# Patient Record
Sex: MALE | Race: BLACK OR AFRICAN AMERICAN | NOT HISPANIC OR LATINO
[De-identification: names, ages, dates, MRNs, and addresses within clinical notes are randomized per-mention and may not be internally consistent; named-entity substitution may affect disease eponyms.]

---

## 2017-10-31 ENCOUNTER — APPOINTMENT (OUTPATIENT)
Dept: INTERNAL MEDICINE | Facility: CLINIC | Age: 42
End: 2017-10-31
Payer: COMMERCIAL

## 2017-10-31 PROCEDURE — 99214 OFFICE O/P EST MOD 30 MIN: CPT

## 2018-01-04 ENCOUNTER — APPOINTMENT (OUTPATIENT)
Dept: INTERNAL MEDICINE | Facility: CLINIC | Age: 43
End: 2018-01-04

## 2018-01-26 ENCOUNTER — OUTPATIENT (OUTPATIENT)
Dept: OUTPATIENT SERVICES | Facility: HOSPITAL | Age: 43
LOS: 1 days | Discharge: HOME | End: 2018-01-26
Payer: COMMERCIAL

## 2018-01-26 DIAGNOSIS — C85.88 OTHER SPECIFIED TYPES OF NON-HODGKIN LYMPHOMA, LYMPH NODES OF MULTIPLE SITES: ICD-10-CM

## 2018-01-30 ENCOUNTER — RESULT REVIEW (OUTPATIENT)
Age: 43
End: 2018-01-30

## 2018-01-30 ENCOUNTER — APPOINTMENT (OUTPATIENT)
Dept: HEMATOLOGY ONCOLOGY | Facility: CLINIC | Age: 43
End: 2018-01-30
Payer: COMMERCIAL

## 2018-01-30 ENCOUNTER — LABORATORY RESULT (OUTPATIENT)
Age: 43
End: 2018-01-30

## 2018-01-30 VITALS
DIASTOLIC BLOOD PRESSURE: 100 MMHG | BODY MASS INDEX: 26.12 KG/M2 | HEIGHT: 68.98 IN | TEMPERATURE: 98.8 F | RESPIRATION RATE: 16 BRPM | OXYGEN SATURATION: 98 % | WEIGHT: 176.37 LBS | HEART RATE: 100 BPM | SYSTOLIC BLOOD PRESSURE: 150 MMHG

## 2018-01-30 LAB
BASOPHILS # BLD AUTO: 0.1 K/UL — SIGNIFICANT CHANGE UP (ref 0–0.2)
BASOPHILS NFR BLD AUTO: 0.7 % — SIGNIFICANT CHANGE UP (ref 0–2)
EOSINOPHIL # BLD AUTO: 0.2 K/UL — SIGNIFICANT CHANGE UP (ref 0–0.5)
EOSINOPHIL NFR BLD AUTO: 1.8 % — SIGNIFICANT CHANGE UP (ref 0–6)
HCT VFR BLD CALC: 43.7 % — SIGNIFICANT CHANGE UP (ref 39–50)
HGB BLD-MCNC: 15.1 G/DL — SIGNIFICANT CHANGE UP (ref 13–17)
LYMPHOCYTES # BLD AUTO: 4.1 K/UL — HIGH (ref 1–3.3)
LYMPHOCYTES # BLD AUTO: 49.8 % — HIGH (ref 13–44)
MCHC RBC-ENTMCNC: 30.1 PG — SIGNIFICANT CHANGE UP (ref 27–34)
MCHC RBC-ENTMCNC: 34.5 G/DL — SIGNIFICANT CHANGE UP (ref 32–36)
MCV RBC AUTO: 87.2 FL — SIGNIFICANT CHANGE UP (ref 80–100)
MONOCYTES # BLD AUTO: 0.6 K/UL — SIGNIFICANT CHANGE UP (ref 0–0.9)
MONOCYTES NFR BLD AUTO: 7.8 % — SIGNIFICANT CHANGE UP (ref 2–14)
NEUTROPHILS # BLD AUTO: 3.3 K/UL — SIGNIFICANT CHANGE UP (ref 1.8–7.4)
NEUTROPHILS NFR BLD AUTO: 39.9 % — LOW (ref 43–77)
PLATELET # BLD AUTO: 196 K/UL — SIGNIFICANT CHANGE UP (ref 150–400)
RBC # BLD: 5.01 M/UL — SIGNIFICANT CHANGE UP (ref 4.2–5.8)
RBC # FLD: 14.5 % — SIGNIFICANT CHANGE UP (ref 10.3–14.5)
WBC # BLD: 8.3 K/UL — SIGNIFICANT CHANGE UP (ref 3.8–10.5)
WBC # FLD AUTO: 8.3 K/UL — SIGNIFICANT CHANGE UP (ref 3.8–10.5)

## 2018-01-30 PROCEDURE — 99245 OFF/OP CONSLTJ NEW/EST HI 55: CPT | Mod: 25

## 2018-01-30 PROCEDURE — 38222 DX BONE MARROW BX & ASPIR: CPT | Mod: LT

## 2018-01-31 ENCOUNTER — APPOINTMENT (OUTPATIENT)
Dept: NUCLEAR MEDICINE | Facility: CLINIC | Age: 43
End: 2018-01-31
Payer: COMMERCIAL

## 2018-01-31 ENCOUNTER — RESULT REVIEW (OUTPATIENT)
Age: 43
End: 2018-01-31

## 2018-01-31 ENCOUNTER — OUTPATIENT (OUTPATIENT)
Dept: OUTPATIENT SERVICES | Facility: HOSPITAL | Age: 43
LOS: 1 days | End: 2018-01-31

## 2018-01-31 ENCOUNTER — APPOINTMENT (OUTPATIENT)
Dept: HEMATOLOGY ONCOLOGY | Facility: CLINIC | Age: 43
End: 2018-01-31
Payer: COMMERCIAL

## 2018-01-31 ENCOUNTER — OUTPATIENT (OUTPATIENT)
Dept: OUTPATIENT SERVICES | Facility: HOSPITAL | Age: 43
LOS: 1 days | End: 2018-01-31
Payer: COMMERCIAL

## 2018-01-31 VITALS
RESPIRATION RATE: 16 BRPM | TEMPERATURE: 97.9 F | OXYGEN SATURATION: 100 % | BODY MASS INDEX: 26.17 KG/M2 | HEART RATE: 90 BPM | SYSTOLIC BLOOD PRESSURE: 143 MMHG | DIASTOLIC BLOOD PRESSURE: 89 MMHG | WEIGHT: 177.11 LBS

## 2018-01-31 DIAGNOSIS — C85.88 OTHER SPECIFIED TYPES OF NON-HODGKIN LYMPHOMA, LYMPH NODES OF MULTIPLE SITES: ICD-10-CM

## 2018-01-31 DIAGNOSIS — C83.74: ICD-10-CM

## 2018-01-31 LAB
ALBUMIN SERPL ELPH-MCNC: 4.7 G/DL
ALP BLD-CCNC: 76 U/L
ALT SERPL-CCNC: 27 U/L
ANION GAP SERPL CALC-SCNC: 11 MMOL/L
APTT BLD: 28.2 SEC
AST SERPL-CCNC: 22 U/L
B2 MICROGLOB SERPL-MCNC: 2.5 MG/L
BILIRUB SERPL-MCNC: 0.2 MG/DL
BUN SERPL-MCNC: 13 MG/DL
CALCIUM SERPL-MCNC: 10.3 MG/DL
CHLORIDE SERPL-SCNC: 99 MMOL/L
CO2 SERPL-SCNC: 29 MMOL/L
CREAT SERPL-MCNC: 1.29 MG/DL
FIBRINOGEN PPP COAG.DERIVED-MCNC: 290 MG/DL
GLUCOSE SERPL-MCNC: 97 MG/DL
IGA SER QL IEP: 72 MG/DL
IGG SER QL IEP: 2170 MG/DL
IGM SER QL IEP: 717 MG/DL
INR PPP: 0.97 RATIO
LDH SERPL-CCNC: 212 U/L
PHOSPHATE SERPL-MCNC: 3.5 MG/DL
POTASSIUM SERPL-SCNC: 4.5 MMOL/L
PROT SERPL-MCNC: 8.9 G/DL
PT BLD: 11 SEC
SODIUM SERPL-SCNC: 139 MMOL/L
URATE SERPL-MCNC: 4 MG/DL

## 2018-01-31 PROCEDURE — 85097 BONE MARROW INTERPRETATION: CPT

## 2018-01-31 PROCEDURE — 78815 PET IMAGE W/CT SKULL-THIGH: CPT | Mod: 26,PI

## 2018-01-31 PROCEDURE — 38222 DX BONE MARROW BX & ASPIR: CPT | Mod: LT

## 2018-01-31 PROCEDURE — 88305 TISSUE EXAM BY PATHOLOGIST: CPT

## 2018-01-31 PROCEDURE — 88305 TISSUE EXAM BY PATHOLOGIST: CPT | Mod: 26

## 2018-01-31 PROCEDURE — 88185 FLOWCYTOMETRY/TC ADD-ON: CPT

## 2018-01-31 PROCEDURE — 88184 FLOWCYTOMETRY/ TC 1 MARKER: CPT

## 2018-01-31 PROCEDURE — 78472 GATED HEART PLANAR SINGLE: CPT | Mod: 26

## 2018-01-31 PROCEDURE — 88313 SPECIAL STAINS GROUP 2: CPT

## 2018-01-31 PROCEDURE — 88189 FLOWCYTOMETRY/READ 16 & >: CPT

## 2018-01-31 PROCEDURE — 88313 SPECIAL STAINS GROUP 2: CPT | Mod: 26

## 2018-02-05 LAB
HEMATOPATHOLOGY REPORT: SIGNIFICANT CHANGE UP
TM INTERPRETATION: SIGNIFICANT CHANGE UP

## 2018-02-07 ENCOUNTER — RESULT REVIEW (OUTPATIENT)
Age: 43
End: 2018-02-07

## 2018-02-07 PROCEDURE — 88321 CONSLTJ&REPRT SLD PREP ELSWR: CPT

## 2018-02-07 PROCEDURE — 88367 INSITU HYBRIDIZATION AUTO: CPT | Mod: 26,59

## 2018-02-07 PROCEDURE — 88360 TUMOR IMMUNOHISTOCHEM/MANUAL: CPT | Mod: 26,59

## 2018-02-07 PROCEDURE — 88365 INSITU HYBRIDIZATION (FISH): CPT | Mod: 26,59

## 2018-02-07 PROCEDURE — 88342 IMHCHEM/IMCYTCHM 1ST ANTB: CPT | Mod: 26,59

## 2018-02-07 PROCEDURE — 88341 IMHCHEM/IMCYTCHM EA ADD ANTB: CPT | Mod: 26,59

## 2018-02-11 ENCOUNTER — FORM ENCOUNTER (OUTPATIENT)
Age: 43
End: 2018-02-11

## 2018-02-12 ENCOUNTER — OUTPATIENT (OUTPATIENT)
Dept: OUTPATIENT SERVICES | Facility: HOSPITAL | Age: 43
LOS: 1 days | End: 2018-02-12
Payer: COMMERCIAL

## 2018-02-12 DIAGNOSIS — C85.10 UNSPECIFIED B-CELL LYMPHOMA, UNSPECIFIED SITE: ICD-10-CM

## 2018-02-12 PROCEDURE — 36561 INSERT TUNNELED CV CATH: CPT

## 2018-02-12 PROCEDURE — 76937 US GUIDE VASCULAR ACCESS: CPT

## 2018-02-12 PROCEDURE — 77001 FLUOROGUIDE FOR VEIN DEVICE: CPT | Mod: 26

## 2018-02-12 PROCEDURE — C1788: CPT

## 2018-02-12 PROCEDURE — 76937 US GUIDE VASCULAR ACCESS: CPT | Mod: 26

## 2018-02-12 PROCEDURE — C1894: CPT

## 2018-02-12 PROCEDURE — 77001 FLUOROGUIDE FOR VEIN DEVICE: CPT

## 2018-02-12 PROCEDURE — C1769: CPT

## 2018-02-14 DIAGNOSIS — Z45.2 ENCOUNTER FOR ADJUSTMENT AND MANAGEMENT OF VASCULAR ACCESS DEVICE: ICD-10-CM

## 2018-02-14 DIAGNOSIS — C85.10 UNSPECIFIED B-CELL LYMPHOMA, UNSPECIFIED SITE: ICD-10-CM

## 2018-02-22 ENCOUNTER — INPATIENT (INPATIENT)
Facility: HOSPITAL | Age: 43
LOS: 11 days | Discharge: HOME | DRG: 846 | End: 2018-03-06
Attending: INTERNAL MEDICINE | Admitting: INTERNAL MEDICINE
Payer: COMMERCIAL

## 2018-02-22 VITALS
RESPIRATION RATE: 18 BRPM | HEIGHT: 68 IN | DIASTOLIC BLOOD PRESSURE: 86 MMHG | SYSTOLIC BLOOD PRESSURE: 130 MMHG | WEIGHT: 176.15 LBS | TEMPERATURE: 98 F | HEART RATE: 105 BPM

## 2018-02-22 DIAGNOSIS — B20 HUMAN IMMUNODEFICIENCY VIRUS [HIV] DISEASE: ICD-10-CM

## 2018-02-22 DIAGNOSIS — C85.88 OTHER SPECIFIED TYPES OF NON-HODGKIN LYMPHOMA, LYMPH NODES OF MULTIPLE SITES: ICD-10-CM

## 2018-02-22 DIAGNOSIS — B99.9 UNSPECIFIED INFECTIOUS DISEASE: ICD-10-CM

## 2018-02-22 DIAGNOSIS — Z29.9 ENCOUNTER FOR PROPHYLACTIC MEASURES, UNSPECIFIED: ICD-10-CM

## 2018-02-22 DIAGNOSIS — C85.10 UNSPECIFIED B-CELL LYMPHOMA, UNSPECIFIED SITE: ICD-10-CM

## 2018-02-22 LAB
ALBUMIN SERPL ELPH-MCNC: 4.4 G/DL — SIGNIFICANT CHANGE UP (ref 3.3–5)
ALP SERPL-CCNC: 72 U/L — SIGNIFICANT CHANGE UP (ref 40–120)
ALT FLD-CCNC: 38 U/L RC — SIGNIFICANT CHANGE UP (ref 10–45)
ANION GAP SERPL CALC-SCNC: 11 MMOL/L — SIGNIFICANT CHANGE UP (ref 5–17)
APTT BLD: 32 SEC — SIGNIFICANT CHANGE UP (ref 27.5–37.4)
AST SERPL-CCNC: 22 U/L — SIGNIFICANT CHANGE UP (ref 10–40)
BASOPHILS # BLD AUTO: 0.1 K/UL — SIGNIFICANT CHANGE UP (ref 0–0.2)
BASOPHILS NFR BLD AUTO: 1 % — SIGNIFICANT CHANGE UP (ref 0–2)
BILIRUB SERPL-MCNC: 0.4 MG/DL — SIGNIFICANT CHANGE UP (ref 0.2–1.2)
BLD GP AB SCN SERPL QL: NEGATIVE — SIGNIFICANT CHANGE UP
BUN SERPL-MCNC: 11 MG/DL — SIGNIFICANT CHANGE UP (ref 7–23)
CALCIUM SERPL-MCNC: 9.5 MG/DL — SIGNIFICANT CHANGE UP (ref 8.4–10.5)
CHLORIDE SERPL-SCNC: 99 MMOL/L — SIGNIFICANT CHANGE UP (ref 96–108)
CO2 SERPL-SCNC: 26 MMOL/L — SIGNIFICANT CHANGE UP (ref 22–31)
CREAT SERPL-MCNC: 0.98 MG/DL — SIGNIFICANT CHANGE UP (ref 0.5–1.3)
D DIMER BLD IA.RAPID-MCNC: <150 NG/ML DDU — SIGNIFICANT CHANGE UP
EOSINOPHIL # BLD AUTO: 0.1 K/UL — SIGNIFICANT CHANGE UP (ref 0–0.5)
EOSINOPHIL NFR BLD AUTO: 0.9 % — SIGNIFICANT CHANGE UP (ref 0–6)
FIBRINOGEN PPP-MCNC: 288 MG/DL — LOW (ref 310–510)
GLUCOSE SERPL-MCNC: 95 MG/DL — SIGNIFICANT CHANGE UP (ref 70–99)
HCT VFR BLD CALC: 46 % — SIGNIFICANT CHANGE UP (ref 39–50)
HGB BLD-MCNC: 14.6 G/DL — SIGNIFICANT CHANGE UP (ref 13–17)
INR BLD: 1 RATIO — SIGNIFICANT CHANGE UP (ref 0.88–1.16)
LDH SERPL L TO P-CCNC: 193 U/L — SIGNIFICANT CHANGE UP (ref 50–242)
LYMPHOCYTES # BLD AUTO: 2.7 K/UL — SIGNIFICANT CHANGE UP (ref 1–3.3)
LYMPHOCYTES # BLD AUTO: 44.7 % — HIGH (ref 13–44)
MAGNESIUM SERPL-MCNC: 1.9 MG/DL — SIGNIFICANT CHANGE UP (ref 1.6–2.6)
MCHC RBC-ENTMCNC: 28.4 PG — SIGNIFICANT CHANGE UP (ref 27–34)
MCHC RBC-ENTMCNC: 31.7 GM/DL — LOW (ref 32–36)
MCV RBC AUTO: 89.5 FL — SIGNIFICANT CHANGE UP (ref 80–100)
MONOCYTES # BLD AUTO: 0.5 K/UL — SIGNIFICANT CHANGE UP (ref 0–0.9)
MONOCYTES NFR BLD AUTO: 8.6 % — SIGNIFICANT CHANGE UP (ref 2–14)
NEUTROPHILS # BLD AUTO: 2.7 K/UL — SIGNIFICANT CHANGE UP (ref 1.8–7.4)
NEUTROPHILS NFR BLD AUTO: 44.7 % — SIGNIFICANT CHANGE UP (ref 43–77)
PHOSPHATE SERPL-MCNC: 3.1 MG/DL — SIGNIFICANT CHANGE UP (ref 2.5–4.5)
PLATELET # BLD AUTO: 215 K/UL — SIGNIFICANT CHANGE UP (ref 150–400)
POTASSIUM SERPL-MCNC: 3.9 MMOL/L — SIGNIFICANT CHANGE UP (ref 3.5–5.3)
POTASSIUM SERPL-SCNC: 3.9 MMOL/L — SIGNIFICANT CHANGE UP (ref 3.5–5.3)
PROT SERPL-MCNC: 9.2 G/DL — HIGH (ref 6–8.3)
PROTHROM AB SERPL-ACNC: 10.9 SEC — SIGNIFICANT CHANGE UP (ref 9.8–12.7)
RBC # BLD: 5.14 M/UL — SIGNIFICANT CHANGE UP (ref 4.2–5.8)
RBC # FLD: 14 % — SIGNIFICANT CHANGE UP (ref 10.3–14.5)
RH IG SCN BLD-IMP: POSITIVE — SIGNIFICANT CHANGE UP
SODIUM SERPL-SCNC: 136 MMOL/L — SIGNIFICANT CHANGE UP (ref 135–145)
URATE SERPL-MCNC: 5.4 MG/DL — SIGNIFICANT CHANGE UP (ref 3.4–8.8)
WBC # BLD: 6.1 K/UL — SIGNIFICANT CHANGE UP (ref 3.8–10.5)
WBC # FLD AUTO: 6.1 K/UL — SIGNIFICANT CHANGE UP (ref 3.8–10.5)

## 2018-02-22 RX ORDER — ALLOPURINOL 300 MG
300 TABLET ORAL DAILY
Qty: 0 | Refills: 0 | Status: DISCONTINUED | OUTPATIENT
Start: 2018-02-22 | End: 2018-03-06

## 2018-02-22 RX ORDER — INFLUENZA VIRUS VACCINE 15; 15; 15; 15 UG/.5ML; UG/.5ML; UG/.5ML; UG/.5ML
0.5 SUSPENSION INTRAMUSCULAR ONCE
Qty: 0 | Refills: 0 | Status: COMPLETED | OUTPATIENT
Start: 2018-02-22 | End: 2018-02-22

## 2018-02-22 RX ORDER — ABACAVIR 20 MG/ML
600 SOLUTION ORAL DAILY
Qty: 0 | Refills: 0 | Status: DISCONTINUED | OUTPATIENT
Start: 2018-02-22 | End: 2018-02-22

## 2018-02-22 RX ORDER — ACETAMINOPHEN 500 MG
650 TABLET ORAL ONCE
Qty: 0 | Refills: 0 | Status: COMPLETED | OUTPATIENT
Start: 2018-02-22 | End: 2018-02-22

## 2018-02-22 RX ORDER — HYDROCORTISONE 20 MG
100 TABLET ORAL ONCE
Qty: 0 | Refills: 0 | Status: DISCONTINUED | OUTPATIENT
Start: 2018-02-22 | End: 2018-03-06

## 2018-02-22 RX ORDER — ENOXAPARIN SODIUM 100 MG/ML
40 INJECTION SUBCUTANEOUS DAILY
Qty: 0 | Refills: 0 | Status: DISCONTINUED | OUTPATIENT
Start: 2018-02-22 | End: 2018-02-23

## 2018-02-22 RX ORDER — ACYCLOVIR SODIUM 500 MG
400 VIAL (EA) INTRAVENOUS THREE TIMES A DAY
Qty: 0 | Refills: 0 | Status: DISCONTINUED | OUTPATIENT
Start: 2018-02-22 | End: 2018-03-06

## 2018-02-22 RX ORDER — RITUXIMAB 10 MG/ML
728 INJECTION, SOLUTION INTRAVENOUS ONCE
Qty: 0 | Refills: 0 | Status: COMPLETED | OUTPATIENT
Start: 2018-02-22 | End: 2018-02-26

## 2018-02-22 RX ORDER — DIPHENHYDRAMINE HCL 50 MG
50 CAPSULE ORAL ONCE
Qty: 0 | Refills: 0 | Status: COMPLETED | OUTPATIENT
Start: 2018-02-22 | End: 2018-02-22

## 2018-02-22 RX ORDER — LAMIVUDINE 150 MG
300 TABLET ORAL DAILY
Qty: 0 | Refills: 0 | Status: DISCONTINUED | OUTPATIENT
Start: 2018-02-22 | End: 2018-02-22

## 2018-02-22 RX ORDER — DIPHENHYDRAMINE HCL 50 MG
50 CAPSULE ORAL ONCE
Qty: 0 | Refills: 0 | Status: COMPLETED | OUTPATIENT
Start: 2018-02-22 | End: 2018-02-23

## 2018-02-22 RX ADMIN — Medication 50 MILLIGRAM(S): at 15:30

## 2018-02-22 RX ADMIN — Medication 400 MILLIGRAM(S): at 21:17

## 2018-02-22 RX ADMIN — Medication 650 MILLIGRAM(S): at 15:30

## 2018-02-22 RX ADMIN — Medication 300 MILLIGRAM(S): at 18:11

## 2018-02-22 NOTE — H&P ADULT - ASSESSMENT
Patient is a 41-year-old male who is HIV-positive and has been diagnosed with a high-grade B-cell, CD10+ lymphoma admitted for cycle 1 DA-R-EPOCH.

## 2018-02-22 NOTE — PATIENT PROFILE ADULT. - LANGUAGE ASSISTANCE NEEDED
No-Patient/Caregiver offered and refused free interpretation services./Analy at bedside to translate

## 2018-02-22 NOTE — ADVANCED PRACTICE NURSE CONSULT - ASSESSMENT
Pt admitted for chemotherapy oriented to unit routine alert and oriented times four. Pt ambulate to bathroom with steady gait no distress noted. Mediport to right chest wall access by primary nurse, remain  with + blood return and flush easily. Chemotherapy teaching done with help of  pt verbalized understanding, also given drug card which outline side effect. Pt lab result was reviewed by Dr Cortes. Pt was pre-medicated with Tylenol 650 mg PO, and Benadryl 50 mg ivss. Drug verification done with primary nurse. At 1615 start Rituxan 375 mg/m2= 728 mg iv to run over 4-6 hours. Pt was monitor during the initial hour vital sign taken and charted no reaction noted. At 1930 pt complain of itching generalized  stp Rituxan and notified NP, order Benadryl repeated dose done. At 2015 will resume infusion pt stated felt better. Report given to primary nurse. Left pt in bed sleeping. Pt admitted for chemotherapy oriented to unit routine alert and oriented times four. Pt ambulate to bathroom with steady gait no distress noted. Mediport to right chest wall access by primary nurse, remain  with + blood return and flush easily. Chemotherapy teaching done with help of  pt verbalized understanding, also given drug card which outline side effect. Pt lab result was reviewed by Dr Cortes. Pt was pre-medicated with Tylenol 650 mg PO, and Benadryl 50 mg ivss. Drug verification done with primary nurse. At 1615 start Rituxan 375 mg/m2= 728 mg iv to run over 4-6 hours. Pt was monitor during the initial hour vital sign taken and charted no reaction noted. At 1930 pt complain of itching generalized  stp Rituxan and notified NP, order Benadryl repeated dose done. At 2015 resume infusion pt stated felt better. Report given to primary nurse. Left pt in bed with visitors at bedside.

## 2018-02-22 NOTE — H&P ADULT - PROBLEM SELECTOR PLAN 1
Admit to [facility]  Start R-EPOCH  LP with IT MTX 2/22, follow up flow results  CBC w diff daily  CMP, TLS labs, coags daily  Strict I&O's, daily weight, mouth care Admit to [facility]  Start R-EPOCH  LP with IT MTX 2/23, follow up flow results  CBC w diff daily, transfuse prn  CMP, replenish prn  TLS labs, coags daily  continue Allopurinol  Strict I&O's, daily weight, mouth care  Right Chest Mediport may access

## 2018-02-23 ENCOUNTER — OUTPATIENT (OUTPATIENT)
Dept: OUTPATIENT SERVICES | Facility: HOSPITAL | Age: 43
LOS: 1 days | Discharge: HOME | End: 2018-02-23

## 2018-02-23 ENCOUNTER — TRANSCRIPTION ENCOUNTER (OUTPATIENT)
Age: 43
End: 2018-02-23

## 2018-02-23 DIAGNOSIS — C85.88 OTHER SPECIFIED TYPES OF NON-HODGKIN LYMPHOMA, LYMPH NODES OF MULTIPLE SITES: ICD-10-CM

## 2018-02-23 LAB
ALBUMIN SERPL ELPH-MCNC: 3.8 G/DL — SIGNIFICANT CHANGE UP (ref 3.3–5)
ALP SERPL-CCNC: 66 U/L — SIGNIFICANT CHANGE UP (ref 40–120)
ALT FLD-CCNC: 39 U/L RC — SIGNIFICANT CHANGE UP (ref 10–45)
ANION GAP SERPL CALC-SCNC: 11 MMOL/L — SIGNIFICANT CHANGE UP (ref 5–17)
APPEARANCE CSF: CLEAR — SIGNIFICANT CHANGE UP
APPEARANCE SPUN FLD: COLORLESS — SIGNIFICANT CHANGE UP
AST SERPL-CCNC: 27 U/L — SIGNIFICANT CHANGE UP (ref 10–40)
BASOPHILS # BLD AUTO: 0 K/UL — SIGNIFICANT CHANGE UP (ref 0–0.2)
BASOPHILS NFR BLD AUTO: 0.4 % — SIGNIFICANT CHANGE UP (ref 0–2)
BILIRUB SERPL-MCNC: 0.3 MG/DL — SIGNIFICANT CHANGE UP (ref 0.2–1.2)
BUN SERPL-MCNC: 9 MG/DL — SIGNIFICANT CHANGE UP (ref 7–23)
CALCIUM SERPL-MCNC: 8.9 MG/DL — SIGNIFICANT CHANGE UP (ref 8.4–10.5)
CHLORIDE SERPL-SCNC: 100 MMOL/L — SIGNIFICANT CHANGE UP (ref 96–108)
CO2 SERPL-SCNC: 24 MMOL/L — SIGNIFICANT CHANGE UP (ref 22–31)
COLOR CSF: SIGNIFICANT CHANGE UP
CREAT SERPL-MCNC: 1.02 MG/DL — SIGNIFICANT CHANGE UP (ref 0.5–1.3)
EOSINOPHIL # BLD AUTO: 0.1 K/UL — SIGNIFICANT CHANGE UP (ref 0–0.5)
EOSINOPHIL NFR BLD AUTO: 2.4 % — SIGNIFICANT CHANGE UP (ref 0–6)
GLUCOSE CSF-MCNC: 65 MG/DL — SIGNIFICANT CHANGE UP (ref 40–70)
GLUCOSE SERPL-MCNC: 110 MG/DL — HIGH (ref 70–99)
HCT VFR BLD CALC: 44.8 % — SIGNIFICANT CHANGE UP (ref 39–50)
HGB BLD-MCNC: 14.2 G/DL — SIGNIFICANT CHANGE UP (ref 13–17)
LDH CSF L TO P-CCNC: 19 U/L — SIGNIFICANT CHANGE UP
LDH FLD-CCNC: 19 U/L — SIGNIFICANT CHANGE UP
LDH SERPL L TO P-CCNC: 288 U/L — HIGH (ref 50–242)
LYMPHOCYTES # BLD AUTO: 0.6 K/UL — LOW (ref 1–3.3)
LYMPHOCYTES # BLD AUTO: 11 % — LOW (ref 13–44)
LYMPHOCYTES # CSF: 99 % — HIGH (ref 40–80)
MAGNESIUM SERPL-MCNC: 1.7 MG/DL — SIGNIFICANT CHANGE UP (ref 1.6–2.6)
MCHC RBC-ENTMCNC: 28.2 PG — SIGNIFICANT CHANGE UP (ref 27–34)
MCHC RBC-ENTMCNC: 31.6 GM/DL — LOW (ref 32–36)
MCV RBC AUTO: 89.1 FL — SIGNIFICANT CHANGE UP (ref 80–100)
MONOCYTES # BLD AUTO: 0.1 K/UL — SIGNIFICANT CHANGE UP (ref 0–0.9)
MONOCYTES NFR BLD AUTO: 2.5 % — SIGNIFICANT CHANGE UP (ref 2–14)
MONOS+MACROS NFR CSF: 1 % — LOW (ref 15–45)
NEUTROPHILS # BLD AUTO: 4.4 K/UL — SIGNIFICANT CHANGE UP (ref 1.8–7.4)
NEUTROPHILS # CSF: 0 % — SIGNIFICANT CHANGE UP (ref 0–6)
NEUTROPHILS NFR BLD AUTO: 83.7 % — HIGH (ref 43–77)
NRBC NFR CSF: 4 /UL — SIGNIFICANT CHANGE UP (ref 0–5)
PHOSPHATE SERPL-MCNC: 2.5 MG/DL — SIGNIFICANT CHANGE UP (ref 2.5–4.5)
PLATELET # BLD AUTO: 205 K/UL — SIGNIFICANT CHANGE UP (ref 150–400)
POTASSIUM SERPL-MCNC: 4.1 MMOL/L — SIGNIFICANT CHANGE UP (ref 3.5–5.3)
POTASSIUM SERPL-SCNC: 4.1 MMOL/L — SIGNIFICANT CHANGE UP (ref 3.5–5.3)
PROT CSF-MCNC: 42 MG/DL — SIGNIFICANT CHANGE UP (ref 15–45)
PROT SERPL-MCNC: 8.1 G/DL — SIGNIFICANT CHANGE UP (ref 6–8.3)
RBC # BLD: 5.03 M/UL — SIGNIFICANT CHANGE UP (ref 4.2–5.8)
RBC # CSF: 0 /UL — SIGNIFICANT CHANGE UP (ref 0–0)
RBC # FLD: 14 % — SIGNIFICANT CHANGE UP (ref 10.3–14.5)
SODIUM SERPL-SCNC: 135 MMOL/L — SIGNIFICANT CHANGE UP (ref 135–145)
TUBE TYPE: SIGNIFICANT CHANGE UP
URATE SERPL-MCNC: 4.1 MG/DL — SIGNIFICANT CHANGE UP (ref 3.4–8.8)
WBC # BLD: 5.3 K/UL — SIGNIFICANT CHANGE UP (ref 3.8–10.5)
WBC # FLD AUTO: 5.3 K/UL — SIGNIFICANT CHANGE UP (ref 3.8–10.5)

## 2018-02-23 PROCEDURE — 88188 FLOWCYTOMETRY/READ 9-15: CPT | Mod: 59

## 2018-02-23 PROCEDURE — 99232 SBSQ HOSP IP/OBS MODERATE 35: CPT | Mod: GC

## 2018-02-23 PROCEDURE — 71045 X-RAY EXAM CHEST 1 VIEW: CPT | Mod: 26

## 2018-02-23 PROCEDURE — 88108 CYTOPATH CONCENTRATE TECH: CPT | Mod: 26,59

## 2018-02-23 PROCEDURE — 77003 FLUOROGUIDE FOR SPINE INJECT: CPT | Mod: 26

## 2018-02-23 PROCEDURE — 93010 ELECTROCARDIOGRAM REPORT: CPT

## 2018-02-23 PROCEDURE — 62272 THER SPI PNXR DRG CSF: CPT

## 2018-02-23 RX ORDER — SODIUM CHLORIDE 9 MG/ML
500 INJECTION INTRAMUSCULAR; INTRAVENOUS; SUBCUTANEOUS ONCE
Qty: 0 | Refills: 0 | Status: COMPLETED | OUTPATIENT
Start: 2018-02-23 | End: 2018-02-23

## 2018-02-23 RX ORDER — METHOTREXATE 2.5 MG/1
12 TABLET ORAL ONCE
Qty: 0 | Refills: 0 | Status: COMPLETED | OUTPATIENT
Start: 2018-02-23 | End: 2018-02-26

## 2018-02-23 RX ORDER — METOCLOPRAMIDE HCL 10 MG
10 TABLET ORAL EVERY 6 HOURS
Qty: 0 | Refills: 0 | Status: DISCONTINUED | OUTPATIENT
Start: 2018-02-23 | End: 2018-02-23

## 2018-02-23 RX ORDER — ONDANSETRON 8 MG/1
8 TABLET, FILM COATED ORAL EVERY 8 HOURS
Qty: 0 | Refills: 0 | Status: COMPLETED | OUTPATIENT
Start: 2018-02-23 | End: 2018-02-28

## 2018-02-23 RX ORDER — DOXORUBICIN HYDROCHLORIDE 2 MG/ML
19 INJECTION, SOLUTION INTRAVENOUS DAILY
Qty: 0 | Refills: 0 | Status: COMPLETED | OUTPATIENT
Start: 2018-02-23 | End: 2018-02-27

## 2018-02-23 RX ORDER — ACYCLOVIR SODIUM 500 MG
1 VIAL (EA) INTRAVENOUS
Qty: 0 | Refills: 0 | COMMUNITY
Start: 2018-02-23

## 2018-02-23 RX ORDER — ONDANSETRON 8 MG/1
8 TABLET, FILM COATED ORAL EVERY 8 HOURS
Qty: 0 | Refills: 0 | Status: DISCONTINUED | OUTPATIENT
Start: 2018-02-23 | End: 2018-02-23

## 2018-02-23 RX ORDER — FOSAPREPITANT DIMEGLUMINE 150 MG/5ML
150 INJECTION, POWDER, LYOPHILIZED, FOR SOLUTION INTRAVENOUS ONCE
Qty: 0 | Refills: 0 | Status: COMPLETED | OUTPATIENT
Start: 2018-02-23 | End: 2018-02-23

## 2018-02-23 RX ORDER — METOCLOPRAMIDE HCL 10 MG
10 TABLET ORAL EVERY 6 HOURS
Qty: 0 | Refills: 0 | Status: DISCONTINUED | OUTPATIENT
Start: 2018-02-23 | End: 2018-03-06

## 2018-02-23 RX ORDER — ETOPOSIDE 20 MG/ML
97 VIAL (ML) INTRAVENOUS DAILY
Qty: 0 | Refills: 0 | Status: COMPLETED | OUTPATIENT
Start: 2018-02-23 | End: 2018-02-27

## 2018-02-23 RX ORDER — ACETAMINOPHEN 500 MG
650 TABLET ORAL EVERY 6 HOURS
Qty: 0 | Refills: 0 | Status: DISCONTINUED | OUTPATIENT
Start: 2018-02-23 | End: 2018-03-06

## 2018-02-23 RX ADMIN — Medication 50 MILLIGRAM(S): at 00:10

## 2018-02-23 RX ADMIN — Medication 1 TABLET(S): at 11:26

## 2018-02-23 RX ADMIN — ONDANSETRON 8 MILLIGRAM(S): 8 TABLET, FILM COATED ORAL at 21:21

## 2018-02-23 RX ADMIN — SODIUM CHLORIDE 500 MILLILITER(S): 9 INJECTION INTRAMUSCULAR; INTRAVENOUS; SUBCUTANEOUS at 23:34

## 2018-02-23 RX ADMIN — FOSAPREPITANT DIMEGLUMINE 300 MILLIGRAM(S): 150 INJECTION, POWDER, LYOPHILIZED, FOR SOLUTION INTRAVENOUS at 17:30

## 2018-02-23 RX ADMIN — Medication 400 MILLIGRAM(S): at 05:28

## 2018-02-23 RX ADMIN — Medication 115 MILLIGRAM(S): at 18:44

## 2018-02-23 RX ADMIN — Medication 650 MILLIGRAM(S): at 22:15

## 2018-02-23 RX ADMIN — Medication 400 MILLIGRAM(S): at 21:21

## 2018-02-23 RX ADMIN — Medication 300 MILLIGRAM(S): at 11:26

## 2018-02-23 NOTE — DISCHARGE NOTE ADULT - PATIENT PORTAL LINK FT
You can access the [facility] Portal, offered by [facility], by registering with the following website: [email or web address]

## 2018-02-23 NOTE — PROGRESS NOTE ADULT - ATTENDING COMMENTS
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 On Bactrim/Acyclovir prophylaxis. ID consult called

## 2018-02-23 NOTE — PROGRESS NOTE ADULT - SUBJECTIVE AND OBJECTIVE BOX
CLINICAL INDICATION:     Patient presents for fluoroscopically guided lumbar puncture and intrathecal chemotherapy administration.      Risks and benefits were discussed with the patient and/or patient health care proxy.  Risks discussed included bleeding, infection, nerve damage, and headache.       Status post lumbar puncture at the L2-L3 level utilizing a 22G spinal needle.      6cc of clear cerebral spinal fluid was obtained.    12mg of methotrexate was intrathecally injected.      No immediate complications.

## 2018-02-23 NOTE — DISCHARGE NOTE ADULT - PROVIDER TOKENS
TOKEN:'[id number]' TOKEN:'[id number]',TOKEN:'[id number]' TOKEN:'[id number]',TOKEN:'[id number]',TOKEN:'[id number]'

## 2018-02-23 NOTE — DISCHARGE NOTE ADULT - CARE PROVIDERS DIRECT ADDRESSES
,[email or web address],DirectAddress_Unknown

## 2018-02-23 NOTE — ADVANCED PRACTICE NURSE CONSULT - ASSESSMENT
Labs today WBC 5.3 HGB 14.2 HCT 44.8 PLTS 25 CR. 1.02 TBILI. 0.3 Pt found in bed alert and oriented x4, v/s stable afebrile, n/c offered.  Right chest wall mediport in place. Site without redness or swelling. Lumen previously accessed with + blood return flushes well with NS. Pt premedicated with  Zofran 8 mg and emend 150mg  IVSS prior to chemotherapy. Chemotherapy verified by 2 RNs prior to administration. at 1800 treated with doxorubicin 10mg/m2=19mg,etoposide 50mg/m2= 97mg and vincristine 0.4mg/m2= 0.8mg civi. Pt remains in bed with n/c offered. Primary RN aware of treatment plan.

## 2018-02-23 NOTE — DISCHARGE NOTE ADULT - MEDICATION SUMMARY - MEDICATIONS TO TAKE
I will START or STAY ON the medications listed below when I get home from the hospital:    predniSONE 5 mg oral tablet  -- 23 tab(s) by mouth 2 times a day, one dose tonite 2/27 and one dose on 2/28 am dose and then stop.  -- Indication: For anti-inflammatory    Reglan 10 mg oral tablet  -- 1 tab(s) by mouth every 6 hours, As Needed  -- Indication: For anti-nausea    allopurinol 300 mg oral tablet  -- 1 tab(s) by mouth once a day x2 days and then stop.  -- Indication: For tumor lysis    Triumeq oral tablet  -- 1 tab(s) by mouth once a day  -- Indication: For anti-retroviral    acyclovir 400 mg oral tablet  -- 1 tab(s) by mouth 3 times a day  -- Indication: For anti-viral    sulfamethoxazole-trimethoprim 800 mg-160 mg oral tablet  -- 1 tab(s) by mouth Monday, Wednesday, and Friday  -- Indication: For Prophylactic measure I will START or STAY ON the medications listed below when I get home from the hospital:    Reglan 10 mg oral tablet  -- 1 tab(s) by mouth every 6 hours, As Needed  -- Indication: For anti-nausea    allopurinol 300 mg oral tablet  -- 1 tab(s) by mouth once a day x2 days and then stop.  -- Indication: For tumor lysis    Triumeq oral tablet  -- 1 tab(s) by mouth once a day  -- Indication: For HIV (human immunodeficiency virus infection)    acyclovir 400 mg oral tablet  -- 1 tab(s) by mouth 3 times a day  -- Indication: For Prophylaxis    sulfamethoxazole-trimethoprim 800 mg-160 mg oral tablet  -- 1 tab(s) by mouth Monday, Wednesday, and Friday  -- Indication: For Prophylaxis I will START or STAY ON the medications listed below when I get home from the hospital:    Reglan 10 mg oral tablet  -- 1 tab(s) by mouth every 6 hours, As Needed  -- Indication: For anti-nausea    acyclovir 400 mg oral tablet  -- 1 tab(s) by mouth 3 times a day  -- Indication: For Prophylaxis    Triumeq oral tablet  -- 1 tab(s) by mouth once a day  -- Indication: For HIV (human immunodeficiency virus infection)    sulfamethoxazole-trimethoprim 800 mg-160 mg oral tablet  -- 1 tab(s) by mouth Monday, Wednesday, and Friday  -- Indication: For Prophylaxis I will START or STAY ON the medications listed below when I get home from the hospital:    Reglan 10 mg oral tablet  -- 1 tab(s) by mouth every 6 hours, As Needed  -- Indication: For anti-nausea    acyclovir 400 mg oral tablet  -- 1 tab(s) by mouth 3 times a day  -- Indication: For Prophylaxis    Triumeq oral tablet  -- 1 tab(s) by mouth once a day  -- Indication: For HIV (human immunodeficiency virus infection)    Mepron 750 mg/5 mL oral suspension  -- 5 milliliter(s) by mouth 2 times a day   -- Finish all this medication unless otherwise directed by prescriber.  Shake well before use.  Take with food or milk.    -- Indication: For Prophylactic measure    sulfamethoxazole-trimethoprim 800 mg-160 mg oral tablet  -- 1 tab(s) by mouth Monday, Wednesday, and Friday  -- Indication: For Prophylactic measure I will START or STAY ON the medications listed below when I get home from the hospital:    Reglan 10 mg oral tablet  -- 1 tab(s) by mouth every 6 hours, As Needed  -- Indication: For anti-nausea    acyclovir 400 mg oral tablet  -- 1 tab(s) by mouth 3 times a day  -- Indication: For Prophylaxis    Triumeq oral tablet  -- 1 tab(s) by mouth once a day  -- Indication: For HIV (human immunodeficiency virus infection)    Mepron 750 mg/5 mL oral suspension  -- 5 milliliter(s) by mouth 2 times a day   -- Finish all this medication unless otherwise directed by prescriber.  Shake well before use.  Take with food or milk.    -- Indication: For Prophylactic measure

## 2018-02-23 NOTE — PROGRESS NOTE ADULT - PROBLEM SELECTOR PLAN 1
Start DA- R-EPOCH  LP with IT MTX 2/23, follow up flow results  CBC w diff daily, transfuse prn  CMP, replenish prn  TLS labs, coags daily  continue Allopurinol  Strict I&O's, daily weight, mouth care  Right Chest Mediport may access

## 2018-02-23 NOTE — DISCHARGE NOTE ADULT - PLAN OF CARE
maintain counts, remain free from infection Notify MD and report to ER for any temperature greater than or equal to 100.4 degrees, intractable nausea, vomiting, diarrhea, or uncontrolled bleeding. maintain CD4 counts, keep viral load down continue Triumeq as prescribed, may need to call specialty pharmacy to arrange delivery continue Triumeq as prescribed, follow up with Infectious disease clininc and/or Dr. Craft continue Triumeq as prescribed, follow up with Infectious disease clinic and/or Dr. Craft continue Triumeq as prescribed, follow up with Infectious disease as directed in care provider section below continue Triumeq as prescribed, follow up with Infectious disease as directed in care provider section below. Follow up in ID clinic (Dr. Berry) by calling [phone] Pickup medication at [facility] and continue Triumeq as prescribed, follow up with Infectious disease as directed in care provider section below. Follow up in ID clinic (Dr. Berry) by calling [phone]

## 2018-02-23 NOTE — DISCHARGE NOTE ADULT - CARE PLAN
Principal Discharge DX:	High grade B-cell lymphoma  Goal:	maintain counts, remain free from infection  Assessment and plan of treatment:	Notify MD and report to ER for any temperature greater than or equal to 100.4 degrees, intractable nausea, vomiting, diarrhea, or uncontrolled bleeding.  Secondary Diagnosis:	HIV (human immunodeficiency virus infection)  Goal:	maintain CD4 counts, keep viral load down  Assessment and plan of treatment:	continue Triumeq as prescribed, may need to call specialty pharmacy to arrange delivery Principal Discharge DX:	High grade B-cell lymphoma  Goal:	maintain counts, remain free from infection  Assessment and plan of treatment:	Notify MD and report to ER for any temperature greater than or equal to 100.4 degrees, intractable nausea, vomiting, diarrhea, or uncontrolled bleeding.  Secondary Diagnosis:	HIV (human immunodeficiency virus infection)  Goal:	maintain CD4 counts, keep viral load down  Assessment and plan of treatment:	continue Triumeq as prescribed, follow up with Infectious disease clininc and/or Dr. Craft Principal Discharge DX:	High grade B-cell lymphoma  Goal:	maintain counts, remain free from infection  Assessment and plan of treatment:	Notify MD and report to ER for any temperature greater than or equal to 100.4 degrees, intractable nausea, vomiting, diarrhea, or uncontrolled bleeding.  Secondary Diagnosis:	HIV (human immunodeficiency virus infection)  Goal:	maintain CD4 counts, keep viral load down  Assessment and plan of treatment:	continue Triumeq as prescribed, follow up with Infectious disease clinic and/or Dr. Craft Principal Discharge DX:	High grade B-cell lymphoma  Goal:	maintain counts, remain free from infection  Assessment and plan of treatment:	Notify MD and report to ER for any temperature greater than or equal to 100.4 degrees, intractable nausea, vomiting, diarrhea, or uncontrolled bleeding.  Secondary Diagnosis:	HIV (human immunodeficiency virus infection)  Goal:	maintain CD4 counts, keep viral load down  Assessment and plan of treatment:	continue Triumeq as prescribed, follow up with Infectious disease as directed in care provider section below Principal Discharge DX:	High grade B-cell lymphoma  Goal:	maintain counts, remain free from infection  Assessment and plan of treatment:	Notify MD and report to ER for any temperature greater than or equal to 100.4 degrees, intractable nausea, vomiting, diarrhea, or uncontrolled bleeding.  Secondary Diagnosis:	HIV (human immunodeficiency virus infection)  Goal:	maintain CD4 counts, keep viral load down  Assessment and plan of treatment:	continue Triumeq as prescribed, follow up with Infectious disease as directed in care provider section below. Follow up in ID clinic (Dr. Berry) by calling [phone] Principal Discharge DX:	High grade B-cell lymphoma  Goal:	maintain counts, remain free from infection  Assessment and plan of treatment:	Notify MD and report to ER for any temperature greater than or equal to 100.4 degrees, intractable nausea, vomiting, diarrhea, or uncontrolled bleeding.  Secondary Diagnosis:	HIV (human immunodeficiency virus infection)  Goal:	maintain CD4 counts, keep viral load down  Assessment and plan of treatment:	Pickup medication at [facility] and continue Triumeq as prescribed, follow up with Infectious disease as directed in care provider section below. Follow up in ID clinic (Dr. Berry) by calling [phone]

## 2018-02-23 NOTE — DISCHARGE NOTE ADULT - ADDITIONAL INSTRUCTIONS
To [facility] on  Thursday 3/1/18 at 12:00 noon for Neulasta injection  To [facility] on Monday 3/5/18 at 9:20 am with LUIZ Thomas. To [facility] on  Thursday 3/1/18 at 12:00 noon for Neulasta injection  To [facility] on Monday 3/5/18 at 9:20 am with LUIZ Thomas and blood work. You are scheduled to have blood work performed on thursday 3/8/18. To [facility] on  Thursday 3/1/18 at 12:00 noon for Neulasta injection  To [facility] on Monday 3/5/18 at 9:20 am with LUIZ Thomas and blood work. You are scheduled to have blood work performed on Thursday 3/8/18. To [facility] on  Friday 3/2/18 at 1:20 pm for Neulasta injection  To [facility] on Monday 3/5/18 at 9:20 am with LUIZ Thomas and blood work. You are scheduled to have blood work performed on Thursday 3/8/18. To [email or web address] on Monday 3/5/18 at 9:20 am with LUIZ Thomas and blood work. You are scheduled to have blood work performed on Thursday 3/8/18. To [facility] on Saturday 3/3/18 at 1240 pm for Neluasta injection.  To [facility] on Monday 3/5/18 at 9:20 am with LUIZ Thomas and blood work. You are scheduled to have blood work performed on Thursday 3/8/18. To [facility] on Saturday 3/3/18 at 1240 pm for Neluasta injection.  To [facility] on Monday 3/5/18 at 9:20 am with LUIZ Thomas and blood work.  You are scheduled for possible platelets at      . You are scheduled for a lumbar puncture as an outpatient at the hospital at 2:30pm on Monday 3/5 as well.  You are scheduled to have blood work performed on Thursday 3/8/18. You will be requiring an LP as an outpatient as well. To [facility] on Saturday 3/3/18 at 1240 pm for Neluasta injection.  To [facility] on Monday 3/5/18 at 9:20 am with LUIZ Thomas and blood work.  You are scheduled for possible platelets at 9am.    . You are scheduled for a lumbar puncture as an outpatient at the hospital at 2:30pm on Monday 3/5 as well.  You are scheduled to have blood work performed on Thursday 3/8/18. You will be requiring an LP as an outpatient as well. To [facility] on ________ with NP Amira Thomas and blood work.   You are scheduled to have blood work performed on Thursday 3/8/18. You will be requiring an LP as an outpatient as well.  Please call ID clinic by calling [phone] to follow up To [facility] on Friday 3/9/16 at _________with NP Amira Thomas and blood work.     Please call Infectious Disease clinic by calling [phone] to make appointment To [facility] on Friday 3/9/16 at 11:00 am with LUIZ Crawford and blood work.     Please call Infectious Disease clinic by calling [phone] to make appointment with Dr. Berry.

## 2018-02-23 NOTE — PROGRESS NOTE ADULT - PROBLEM SELECTOR PLAN 2
continue Triumeq 1 tab daily  last CD4 in 800's December 2017 continue Triumeq 1 tab daily  last CD4 in 800's December 2017  follow up Infectious Disease

## 2018-02-23 NOTE — DISCHARGE NOTE ADULT - CARE PROVIDER_API CALL
Niall Cortes), Hematology; Internal Medicine; Medical Oncology  [address] [place] [address]  Phone: [phone]  Fax: [phone] Niall Cortes), Hematology; Internal Medicine; Medical Oncology  [address] [place] [address]  Phone: [phone]  Fax: [phone]    Esther Berry), Infectious Disease; Internal Medicine  [address] [place] [address]  Phone: [phone]  Fax: [phone] Niall Cortes), Hematology; Internal Medicine; Medical Oncology  [address] [place] [address]  Phone: [phone]  Fax: [phone]    Esther Berry), Infectious Disease; Internal Medicine  [address] [place] [address]  Phone: [phone]  Fax: [phone]    Amira Crawford (ANP-C), NP in Adult Health  [address] [place] [address]  Phone: [phone]  Fax: [phone]

## 2018-02-23 NOTE — DISCHARGE NOTE ADULT - HOSPITAL COURSE
Patient is a 41-year-old male who is HIV-positive and has been diagnosed with a high-grade B-cell, CD10+ lymphoma admitted for cycle 1 DA-R-EPOCH.    Upon admission 2/22, pt had chest Mediport accessed, started on IV Hydration. Patient received Rituxan 375 mg/m2 and IT MTX on Day 1. Patient then received Doxorubicin 10mg/m2 on Days 2-5, Etoposide 50mg/m2 on days 2-5, Vincristine 0.4mg/m2 on days 2-5, Prednisone 60mg/m2 on days 2-6, and Cyclophosphamide x 1 on day 6. Patient was pre-medicated prior to Rituxan with tylenol, benadryl, and hydrocortisone, and received anti-emetics Emend 150mg x 1 on day 2, Zofran 8mg IV q8 on days 2.6    Patient tolerated chemotherapy well, stable for discharge on Tuesday 2/26. Post discharge Neulasta injection scheduled for 3/1 and follow-up appointment at [facility] with NP on 3/5. Patient is a 41-year-old male who is HIV-positive and has been diagnosed with a high-grade B-cell, CD10+ lymphoma admitted for cycle 1 DA-R-EPOCH.    Upon admission 2/22, pt had chest Mediport accessed, started on IV Hydration. Patient received Rituxan 375 mg/m2 and IT MTX on Day 1. Patient then received Doxorubicin 10mg/m2 on Days 2-5, Etoposide 50mg/m2 on days 2-5, Vincristine 0.4mg/m2 on days 2-5, Prednisone 60mg/m2 on days 2-6, and Cyclophosphamide x 1 on day 6. Patient was pre-medicated prior to Rituxan with tylenol, benadryl, and hydrocortisone, and received anti-emetics Emend 150mg x 1 on day 2, Zofran 8mg IV q8 on days 2-6. Flow cytometry from CSF on 2/23 was positive for malignant cells, patient underwent LP with intrathecal chemotherapy on 2/28.    Patient tolerated chemotherapy well, stable for discharge on Tuesday 2/26. Post discharge Neulasta injection scheduled for 3/1 and follow-up appointment at [facility] with NP on 3/5. Patient is a 41-year-old male who is HIV-positive and has been diagnosed with a high-grade B-cell, CD10+ lymphoma admitted for cycle 1 DA-R-EPOCH.    Upon admission 2/22, pt had chest Mediport accessed, started on IV Hydration. Patient received Rituxan 375 mg/m2 and IT MTX on Day 1. Patient then received Doxorubicin 10mg/m2 on Days 2-5, Etoposide 50mg/m2 on days 2-5, Vincristine 0.4mg/m2 on days 2-5, Prednisone 60mg/m2 on days 2-6, and Cyclophosphamide x 1 on day 6. Patient was pre-medicated prior to Rituxan with tylenol, benadryl, and hydrocortisone, and received anti-emetics Emend 150mg x 1 on day 2, Zofran 8mg IV q8 on days 2-6. Flow cytometry from CSF on 2/23 was positive for malignant cells, patient underwent LP with intrathecal chemotherapy on 2/28.    Patient tolerated chemotherapy well, discharge was held for neutropenic fever with all infectious work up negative. The patient completed LP on Wednesday 2/28 and then was stable for discharge. Post discharge Neulasta injection scheduled for 3/1 and follow-up appointment at [facility] with NP on 3/5. Patient is a 41-year-old male who is HIV-positive and has been diagnosed with a high-grade B-cell, CD10+ lymphoma admitted for cycle 1 DA-R-EPOCH.    Upon admission 2/22, pt had chest Mediport accessed, started on IV Hydration. Patient received Rituxan 375 mg/m2 and IT MTX on Day 1. Patient then received Doxorubicin 10mg/m2 on Days 2-5, Etoposide 50mg/m2 on days 2-5, Vincristine 0.4mg/m2 on days 2-5, Prednisone 60mg/m2 on days 2-6, and Cyclophosphamide x 1 on day 6. Patient was pre-medicated prior to Rituxan with tylenol, benadryl, and hydrocortisone, and received anti-emetics Emend 150mg x 1 on day 2, Zofran 8mg IV q8 on days 2-6. Flow cytometry from CSF on 2/23 was positive for malignant cells, patient underwent LP with intrathecal chemotherapy on 2/28. Flow cytometry results from LP on 2/28 absent B-cells. Patient underwent one further LP with IT MTX on 3/5.    Patient tolerated chemotherapy well, discharge was held for neutropenic fever with all infectious work up negative. Patient is a 41-year-old male who is HIV-positive and has been diagnosed with a high-grade B-cell, CD10+ lymphoma admitted for cycle 1 DA-R-EPOCH.    Upon admission 2/22, pt had chest Mediport accessed, started on IV Hydration. Patient received Rituxan 375 mg/m2 and IT MTX on Day 1. Patient then received Doxorubicin 10mg/m2 on Days 2-5, Etoposide 50mg/m2 on days 2-5, Vincristine 0.4mg/m2 on days 2-5, Prednisone 60mg/m2 on days 2-6, and Cyclophosphamide x 1 on day 6. Patient was pre-medicated prior to Rituxan with tylenol, benadryl, and hydrocortisone, and received anti-emetics Emend 150mg x 1 on day 2, Zofran 8mg IV q8 on days 2-6. Flow cytometry from CSF on 2/23 was positive for malignant cells, patient underwent LP with intrathecal chemotherapy on 2/28. Flow cytometry results from LP on 2/28 absent B-cells. Patient underwent one further LP with IT MTX on 3/5, results pending, will be followed as outpatient.    Patient continued on Triumeq for HIV treatment, Infectious disease followed the patient, offered him to be seen in clinic as outpatient for more support measures vs going back to private ID Dr. Craft. Patient will decide course of action. LFT's were elevated (AST 1.7x ULN/ ALT >5x ULN). Bactrim was discontinued as likely cause for transaminitis. Mepron ordered for further PCP prophylaxis, likely will need prior authorization. To be checked on follow up appointment at [facility]. Patient is a 41-year-old male who is HIV-positive and has been diagnosed with a high-grade B-cell, CD10+ lymphoma admitted for cycle 1 DA-R-EPOCH.    Upon admission 2/22, pt had chest Mediport accessed, started on IV Hydration. Patient received Rituxan 375 mg/m2 and IT MTX on Day 1. Patient then received Doxorubicin 10mg/m2 on Days 2-5, Etoposide 50mg/m2 on days 2-5, Vincristine 0.4mg/m2 on days 2-5, Prednisone 60mg/m2 on days 2-6, and Cyclophosphamide x 1 on day 6. Patient was pre-medicated prior to Rituxan with tylenol, benadryl, and hydrocortisone, and received anti-emetics Emend 150mg x 1 on day 2, Zofran 8mg IV q8 on days 2-6. Flow cytometry from CSF on 2/23 was positive for malignant cells, patient underwent LP with intrathecal chemotherapy on 2/28. Flow cytometry results from LP on 2/28 absent B-cells. Patient underwent one further LP with IT MTX on 3/5, results pending, will be followed as outpatient.    Patient continued on Triumeq for HIV treatment, Infectious disease followed the patient, offered him to be seen in clinic as outpatient for more support measures vs going back to private ID Dr. Craft. Patient will decide course of action. LFT's were elevated (AST 1.7x ULN/ ALT >5x ULN). Bactrim was discontinued as likely cause for transaminitis. Mepron ordered for further PCP prophylaxis, likely will need prior authorization. To be checked on follow up appointment at [facility]. Triumeq was arranged for delivery from specialty [facility], sent to friend's house  Clarence Simmons.

## 2018-02-23 NOTE — DISCHARGE NOTE ADULT - MEDICATION SUMMARY - MEDICATIONS TO STOP TAKING
I will STOP taking the medications listed below when I get home from the hospital:  None I will STOP taking the medications listed below when I get home from the hospital:    allopurinol 300 mg oral tablet  -- 1 tab(s) by mouth once a day

## 2018-02-23 NOTE — CHART NOTE - NSCHARTNOTEFT_GEN_A_CORE
Medicine PA Episodic Note     Called by RN to evaluate pt. with temp of 101.7F. Pt. seen and examined at bedside, alert and in NAD.  Denies c/o CP, SOB, palpitations, HA, cough, diaphoresis, chills, N/V/D, abd pain or dysuria.    VITAL SIGNS:  T(C): 38.7 (02-23-18 @ 21:25), Max: 38.7 (02-23-18 @ 21:25)  HR: 128 (02-23-18 @ 21:25) (91 - 128)  BP: 102/56 (02-23-18 @ 21:25) (99/53 - 123/76)  RR: 18 (02-23-18 @ 21:25) (18 - 18)  SpO2: 98% (02-23-18 @ 13:45) (98% - 100%)  Wt(kg): --      LABORATORY:                          14.2   5.3   )-----------( 205      ( 23 Feb 2018 07:03 )             44.8       02-23    135  |  100  |  9   ----------------------------<  110<H>  4.1   |  24  |  1.02    Ca    8.9      23 Feb 2018 07:03  Phos  2.5     02-23  Mg     1.7     02-23    TPro  8.1  /  Alb  3.8  /  TBili  0.3  /  DBili  x   /  AST  27  /  ALT  39  /  AlkPhos  66  02-23          MICROBIOLOGY:       RADIOLOGY:      MEDICATIONS:   abacavir 600 mG/dolutegravir 50 mG/lamiVUDine 300 mG 1 Tablet(s) Oral daily  acyclovir   Tablet 400 milliGRAM(s) Oral three times a day  allopurinol 300 milliGRAM(s) Oral daily  DOXOrubicin IVPB w/vinCRIStine 19 milliGRAM(s) IV Intermittent daily  etoposide IVPB 97 milliGRAM(s) IV Intermittent daily  influenza   Vaccine 0.5 milliLiter(s) IntraMuscular once  methotrexate PF IntraThecal 12 milliGRAM(s) IntraThecal once  ondansetron Injectable 8 milliGRAM(s) IV Push every 8 hours  predniSONE   Tablet 115 milliGRAM(s) Oral two times a day  riTUXimab IVPB 728 milliGRAM(s) IV Intermittent once  trimethoprim  160 mG/sulfamethoxazole 800 mG 1 Tablet(s) Oral <User Schedule>      PHYSICAL EXAM:    Constitutional: AOx3, in NAD  Resp: CTA bilat, resp. even and non-labored   CV: S1 S2. No murmurs.  GI: BS X4 active. soft. nontender.  Ext: No BLE edema.      ASSESSMENT/PLAN:   HPI:  Patient is a 41-year-old male who is HIV-positive and has been diagnosed with a high-grade B-cell, CD10+ lymphoma admitted for cycle 1 DA-R-EPOCH.  Patient was initially seen at  [facility] with fever. He was found to have left axillary lymphadenopathy. He had not been fully compliant with his antiretroviral therapy until December of 2017 when he restarted his medication. His CD4 count in December 2017 was in 800 range.  CT of chest showed left axillary lymphadenopathy with a node measuring up to 5 cm. Minimal bibasilar and right middle lobe atelectasis was seen with a tiny left pleural effusion. Mild cardiomegaly was noted. CT of abdomen and pelvis showed no hepatosplenomegaly or mass and no significant adenopathy.. Gastric wall thickening was seen of unclear etiology.  Left axillary lymph node biopsy showed a high-grade CD10 positive B cell lymphoma expressing CD20 and BCL 6. 65% of nuclei showed a myc rearrangement. BCL-2 and BCL 6 were not rearranged. Ki-67 was about 90%.. LDH was 618, but I think it was tested using a higher range. CBC, creat were normal. Total protein and globulins were increased. He defervesced on antibiotics and was discharged home. (22 Feb 2018 10:29)      # Fever    - Tylenol and cooling measures prn for pyrexia  - BC x2, UA/UC  - Chest x-ray  - Continue with   - Continue IV hydration  - Monitor vital signs q4hr.    Will endorse to primary team in am; attending to follow.    Joya Shultz PA-C  # Medicine PA Episodic Note     Called by RN to evaluate pt. with temp of 101.7F. Pt. seen and examined at bedside, alert and in NAD.  Denies c/o CP, SOB, palpitations, HA, cough, diaphoresis, chills, N/V/D, abd pain or dysuria.    VITAL SIGNS:  T(C): 38.7 (02-23-18 @ 21:25), Max: 38.7 (02-23-18 @ 21:25)  HR: 128 (02-23-18 @ 21:25) (91 - 128)  BP: 102/56 (02-23-18 @ 21:25) (99/53 - 123/76)  RR: 18 (02-23-18 @ 21:25) (18 - 18)  SpO2: 98% (02-23-18 @ 13:45) (98% - 100%)  Wt(kg): --      LABORATORY:                          14.2   5.3   )-----------( 205      ( 23 Feb 2018 07:03 )             44.8       02-23    135  |  100  |  9   ----------------------------<  110<H>  4.1   |  24  |  1.02    Ca    8.9      23 Feb 2018 07:03  Phos  2.5     02-23  Mg     1.7     02-23    TPro  8.1  /  Alb  3.8  /  TBili  0.3  /  DBili  x   /  AST  27  /  ALT  39  /  AlkPhos  66  02-23          MICROBIOLOGY:       RADIOLOGY:      MEDICATIONS:   abacavir 600 mG/dolutegravir 50 mG/lamiVUDine 300 mG 1 Tablet(s) Oral daily  acyclovir   Tablet 400 milliGRAM(s) Oral three times a day  allopurinol 300 milliGRAM(s) Oral daily  DOXOrubicin IVPB w/vinCRIStine 19 milliGRAM(s) IV Intermittent daily  etoposide IVPB 97 milliGRAM(s) IV Intermittent daily  influenza   Vaccine 0.5 milliLiter(s) IntraMuscular once  methotrexate PF IntraThecal 12 milliGRAM(s) IntraThecal once  ondansetron Injectable 8 milliGRAM(s) IV Push every 8 hours  predniSONE   Tablet 115 milliGRAM(s) Oral two times a day  riTUXimab IVPB 728 milliGRAM(s) IV Intermittent once  trimethoprim  160 mG/sulfamethoxazole 800 mG 1 Tablet(s) Oral <User Schedule>      PHYSICAL EXAM:    Constitutional: AOx3, in NAD  Resp: CTA bilat, resp. even and non-labored   CV: S1 S2. No murmurs.  GI: BS X4 active. soft. nontender.  Ext: No BLE edema.      ASSESSMENT/PLAN:   HPI:  Patient is a 41-year-old male who is HIV-positive and has been diagnosed with a high-grade B-cell, CD10+ lymphoma admitted for cycle 1 DA-R-EPOCH.  Patient was initially seen at  [facility] with fever. He was found to have left axillary lymphadenopathy. He had not been fully compliant with his antiretroviral therapy until December of 2017 when he restarted his medication. His CD4 count in December 2017 was in 800 range.  CT of chest showed left axillary lymphadenopathy with a node measuring up to 5 cm. Minimal bibasilar and right middle lobe atelectasis was seen with a tiny left pleural effusion. Mild cardiomegaly was noted. CT of abdomen and pelvis showed no hepatosplenomegaly or mass and no significant adenopathy.. Gastric wall thickening was seen of unclear etiology.  Left axillary lymph node biopsy showed a high-grade CD10 positive B cell lymphoma expressing CD20 and BCL 6. 65% of nuclei showed a myc rearrangement. BCL-2 and BCL 6 were not rearranged. Ki-67 was about 90%.. LDH was 618, but I think it was tested using a higher range. CBC, creat were normal. Total protein and globulins were increased. He defervesced on antibiotics and was discharged home. (22 Feb 2018 10:29)      # Fever, non-neutropenic    - Tylenol and cooling measures prn for pyrexia  - BC x2, UA/UC ordered  - Chest x-ray ordered  - Continue with bactrim, acyclovir, triumeq  - Monitor vital signs q4hr.    Will endorse to primary team in am; attending to follow.    Joya Shultz PA-C  #[id number] Medicine PA Episodic Note     Called by RN to evaluate pt. with temp of 101.7F. Pt. seen and examined at bedside, alert and in NAD.  Denies c/o CP, SOB, palpitations, HA, cough, diaphoresis, chills, N/V/D, abd pain or dysuria.    VITAL SIGNS:  T(C): 38.7 (02-23-18 @ 21:25), Max: 38.7 (02-23-18 @ 21:25)  HR: 128 (02-23-18 @ 21:25) (91 - 128)  BP: 102/56 (02-23-18 @ 21:25) (99/53 - 123/76)  RR: 18 (02-23-18 @ 21:25) (18 - 18)  SpO2: 98% (02-23-18 @ 13:45) (98% - 100%)  Wt(kg): --      LABORATORY:                          14.2   5.3   )-----------( 205      ( 23 Feb 2018 07:03 )             44.8       02-23    135  |  100  |  9   ----------------------------<  110<H>  4.1   |  24  |  1.02    Ca    8.9      23 Feb 2018 07:03  Phos  2.5     02-23  Mg     1.7     02-23    TPro  8.1  /  Alb  3.8  /  TBili  0.3  /  DBili  x   /  AST  27  /  ALT  39  /  AlkPhos  66  02-23          MICROBIOLOGY:       RADIOLOGY:      MEDICATIONS:   abacavir 600 mG/dolutegravir 50 mG/lamiVUDine 300 mG 1 Tablet(s) Oral daily  acyclovir   Tablet 400 milliGRAM(s) Oral three times a day  allopurinol 300 milliGRAM(s) Oral daily  DOXOrubicin IVPB w/vinCRIStine 19 milliGRAM(s) IV Intermittent daily  etoposide IVPB 97 milliGRAM(s) IV Intermittent daily  influenza   Vaccine 0.5 milliLiter(s) IntraMuscular once  methotrexate PF IntraThecal 12 milliGRAM(s) IntraThecal once  ondansetron Injectable 8 milliGRAM(s) IV Push every 8 hours  predniSONE   Tablet 115 milliGRAM(s) Oral two times a day  riTUXimab IVPB 728 milliGRAM(s) IV Intermittent once  trimethoprim  160 mG/sulfamethoxazole 800 mG 1 Tablet(s) Oral <User Schedule>      PHYSICAL EXAM:    Constitutional: AOx3, in NAD  Resp: CTA bilat, resp. even and non-labored   CV: S1 S2. No murmurs.  GI: BS X4 active. soft. nontender.  Ext: No BLE edema.      ASSESSMENT/PLAN:   HPI:  Patient is a 41-year-old male who is HIV-positive and has been diagnosed with a high-grade B-cell, CD10+ lymphoma admitted for cycle 1 DA-R-EPOCH.  Patient was initially seen at  [facility] with fever. He was found to have left axillary lymphadenopathy. He had not been fully compliant with his antiretroviral therapy until December of 2017 when he restarted his medication. His CD4 count in December 2017 was in 800 range.  CT of chest showed left axillary lymphadenopathy with a node measuring up to 5 cm. Minimal bibasilar and right middle lobe atelectasis was seen with a tiny left pleural effusion. Mild cardiomegaly was noted. CT of abdomen and pelvis showed no hepatosplenomegaly or mass and no significant adenopathy.. Gastric wall thickening was seen of unclear etiology.  Left axillary lymph node biopsy showed a high-grade CD10 positive B cell lymphoma expressing CD20 and BCL 6. 65% of nuclei showed a myc rearrangement. BCL-2 and BCL 6 were not rearranged. Ki-67 was about 90%.. LDH was 618, but I think it was tested using a higher range. CBC, creat were normal. Total protein and globulins were increased. He defervesced on antibiotics and was discharged home. (22 Feb 2018 10:29)      # Fever, non-neutropenic    - Tylenol and cooling measures prn for pyrexia  - BC x2, UA/UC ordered  - Chest x-ray ordered  - Continue with bactrim, acyclovir, triumeq  - Monitor vital signs q4hr.    Will endorse to primary team in am; attending to follow.    Joya Shultz PA-C  #[id number]    Addendum: Prelim CXR results without any acute findings. F/u with final report.

## 2018-02-23 NOTE — PROGRESS NOTE ADULT - SUBJECTIVE AND OBJECTIVE BOX
Diagnosis: High grade B-Cell Lymphoma    Protocol/Chemo Regimen: DA-R-EPOCH  Day: 1     Pt endorsed:    Review of Systems:      Pain scale:                                        Location:    Diet: regular    Allergies: No Known Allergies      ANTIMICROBIALS  abacavir 600 mG/dolutegravir 50 mG/lamiVUDine 300 mG 1 Tablet(s) Oral daily  acyclovir   Tablet 400 milliGRAM(s) Oral three times a day  trimethoprim  160 mG/sulfamethoxazole 800 mG 1 Tablet(s) Oral <User Schedule>      HEME/ONC MEDICATIONS  enoxaparin Injectable 40 milliGRAM(s) SubCutaneous daily  riTUXimab IVPB 728 milliGRAM(s) IV Intermittent once      STANDING MEDICATIONS  allopurinol 300 milliGRAM(s) Oral daily  influenza   Vaccine 0.5 milliLiter(s) IntraMuscular once      PRN MEDICATIONS  hydrocortisone sodium succinate Injectable 100 milliGRAM(s) IV Push once PRN        Vital Signs Last 24 Hrs  T(C): 37.5 (23 Feb 2018 05:38), Max: 37.5 (23 Feb 2018 05:38)  T(F): 99.5 (23 Feb 2018 05:38), Max: 99.5 (23 Feb 2018 05:38)  HR: 114 (23 Feb 2018 05:38) (82 - 114)  BP: 123/73 (23 Feb 2018 05:38) (121/72 - 153/84)  RR: 18 (23 Feb 2018 05:38) (18 - 18)  SpO2: 100% (23 Feb 2018 05:38) (99% - 100%)    PHYSICAL EXAM  General: adult in NAD  HEENT: clear oropharynx, anicteric sclera, pink conjunctiva  Neck: supple  CV: normal S1/S2 RRR  Lungs: positive air movement b/l ant lungs,clear to auscultation, no wheezes, no rales  Abdomen: soft non-tender non-distended, no hepatosplenomegaly  Ext: no clubbing cyanosis or edema  Skin: no rashes and no petechiae  Neuro: alert and oriented X 4, no focal deficits  Central Line: right chest Mediport    LABS:    Blood Cultures:                           14.2   5.3   )-----------( 205      ( 23 Feb 2018 07:03 )             44.8         Mean Cell Volume : 89.1 fl  Mean Cell Hemoglobin : 28.2 pg  Mean Cell Hemoglobin Concentration : 31.6 gm/dL  Auto Neutrophil # : 4.4 K/uL  Auto Lymphocyte # : 0.6 K/uL  Auto Monocyte # : 0.1 K/uL  Auto Eosinophil # : 0.1 K/uL  Auto Basophil # : 0.0 K/uL  Auto Neutrophil % : 83.7 %  Auto Lymphocyte % : 11.0 %  Auto Monocyte % : 2.5 %  Auto Eosinophil % : 2.4 %  Auto Basophil % : 0.4 %      02-23    135  |  100  |  9   ----------------------------<  110<H>  4.1   |  24  |  1.02    Ca    8.9      23 Feb 2018 07:03  Phos  2.5     02-23  Mg     1.7     02-23    TPro  8.1  /  Alb  3.8  /  TBili  0.3  /  DBili  x   /  AST  27  /  ALT  39  /  AlkPhos  66  02-23      PT/INR - ( 22 Feb 2018 12:15 )   PT: 10.9 sec;   INR: 1.00 ratio    PTT - ( 22 Feb 2018 12:15 )  PTT:32.0 sec      Uric Acid 4.1      RADIOLOGY & ADDITIONAL STUDIES: Diagnosis: High grade B-Cell Lymphoma    Protocol/Chemo Regimen: DA-R-EPOCH  Day: 1     Wes Sloan  ID: # [id number]  Pt endorsed: no compaints    Review of Systems: Patient denied nausea, vomiting, odynophagia, chest pain, cough, dyspnea, abdominal pain, constipation, diarrhea, sunday-rectal pain, rash, fatigue, headache, depression    Pain scale:  0                                       Diet: regular    Allergies: No Known Allergies      ANTIMICROBIALS  abacavir 600 mG/dolutegravir 50 mG/lamiVUDine 300 mG 1 Tablet(s) Oral daily  acyclovir   Tablet 400 milliGRAM(s) Oral three times a day  trimethoprim  160 mG/sulfamethoxazole 800 mG 1 Tablet(s) Oral <User Schedule>      HEME/ONC MEDICATIONS  enoxaparin Injectable 40 milliGRAM(s) SubCutaneous daily  riTUXimab IVPB 728 milliGRAM(s) IV Intermittent once      STANDING MEDICATIONS  allopurinol 300 milliGRAM(s) Oral daily  influenza   Vaccine 0.5 milliLiter(s) IntraMuscular once      PRN MEDICATIONS  hydrocortisone sodium succinate Injectable 100 milliGRAM(s) IV Push once PRN        Vital Signs Last 24 Hrs  T(C): 37.5 (23 Feb 2018 05:38), Max: 37.5 (23 Feb 2018 05:38)  T(F): 99.5 (23 Feb 2018 05:38), Max: 99.5 (23 Feb 2018 05:38)  HR: 114 (23 Feb 2018 05:38) (82 - 114)  BP: 123/73 (23 Feb 2018 05:38) (121/72 - 153/84)  RR: 18 (23 Feb 2018 05:38) (18 - 18)  SpO2: 100% (23 Feb 2018 05:38) (99% - 100%)    PHYSICAL EXAM  General: adult in NAD  HEENT: clear oropharynx, anicteric sclera, pink conjunctiva  Neck: supple  CV: normal S1/S2 RRR  Lungs: positive air movement b/l ant lungs,clear to auscultation, no wheezes, no rales  Abdomen: soft non-tender non-distended, no hepatosplenomegaly  Ext: no clubbing cyanosis or edema  Skin: no rashes and no petechiae  Neuro: alert and oriented X 4, no focal deficits  Central Line: right chest Mediport    LABS:                          14.2   5.3   )-----------( 205      ( 23 Feb 2018 07:03 )             44.8         Mean Cell Volume : 89.1 fl  Mean Cell Hemoglobin : 28.2 pg  Mean Cell Hemoglobin Concentration : 31.6 gm/dL  Auto Neutrophil # : 4.4 K/uL  Auto Lymphocyte # : 0.6 K/uL  Auto Monocyte # : 0.1 K/uL  Auto Eosinophil # : 0.1 K/uL  Auto Basophil # : 0.0 K/uL  Auto Neutrophil % : 83.7 %  Auto Lymphocyte % : 11.0 %  Auto Monocyte % : 2.5 %  Auto Eosinophil % : 2.4 %  Auto Basophil % : 0.4 %      02-23    135  |  100  |  9   ----------------------------<  110<H>  4.1   |  24  |  1.02    Ca    8.9      23 Feb 2018 07:03  Phos  2.5     02-23  Mg     1.7     02-23    TPro  8.1  /  Alb  3.8  /  TBili  0.3  /  DBili  x   /  AST  27  /  ALT  39  /  AlkPhos  66  02-23      PT/INR - ( 22 Feb 2018 12:15 )   PT: 10.9 sec;   INR: 1.00 ratio    PTT - ( 22 Feb 2018 12:15 )  PTT:32.0 sec      Uric Acid 4.1

## 2018-02-24 LAB
ALBUMIN SERPL ELPH-MCNC: 3.5 G/DL — SIGNIFICANT CHANGE UP (ref 3.3–5)
ALP SERPL-CCNC: 63 U/L — SIGNIFICANT CHANGE UP (ref 40–120)
ALT FLD-CCNC: 48 U/L RC — HIGH (ref 10–45)
ANION GAP SERPL CALC-SCNC: 15 MMOL/L — SIGNIFICANT CHANGE UP (ref 5–17)
APPEARANCE UR: CLEAR — SIGNIFICANT CHANGE UP
AST SERPL-CCNC: 33 U/L — SIGNIFICANT CHANGE UP (ref 10–40)
BASOPHILS # BLD AUTO: 0 K/UL — SIGNIFICANT CHANGE UP (ref 0–0.2)
BASOPHILS NFR BLD AUTO: 0 % — SIGNIFICANT CHANGE UP (ref 0–2)
BILIRUB SERPL-MCNC: 0.2 MG/DL — SIGNIFICANT CHANGE UP (ref 0.2–1.2)
BILIRUB UR-MCNC: NEGATIVE — SIGNIFICANT CHANGE UP
BUN SERPL-MCNC: 14 MG/DL — SIGNIFICANT CHANGE UP (ref 7–23)
CALCIUM SERPL-MCNC: 8.5 MG/DL — SIGNIFICANT CHANGE UP (ref 8.4–10.5)
CHLORIDE SERPL-SCNC: 100 MMOL/L — SIGNIFICANT CHANGE UP (ref 96–108)
CO2 SERPL-SCNC: 22 MMOL/L — SIGNIFICANT CHANGE UP (ref 22–31)
COLOR SPEC: YELLOW — SIGNIFICANT CHANGE UP
CREAT SERPL-MCNC: 1.19 MG/DL — SIGNIFICANT CHANGE UP (ref 0.5–1.3)
DIFF PNL FLD: ABNORMAL
EOSINOPHIL # BLD AUTO: 0 K/UL — SIGNIFICANT CHANGE UP (ref 0–0.5)
EOSINOPHIL NFR BLD AUTO: 0.4 % — SIGNIFICANT CHANGE UP (ref 0–6)
GLUCOSE SERPL-MCNC: 162 MG/DL — HIGH (ref 70–99)
GLUCOSE UR QL: NEGATIVE — SIGNIFICANT CHANGE UP
HCT VFR BLD CALC: 41.1 % — SIGNIFICANT CHANGE UP (ref 39–50)
HGB BLD-MCNC: 13.2 G/DL — SIGNIFICANT CHANGE UP (ref 13–17)
KETONES UR-MCNC: NEGATIVE — SIGNIFICANT CHANGE UP
LDH SERPL L TO P-CCNC: 202 U/L — SIGNIFICANT CHANGE UP (ref 50–242)
LEUKOCYTE ESTERASE UR-ACNC: NEGATIVE — SIGNIFICANT CHANGE UP
LYMPHOCYTES # BLD AUTO: 1.2 K/UL — SIGNIFICANT CHANGE UP (ref 1–3.3)
LYMPHOCYTES # BLD AUTO: 19.3 % — SIGNIFICANT CHANGE UP (ref 13–44)
MAGNESIUM SERPL-MCNC: 1.9 MG/DL — SIGNIFICANT CHANGE UP (ref 1.6–2.6)
MCHC RBC-ENTMCNC: 28.9 PG — SIGNIFICANT CHANGE UP (ref 27–34)
MCHC RBC-ENTMCNC: 32.1 GM/DL — SIGNIFICANT CHANGE UP (ref 32–36)
MCV RBC AUTO: 90.1 FL — SIGNIFICANT CHANGE UP (ref 80–100)
MONOCYTES # BLD AUTO: 0.1 K/UL — SIGNIFICANT CHANGE UP (ref 0–0.9)
MONOCYTES NFR BLD AUTO: 1.2 % — LOW (ref 2–14)
NEUTROPHILS # BLD AUTO: 4.9 K/UL — SIGNIFICANT CHANGE UP (ref 1.8–7.4)
NEUTROPHILS NFR BLD AUTO: 79.1 % — HIGH (ref 43–77)
NITRITE UR-MCNC: NEGATIVE — SIGNIFICANT CHANGE UP
PH UR: 6 — SIGNIFICANT CHANGE UP (ref 5–8)
PHOSPHATE SERPL-MCNC: 3.1 MG/DL — SIGNIFICANT CHANGE UP (ref 2.5–4.5)
PLATELET # BLD AUTO: 169 K/UL — SIGNIFICANT CHANGE UP (ref 150–400)
POTASSIUM SERPL-MCNC: 4.2 MMOL/L — SIGNIFICANT CHANGE UP (ref 3.5–5.3)
POTASSIUM SERPL-SCNC: 4.2 MMOL/L — SIGNIFICANT CHANGE UP (ref 3.5–5.3)
PROT SERPL-MCNC: 7.4 G/DL — SIGNIFICANT CHANGE UP (ref 6–8.3)
PROT UR-MCNC: SIGNIFICANT CHANGE UP
RBC # BLD: 4.56 M/UL — SIGNIFICANT CHANGE UP (ref 4.2–5.8)
RBC # FLD: 14 % — SIGNIFICANT CHANGE UP (ref 10.3–14.5)
SODIUM SERPL-SCNC: 137 MMOL/L — SIGNIFICANT CHANGE UP (ref 135–145)
SP GR SPEC: >1.03 — HIGH (ref 1.01–1.02)
URATE SERPL-MCNC: 3.4 MG/DL — SIGNIFICANT CHANGE UP (ref 3.4–8.8)
UROBILINOGEN FLD QL: NEGATIVE — SIGNIFICANT CHANGE UP
WBC # BLD: 6.2 K/UL — SIGNIFICANT CHANGE UP (ref 3.8–10.5)
WBC # FLD AUTO: 6.2 K/UL — SIGNIFICANT CHANGE UP (ref 3.8–10.5)

## 2018-02-24 RX ORDER — ENOXAPARIN SODIUM 100 MG/ML
40 INJECTION SUBCUTANEOUS EVERY 24 HOURS
Qty: 0 | Refills: 0 | Status: DISCONTINUED | OUTPATIENT
Start: 2018-02-24 | End: 2018-02-28

## 2018-02-24 RX ADMIN — ONDANSETRON 8 MILLIGRAM(S): 8 TABLET, FILM COATED ORAL at 13:24

## 2018-02-24 RX ADMIN — ENOXAPARIN SODIUM 40 MILLIGRAM(S): 100 INJECTION SUBCUTANEOUS at 17:30

## 2018-02-24 RX ADMIN — Medication 400 MILLIGRAM(S): at 21:26

## 2018-02-24 RX ADMIN — Medication 115 MILLIGRAM(S): at 05:33

## 2018-02-24 RX ADMIN — Medication 300 MILLIGRAM(S): at 13:24

## 2018-02-24 RX ADMIN — ONDANSETRON 8 MILLIGRAM(S): 8 TABLET, FILM COATED ORAL at 05:33

## 2018-02-24 RX ADMIN — Medication 400 MILLIGRAM(S): at 13:24

## 2018-02-24 RX ADMIN — ONDANSETRON 8 MILLIGRAM(S): 8 TABLET, FILM COATED ORAL at 21:26

## 2018-02-24 RX ADMIN — Medication 115 MILLIGRAM(S): at 17:31

## 2018-02-24 RX ADMIN — Medication 400 MILLIGRAM(S): at 05:33

## 2018-02-24 NOTE — PROGRESS NOTE ADULT - PROBLEM SELECTOR PLAN 3
continue Triumeq 1 tab daily  last CD4 in 800's December 2017  follow up Infectious Disease Continue Triumeq 1 tab daily  last CD4 in 800's December 2017  Infectious Disease following Continue Triumeq 1 tab daily

## 2018-02-24 NOTE — ADVANCED PRACTICE NURSE CONSULT - ASSESSMENT
Patient's alert & oriented x 4,resting in bed,denies any discomfort,verbalized understanding re- chemo TX.,labs today checked & reviewed by Dr. Samayoa,w/ SL R CW mediport,dressing dry & intact,w/ + blood return,flushes easily,Zofran 8 mg IVP given by Primary RN,Chemo TX. checked & verified w/ other Chemo RN.Etoposide 50 mg/m2=97 mg CIVI,Doxorubicin 10 mg/m2=19 mg CIVI w/ Vincristine 0.4 mg/m2=0.8 mg CIVI mixed in 1 bag started @ 1800 pm.Primary RN aware re- chemo TX., will follow.

## 2018-02-24 NOTE — PROGRESS NOTE ADULT - ASSESSMENT
This is a 41-year-old male who is HIV-positive and has been diagnosed with a high-grade B-cell, CD10+ lymphoma admitted for cycle 1 DA-R-EPOCH. 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 LP completed with IT MTX on 2/23 results currently pending.

## 2018-02-24 NOTE — PROGRESS NOTE ADULT - PROBLEM SELECTOR PLAN 2
afebrile, not neutropenic  Bactrim, Acyclovir prophylaxis Patient is not neutropenic  If febrile Pan Cx and CXR  Continue Bactrim and Acyclovir ppx

## 2018-02-24 NOTE — PROGRESS NOTE ADULT - SUBJECTIVE AND OBJECTIVE BOX
Diagnosis: High grade B-Cell Lymphoma    Protocol/Chemo Regimen: DA-R-EPOCH    Day: 2     Wes Sloan  ID: # [id number]    Pt endorsed: no complaints    Review of Systems: Patient denied nausea, vomiting, odynophagia, chest pain, cough, dyspnea, abdominal pain, constipation, diarrhea, sunday-rectal pain, rash, fatigue, headache, depression    Pain scale:  0                                       Diet: regular    Allergies: No Known Allergies      ANTIMICROBIALS  abacavir 600 mG/dolutegravir 50 mG/lamiVUDine 300 mG 1 Tablet(s) Oral daily  acyclovir   Tablet 400 milliGRAM(s) Oral three times a day  trimethoprim  160 mG/sulfamethoxazole 800 mG 1 Tablet(s) Oral <User Schedule>      HEME/ONC MEDICATIONS  DOXOrubicin IVPB w/vinCRIStine 19 milliGRAM(s) IV Intermittent daily  etoposide IVPB 97 milliGRAM(s) IV Intermittent daily  methotrexate PF IntraThecal 12 milliGRAM(s) IntraThecal once  riTUXimab IVPB 728 milliGRAM(s) IV Intermittent once      STANDING MEDICATIONS  allopurinol 300 milliGRAM(s) Oral daily  influenza   Vaccine 0.5 milliLiter(s) IntraMuscular once  ondansetron Injectable 8 milliGRAM(s) IV Push every 8 hours  predniSONE   Tablet 115 milliGRAM(s) Oral two times a day      PRN MEDICATIONS  acetaminophen   Tablet 650 milliGRAM(s) Oral every 6 hours PRN  hydrocortisone sodium succinate Injectable 100 milliGRAM(s) IV Push once PRN  metoclopramide Injectable 10 milliGRAM(s) IV Push every 6 hours PRN      Vital Signs Last 24 Hrs  T(C): 35.9 (24 Feb 2018 05:40), Max: 38.7 (23 Feb 2018 21:25)  T(F): 96.7 (24 Feb 2018 05:40), Max: 101.7 (23 Feb 2018 21:25)  HR: 94 (24 Feb 2018 05:40) (94 - 128)  BP: 119/69 (24 Feb 2018 05:40) (99/53 - 119/69)  BP(mean): --  RR: 18 (24 Feb 2018 05:40) (18 - 18)  SpO2: 96% (24 Feb 2018 05:40) (96% - 98%)      PHYSICAL EXAM  General: NAD  HEENT: PERRLA, EOMI, clear oropharynx, anicteric sclera, pink conjunctiva  Neck: supple  CV: (+) S1/S2 RRR  Lungs: clear to auscultation, no wheezes or rales  Abdomen: soft, non-tender, non-distended (+) BS  Ext: no clubbing, cyanosis or edema  Skin: no rashes and no petechiae  Neuro: alert and oriented X 3, no focal deficits  Central Line: R ACW Mediport C/D/I      LABS:                        13.2   6.2   )-----------( 169      ( 24 Feb 2018 07:10 )             41.1         Mean Cell Volume : 90.1 fl  Mean Cell Hemoglobin : 28.9 pg  Mean Cell Hemoglobin Concentration : 32.1 gm/dL  Auto Neutrophil # : 4.9 K/uL  Auto Lymphocyte # : 1.2 K/uL  Auto Monocyte # : 0.1 K/uL  Auto Eosinophil # : 0.0 K/uL  Auto Basophil # : 0.0 K/uL  Auto Neutrophil % : 79.1 %  Auto Lymphocyte % : 19.3 %  Auto Monocyte % : 1.2 %  Auto Eosinophil % : 0.4 %  Auto Basophil % : 0.0 %      02-24    137  |  100  |  14  ----------------------------<  162<H>  4.2   |  22  |  1.19    Ca    8.5      24 Feb 2018 07:10  Phos  3.1     02-24  Mg     1.9     02-24    TPro  7.4  /  Alb  3.5  /  TBili  0.2  /  DBili  x   /  AST  33  /  ALT  48<H>  /  AlkPhos  63  02-24      Mg 1.9  Phos 3.1      PT/INR - ( 22 Feb 2018 12:15 )   PT: 10.9 sec;   INR: 1.00 ratio         PTT - ( 22 Feb 2018 12:15 )  PTT:32.0 sec      Uric Acid 3.4 Diagnosis: High grade B-Cell Lymphoma    Protocol/Chemo Regimen: DA-R-EPOCH    Day: 2     Wes Creisaias  ID:     Pt endorsed: no complaints    Review of Systems: Patient denied nausea, vomiting, odynophagia, chest pain, cough, dyspnea, abdominal pain, constipation, diarrhea, sunday-rectal pain, rash, fatigue, headache, depression    Pain scale:  0                                       Diet: regular    Allergies: No Known Allergies      ANTIMICROBIALS  abacavir 600 mG/dolutegravir 50 mG/lamiVUDine 300 mG 1 Tablet(s) Oral daily  acyclovir   Tablet 400 milliGRAM(s) Oral three times a day  trimethoprim  160 mG/sulfamethoxazole 800 mG 1 Tablet(s) Oral <User Schedule>      HEME/ONC MEDICATIONS  DOXOrubicin IVPB w/vinCRIStine 19 milliGRAM(s) IV Intermittent daily  etoposide IVPB 97 milliGRAM(s) IV Intermittent daily  methotrexate PF IntraThecal 12 milliGRAM(s) IntraThecal once  riTUXimab IVPB 728 milliGRAM(s) IV Intermittent once      STANDING MEDICATIONS  allopurinol 300 milliGRAM(s) Oral daily  influenza   Vaccine 0.5 milliLiter(s) IntraMuscular once  ondansetron Injectable 8 milliGRAM(s) IV Push every 8 hours  predniSONE   Tablet 115 milliGRAM(s) Oral two times a day      PRN MEDICATIONS  acetaminophen   Tablet 650 milliGRAM(s) Oral every 6 hours PRN  hydrocortisone sodium succinate Injectable 100 milliGRAM(s) IV Push once PRN  metoclopramide Injectable 10 milliGRAM(s) IV Push every 6 hours PRN      Vital Signs Last 24 Hrs  T(C): 35.9 (24 Feb 2018 05:40), Max: 38.7 (23 Feb 2018 21:25)  T(F): 96.7 (24 Feb 2018 05:40), Max: 101.7 (23 Feb 2018 21:25)  HR: 94 (24 Feb 2018 05:40) (94 - 128)  BP: 119/69 (24 Feb 2018 05:40) (99/53 - 119/69)  BP(mean): --  RR: 18 (24 Feb 2018 05:40) (18 - 18)  SpO2: 96% (24 Feb 2018 05:40) (96% - 98%)      PHYSICAL EXAM  General: NAD  HEENT: PERRLA, EOMI, clear oropharynx, anicteric sclera, pink conjunctiva  Neck: supple  CV: (+) S1/S2 RRR  Lungs: clear to auscultation, no wheezes or rales  Abdomen: soft, non-tender, non-distended (+) BS  Ext: no clubbing, cyanosis or edema  Skin: no rashes and no petechiae  Neuro: alert and oriented X 3, no focal deficits  Central Line: R ACW Mediport C/D/I      LABS:                        13.2   6.2   )-----------( 169      ( 24 Feb 2018 07:10 )             41.1         Mean Cell Volume : 90.1 fl  Mean Cell Hemoglobin : 28.9 pg  Mean Cell Hemoglobin Concentration : 32.1 gm/dL  Auto Neutrophil # : 4.9 K/uL  Auto Lymphocyte # : 1.2 K/uL  Auto Monocyte # : 0.1 K/uL  Auto Eosinophil # : 0.0 K/uL  Auto Basophil # : 0.0 K/uL  Auto Neutrophil % : 79.1 %  Auto Lymphocyte % : 19.3 %  Auto Monocyte % : 1.2 %  Auto Eosinophil % : 0.4 %  Auto Basophil % : 0.0 %      02-24    137  |  100  |  14  ----------------------------<  162<H>  4.2   |  22  |  1.19    Ca    8.5      24 Feb 2018 07:10  Phos  3.1     02-24  Mg     1.9     02-24    TPro  7.4  /  Alb  3.5  /  TBili  0.2  /  DBili  x   /  AST  33  /  ALT  48<H>  /  AlkPhos  63  02-24      Mg 1.9  Phos 3.1      PT/INR - ( 22 Feb 2018 12:15 )   PT: 10.9 sec;   INR: 1.00 ratio         PTT - ( 22 Feb 2018 12:15 )  PTT:32.0 sec      Uric Acid 3.4 Diagnosis: High grade B-Cell Lymphoma    Protocol/Chemo Regimen: DA-R-EPOCH    Day: 2     Wes Sloan  ID: [id number]    Pt endorsed: no complaints    Review of Systems: Patient denied nausea, vomiting, odynophagia, chest pain, cough, dyspnea, abdominal pain, constipation, diarrhea, sunday-rectal pain, rash, fatigue, headache, depression    Pain scale:  0                                       Diet: regular    Allergies: No Known Allergies      ANTIMICROBIALS  abacavir 600 mG/dolutegravir 50 mG/lamiVUDine 300 mG 1 Tablet(s) Oral daily  acyclovir   Tablet 400 milliGRAM(s) Oral three times a day  trimethoprim  160 mG/sulfamethoxazole 800 mG 1 Tablet(s) Oral <User Schedule>      HEME/ONC MEDICATIONS  DOXOrubicin IVPB w/vinCRIStine 19 milliGRAM(s) IV Intermittent daily  etoposide IVPB 97 milliGRAM(s) IV Intermittent daily  methotrexate PF IntraThecal 12 milliGRAM(s) IntraThecal once  riTUXimab IVPB 728 milliGRAM(s) IV Intermittent once      STANDING MEDICATIONS  allopurinol 300 milliGRAM(s) Oral daily  influenza   Vaccine 0.5 milliLiter(s) IntraMuscular once  ondansetron Injectable 8 milliGRAM(s) IV Push every 8 hours  predniSONE   Tablet 115 milliGRAM(s) Oral two times a day      PRN MEDICATIONS  acetaminophen   Tablet 650 milliGRAM(s) Oral every 6 hours PRN  hydrocortisone sodium succinate Injectable 100 milliGRAM(s) IV Push once PRN  metoclopramide Injectable 10 milliGRAM(s) IV Push every 6 hours PRN      Vital Signs Last 24 Hrs  T(C): 35.9 (24 Feb 2018 05:40), Max: 38.7 (23 Feb 2018 21:25)  T(F): 96.7 (24 Feb 2018 05:40), Max: 101.7 (23 Feb 2018 21:25)  HR: 94 (24 Feb 2018 05:40) (94 - 128)  BP: 119/69 (24 Feb 2018 05:40) (99/53 - 119/69)  BP(mean): --  RR: 18 (24 Feb 2018 05:40) (18 - 18)  SpO2: 96% (24 Feb 2018 05:40) (96% - 98%)      PHYSICAL EXAM  General: NAD  HEENT: PERRLA, EOMI, clear oropharynx, anicteric sclera, pink conjunctiva  Neck: supple  CV: (+) S1/S2 RRR  Lungs: clear to auscultation, no wheezes or rales  Abdomen: soft, non-tender, non-distended (+) BS  Ext: no clubbing, cyanosis or edema  Skin: no rashes and no petechiae  Neuro: alert and oriented X 3, no focal deficits  Central Line: R ACW Mediport C/D/I      LABS:                        13.2   6.2   )-----------( 169      ( 24 Feb 2018 07:10 )             41.1         Mean Cell Volume : 90.1 fl  Mean Cell Hemoglobin : 28.9 pg  Mean Cell Hemoglobin Concentration : 32.1 gm/dL  Auto Neutrophil # : 4.9 K/uL  Auto Lymphocyte # : 1.2 K/uL  Auto Monocyte # : 0.1 K/uL  Auto Eosinophil # : 0.0 K/uL  Auto Basophil # : 0.0 K/uL  Auto Neutrophil % : 79.1 %  Auto Lymphocyte % : 19.3 %  Auto Monocyte % : 1.2 %  Auto Eosinophil % : 0.4 %  Auto Basophil % : 0.0 %      02-24    137  |  100  |  14  ----------------------------<  162<H>  4.2   |  22  |  1.19    Ca    8.5      24 Feb 2018 07:10  Phos  3.1     02-24  Mg     1.9     02-24    TPro  7.4  /  Alb  3.5  /  TBili  0.2  /  DBili  x   /  AST  33  /  ALT  48<H>  /  AlkPhos  63  02-24      Mg 1.9  Phos 3.1      PT/INR - ( 22 Feb 2018 12:15 )   PT: 10.9 sec;   INR: 1.00 ratio         PTT - ( 22 Feb 2018 12:15 )  PTT:32.0 sec      Uric Acid 3.4

## 2018-02-24 NOTE — PROGRESS NOTE ADULT - PROBLEM SELECTOR PLAN 4
Enoxaparin Sq daily  discontinue if PLT's less than 50k Lovenox 40mg SQ daily to start 24 hours after LP  D/C if PLT's < 50k

## 2018-02-24 NOTE — PROGRESS NOTE ADULT - ATTENDING COMMENTS
41 yo M with HIV on HAART (but hx noncompliance), newly dx'ed high grade B-cell lymphoma myc+ stage I admitted for C1 dose-adjusted R-EPOCH day 2  Rituxan given on 2/22  -IT MTx LP yesterday -will f/u cell count CSF, flow cytometry  -monitor counts, transfuse PRN  -monitor for fevers -not neutropenic. On Bactrim/Acyclovir prophylaxis. ID consult called 39 yo M with HIV on HAART (but hx noncompliance), newly dx'ed high grade B-cell lymphoma myc+ stage I admitted for C1 dose-adjusted R-EPOCH day 2  Rituxan given on 2/22  -IT MTx LP on 2/23 -will f/u cell count CSF, flow cytometry  -monitor counts, transfuse PRN  -monitor for fevers -not neutropenic. On Bactrim/Acyclovir prophylaxis.

## 2018-02-24 NOTE — PROGRESS NOTE ADULT - PROBLEM SELECTOR PLAN 1
Start DA- R-EPOCH  LP with IT MTX 2/23, follow up flow results  CBC w diff daily, transfuse prn  CMP, replenish prn  TLS labs, coags daily  continue Allopurinol  Strict I&O's, daily weight, mouth care  Right Chest Mediport may access Admitted for Cycle 1 of DA- R-EPOCH  LP with IT MTX completed 2/23, follow up flow results  Montior CBC/Lytes and transfuse/repletye PRN  Strict Is and Os/Daily weights/Mouth Care  Continue Allopurinol

## 2018-02-24 NOTE — ADVANCED PRACTICE NURSE CONSULT - REASON FOR CONSULT
Chemotherapy notes:                                                                                                                                                                                                    Day 2/4 Etoposide CIVI,Doxorubicin & Vincristine CIVI

## 2018-02-25 LAB
ALBUMIN SERPL ELPH-MCNC: 3.6 G/DL — SIGNIFICANT CHANGE UP (ref 3.3–5)
ALP SERPL-CCNC: 59 U/L — SIGNIFICANT CHANGE UP (ref 40–120)
ALT FLD-CCNC: 45 U/L RC — SIGNIFICANT CHANGE UP (ref 10–45)
ANION GAP SERPL CALC-SCNC: 13 MMOL/L — SIGNIFICANT CHANGE UP (ref 5–17)
AST SERPL-CCNC: 26 U/L — SIGNIFICANT CHANGE UP (ref 10–40)
BASOPHILS # BLD AUTO: 0 K/UL — SIGNIFICANT CHANGE UP (ref 0–0.2)
BASOPHILS NFR BLD AUTO: 0 % — SIGNIFICANT CHANGE UP (ref 0–2)
BILIRUB SERPL-MCNC: 0.2 MG/DL — SIGNIFICANT CHANGE UP (ref 0.2–1.2)
BUN SERPL-MCNC: 14 MG/DL — SIGNIFICANT CHANGE UP (ref 7–23)
CALCIUM SERPL-MCNC: 8.8 MG/DL — SIGNIFICANT CHANGE UP (ref 8.4–10.5)
CHLORIDE SERPL-SCNC: 100 MMOL/L — SIGNIFICANT CHANGE UP (ref 96–108)
CO2 SERPL-SCNC: 24 MMOL/L — SIGNIFICANT CHANGE UP (ref 22–31)
CREAT SERPL-MCNC: 0.98 MG/DL — SIGNIFICANT CHANGE UP (ref 0.5–1.3)
CULTURE RESULTS: SIGNIFICANT CHANGE UP
EOSINOPHIL # BLD AUTO: 0 K/UL — SIGNIFICANT CHANGE UP (ref 0–0.5)
EOSINOPHIL NFR BLD AUTO: 0.1 % — SIGNIFICANT CHANGE UP (ref 0–6)
GLUCOSE SERPL-MCNC: 130 MG/DL — HIGH (ref 70–99)
HCT VFR BLD CALC: 39.9 % — SIGNIFICANT CHANGE UP (ref 39–50)
HGB BLD-MCNC: 12.8 G/DL — LOW (ref 13–17)
LDH SERPL L TO P-CCNC: 177 U/L — SIGNIFICANT CHANGE UP (ref 50–242)
LYMPHOCYTES # BLD AUTO: 1.5 K/UL — SIGNIFICANT CHANGE UP (ref 1–3.3)
LYMPHOCYTES # BLD AUTO: 12.3 % — LOW (ref 13–44)
MAGNESIUM SERPL-MCNC: 2 MG/DL — SIGNIFICANT CHANGE UP (ref 1.6–2.6)
MCHC RBC-ENTMCNC: 28.6 PG — SIGNIFICANT CHANGE UP (ref 27–34)
MCHC RBC-ENTMCNC: 32 GM/DL — SIGNIFICANT CHANGE UP (ref 32–36)
MCV RBC AUTO: 89.2 FL — SIGNIFICANT CHANGE UP (ref 80–100)
MONOCYTES # BLD AUTO: 0.2 K/UL — SIGNIFICANT CHANGE UP (ref 0–0.9)
MONOCYTES NFR BLD AUTO: 2.1 % — SIGNIFICANT CHANGE UP (ref 2–14)
NEUTROPHILS # BLD AUTO: 10.1 K/UL — HIGH (ref 1.8–7.4)
NEUTROPHILS NFR BLD AUTO: 85.5 % — HIGH (ref 43–77)
PHOSPHATE SERPL-MCNC: 3.1 MG/DL — SIGNIFICANT CHANGE UP (ref 2.5–4.5)
PLATELET # BLD AUTO: 172 K/UL — SIGNIFICANT CHANGE UP (ref 150–400)
POTASSIUM SERPL-MCNC: 3.8 MMOL/L — SIGNIFICANT CHANGE UP (ref 3.5–5.3)
POTASSIUM SERPL-SCNC: 3.8 MMOL/L — SIGNIFICANT CHANGE UP (ref 3.5–5.3)
PROT SERPL-MCNC: 7.5 G/DL — SIGNIFICANT CHANGE UP (ref 6–8.3)
RBC # BLD: 4.47 M/UL — SIGNIFICANT CHANGE UP (ref 4.2–5.8)
RBC # FLD: 13.9 % — SIGNIFICANT CHANGE UP (ref 10.3–14.5)
SODIUM SERPL-SCNC: 137 MMOL/L — SIGNIFICANT CHANGE UP (ref 135–145)
SPECIMEN SOURCE: SIGNIFICANT CHANGE UP
URATE SERPL-MCNC: 2.8 MG/DL — LOW (ref 3.4–8.8)
WBC # BLD: 11.8 K/UL — HIGH (ref 3.8–10.5)
WBC # FLD AUTO: 11.8 K/UL — HIGH (ref 3.8–10.5)

## 2018-02-25 RX ADMIN — ENOXAPARIN SODIUM 40 MILLIGRAM(S): 100 INJECTION SUBCUTANEOUS at 18:26

## 2018-02-25 RX ADMIN — ONDANSETRON 8 MILLIGRAM(S): 8 TABLET, FILM COATED ORAL at 05:23

## 2018-02-25 RX ADMIN — Medication 400 MILLIGRAM(S): at 05:23

## 2018-02-25 RX ADMIN — Medication 300 MILLIGRAM(S): at 11:59

## 2018-02-25 RX ADMIN — Medication 400 MILLIGRAM(S): at 14:06

## 2018-02-25 RX ADMIN — Medication 115 MILLIGRAM(S): at 05:24

## 2018-02-25 RX ADMIN — ONDANSETRON 8 MILLIGRAM(S): 8 TABLET, FILM COATED ORAL at 14:06

## 2018-02-25 RX ADMIN — ONDANSETRON 8 MILLIGRAM(S): 8 TABLET, FILM COATED ORAL at 22:50

## 2018-02-25 RX ADMIN — Medication 400 MILLIGRAM(S): at 22:50

## 2018-02-25 RX ADMIN — Medication 115 MILLIGRAM(S): at 18:26

## 2018-02-25 NOTE — PROGRESS NOTE ADULT - PROBLEM SELECTOR PLAN 1
Admitted for Cycle 1 of DA- R-EPOCH  LP with IT MTX completed 2/23, follow up flow results  Montior CBC/Lytes and transfuse/replete PRN  Strict Is and Os/Daily weights/Mouth Care  Continue Allopurinol

## 2018-02-25 NOTE — PROGRESS NOTE ADULT - ASSESSMENT
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 LP completed with IT MTX on 2/23 results currently pending.

## 2018-02-25 NOTE — PROGRESS NOTE ADULT - ATTENDING COMMENTS
41 yo M with HIV on HAART (but hx noncompliance), newly dx'ed high grade B-cell lymphoma myc+ stage I admitted for C1 dose-adjusted R-EPOCH day 2  Rituxan given on 2/22  -IT MTx LP on 2/23 -will f/u cell count CSF, flow cytometry  -monitor counts, transfuse PRN  -monitor for fevers -not neutropenic. On Bactrim/Acyclovir prophylaxis. 39 yo M with HIV on HAART (but hx noncompliance), newly dx'ed high grade B-cell lymphoma myc+ stage I admitted for C1 dose-adjusted R-EPOCH day 3  Rituxan given on 2/22  -IT MTx LP on 2/23 -will f/u cell count CSF, flow cytometry  -monitor counts, transfuse PRN  -monitor for fevers -not neutropenic. On Bactrim/Acyclovir prophylaxis.

## 2018-02-25 NOTE — ADVANCED PRACTICE NURSE CONSULT - REASON FOR CONSULT
Chemotherapy notes:                                                                                                                                                                                                    Day 3/4 Etoposide CIVI,Doxorubicin & Vincristine CIVI

## 2018-02-25 NOTE — ADVANCED PRACTICE NURSE CONSULT - ASSESSMENT
Patient's alert & oriented x 4,resting in bed,denies any discomfort,verbalized understanding re- chemo TX.,labs today checked & reviewed by Dr. Samayoa,w/ SL R CW mediport,dressing dry & intact,w/ + blood return,flushes easily,Zofran 8 mg IVP given by Primary RN,Chemo TX. checked & verified w/ Primary RN.Etoposide 50 mg/m2=97 mg CIVI,Doxorubicin 10 mg/m2=19 mg CIVI w/ Vincristine 0.4 mg/m2=0.8 mg CIVI mixed in 1 bag started @ 1830 pm.Primary RN aware re- chemo TX., will follow.

## 2018-02-25 NOTE — PROGRESS NOTE ADULT - SUBJECTIVE AND OBJECTIVE BOX
Diagnosis: High grade B-Cell Lymphoma    Protocol/Chemo Regimen: DA-R-EPOCH    Day: 3     Wes Creisaias  ID:     Pt endorsed: no complaints    Review of Systems: Patient denied nausea, vomiting, odynophagia, chest pain, cough, dyspnea, abdominal pain, constipation, diarrhea, usnday-rectal pain, rash, fatigue, headache, depression    Pain scale:  0                                       Diet: regular    Allergies: No Known Allergies    ANTIMICROBIALS  abacavir 600 mG/dolutegravir 50 mG/lamiVUDine 300 mG 1 Tablet(s) Oral daily  acyclovir   Tablet 400 milliGRAM(s) Oral three times a day  trimethoprim  160 mG/sulfamethoxazole 800 mG 1 Tablet(s) Oral <User Schedule>      HEME/ONC MEDICATIONS  DOXOrubicin IVPB w/vinCRIStine 19 milliGRAM(s) IV Intermittent daily  enoxaparin Injectable 40 milliGRAM(s) SubCutaneous every 24 hours  etoposide IVPB 97 milliGRAM(s) IV Intermittent daily  methotrexate PF IntraThecal 12 milliGRAM(s) IntraThecal once  riTUXimab IVPB 728 milliGRAM(s) IV Intermittent once      STANDING MEDICATIONS  allopurinol 300 milliGRAM(s) Oral daily  influenza   Vaccine 0.5 milliLiter(s) IntraMuscular once  ondansetron Injectable 8 milliGRAM(s) IV Push every 8 hours  predniSONE   Tablet 115 milliGRAM(s) Oral two times a day      PRN MEDICATIONS  acetaminophen   Tablet 650 milliGRAM(s) Oral every 6 hours PRN  hydrocortisone sodium succinate Injectable 100 milliGRAM(s) IV Push once PRN  metoclopramide Injectable 10 milliGRAM(s) IV Push every 6 hours PRN        Vital Signs Last 24 Hrs  T(C): 36.4 (25 Feb 2018 10:05), Max: 36.8 (24 Feb 2018 21:30)  T(F): 97.5 (25 Feb 2018 10:05), Max: 98.2 (24 Feb 2018 21:30)  HR: 96 (25 Feb 2018 10:05) (88 - 109)  BP: 135/79 (25 Feb 2018 10:05) (118/67 - 135/79)  BP(mean): --  RR: 18 (25 Feb 2018 10:05) (18 - 18)  SpO2: 97% (25 Feb 2018 10:05) (96% - 99%)      PHYSICAL EXAM  General: NAD  HEENT: PERRLA, EOMI, clear oropharynx, anicteric sclera, pink conjunctiva  Neck: supple  CV: (+) S1/S2 RRR  Lungs: clear to auscultation, no wheezes or rales  Abdomen: soft, non-tender, non-distended (+) BS  Ext: no clubbing, cyanosis or edema  Skin: no rashes and no petechiae  Neuro: alert and oriented X 3, no focal deficits  Central Line: R ACW [facility] C/D/I        LABS:                        12.8   11.8  )-----------( 172      ( 25 Feb 2018 06:48 )             39.9         Mean Cell Volume : 89.2 fl  Mean Cell Hemoglobin : 28.6 pg  Mean Cell Hemoglobin Concentration : 32.0 gm/dL  Auto Neutrophil # : 10.1 K/uL  Auto Lymphocyte # : 1.5 K/uL  Auto Monocyte # : 0.2 K/uL  Auto Eosinophil # : 0.0 K/uL  Auto Basophil # : 0.0 K/uL  Auto Neutrophil % : 85.5 %  Auto Lymphocyte % : 12.3 %  Auto Monocyte % : 2.1 %  Auto Eosinophil % : 0.1 %  Auto Basophil % : 0.0 %      02-25    137  |  100  |  14  ----------------------------<  130<H>  3.8   |  24  |  0.98    Ca    8.8      25 Feb 2018 06:48  Phos  3.1     02-25  Mg     2.0     02-25    TPro  7.5  /  Alb  3.6  /  TBili  0.2  /  DBili  x   /  AST  26  /  ALT  45  /  AlkPhos  59  02-25      Mg 2.0  Phos 3.1    Uric Acid 2.8 Diagnosis: High grade B-Cell Lymphoma    Protocol/Chemo Regimen: DA-R-EPOCH    Day: 3     Wes Sloan  ID: [id number]    Pt endorsed: no complaints    Review of Systems: Patient denied nausea, vomiting, odynophagia, chest pain, cough, dyspnea, abdominal pain, constipation, diarrhea, sunday-rectal pain, rash, fatigue, headache, depression    Pain scale:  0                                       Diet: regular    Allergies: No Known Allergies    ANTIMICROBIALS  abacavir 600 mG/dolutegravir 50 mG/lamiVUDine 300 mG 1 Tablet(s) Oral daily  acyclovir   Tablet 400 milliGRAM(s) Oral three times a day  trimethoprim  160 mG/sulfamethoxazole 800 mG 1 Tablet(s) Oral <User Schedule>      HEME/ONC MEDICATIONS  DOXOrubicin IVPB w/vinCRIStine 19 milliGRAM(s) IV Intermittent daily  enoxaparin Injectable 40 milliGRAM(s) SubCutaneous every 24 hours  etoposide IVPB 97 milliGRAM(s) IV Intermittent daily  methotrexate PF IntraThecal 12 milliGRAM(s) IntraThecal once  riTUXimab IVPB 728 milliGRAM(s) IV Intermittent once      STANDING MEDICATIONS  allopurinol 300 milliGRAM(s) Oral daily  influenza   Vaccine 0.5 milliLiter(s) IntraMuscular once  ondansetron Injectable 8 milliGRAM(s) IV Push every 8 hours  predniSONE   Tablet 115 milliGRAM(s) Oral two times a day      PRN MEDICATIONS  acetaminophen   Tablet 650 milliGRAM(s) Oral every 6 hours PRN  hydrocortisone sodium succinate Injectable 100 milliGRAM(s) IV Push once PRN  metoclopramide Injectable 10 milliGRAM(s) IV Push every 6 hours PRN        Vital Signs Last 24 Hrs  T(C): 36.4 (25 Feb 2018 10:05), Max: 36.8 (24 Feb 2018 21:30)  T(F): 97.5 (25 Feb 2018 10:05), Max: 98.2 (24 Feb 2018 21:30)  HR: 96 (25 Feb 2018 10:05) (88 - 109)  BP: 135/79 (25 Feb 2018 10:05) (118/67 - 135/79)  BP(mean): --  RR: 18 (25 Feb 2018 10:05) (18 - 18)  SpO2: 97% (25 Feb 2018 10:05) (96% - 99%)      PHYSICAL EXAM  General: NAD  HEENT: PERRLA, EOMI, clear oropharynx, anicteric sclera, pink conjunctiva  Neck: supple  CV: (+) S1/S2 RRR  Lungs: clear to auscultation, no wheezes or rales  Abdomen: soft, non-tender, non-distended (+) BS  Ext: no clubbing, cyanosis or edema  Skin: no rashes and no petechiae  Neuro: alert and oriented X 3, no focal deficits  Central Line: R ACW [facility] C/D/I        LABS:                        12.8   11.8  )-----------( 172      ( 25 Feb 2018 06:48 )             39.9         Mean Cell Volume : 89.2 fl  Mean Cell Hemoglobin : 28.6 pg  Mean Cell Hemoglobin Concentration : 32.0 gm/dL  Auto Neutrophil # : 10.1 K/uL  Auto Lymphocyte # : 1.5 K/uL  Auto Monocyte # : 0.2 K/uL  Auto Eosinophil # : 0.0 K/uL  Auto Basophil # : 0.0 K/uL  Auto Neutrophil % : 85.5 %  Auto Lymphocyte % : 12.3 %  Auto Monocyte % : 2.1 %  Auto Eosinophil % : 0.1 %  Auto Basophil % : 0.0 %      02-25    137  |  100  |  14  ----------------------------<  130<H>  3.8   |  24  |  0.98    Ca    8.8      25 Feb 2018 06:48  Phos  3.1     02-25  Mg     2.0     02-25    TPro  7.5  /  Alb  3.6  /  TBili  0.2  /  DBili  x   /  AST  26  /  ALT  45  /  AlkPhos  59  02-25      Mg 2.0  Phos 3.1    Uric Acid 2.8

## 2018-02-26 LAB
ALBUMIN SERPL ELPH-MCNC: 3.5 G/DL — SIGNIFICANT CHANGE UP (ref 3.3–5)
ALP SERPL-CCNC: 57 U/L — SIGNIFICANT CHANGE UP (ref 40–120)
ALT FLD-CCNC: 48 U/L RC — HIGH (ref 10–45)
ANION GAP SERPL CALC-SCNC: 11 MMOL/L — SIGNIFICANT CHANGE UP (ref 5–17)
AST SERPL-CCNC: 23 U/L — SIGNIFICANT CHANGE UP (ref 10–40)
BASOPHILS # BLD AUTO: 0 K/UL — SIGNIFICANT CHANGE UP (ref 0–0.2)
BASOPHILS NFR BLD AUTO: 0.2 % — SIGNIFICANT CHANGE UP (ref 0–2)
BILIRUB SERPL-MCNC: 0.3 MG/DL — SIGNIFICANT CHANGE UP (ref 0.2–1.2)
BUN SERPL-MCNC: 14 MG/DL — SIGNIFICANT CHANGE UP (ref 7–23)
CALCIUM SERPL-MCNC: 8.7 MG/DL — SIGNIFICANT CHANGE UP (ref 8.4–10.5)
CHLORIDE SERPL-SCNC: 98 MMOL/L — SIGNIFICANT CHANGE UP (ref 96–108)
CO2 SERPL-SCNC: 26 MMOL/L — SIGNIFICANT CHANGE UP (ref 22–31)
CREAT SERPL-MCNC: 0.93 MG/DL — SIGNIFICANT CHANGE UP (ref 0.5–1.3)
EOSINOPHIL # BLD AUTO: 0 K/UL — SIGNIFICANT CHANGE UP (ref 0–0.5)
EOSINOPHIL NFR BLD AUTO: 0.1 % — SIGNIFICANT CHANGE UP (ref 0–6)
GLUCOSE SERPL-MCNC: 114 MG/DL — HIGH (ref 70–99)
HCT VFR BLD CALC: 40.6 % — SIGNIFICANT CHANGE UP (ref 39–50)
HGB BLD-MCNC: 12.9 G/DL — LOW (ref 13–17)
LDH SERPL L TO P-CCNC: 163 U/L — SIGNIFICANT CHANGE UP (ref 50–242)
LYMPHOCYTES # BLD AUTO: 1.2 K/UL — SIGNIFICANT CHANGE UP (ref 1–3.3)
LYMPHOCYTES # BLD AUTO: 13.9 % — SIGNIFICANT CHANGE UP (ref 13–44)
MAGNESIUM SERPL-MCNC: 2.1 MG/DL — SIGNIFICANT CHANGE UP (ref 1.6–2.6)
MCHC RBC-ENTMCNC: 28.4 PG — SIGNIFICANT CHANGE UP (ref 27–34)
MCHC RBC-ENTMCNC: 31.7 GM/DL — LOW (ref 32–36)
MCV RBC AUTO: 89.5 FL — SIGNIFICANT CHANGE UP (ref 80–100)
MONOCYTES # BLD AUTO: 0.1 K/UL — SIGNIFICANT CHANGE UP (ref 0–0.9)
MONOCYTES NFR BLD AUTO: 1 % — LOW (ref 2–14)
NEUTROPHILS # BLD AUTO: 7.3 K/UL — SIGNIFICANT CHANGE UP (ref 1.8–7.4)
NEUTROPHILS NFR BLD AUTO: 84.8 % — HIGH (ref 43–77)
PHOSPHATE SERPL-MCNC: 3.3 MG/DL — SIGNIFICANT CHANGE UP (ref 2.5–4.5)
PLATELET # BLD AUTO: 184 K/UL — SIGNIFICANT CHANGE UP (ref 150–400)
POTASSIUM SERPL-MCNC: 3.7 MMOL/L — SIGNIFICANT CHANGE UP (ref 3.5–5.3)
POTASSIUM SERPL-SCNC: 3.7 MMOL/L — SIGNIFICANT CHANGE UP (ref 3.5–5.3)
PROT SERPL-MCNC: 7.2 G/DL — SIGNIFICANT CHANGE UP (ref 6–8.3)
RBC # BLD: 4.53 M/UL — SIGNIFICANT CHANGE UP (ref 4.2–5.8)
RBC # FLD: 13.8 % — SIGNIFICANT CHANGE UP (ref 10.3–14.5)
SODIUM SERPL-SCNC: 135 MMOL/L — SIGNIFICANT CHANGE UP (ref 135–145)
URATE SERPL-MCNC: 2.8 MG/DL — LOW (ref 3.4–8.8)
WBC # BLD: 8.6 K/UL — SIGNIFICANT CHANGE UP (ref 3.8–10.5)
WBC # FLD AUTO: 8.6 K/UL — SIGNIFICANT CHANGE UP (ref 3.8–10.5)

## 2018-02-26 PROCEDURE — 99232 SBSQ HOSP IP/OBS MODERATE 35: CPT | Mod: GC

## 2018-02-26 RX ADMIN — Medication 300 MILLIGRAM(S): at 12:12

## 2018-02-26 RX ADMIN — Medication 400 MILLIGRAM(S): at 13:39

## 2018-02-26 RX ADMIN — ONDANSETRON 8 MILLIGRAM(S): 8 TABLET, FILM COATED ORAL at 21:12

## 2018-02-26 RX ADMIN — Medication 400 MILLIGRAM(S): at 21:12

## 2018-02-26 RX ADMIN — Medication 400 MILLIGRAM(S): at 06:47

## 2018-02-26 RX ADMIN — Medication 115 MILLIGRAM(S): at 17:59

## 2018-02-26 RX ADMIN — ENOXAPARIN SODIUM 40 MILLIGRAM(S): 100 INJECTION SUBCUTANEOUS at 17:58

## 2018-02-26 RX ADMIN — Medication 1 TABLET(S): at 12:12

## 2018-02-26 RX ADMIN — ONDANSETRON 8 MILLIGRAM(S): 8 TABLET, FILM COATED ORAL at 06:46

## 2018-02-26 RX ADMIN — Medication 115 MILLIGRAM(S): at 06:47

## 2018-02-26 RX ADMIN — ONDANSETRON 8 MILLIGRAM(S): 8 TABLET, FILM COATED ORAL at 13:39

## 2018-02-26 NOTE — PROGRESS NOTE ADULT - ATTENDING COMMENTS
39 yo M with HIV on HAART (but hx noncompliance), newly dx'ed high grade B-cell lymphoma myc+ stage I admitted for C1 dose-adjusted R-EPOCH day 3  Rituxan given on 2/22  -IT MTx LP on 2/23 -will f/u cell count CSF, flow cytometry  -monitor counts, transfuse PRN  -monitor for fevers -not neutropenic. On Bactrim/Acyclovir prophylaxis. 39 yo M with HIV on HAART (but hx noncompliance), newly dx'ed high grade B-cell lymphoma myc+ stage I admitted for C1 dose-adjusted R-EPOCH day 4  Rituxan given on 2/22. well tolerated  -IT MTx LP on 2/23 -CSF (-)  -monitor counts, transfuse PRN  -monitor for fevers -not neutropenic. On Bactrim/Acyclovir prophylaxis.  plan d/c tomorrow post CTX  Pt advised of importance of taking HAART. 39 yo M with HIV on HAART (but hx noncompliance), newly dx'ed high grade B-cell lymphoma myc+ stage I admitted for C1 dose-adjusted R-EPOCH day 5  Rituxan given on 2/22. well tolerated  -IT MTx LP on 2/23 -CSF (-)  For cytoxan today  work with pt, home care, drug plan (if any) to ensure delivery of HAART to pt ASAP  Home today if stable, for Neulasta in OPD on 3/1/18.  OPD f/u on 3/5/28.  home on Bactrim/Acyclovir prophylaxis.  Pt again advised of importance of taking HAART. 39 yo M with HIV on HAART (but hx noncompliance), newly dx'ed high grade B-cell lymphoma myc+ stage I admitted for C1 dose-adjusted R-EPOCH day 5  Rituxan given on 2/22. well tolerated  -IT MTx LP on 2/23 -CSF (-)  For cytoxan today  work with pt, home care, drug plan (if any) to ensure delivery of HAART to pt ASAP  Home today if stable, for Neulasta in OPD on 3/1/18.  OPD f/u on 3/5/28.  home on Bactrim/Acyclovir prophylaxis.  Pt again advised of importance of taking HAART.    addendum: CSF+  Will need 2x/wk LPs until clear

## 2018-02-26 NOTE — PROGRESS NOTE ADULT - SUBJECTIVE AND OBJECTIVE BOX
Diagnosis: High grade B-Cell Lymphoma    Protocol/Chemo Regimen: DA-R-EPOCH    Day: 4     Wes Sloan  ID:    Pt endorsed: no complaints    Review of Systems: Patient denied nausea, vomiting, odynophagia, chest pain, cough, dyspnea, abdominal pain, constipation, diarrhea, sunday-rectal pain, rash, fatigue, headache, depression    Pain scale:  0                                       Diet: regular    Allergies: No Known Allergies    ANTIMICROBIALS  abacavir 600 mG/dolutegravir 50 mG/lamiVUDine 300 mG 1 Tablet(s) Oral daily  acyclovir   Tablet 400 milliGRAM(s) Oral three times a day  trimethoprim  160 mG/sulfamethoxazole 800 mG 1 Tablet(s) Oral <User Schedule>      HEME/ONC MEDICATIONS  DOXOrubicin IVPB w/vinCRIStine 19 milliGRAM(s) IV Intermittent daily  enoxaparin Injectable 40 milliGRAM(s) SubCutaneous every 24 hours  etoposide IVPB 97 milliGRAM(s) IV Intermittent daily  methotrexate PF IntraThecal 12 milliGRAM(s) IntraThecal once  riTUXimab IVPB 728 milliGRAM(s) IV Intermittent once      STANDING MEDICATIONS  allopurinol 300 milliGRAM(s) Oral daily  influenza   Vaccine 0.5 milliLiter(s) IntraMuscular once  ondansetron Injectable 8 milliGRAM(s) IV Push every 8 hours  predniSONE   Tablet 115 milliGRAM(s) Oral two times a day      PRN MEDICATIONS  acetaminophen   Tablet 650 milliGRAM(s) Oral every 6 hours PRN  hydrocortisone sodium succinate Injectable 100 milliGRAM(s) IV Push once PRN  metoclopramide Injectable 10 milliGRAM(s) IV Push every 6 hours PRN        Vital Signs Last 24 Hrs      PHYSICAL EXAM  General: NAD  HEENT: PERRLA, EOMI, clear oropharynx, anicteric sclera, pink conjunctiva  Neck: supple  CV: (+) S1/S2 RRR  Lungs: clear to auscultation, no wheezes or rales  Abdomen: soft, non-tender, non-distended (+) BS  Ext: no clubbing, cyanosis or edema  Skin: no rashes and no petechiae  Neuro: alert and oriented X 3, no focal deficits  Central Line: R ACW [facility] C/D/I        LABS: Diagnosis: High grade B-Cell Lymphoma    Protocol/Chemo Regimen: DA-R-EPOCH    Day: 4    Pt endorsed: no complaints    Review of Systems: Patient denied nausea, vomiting, odynophagia, chest pain, cough, dyspnea, abdominal pain, constipation, diarrhea, sunday-rectal pain, rash, fatigue, headache, depression    Pain scale:  0                                       Diet: regular    Allergies: No Known Allergies    ANTIMICROBIALS  abacavir 600 mG/dolutegravir 50 mG/lamiVUDine 300 mG 1 Tablet(s) Oral daily  acyclovir   Tablet 400 milliGRAM(s) Oral three times a day  trimethoprim  160 mG/sulfamethoxazole 800 mG 1 Tablet(s) Oral <User Schedule>      HEME/ONC MEDICATIONS  DOXOrubicin IVPB w/vinCRIStine 19 milliGRAM(s) IV Intermittent daily  enoxaparin Injectable 40 milliGRAM(s) SubCutaneous every 24 hours  etoposide IVPB 97 milliGRAM(s) IV Intermittent daily  methotrexate PF IntraThecal 12 milliGRAM(s) IntraThecal once  riTUXimab IVPB 728 milliGRAM(s) IV Intermittent once      STANDING MEDICATIONS  allopurinol 300 milliGRAM(s) Oral daily  influenza   Vaccine 0.5 milliLiter(s) IntraMuscular once  ondansetron Injectable 8 milliGRAM(s) IV Push every 8 hours  predniSONE   Tablet 115 milliGRAM(s) Oral two times a day      PRN MEDICATIONS  acetaminophen   Tablet 650 milliGRAM(s) Oral every 6 hours PRN  hydrocortisone sodium succinate Injectable 100 milliGRAM(s) IV Push once PRN  metoclopramide Injectable 10 milliGRAM(s) IV Push every 6 hours PRN    Vital Signs Last 24 Hrs  T(C): 36.4 (26 Feb 2018 13:47), Max: 36.9 (25 Feb 2018 17:00)  T(F): 97.5 (26 Feb 2018 13:47), Max: 98.4 (25 Feb 2018 17:00)  HR: 82 (26 Feb 2018 13:47) (82 - 97)  BP: 145/66 (26 Feb 2018 13:47) (127/79 - 150/69)  RR: 18 (26 Feb 2018 13:47) (18 - 20)  SpO2: 96% (26 Feb 2018 13:47) (96% - 98%)      PHYSICAL EXAM  General: NAD  HEENT: PERRLA, EOMI, clear oropharynx, anicteric sclera, pink conjunctiva  Neck: supple  CV: (+) S1/S2 RRR  Lungs: clear to auscultation, no wheezes or rales  Abdomen: soft, non-tender, non-distended (+) BS  Ext: no clubbing, cyanosis or edema  Skin: no rashes and no petechiae  Neuro: alert and oriented X 3, no focal deficits  Central Line: R ACW Mediport C/D/I      LABS:                        12.9   8.6   )-----------( 184      ( 26 Feb 2018 07:12 )             40.6     02-26    135  |  98  |  14  ----------------------------<  114<H>  3.7   |  26  |  0.93    Ca    8.7      26 Feb 2018 07:12  Phos  3.3     02-26  Mg     2.1     02-26    TPro  7.2  /  Alb  3.5  /  TBili  0.3  /  DBili  x   /  AST  23  /  ALT  48<H>  /  AlkPhos  57  02-26      I&O's Summary    25 Feb 2018 07:01  -  26 Feb 2018 07:00  --------------------------------------------------------  IN: 3384 mL / OUT: 3125 mL / NET: 259 mL    26 Feb 2018 07:01  -  26 Feb 2018 15:12  --------------------------------------------------------  IN: 480 mL / OUT: 1200 mL / NET: -720 mL Diagnosis: High grade B-Cell Lymphoma    Protocol/Chemo Regimen: DA-R-EPOCH    Day: 4    Pt endorsed: no complaints    Review of Systems: Patient denied nausea, vomiting, odynophagia, chest pain, cough, dyspnea, abdominal pain, constipation, diarrhea, sunday-rectal pain, rash, fatigue, headache, depression    Pain scale:  0                                       Diet: regular    Allergies: No Known Allergies    ANTIMICROBIALS  abacavir 600 mG/dolutegravir 50 mG/lamiVUDine 300 mG 1 Tablet(s) Oral daily  acyclovir   Tablet 400 milliGRAM(s) Oral three times a day  trimethoprim  160 mG/sulfamethoxazole 800 mG 1 Tablet(s) Oral <User Schedule>      HEME/ONC MEDICATIONS  DOXOrubicin IVPB w/vinCRIStine 19 milliGRAM(s) IV Intermittent daily  enoxaparin Injectable 40 milliGRAM(s) SubCutaneous every 24 hours  etoposide IVPB 97 milliGRAM(s) IV Intermittent daily      STANDING MEDICATIONS  allopurinol 300 milliGRAM(s) Oral daily  influenza   Vaccine 0.5 milliLiter(s) IntraMuscular once  ondansetron Injectable 8 milliGRAM(s) IV Push every 8 hours  predniSONE   Tablet 115 milliGRAM(s) Oral two times a day      PRN MEDICATIONS  acetaminophen   Tablet 650 milliGRAM(s) Oral every 6 hours PRN  hydrocortisone sodium succinate Injectable 100 milliGRAM(s) IV Push once PRN  metoclopramide Injectable 10 milliGRAM(s) IV Push every 6 hours PRN    Vital Signs Last 24 Hrs  T(C): 36.4 (26 Feb 2018 13:47), Max: 36.9 (25 Feb 2018 17:00)  T(F): 97.5 (26 Feb 2018 13:47), Max: 98.4 (25 Feb 2018 17:00)  HR: 82 (26 Feb 2018 13:47) (82 - 97)  BP: 145/66 (26 Feb 2018 13:47) (127/79 - 150/69)  RR: 18 (26 Feb 2018 13:47) (18 - 20)  SpO2: 96% (26 Feb 2018 13:47) (96% - 98%)      PHYSICAL EXAM  General: NAD  HEENT: PERRLA, EOMI, clear oropharynx, anicteric sclera, pink conjunctiva  Neck: supple  CV: (+) S1/S2 RRR  Lungs: clear to auscultation, no wheezes or rales  Abdomen: soft, non-tender, non-distended (+) BS  Ext: no clubbing, cyanosis or edema  Skin: no rashes and no petechiae  Neuro: alert and oriented X 3, no focal deficits  [facility]      LABS:                        12.9   8.6   )-----------( 184      ( 26 Feb 2018 07:12 )             40.6     02-26    135  |  98  |  14  ----------------------------<  114<H>  3.7   |  26  |  0.93    Ca    8.7      26 Feb 2018 07:12  Phos  3.3     02-26  Mg     2.1     02-26    TPro  7.2  /  Alb  3.5  /  TBili  0.3  /  DBili  x   /  AST  23  /  ALT  48<H>  /  AlkPhos  57  02-26      I&O's Summary    25 Feb 2018 07:01 - 26 Feb 2018 07:00  --------------------------------------------------------  IN: 3384 mL / OUT: 3125 mL / NET: 259 mL    26 Feb 2018 07:01  -  26 Feb 2018 15:12  --------------------------------------------------------  IN: 480 mL / OUT: 1200 mL / NET: -720 mL Diagnosis: High grade B-Cell Lymphoma    Protocol/Chemo Regimen: DA-R-EPOCH    Day: 4    : Mikayla ID # [id number]  Pt endorsed: indigestion    Review of Systems: Patient denied nausea, vomiting, odynophagia, chest pain, cough, dyspnea, abdominal pain, constipation, diarrhea, sunday-rectal pain, rash, fatigue, headache, depression    Pain scale:  0                                       Diet: regular    Allergies: No Known Allergies    ANTIMICROBIALS  abacavir 600 mG/dolutegravir 50 mG/lamiVUDine 300 mG 1 Tablet(s) Oral daily  acyclovir   Tablet 400 milliGRAM(s) Oral three times a day  trimethoprim  160 mG/sulfamethoxazole 800 mG 1 Tablet(s) Oral <User Schedule>      HEME/ONC MEDICATIONS  DOXOrubicin IVPB w/vinCRIStine 19 milliGRAM(s) IV Intermittent daily  enoxaparin Injectable 40 milliGRAM(s) SubCutaneous every 24 hours  etoposide IVPB 97 milliGRAM(s) IV Intermittent daily      STANDING MEDICATIONS  allopurinol 300 milliGRAM(s) Oral daily  influenza   Vaccine 0.5 milliLiter(s) IntraMuscular once  ondansetron Injectable 8 milliGRAM(s) IV Push every 8 hours  predniSONE   Tablet 115 milliGRAM(s) Oral two times a day      PRN MEDICATIONS  acetaminophen   Tablet 650 milliGRAM(s) Oral every 6 hours PRN  hydrocortisone sodium succinate Injectable 100 milliGRAM(s) IV Push once PRN  metoclopramide Injectable 10 milliGRAM(s) IV Push every 6 hours PRN    Vital Signs Last 24 Hrs  T(C): 36.4 (26 Feb 2018 13:47), Max: 36.9 (25 Feb 2018 17:00)  T(F): 97.5 (26 Feb 2018 13:47), Max: 98.4 (25 Feb 2018 17:00)  HR: 82 (26 Feb 2018 13:47) (82 - 97)  BP: 145/66 (26 Feb 2018 13:47) (127/79 - 150/69)  RR: 18 (26 Feb 2018 13:47) (18 - 20)  SpO2: 96% (26 Feb 2018 13:47) (96% - 98%)      PHYSICAL EXAM  General: NAD  HEENT: PERRLA, EOMI, clear oropharynx, anicteric sclera, pink conjunctiva  Neck: supple  CV: (+) S1/S2 RRR  Lungs: clear to auscultation, no wheezes or rales  Abdomen: soft, non-tender, non-distended (+) BS  Ext: no clubbing, cyanosis or edema  Skin: no rashes and no petechiae  Neuro: alert and oriented X 3, no focal deficits  Central Line: R ACW [facility] C/D/I      LABS:                        12.9   8.6   )-----------( 184      ( 26 Feb 2018 07:12 )             40.6     02-26    135  |  98  |  14  ----------------------------<  114<H>  3.7   |  26  |  0.93    Ca    8.7      26 Feb 2018 07:12  Phos  3.3     02-26  Mg     2.1     02-26    TPro  7.2  /  Alb  3.5  /  TBili  0.3  /  DBili  x   /  AST  23  /  ALT  48<H>  /  AlkPhos  57  02-26      I&O's Summary    25 Feb 2018 07:01 - 26 Feb 2018 07:00  --------------------------------------------------------  IN: 3384 mL / OUT: 3125 mL / NET: 259 mL    26 Feb 2018 07:01  -  26 Feb 2018 15:12  --------------------------------------------------------  IN: 480 mL / OUT: 1200 mL / NET: -720 mL

## 2018-02-26 NOTE — ADVANCED PRACTICE NURSE CONSULT - ASSESSMENT
Pt admitted for chemotherapy oriented to unit routine alert and oriented times four. Pt ambulate to bathroom with steady gait no distress noted. Mediport to right chest wall access by primary nurse, remain  with + blood return and flush easily. Chemotherapy teaching done with help of  pt verbalized understanding, also given drug card which outline side effect. Pt lab result was reviewed by Dr Cortes. Pt was pre-medicated with zofran by primary nurse. Pt was started on Doxorubicin 10 mg/m2= 19 mg, Vincristine 0.4 mg/m2= 0.8 mg and Etoposide 50 mg/m2= 97 mg iv all to run over 24 hours in separate 500 cc normal saline Report given to primary nurse. Left pt in bed with visitors at bedside. Report given to primary nurse.

## 2018-02-26 NOTE — PROGRESS NOTE ADULT - ASSESSMENT
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 LP completed with IT MTX on 2/23 results currently pending. 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 LP completed with IT MTX on 2/23 cell counts normal.

## 2018-02-26 NOTE — PROGRESS NOTE ADULT - PROBLEM SELECTOR PLAN 1
Admitted for Cycle 1 of DA- R-EPOCH  LP with IT MTX completed 2/23, follow up flow results  Montior CBC/Lytes and transfuse/replete PRN  Strict Is and Os/Daily weights/Mouth Care  Continue Allopurinol Admitted for Cycle 1 of DA- R-EPOCH  LP with IT MTX completed 2/23, follow up flow results  Montior CBC/Lytes and transfuse/replete PRN  Strict Is and Os/Daily weights/Mouth Care  Continue Allopurinol  Discharge planning for 2/27- follow up appointments made Admitted for Cycle 1 of DA- R-EPOCH  LP with IT MTX completed 2/23, cell counts normal  Monitor CBC/Lytes and transfuse/replete PRN  Strict Is and Os/Daily weights/Mouth Care  Continue Allopurinol  Discharge planning for 2/27- follow up appointments made

## 2018-02-27 LAB
ALBUMIN SERPL ELPH-MCNC: 3.4 G/DL — SIGNIFICANT CHANGE UP (ref 3.3–5)
ALP SERPL-CCNC: 57 U/L — SIGNIFICANT CHANGE UP (ref 40–120)
ALT FLD-CCNC: 39 U/L RC — SIGNIFICANT CHANGE UP (ref 10–45)
ANION GAP SERPL CALC-SCNC: 12 MMOL/L — SIGNIFICANT CHANGE UP (ref 5–17)
AST SERPL-CCNC: 16 U/L — SIGNIFICANT CHANGE UP (ref 10–40)
BASOPHILS # BLD AUTO: 0 K/UL — SIGNIFICANT CHANGE UP (ref 0–0.2)
BASOPHILS NFR BLD AUTO: 0.4 % — SIGNIFICANT CHANGE UP (ref 0–2)
BILIRUB SERPL-MCNC: 0.5 MG/DL — SIGNIFICANT CHANGE UP (ref 0.2–1.2)
BUN SERPL-MCNC: 16 MG/DL — SIGNIFICANT CHANGE UP (ref 7–23)
CALCIUM SERPL-MCNC: 8.9 MG/DL — SIGNIFICANT CHANGE UP (ref 8.4–10.5)
CHLORIDE SERPL-SCNC: 97 MMOL/L — SIGNIFICANT CHANGE UP (ref 96–108)
CO2 SERPL-SCNC: 27 MMOL/L — SIGNIFICANT CHANGE UP (ref 22–31)
CREAT SERPL-MCNC: 0.95 MG/DL — SIGNIFICANT CHANGE UP (ref 0.5–1.3)
EOSINOPHIL # BLD AUTO: 0 K/UL — SIGNIFICANT CHANGE UP (ref 0–0.5)
EOSINOPHIL NFR BLD AUTO: 0.2 % — SIGNIFICANT CHANGE UP (ref 0–6)
GLUCOSE SERPL-MCNC: 125 MG/DL — HIGH (ref 70–99)
HCT VFR BLD CALC: 41.9 % — SIGNIFICANT CHANGE UP (ref 39–50)
HGB BLD-MCNC: 13.4 G/DL — SIGNIFICANT CHANGE UP (ref 13–17)
LDH SERPL L TO P-CCNC: 146 U/L — SIGNIFICANT CHANGE UP (ref 50–242)
LYMPHOCYTES # BLD AUTO: 1.2 K/UL — SIGNIFICANT CHANGE UP (ref 1–3.3)
LYMPHOCYTES # BLD AUTO: 21.5 % — SIGNIFICANT CHANGE UP (ref 13–44)
MAGNESIUM SERPL-MCNC: 2.1 MG/DL — SIGNIFICANT CHANGE UP (ref 1.6–2.6)
MCHC RBC-ENTMCNC: 28.4 PG — SIGNIFICANT CHANGE UP (ref 27–34)
MCHC RBC-ENTMCNC: 31.9 GM/DL — LOW (ref 32–36)
MCV RBC AUTO: 89.2 FL — SIGNIFICANT CHANGE UP (ref 80–100)
MONOCYTES # BLD AUTO: 0 K/UL — SIGNIFICANT CHANGE UP (ref 0–0.9)
MONOCYTES NFR BLD AUTO: 0.6 % — LOW (ref 2–14)
NEUTROPHILS # BLD AUTO: 4.2 K/UL — SIGNIFICANT CHANGE UP (ref 1.8–7.4)
NEUTROPHILS NFR BLD AUTO: 77.4 % — HIGH (ref 43–77)
PHOSPHATE SERPL-MCNC: 3.5 MG/DL — SIGNIFICANT CHANGE UP (ref 2.5–4.5)
PLATELET # BLD AUTO: 192 K/UL — SIGNIFICANT CHANGE UP (ref 150–400)
POTASSIUM SERPL-MCNC: 3.8 MMOL/L — SIGNIFICANT CHANGE UP (ref 3.5–5.3)
POTASSIUM SERPL-SCNC: 3.8 MMOL/L — SIGNIFICANT CHANGE UP (ref 3.5–5.3)
PROT SERPL-MCNC: 7.4 G/DL — SIGNIFICANT CHANGE UP (ref 6–8.3)
RBC # BLD: 4.7 M/UL — SIGNIFICANT CHANGE UP (ref 4.2–5.8)
RBC # FLD: 13.6 % — SIGNIFICANT CHANGE UP (ref 10.3–14.5)
SODIUM SERPL-SCNC: 136 MMOL/L — SIGNIFICANT CHANGE UP (ref 135–145)
TM INTERPRETATION: SIGNIFICANT CHANGE UP
URATE SERPL-MCNC: 3.1 MG/DL — LOW (ref 3.4–8.8)
WBC # BLD: 5.5 K/UL — SIGNIFICANT CHANGE UP (ref 3.8–10.5)
WBC # FLD AUTO: 5.5 K/UL — SIGNIFICANT CHANGE UP (ref 3.8–10.5)

## 2018-02-27 PROCEDURE — 99222 1ST HOSP IP/OBS MODERATE 55: CPT

## 2018-02-27 PROCEDURE — 99233 SBSQ HOSP IP/OBS HIGH 50: CPT | Mod: GC

## 2018-02-27 RX ORDER — ALLOPURINOL 300 MG
1 TABLET ORAL
Qty: 0 | Refills: 0 | COMMUNITY

## 2018-02-27 RX ORDER — ABACAVIR SULFATE, DOLUTEGRAVIR SODIUM, LAMIVUDINE 60-5-30 MG
1 KIT ORAL
Qty: 30 | Refills: 0 | OUTPATIENT
Start: 2018-02-27 | End: 2018-03-28

## 2018-02-27 RX ORDER — ABACAVIR SULFATE, DOLUTEGRAVIR SODIUM, LAMIVUDINE 60-5-30 MG
1 KIT ORAL
Qty: 30 | Refills: 0 | OUTPATIENT
Start: 2018-02-27

## 2018-02-27 RX ORDER — METOCLOPRAMIDE HCL 10 MG
1 TABLET ORAL
Qty: 40 | Refills: 0 | OUTPATIENT
Start: 2018-02-27 | End: 2018-03-08

## 2018-02-27 RX ORDER — ABACAVIR SULFATE, DOLUTEGRAVIR SODIUM, LAMIVUDINE 60-5-30 MG
1 KIT ORAL
Qty: 0 | Refills: 0 | COMMUNITY

## 2018-02-27 RX ORDER — CYCLOPHOSPHAMIDE 100 MG
1455 VIAL (EA) INTRAVENOUS ONCE
Qty: 0 | Refills: 0 | Status: COMPLETED | OUTPATIENT
Start: 2018-02-27 | End: 2018-02-28

## 2018-02-27 RX ORDER — ACYCLOVIR SODIUM 500 MG
1 VIAL (EA) INTRAVENOUS
Qty: 90 | Refills: 0 | OUTPATIENT
Start: 2018-02-27 | End: 2018-03-28

## 2018-02-27 RX ORDER — ALLOPURINOL 300 MG
1 TABLET ORAL
Qty: 0 | Refills: 0 | COMMUNITY
Start: 2018-02-27

## 2018-02-27 RX ADMIN — ONDANSETRON 8 MILLIGRAM(S): 8 TABLET, FILM COATED ORAL at 05:00

## 2018-02-27 RX ADMIN — ENOXAPARIN SODIUM 40 MILLIGRAM(S): 100 INJECTION SUBCUTANEOUS at 17:42

## 2018-02-27 RX ADMIN — Medication 400 MILLIGRAM(S): at 21:20

## 2018-02-27 RX ADMIN — Medication 115 MILLIGRAM(S): at 17:42

## 2018-02-27 RX ADMIN — Medication 400 MILLIGRAM(S): at 05:00

## 2018-02-27 RX ADMIN — ONDANSETRON 8 MILLIGRAM(S): 8 TABLET, FILM COATED ORAL at 21:20

## 2018-02-27 RX ADMIN — Medication 300 MILLIGRAM(S): at 12:17

## 2018-02-27 RX ADMIN — Medication 400 MILLIGRAM(S): at 13:27

## 2018-02-27 RX ADMIN — ONDANSETRON 8 MILLIGRAM(S): 8 TABLET, FILM COATED ORAL at 13:27

## 2018-02-27 RX ADMIN — Medication 115 MILLIGRAM(S): at 05:01

## 2018-02-27 NOTE — PROGRESS NOTE ADULT - SUBJECTIVE AND OBJECTIVE BOX
Diagnosis: High grade B-Cell Lymphoma    Protocol/Chemo Regimen: DA-R-EPOCH    Day: 5    Pt endorsed: no complaints    Review of Systems: Patient denied nausea, vomiting, odynophagia, chest pain, cough, dyspnea, abdominal pain, constipation, diarrhea, sunday-rectal pain, rash, fatigue, headache, depression    Pain scale:  0                                       Diet: regular    Allergies: No Known Allergies    ANTIMICROBIALS  abacavir 600 mG/dolutegravir 50 mG/lamiVUDine 300 mG 1 Tablet(s) Oral daily  acyclovir   Tablet 400 milliGRAM(s) Oral three times a day  trimethoprim  160 mG/sulfamethoxazole 800 mG 1 Tablet(s) Oral <User Schedule>      HEME/ONC MEDICATIONS  DOXOrubicin IVPB w/vinCRIStine 19 milliGRAM(s) IV Intermittent daily  enoxaparin Injectable 40 milliGRAM(s) SubCutaneous every 24 hours  etoposide IVPB 97 milliGRAM(s) IV Intermittent daily      STANDING MEDICATIONS  allopurinol 300 milliGRAM(s) Oral daily  influenza   Vaccine 0.5 milliLiter(s) IntraMuscular once  ondansetron Injectable 8 milliGRAM(s) IV Push every 8 hours  predniSONE   Tablet 115 milliGRAM(s) Oral two times a day      PRN MEDICATIONS  acetaminophen   Tablet 650 milliGRAM(s) Oral every 6 hours PRN  hydrocortisone sodium succinate Injectable 100 milliGRAM(s) IV Push once PRN  metoclopramide Injectable 10 milliGRAM(s) IV Push every 6 hours PRN    Vital Signs Last 24 Hrs  T(C): 36.4 (27 Feb 2018 13:42), Max: 36.6 (26 Feb 2018 17:06)  T(F): 97.5 (27 Feb 2018 13:42), Max: 97.9 (26 Feb 2018 17:06)  HR: 77 (27 Feb 2018 13:42) (77 - 92)  BP: 151/81 (27 Feb 2018 13:42) (125/85 - 151/81)  RR: 18 (27 Feb 2018 13:42) (18 - 18)  SpO2: 99% (27 Feb 2018 13:42) (96% - 99%)        PHYSICAL EXAM  General: NAD  HEENT: PERRLA, EOMI, clear oropharynx, anicteric sclera, pink conjunctiva  Neck: supple  CV: (+) S1/S2 RRR  Lungs: clear to auscultation, no wheezes or rales  Abdomen: soft, non-tender, non-distended (+) BS  Ext: no clubbing, cyanosis or edema  Skin: no rashes and no petechiae  Neuro: alert and oriented X 3, no focal deficits  [facility]      LABS:                                   13.4   5.5   )-----------( 192      ( 27 Feb 2018 06:59 )             41.9     02-27    136  |  97  |  16  ----------------------------<  125<H>  3.8   |  27  |  0.95    Ca    8.9      27 Feb 2018 06:59  Phos  3.5     02-27  Mg     2.1     02-27    TPro  7.4  /  Alb  3.4  /  TBili  0.5  /  DBili  x   /  AST  16  /  ALT  39  /  AlkPhos  57  02-27        I&O's Summary    26 Feb 2018 07:01 - 27 Feb 2018 07:00  --------------------------------------------------------  IN: 2283 mL / OUT: 4875 mL / NET: -2592 mL    27 Feb 2018 07:01  -  27 Feb 2018 14:56  --------------------------------------------------------  IN: 953 mL / OUT: 400 mL / NET: 553 mL Diagnosis: High grade B-Cell Lymphoma    Protocol/Chemo Regimen: DA-R-EPOCH    Day: 5    Pt endorsed: no complaints    Review of Systems: Patient denied nausea, vomiting, odynophagia, chest pain, cough, dyspnea, abdominal pain, constipation, diarrhea, sunday-rectal pain, rash, fatigue, headache, depression    Pain scale:  0                                       Diet: regular    Allergies: No Known Allergies    ANTIMICROBIALS  abacavir 600 mG/dolutegravir 50 mG/lamiVUDine 300 mG 1 Tablet(s) Oral daily  acyclovir   Tablet 400 milliGRAM(s) Oral three times a day  trimethoprim  160 mG/sulfamethoxazole 800 mG 1 Tablet(s) Oral <User Schedule>      HEME/ONC MEDICATIONS  DOXOrubicin IVPB w/vinCRIStine 19 milliGRAM(s) IV Intermittent daily  enoxaparin Injectable 40 milliGRAM(s) SubCutaneous every 24 hours  etoposide IVPB 97 milliGRAM(s) IV Intermittent daily      STANDING MEDICATIONS  allopurinol 300 milliGRAM(s) Oral daily  influenza   Vaccine 0.5 milliLiter(s) IntraMuscular once  ondansetron Injectable 8 milliGRAM(s) IV Push every 8 hours  predniSONE   Tablet 115 milliGRAM(s) Oral two times a day      PRN MEDICATIONS  acetaminophen   Tablet 650 milliGRAM(s) Oral every 6 hours PRN  hydrocortisone sodium succinate Injectable 100 milliGRAM(s) IV Push once PRN  metoclopramide Injectable 10 milliGRAM(s) IV Push every 6 hours PRN    Vital Signs Last 24 Hrs  T(C): 36.4 (27 Feb 2018 13:42), Max: 36.6 (26 Feb 2018 17:06)  T(F): 97.5 (27 Feb 2018 13:42), Max: 97.9 (26 Feb 2018 17:06)  HR: 77 (27 Feb 2018 13:42) (77 - 92)  BP: 151/81 (27 Feb 2018 13:42) (125/85 - 151/81)  RR: 18 (27 Feb 2018 13:42) (18 - 18)  SpO2: 99% (27 Feb 2018 13:42) (96% - 99%)        PHYSICAL EXAM  General: NAD  HEENT: PERRLA, EOMI, clear oropharynx, anicteric sclera, pink conjunctiva  Neck: supple  CV: (+) S1/S2 RRR  Lungs: clear to auscultation, no wheezes or rales  Abdomen: soft, non-tender, non-distended (+) BS  Ext: no clubbing, cyanosis or edema  Skin: no rashes and no petechiae  Neuro: alert and oriented X 3, no focal deficits  Central Line: R ACW [facility] C/D/I      LABS:                                   13.4   5.5   )-----------( 192      ( 27 Feb 2018 06:59 )             41.9     02-27    136  |  97  |  16  ----------------------------<  125<H>  3.8   |  27  |  0.95    Ca    8.9      27 Feb 2018 06:59  Phos  3.5     02-27  Mg     2.1     02-27    TPro  7.4  /  Alb  3.4  /  TBili  0.5  /  DBili  x   /  AST  16  /  ALT  39  /  AlkPhos  57  02-27    Flow Cytometry Order. (02.23.18 @ 16:20)    TM Interpretation:   Flow Cytometry Final Report    Cerebrospinal fluid:      - Positive for malignant cells (lymphoma)       - Monotypic B-cells (32% of cells), positive for kappa, CD19, CD20, CD10; negative CD5,  consistent with involvement by CD10 positive B-cell lymphoma.    INTERPRETATION:  MORPHOLOGY:  CYTOSPIN: Slightly increased medium-sized lymphoid cells    IMMUNOPHENOTYPE: _ Monotypic B-cells (32% of cells), positive for kappa, CD19, CD20, CD10; negative  CD5.    The findings are consistent with involvement by CD10 positive B-cell lymphoma.      I&O's Summary    26 Feb 2018 07:01  -  27 Feb 2018 07:00  --------------------------------------------------------  IN: 2283 mL / OUT: 4875 mL / NET: -2592 mL    27 Feb 2018 07:01  -  27 Feb 2018 14:56  --------------------------------------------------------  IN: 953 mL / OUT: 400 mL / NET: 553 mL

## 2018-02-27 NOTE — PROGRESS NOTE ADULT - PROBLEM SELECTOR PLAN 3
Continue Triumeq 1 tab daily  ID called for follow up Continue Triumeq 1 tab daily  ID called for follow up  Patient non-compliant with  medication prior to admission, possibly lost to follow-up  May need assistance with obtaining medication- sent prescription to [facility], awaiting evaluation

## 2018-02-27 NOTE — PROGRESS NOTE ADULT - PROBLEM SELECTOR PLAN 1
Admitted for Cycle 1 of DA- R-EPOCH  LP with IT MTX completed 2/23, FLow cytometry positive for malignant cells  scheduled for LP with IT chemo 2/28 at 2:30 pm  Monitor CBC/Lytes and transfuse/replete PRN  Strict Is and Os/Daily weights/Mouth Care  Continue Allopurinol

## 2018-02-27 NOTE — ADVANCED PRACTICE NURSE CONSULT - ASSESSMENT
Pt found lying in bed. No c/o. Labs today WBC: 5.5, Hgb: 13.4, Hct: 41.9, Plt: 192, serum creatinine: 0.95, total bilirubin: 0.5. Chemotherapy verified with fellow RN. Mediport at right anterior chest wall, previously accessed. Flushes easily with 0.9% NS with positive brisk blood return. Site without any redness, tenderness or swelling. Pt received Cyclophosphamide 750 mg/m2 = 1455 mg IV via pump at 1710. Primary RN aware of plan of care.

## 2018-02-27 NOTE — CONSULT NOTE ADULT - ASSESSMENT
41 yo man with a history of AIDs, recently diagnosed with B-cell lymphoma and current admission for his first cycle of chemotherapy.  Past history of poor medication adherence and possible admissions for an OI of unclear etiology   Will need staging HIV labs    Please send CD4, VL  Toxo ab  quant gold- negative  RPR  strongyloides ab  hep serologies- noted

## 2018-02-27 NOTE — PROGRESS NOTE ADULT - ATTENDING COMMENTS
41 yo M with HIV on HAART (but hx noncompliance), newly dx'ed high grade B-cell lymphoma myc+ stage I admitted for C1 dose-adjusted R-EPOCH day 5  Rituxan given on 2/22. well tolerated  -IT MTx LP on 2/23 -CSF (-)  For cytoxan today  work with pt, home care, drug plan (if any) to ensure delivery of HAART to pt ASAP  Home today if stable, for Neulasta in OPD on 3/1/18.  OPD f/u on 3/5/28.  home on Bactrim/Acyclovir prophylaxis.  Pt again advised of importance of taking HAART.    addendum: CSF+  Will need 2x/wk LPs until clear

## 2018-02-27 NOTE — PROGRESS NOTE ADULT - ASSESSMENT
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 LP completed with IT MTX on 2/23, flow cytometry positive for malignant cells.

## 2018-02-28 LAB
4/8 RATIO: 0.69 RATIO — LOW (ref 0.9–3.6)
ABS CD8: 1110 /UL — HIGH (ref 136–757)
ALBUMIN SERPL ELPH-MCNC: 3.7 G/DL — SIGNIFICANT CHANGE UP (ref 3.3–5)
ALBUMIN SERPL ELPH-MCNC: 3.8 G/DL — SIGNIFICANT CHANGE UP (ref 3.3–5)
ALP SERPL-CCNC: 48 U/L — SIGNIFICANT CHANGE UP (ref 40–120)
ALP SERPL-CCNC: 64 U/L — SIGNIFICANT CHANGE UP (ref 40–120)
ALT FLD-CCNC: 58 U/L RC — HIGH (ref 10–45)
ALT FLD-CCNC: 68 U/L RC — HIGH (ref 10–45)
ANION GAP SERPL CALC-SCNC: 11 MMOL/L — SIGNIFICANT CHANGE UP (ref 5–17)
ANION GAP SERPL CALC-SCNC: 13 MMOL/L — SIGNIFICANT CHANGE UP (ref 5–17)
APPEARANCE CSF: CLEAR — SIGNIFICANT CHANGE UP
APPEARANCE SPUN FLD: COLORLESS — SIGNIFICANT CHANGE UP
APTT BLD: 24.2 SEC — LOW (ref 27.5–37.4)
AST SERPL-CCNC: 34 U/L — SIGNIFICANT CHANGE UP (ref 10–40)
AST SERPL-CCNC: 65 U/L — HIGH (ref 10–40)
BASOPHILS # BLD AUTO: 0 K/UL — SIGNIFICANT CHANGE UP (ref 0–0.2)
BASOPHILS NFR BLD AUTO: 0.7 % — SIGNIFICANT CHANGE UP (ref 0–2)
BILIRUB SERPL-MCNC: 0.5 MG/DL — SIGNIFICANT CHANGE UP (ref 0.2–1.2)
BILIRUB SERPL-MCNC: 0.6 MG/DL — SIGNIFICANT CHANGE UP (ref 0.2–1.2)
BUN SERPL-MCNC: 16 MG/DL — SIGNIFICANT CHANGE UP (ref 7–23)
BUN SERPL-MCNC: 17 MG/DL — SIGNIFICANT CHANGE UP (ref 7–23)
CALCIUM SERPL-MCNC: 9 MG/DL — SIGNIFICANT CHANGE UP (ref 8.4–10.5)
CALCIUM SERPL-MCNC: 9.1 MG/DL — SIGNIFICANT CHANGE UP (ref 8.4–10.5)
CD3 BLASTS SPEC-ACNC: 1873 /UL — SIGNIFICANT CHANGE UP (ref 799–2171)
CD3 BLASTS SPEC-ACNC: 94 % — HIGH (ref 59–85)
CD4 %: 38 % — SIGNIFICANT CHANGE UP (ref 36–65)
CD8 %: 55 % — HIGH (ref 11–36)
CHLORIDE SERPL-SCNC: 95 MMOL/L — LOW (ref 96–108)
CHLORIDE SERPL-SCNC: 96 MMOL/L — SIGNIFICANT CHANGE UP (ref 96–108)
CO2 SERPL-SCNC: 25 MMOL/L — SIGNIFICANT CHANGE UP (ref 22–31)
CO2 SERPL-SCNC: 28 MMOL/L — SIGNIFICANT CHANGE UP (ref 22–31)
COLOR CSF: SIGNIFICANT CHANGE UP
CREAT SERPL-MCNC: 0.86 MG/DL — SIGNIFICANT CHANGE UP (ref 0.5–1.3)
CREAT SERPL-MCNC: 0.95 MG/DL — SIGNIFICANT CHANGE UP (ref 0.5–1.3)
EOSINOPHIL # BLD AUTO: 0 K/UL — SIGNIFICANT CHANGE UP (ref 0–0.5)
EOSINOPHIL NFR BLD AUTO: 0.4 % — SIGNIFICANT CHANGE UP (ref 0–6)
GLUCOSE CSF-MCNC: 94 MG/DL — HIGH (ref 40–70)
GLUCOSE SERPL-MCNC: 117 MG/DL — HIGH (ref 70–99)
GLUCOSE SERPL-MCNC: 168 MG/DL — HIGH (ref 70–99)
HAV IGG+IGM SER QL: REACTIVE
HCT VFR BLD CALC: 43.3 % — SIGNIFICANT CHANGE UP (ref 39–50)
HGB BLD-MCNC: 14.3 G/DL — SIGNIFICANT CHANGE UP (ref 13–17)
HIV-1 VIRAL LOAD RESULT: ABNORMAL
HIV1 RNA # SERPL NAA+PROBE: 65 — SIGNIFICANT CHANGE UP
HIV1 RNA SER-IMP: SIGNIFICANT CHANGE UP
HIV1 RNA SERPL NAA+PROBE-ACNC: ABNORMAL
HIV1 RNA SERPL NAA+PROBE-LOG#: 1.82 — SIGNIFICANT CHANGE UP
INR BLD: 0.96 RATIO — SIGNIFICANT CHANGE UP (ref 0.88–1.16)
LDH CSF L TO P-CCNC: 22 U/L — SIGNIFICANT CHANGE UP
LDH FLD-CCNC: 22 U/L — SIGNIFICANT CHANGE UP
LDH SERPL L TO P-CCNC: 160 U/L — SIGNIFICANT CHANGE UP (ref 50–242)
LDH SERPL L TO P-CCNC: 822 U/L — HIGH (ref 50–242)
LYMPHOCYTES # BLD AUTO: 1.5 K/UL — SIGNIFICANT CHANGE UP (ref 1–3.3)
LYMPHOCYTES # BLD AUTO: 30 % — SIGNIFICANT CHANGE UP (ref 13–44)
LYMPHOCYTES # CSF: 96 % — HIGH (ref 40–80)
MAGNESIUM SERPL-MCNC: 2.2 MG/DL — SIGNIFICANT CHANGE UP (ref 1.6–2.6)
MAGNESIUM SERPL-MCNC: 2.2 MG/DL — SIGNIFICANT CHANGE UP (ref 1.6–2.6)
MCHC RBC-ENTMCNC: 29 PG — SIGNIFICANT CHANGE UP (ref 27–34)
MCHC RBC-ENTMCNC: 33 GM/DL — SIGNIFICANT CHANGE UP (ref 32–36)
MCV RBC AUTO: 87.9 FL — SIGNIFICANT CHANGE UP (ref 80–100)
MONOCYTES # BLD AUTO: 0 K/UL — SIGNIFICANT CHANGE UP (ref 0–0.9)
MONOCYTES NFR BLD AUTO: 0.8 % — LOW (ref 2–14)
MONOS+MACROS NFR CSF: 4 % — LOW (ref 15–45)
NEUTROPHILS # BLD AUTO: 3.4 K/UL — SIGNIFICANT CHANGE UP (ref 1.8–7.4)
NEUTROPHILS # CSF: 0 % — SIGNIFICANT CHANGE UP (ref 0–6)
NEUTROPHILS NFR BLD AUTO: 68.2 % — SIGNIFICANT CHANGE UP (ref 43–77)
NRBC NFR CSF: 4 /UL — SIGNIFICANT CHANGE UP (ref 0–5)
PHOSPHATE SERPL-MCNC: 2.1 MG/DL — LOW (ref 2.5–4.5)
PHOSPHATE SERPL-MCNC: 3.8 MG/DL — SIGNIFICANT CHANGE UP (ref 2.5–4.5)
PLATELET # BLD AUTO: 222 K/UL — SIGNIFICANT CHANGE UP (ref 150–400)
POTASSIUM SERPL-MCNC: 4 MMOL/L — SIGNIFICANT CHANGE UP (ref 3.5–5.3)
POTASSIUM SERPL-MCNC: 5.6 MMOL/L — HIGH (ref 3.5–5.3)
POTASSIUM SERPL-SCNC: 4 MMOL/L — SIGNIFICANT CHANGE UP (ref 3.5–5.3)
POTASSIUM SERPL-SCNC: 5.6 MMOL/L — HIGH (ref 3.5–5.3)
PROT CSF-MCNC: 27 MG/DL — SIGNIFICANT CHANGE UP (ref 15–45)
PROT SERPL-MCNC: 8 G/DL — SIGNIFICANT CHANGE UP (ref 6–8.3)
PROT SERPL-MCNC: 8.7 G/DL — HIGH (ref 6–8.3)
PROTHROM AB SERPL-ACNC: 10.4 SEC — SIGNIFICANT CHANGE UP (ref 9.8–12.7)
RBC # BLD: 4.92 M/UL — SIGNIFICANT CHANGE UP (ref 4.2–5.8)
RBC # CSF: 92 /UL — HIGH (ref 0–0)
RBC # FLD: 13.4 % — SIGNIFICANT CHANGE UP (ref 10.3–14.5)
SODIUM SERPL-SCNC: 133 MMOL/L — LOW (ref 135–145)
SODIUM SERPL-SCNC: 135 MMOL/L — SIGNIFICANT CHANGE UP (ref 135–145)
T GONDII IGG SER QL: <3 IU/ML — SIGNIFICANT CHANGE UP
T GONDII IGG SER QL: NEGATIVE — SIGNIFICANT CHANGE UP
T PALLIDUM AB TITR SER: NEGATIVE — SIGNIFICANT CHANGE UP
T-CELL CD4 SUBSET PNL BLD: 762 /UL — SIGNIFICANT CHANGE UP (ref 489–1457)
TUBE TYPE: SIGNIFICANT CHANGE UP
URATE SERPL-MCNC: 3.4 MG/DL — SIGNIFICANT CHANGE UP (ref 3.4–8.8)
WBC # BLD: 5 K/UL — SIGNIFICANT CHANGE UP (ref 3.8–10.5)
WBC # FLD AUTO: 5 K/UL — SIGNIFICANT CHANGE UP (ref 3.8–10.5)

## 2018-02-28 PROCEDURE — 99232 SBSQ HOSP IP/OBS MODERATE 35: CPT

## 2018-02-28 PROCEDURE — 77003 FLUOROGUIDE FOR SPINE INJECT: CPT | Mod: 26

## 2018-02-28 PROCEDURE — 99231 SBSQ HOSP IP/OBS SF/LOW 25: CPT

## 2018-02-28 PROCEDURE — 62272 THER SPI PNXR DRG CSF: CPT

## 2018-02-28 PROCEDURE — 88188 FLOWCYTOMETRY/READ 9-15: CPT

## 2018-02-28 RX ORDER — FILGRASTIM 480MCG/1.6
480 VIAL (ML) INJECTION DAILY
Qty: 0 | Refills: 0 | Status: DISCONTINUED | OUTPATIENT
Start: 2018-02-28 | End: 2018-03-01

## 2018-02-28 RX ORDER — METHOTREXATE 2.5 MG/1
12 TABLET ORAL ONCE
Qty: 0 | Refills: 0 | Status: COMPLETED | OUTPATIENT
Start: 2018-02-28 | End: 2018-03-04

## 2018-02-28 RX ORDER — POTASSIUM PHOSPHATE, MONOBASIC POTASSIUM PHOSPHATE, DIBASIC 236; 224 MG/ML; MG/ML
15 INJECTION, SOLUTION INTRAVENOUS ONCE
Qty: 0 | Refills: 0 | Status: COMPLETED | OUTPATIENT
Start: 2018-02-28 | End: 2018-02-28

## 2018-02-28 RX ADMIN — Medication 400 MILLIGRAM(S): at 05:14

## 2018-02-28 RX ADMIN — ONDANSETRON 8 MILLIGRAM(S): 8 TABLET, FILM COATED ORAL at 05:14

## 2018-02-28 RX ADMIN — Medication 480 MICROGRAM(S): at 17:14

## 2018-02-28 RX ADMIN — POTASSIUM PHOSPHATE, MONOBASIC POTASSIUM PHOSPHATE, DIBASIC 62.5 MILLIMOLE(S): 236; 224 INJECTION, SOLUTION INTRAVENOUS at 10:09

## 2018-02-28 RX ADMIN — Medication 1 TABLET(S): at 12:41

## 2018-02-28 RX ADMIN — Medication 400 MILLIGRAM(S): at 17:13

## 2018-02-28 RX ADMIN — Medication 115 MILLIGRAM(S): at 05:14

## 2018-02-28 RX ADMIN — Medication 400 MILLIGRAM(S): at 21:25

## 2018-02-28 RX ADMIN — Medication 300 MILLIGRAM(S): at 12:41

## 2018-02-28 NOTE — PROGRESS NOTE ADULT - ATTENDING COMMENTS
41 yo M with HIV on HAART (but hx noncompliance), newly dx'ed high grade B-cell lymphoma myc+ stage I admitted for C1 dose-adjusted R-EPOCH day 5  Rituxan given on 2/22. well tolerated  -IT MTx LP on 2/23 -CSF (-)  For cytoxan today  work with pt, home care, drug plan (if any) to ensure delivery of HAART to pt ASAP  Home today if stable, for Neulasta in OPD on 3/1/18.  OPD f/u on 3/5/28.  home on Bactrim/Acyclovir prophylaxis.  Pt again advised of importance of taking HAART.    addendum: CSF+  Will need 2x/wk LPs until clear 41 yo M with HIV on HAART (but hx noncompliance), newly dx'ed high grade B-cell lymphoma myc+ stage I admitted for C1 dose-adjusted R-EPOCH day 5  Rituxan given on 2/22. well tolerated  -IT MTx LP on 2/23 -CSF (-)  For repeat LP/IT MTX today,.  Pt aware CSF is (+); otherwise w/o c/o.  work with pt, home care, drug plan (if any) to ensure delivery of HAART to pt ASAP  Home today if HAART delivered to home.  home on Bactrim/Acyclovir prophylaxis.  Pt again advised of importance of taking HAART

## 2018-02-28 NOTE — PROGRESS NOTE ADULT - PROBLEM SELECTOR PLAN 1
Admitted for Cycle 1 of DA- R-EPOCH  LP with IT MTX completed 2/23, FLow cytometry positive for malignant cells  scheduled for LP with IT chemo 2/28 at 2:30 pm  Monitor CBC/Lytes and transfuse/replete PRN  Strict Is and Os/Daily weights/Mouth Care  Continue Allopurinol Admitted for Cycle 1 of DA- R-EPOCH  LP with IT MTX completed 2/23, Flow cytometry positive for malignant cells, will need biweekly LPs until cleared, next LP with IT chemo today  Monitor CBC/Lytes and transfuse/replete PRN  Hypophosphatemia: K Phos 15 mmol x 1  Strict Is and Os/Daily weights/Mouth Care  Continue Allopurinol  Antiemetics Admitted for Cycle 1 of DA- R-EPOCH  LP with IT MTX completed 2/23, Flow cytometry positive for malignant cells, will need biweekly LPs until cleared, next LP with IT chemo today  Monitor CBC/Lytes and transfuse/replete PRN  Hypophosphatemia: K Phos 15 mmol x 1  Strict Is and Os/Daily weights/Mouth Care  Zarxio 480 mcg SQ x 1  Continue Allopurinol  Antiemetics

## 2018-02-28 NOTE — PROGRESS NOTE ADULT - SUBJECTIVE AND OBJECTIVE BOX
Clinical Indication:    PREPROCEDURE:    Patient presents for therapeutic lumbar puncture.  Risks and benefits were discussed with patient and/or health care proxy.  Risks include but are not limited to headache, bleeding, infection and nerve damage.    Patient and/or health care proxy understands and consents to procedure.    POSTPROCEDURE:    Lumbar puncture was performed at the L4-5    level.   Intrathecal chemotherapy was administered.     Patient tolerated the procedure well.  CSF delivered to the lab.

## 2018-02-28 NOTE — PROGRESS NOTE ADULT - ASSESSMENT
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 LP completed with IT MTX on 2/23, flow cytometry positive for malignant cells. 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 LP completed with IT MTX on 2/23, flow cytometry positive for malignant cells. Hospital course complicated by neutropenic fevers. 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 LP completed with IT MTX on 2/23, flow cytometry positive for malignant cells. The patients hospital course has been complicated by neutropenic fevers now resolved.

## 2018-02-28 NOTE — PROGRESS NOTE ADULT - ASSESSMENT
41 yo man with a history of AIDs, recently diagnosed with B-cell lymphoma and current admission for his first cycle of chemotherapy.  Past history of poor medication adherence and possible admissions for an OI of unclear etiology   Will need staging HIV labs    Please send CD4, VL  Toxo ab  quant gold- negative  RPR  strongyloides ab  hep serologies- noted    if patient is discharged he can follow up in ID clinic by calling [phone]    Guille Berry MD  [phone]  After 5pm/weekends [phone]

## 2018-02-28 NOTE — PROGRESS NOTE ADULT - SUBJECTIVE AND OBJECTIVE BOX
Diagnosis:    Protocol/Chemo Regimen:    Day:     Pt endorsed:    Review of Systems:     Pain scale:     Diet:     Allergies    No Known Allergies    Intolerances        ANTIMICROBIALS  abacavir 600 mG/dolutegravir 50 mG/lamiVUDine 300 mG 1 Tablet(s) Oral daily  acyclovir   Tablet 400 milliGRAM(s) Oral three times a day  trimethoprim  160 mG/sulfamethoxazole 800 mG 1 Tablet(s) Oral <User Schedule>      HEME/ONC MEDICATIONS      STANDING MEDICATIONS  allopurinol 300 milliGRAM(s) Oral daily      PRN MEDICATIONS  acetaminophen   Tablet 650 milliGRAM(s) Oral every 6 hours PRN  hydrocortisone sodium succinate Injectable 100 milliGRAM(s) IV Push once PRN  metoclopramide Injectable 10 milliGRAM(s) IV Push every 6 hours PRN        Vital Signs Last 24 Hrs  T(C): 35.7 (28 Feb 2018 05:46), Max: 36.9 (27 Feb 2018 21:53)  T(F): 96.3 (28 Feb 2018 05:46), Max: 98.4 (27 Feb 2018 21:53)  HR: 87 (28 Feb 2018 05:46) (74 - 90)  BP: 143/82 (28 Feb 2018 05:46) (119/72 - 151/81)  BP(mean): --  RR: 18 (28 Feb 2018 05:46) (18 - 18)  SpO2: 98% (28 Feb 2018 05:46) (96% - 99%)    PHYSICAL EXAM  General: NAD  HEENT: PERRLA, EOMOI, clear oropharynx, anicteric sclera, pink conjunctiva  Neck: supple  CV: (+) S1/S2 RRR  Lungs: clear to auscultation, no wheezes or rales  Abdomen: soft, non-tender, non-distended (+) BS  Ext: no clubbing, cyanosis or edema  Skin: no rashes and no petechiae  Neuro: alert and oriented X 3, no focal deficits  Central Line:     RECENT CULTURES:  02-24 @ 03:08  .Urine Clean Catch (Midstream)  --  --  --    <10,000 CFU/ml Normal Urogenital [place]ano present  --  02-24 @ 00:18  .Blood Blood-Venous  --  --  --    No growth to date.  --  02-24 @ 00:17  .Blood Blood-Peripheral  --  --  --    No growth to date.  --        LABS:                        14.3   5.0   )-----------( 222      ( 28 Feb 2018 06:49 )             43.3         Mean Cell Volume : 87.9 fl  Mean Cell Hemoglobin : 29.0 pg  Mean Cell Hemoglobin Concentration : 33.0 gm/dL  Auto Neutrophil # : 3.4 K/uL  Auto Lymphocyte # : 1.5 K/uL  Auto Monocyte # : 0.0 K/uL  Auto Eosinophil # : 0.0 K/uL  Auto Basophil # : 0.0 K/uL  Auto Neutrophil % : 68.2 %  Auto Lymphocyte % : 30.0 %  Auto Monocyte % : 0.8 %  Auto Eosinophil % : 0.4 %  Auto Basophil % : 0.7 %      02-28    133<L>  |  95<L>  |  16  ----------------------------<  117<H>  5.6<H>   |  25  |  0.86    Ca    9.0      28 Feb 2018 06:49  Phos  3.8     02-28  Mg     2.2     02-28    TPro  8.7<H>  /  Alb  3.7  /  TBili  0.6  /  DBili  x   /  AST  65<H>  /  ALT  58<H>  /  AlkPhos  48  02-28      Mg 2.2  Phos 3.8            Uric Acid 3.4        RADIOLOGY & ADDITIONAL STUDIES: Diagnosis: High grade B-Cell Lymphoma    Protocol/Chemo Regimen: DA-R-EPOCH    Day: 6    Pt endorsed: no complaints    Review of Systems: Patient denied nausea, vomiting, odynophagia, chest pain, cough, dyspnea, abdominal pain, constipation, diarrhea, sunday-rectal pain, rash, fatigue, headache, depression    Pain scale:  0                                       Diet: regular    Allergies: No Known Allergies      ANTIMICROBIALS  abacavir 600 mG/dolutegravir 50 mG/lamiVUDine 300 mG 1 Tablet(s) Oral daily  acyclovir   Tablet 400 milliGRAM(s) Oral three times a day  trimethoprim  160 mG/sulfamethoxazole 800 mG 1 Tablet(s) Oral <User Schedule>      STANDING MEDICATIONS  allopurinol 300 milliGRAM(s) Oral daily      PRN MEDICATIONS  acetaminophen   Tablet 650 milliGRAM(s) Oral every 6 hours PRN  hydrocortisone sodium succinate Injectable 100 milliGRAM(s) IV Push once PRN  metoclopramide Injectable 10 milliGRAM(s) IV Push every 6 hours PRN      Vital Signs Last 24 Hrs  T(C): 35.7 (28 Feb 2018 05:46), Max: 36.9 (27 Feb 2018 21:53)  T(F): 96.3 (28 Feb 2018 05:46), Max: 98.4 (27 Feb 2018 21:53)  HR: 87 (28 Feb 2018 05:46) (74 - 90)  BP: 143/82 (28 Feb 2018 05:46) (119/72 - 151/81)  BP(mean): --  RR: 18 (28 Feb 2018 05:46) (18 - 18)  SpO2: 98% (28 Feb 2018 05:46) (96% - 99%)      PHYSICAL EXAM  General: NAD  HEENT: PERRLA, EOMI, clear oropharynx, anicteric sclera, pink conjunctiva  Neck: supple  CV: (+) S1/S2 RRR  Lungs: clear to auscultation, no wheezes or rales  Abdomen: soft, non-tender, non-distended (+) BS  Ext: no clubbing, cyanosis or edema  Skin: no rashes and no petechiae  Neuro: alert and oriented X 3, no focal deficits  Central Line: R ACW Mediport C/D/I        LABS:                        14.3   5.0   )-----------( 222      ( 28 Feb 2018 06:49 )             43.3         Mean Cell Volume : 87.9 fl  Mean Cell Hemoglobin : 29.0 pg  Mean Cell Hemoglobin Concentration : 33.0 gm/dL  Auto Neutrophil # : 3.4 K/uL  Auto Lymphocyte # : 1.5 K/uL  Auto Monocyte # : 0.0 K/uL  Auto Eosinophil # : 0.0 K/uL  Auto Basophil # : 0.0 K/uL  Auto Neutrophil % : 68.2 %  Auto Lymphocyte % : 30.0 %  Auto Monocyte % : 0.8 %  Auto Eosinophil % : 0.4 %  Auto Basophil % : 0.7 %      02-28    133<L>  |  95<L>  |  16  ----------------------------<  117<H>  5.6<H>   |  25  |  0.86    Ca    9.0      28 Feb 2018 06:49  Phos  3.8     02-28  Mg     2.2     02-28    TPro  8.7<H>  /  Alb  3.7  /  TBili  0.6  /  DBili  x   /  AST  65<H>  /  ALT  58<H>  /  AlkPhos  48  02-28      Mg 2.2  Phos 3.8    Uric Acid 3.4    RECENT CULTURES:    Culture - Urine (02.24.18 @ 03:08)    Specimen Source: .Urine Clean Catch (Midstream)    Culture Results:   <10,000 CFU/ml Normal Urogenital [place] present    Culture - Blood (02.24.18 @ 00:18)    Specimen Source: .Blood Blood-Venous    Culture Results:   No growth to date.      RADIOLOGY & ADDITIONAL STUDIES:    EXAM:  XR CHEST PORTABLE URGENT 1V                        PROCEDURE DATE:  02/23/2018    IMPRESSION: No focal consolidation. Diagnosis: High grade B-Cell Lymphoma    Protocol/Chemo Regimen: DA-R-EPOCH    Day: 6     ID: [id number]    Pt endorsed: no complaints    Review of Systems: Patient denies headache, dizziness, visual changes, chest pain, palpitations, SOB, abdominal pain, nausea, vomiting, diarrhea or dysuria.    Pain scale:  0                                       Diet: regular    Allergies: No Known Allergies      ANTIMICROBIALS  abacavir 600 mG/dolutegravir 50 mG/lamiVUDine 300 mG 1 Tablet(s) Oral daily  acyclovir   Tablet 400 milliGRAM(s) Oral three times a day  trimethoprim  160 mG/sulfamethoxazole 800 mG 1 Tablet(s) Oral <User Schedule>      STANDING MEDICATIONS  allopurinol 300 milliGRAM(s) Oral daily      PRN MEDICATIONS  acetaminophen   Tablet 650 milliGRAM(s) Oral every 6 hours PRN  hydrocortisone sodium succinate Injectable 100 milliGRAM(s) IV Push once PRN  metoclopramide Injectable 10 milliGRAM(s) IV Push every 6 hours PRN      Vital Signs Last 24 Hrs  T(C): 35.7 (28 Feb 2018 05:46), Max: 36.9 (27 Feb 2018 21:53)  T(F): 96.3 (28 Feb 2018 05:46), Max: 98.4 (27 Feb 2018 21:53)  HR: 87 (28 Feb 2018 05:46) (74 - 90)  BP: 143/82 (28 Feb 2018 05:46) (119/72 - 151/81)  BP(mean): --  RR: 18 (28 Feb 2018 05:46) (18 - 18)  SpO2: 98% (28 Feb 2018 05:46) (96% - 99%)      PHYSICAL EXAM  General: NAD  HEENT: PERRLA, EOMI, clear oropharynx, anicteric sclera, pink conjunctiva  Neck: supple  CV: (+) S1/S2 RRR  Lungs: clear to auscultation, no wheezes or rales  Abdomen: soft, non-tender, non-distended (+) BS  Ext: no clubbing, cyanosis or edema  Skin: no rashes and no petechiae  Neuro: alert and oriented X 3, no focal deficits  Central Line: R ACW Mediport C/D/I        LABS:                        14.3   5.0   )-----------( 222      ( 28 Feb 2018 06:49 )             43.3         Mean Cell Volume : 87.9 fl  Mean Cell Hemoglobin : 29.0 pg  Mean Cell Hemoglobin Concentration : 33.0 gm/dL  Auto Neutrophil # : 3.4 K/uL  Auto Lymphocyte # : 1.5 K/uL  Auto Monocyte # : 0.0 K/uL  Auto Eosinophil # : 0.0 K/uL  Auto Basophil # : 0.0 K/uL  Auto Neutrophil % : 68.2 %  Auto Lymphocyte % : 30.0 %  Auto Monocyte % : 0.8 %  Auto Eosinophil % : 0.4 %  Auto Basophil % : 0.7 %      02-28    133<L>  |  95<L>  |  16  ----------------------------<  117<H>  5.6<H>   |  25  |  0.86    Ca    9.0      28 Feb 2018 06:49  Phos  3.8     02-28  Mg     2.2     02-28    TPro  8.7<H>  /  Alb  3.7  /  TBili  0.6  /  DBili  x   /  AST  65<H>  /  ALT  58<H>  /  AlkPhos  48  02-28      Mg 2.2  Phos 3.8    Uric Acid 3.4    RECENT CULTURES:    Culture - Urine (02.24.18 @ 03:08)    Specimen Source: .Urine Clean Catch (Midstream)    Culture Results:   <10,000 CFU/ml Normal Urogenital [place] present    Culture - Blood (02.24.18 @ 00:18)    Specimen Source: .Blood Blood-Venous    Culture Results:   No growth to date.      RADIOLOGY & ADDITIONAL STUDIES:    EXAM:  XR CHEST PORTABLE URGENT 1V                        PROCEDURE DATE:  02/23/2018    IMPRESSION: No focal consolidation. Diagnosis: High grade B-Cell Lymphoma    Protocol/Chemo Regimen: DA-R-EPOCH    Day: 6     ID: Ana [id number]/ [id number]    Pt endorsed: no complaints    Review of Systems: Patient denies headache, dizziness, visual changes, chest pain, palpitations, SOB, abdominal pain, nausea, vomiting, diarrhea or dysuria.    Pain scale:  0                                       Diet: regular    Allergies: No Known Allergies      ANTIMICROBIALS  abacavir 600 mG/dolutegravir 50 mG/lamiVUDine 300 mG 1 Tablet(s) Oral daily  acyclovir   Tablet 400 milliGRAM(s) Oral three times a day  trimethoprim  160 mG/sulfamethoxazole 800 mG 1 Tablet(s) Oral <User Schedule>      STANDING MEDICATIONS  allopurinol 300 milliGRAM(s) Oral daily      PRN MEDICATIONS  acetaminophen   Tablet 650 milliGRAM(s) Oral every 6 hours PRN  hydrocortisone sodium succinate Injectable 100 milliGRAM(s) IV Push once PRN  metoclopramide Injectable 10 milliGRAM(s) IV Push every 6 hours PRN      Vital Signs Last 24 Hrs  T(C): 35.7 (28 Feb 2018 05:46), Max: 36.9 (27 Feb 2018 21:53)  T(F): 96.3 (28 Feb 2018 05:46), Max: 98.4 (27 Feb 2018 21:53)  HR: 87 (28 Feb 2018 05:46) (74 - 90)  BP: 143/82 (28 Feb 2018 05:46) (119/72 - 151/81)  BP(mean): --  RR: 18 (28 Feb 2018 05:46) (18 - 18)  SpO2: 98% (28 Feb 2018 05:46) (96% - 99%)      PHYSICAL EXAM  General: NAD  HEENT: PERRLA, EOMI, clear oropharynx, anicteric sclera, pink conjunctiva  Neck: supple  CV: (+) S1/S2 RRR  Lungs: clear to auscultation, no wheezes or rales  Abdomen: soft, non-tender, non-distended (+) BS  Ext: no clubbing, cyanosis or edema  Skin: no rashes and no petechiae  Neuro: alert and oriented X 3, no focal deficits  Central Line: R ACW Mediport C/D/I        LABS:                        14.3   5.0   )-----------( 222      ( 28 Feb 2018 06:49 )             43.3         Mean Cell Volume : 87.9 fl  Mean Cell Hemoglobin : 29.0 pg  Mean Cell Hemoglobin Concentration : 33.0 gm/dL  Auto Neutrophil # : 3.4 K/uL  Auto Lymphocyte # : 1.5 K/uL  Auto Monocyte # : 0.0 K/uL  Auto Eosinophil # : 0.0 K/uL  Auto Basophil # : 0.0 K/uL  Auto Neutrophil % : 68.2 %  Auto Lymphocyte % : 30.0 %  Auto Monocyte % : 0.8 %  Auto Eosinophil % : 0.4 %  Auto Basophil % : 0.7 %      02-28    133<L>  |  95<L>  |  16  ----------------------------<  117<H>  5.6<H>   |  25  |  0.86    Ca    9.0      28 Feb 2018 06:49  Phos  3.8     02-28  Mg     2.2     02-28    TPro  8.7<H>  /  Alb  3.7  /  TBili  0.6  /  DBili  x   /  AST  65<H>  /  ALT  58<H>  /  AlkPhos  48  02-28      Mg 2.2  Phos 3.8    Uric Acid 3.4    RECENT CULTURES:    Culture - Urine (02.24.18 @ 03:08)    Specimen Source: .Urine Clean Catch (Midstream)    Culture Results:   <10,000 CFU/ml Normal Urogenital [place] present    Culture - Blood (02.24.18 @ 00:18)    Specimen Source: .Blood Blood-Venous    Culture Results:   No growth to date.      RADIOLOGY & ADDITIONAL STUDIES:    EXAM:  XR CHEST PORTABLE URGENT 1V                        PROCEDURE DATE:  02/23/2018    IMPRESSION: No focal consolidation.

## 2018-02-28 NOTE — PROGRESS NOTE ADULT - SUBJECTIVE AND OBJECTIVE BOX
INFECTIOUS DISEASES FOLLOW UP-- Esther Berry  [phone]    This is a follow up note for this  42yMale with  Other specified type of non-Hodgkin lymphoma of lymph node of multiple sites s/p chemotherapy including intrathecal meds.      ROS:  CONSTITUTIONAL:  No fever, good appetite  CARDIOVASCULAR:  No chest pain or palpitations  RESPIRATORY:  No dyspnea  GASTROINTESTINAL:  No nausea, vomiting, diarrhea, or abdominal pain  GENITOURINARY:  No dysuria  NEUROLOGIC:  No headache,     Allergies    No Known Allergies    Intolerances        ANTIBIOTICS/RELEVANT:  antimicrobials  abacavir 600 mG/dolutegravir 50 mG/lamiVUDine 300 mG 1 Tablet(s) Oral daily  acyclovir   Tablet 400 milliGRAM(s) Oral three times a day  trimethoprim  160 mG/sulfamethoxazole 800 mG 1 Tablet(s) Oral <User Schedule>    immunologic:  filgrastim-sndz Injectable 480 MICROGram(s) SubCutaneous daily    OTHER:  acetaminophen   Tablet 650 milliGRAM(s) Oral every 6 hours PRN  allopurinol 300 milliGRAM(s) Oral daily  hydrocortisone sodium succinate Injectable 100 milliGRAM(s) IV Push once PRN  methotrexate PF IntraThecal 12 milliGRAM(s) IntraThecal once  metoclopramide Injectable 10 milliGRAM(s) IV Push every 6 hours PRN      Objective:  Vital Signs Last 24 Hrs  T(C): 36.5 (28 Feb 2018 14:00), Max: 36.9 (27 Feb 2018 21:53)  T(F): 97.7 (28 Feb 2018 14:00), Max: 98.4 (27 Feb 2018 21:53)  HR: 92 (28 Feb 2018 14:00) (74 - 95)  BP: 132/74 (28 Feb 2018 14:00) (119/72 - 155/81)  BP(mean): --  RR: 18 (28 Feb 2018 14:00) (18 - 18)  SpO2: 98% (28 Feb 2018 14:00) (96% - 98%)    PHYSICAL EXAM:  Constitutional:no acute distress  Eyes:SARAH, EOMI  Ear/Nose/Throat: no oral lesions, 	  Respiratory: clear BL  Cardiovascular: S1S2  Gastrointestinal:soft, (+) BS, no tenderness  Extremities:no e/e/c  No Lymphadenopathy  IV sites not inflammed.    LABS:                        14.3   5.0   )-----------( 222      ( 28 Feb 2018 06:49 )             43.3     02-28    135  |  96  |  17  ----------------------------<  168<H>  4.0   |  28  |  0.95    Ca    9.1      28 Feb 2018 08:34  Phos  2.1     02-28  Mg     2.2     02-28    TPro  8.0  /  Alb  3.8  /  TBili  0.5  /  DBili  x   /  AST  34  /  ALT  68<H>  /  AlkPhos  64  02-28    PT/INR - ( 28 Feb 2018 08:34 )   PT: 10.4 sec;   INR: 0.96 ratio         PTT - ( 28 Feb 2018 08:34 )  PTT:24.2 sec      MICROBIOLOGY:            RECENT CULTURES:  02-24 @ 03:08  .Urine Clean Catch (Midstream)  --  --  --    <10,000 CFU/ml Normal Urogenital [place] present  --  02-24 @ 00:18  .Blood Blood-Venous  --  --  --    No growth to date.  --  02-24 @ 00:17  .Blood Blood-Peripheral  --  --  --    No growth to date.  --      RADIOLOGY & ADDITIONAL STUDIES:    < from: Xray Chest 1 View- PORTABLE-Urgent (02.23.18 @ 22:10) >  IMPRESSION:   No focal consolidation.    < end of copied text >

## 2018-02-28 NOTE — PROGRESS NOTE ADULT - PROBLEM SELECTOR PLAN 3
Continue Triumeq 1 tab daily  ID called for follow up  Patient non-compliant with  medication prior to admission, possibly lost to follow-up  May need assistance with obtaining medication- sent prescription to [facility], awaiting evaluation Patient non-compliant with  medication prior to admission, possibly lost to follow-up  Continue Triumeq 1 tab daily  ID following Patient non-compliant with  medication prior to admission, possibly lost to follow-up  Continue Triumeq 1 tab daily  ID following  Patient to be discharged once medication is delivered

## 2018-03-01 LAB
ALBUMIN SERPL ELPH-MCNC: 3.3 G/DL — SIGNIFICANT CHANGE UP (ref 3.3–5)
ALP SERPL-CCNC: 67 U/L — SIGNIFICANT CHANGE UP (ref 40–120)
ALT FLD-CCNC: 75 U/L RC — HIGH (ref 10–45)
ANION GAP SERPL CALC-SCNC: 13 MMOL/L — SIGNIFICANT CHANGE UP (ref 5–17)
AST SERPL-CCNC: 33 U/L — SIGNIFICANT CHANGE UP (ref 10–40)
BASOPHILS # BLD AUTO: 0 K/UL — SIGNIFICANT CHANGE UP (ref 0–0.2)
BILIRUB SERPL-MCNC: 0.5 MG/DL — SIGNIFICANT CHANGE UP (ref 0.2–1.2)
BUN SERPL-MCNC: 22 MG/DL — SIGNIFICANT CHANGE UP (ref 7–23)
CALCIUM SERPL-MCNC: 8.6 MG/DL — SIGNIFICANT CHANGE UP (ref 8.4–10.5)
CHLORIDE SERPL-SCNC: 99 MMOL/L — SIGNIFICANT CHANGE UP (ref 96–108)
CO2 SERPL-SCNC: 28 MMOL/L — SIGNIFICANT CHANGE UP (ref 22–31)
CREAT SERPL-MCNC: 0.97 MG/DL — SIGNIFICANT CHANGE UP (ref 0.5–1.3)
CULTURE RESULTS: SIGNIFICANT CHANGE UP
CULTURE RESULTS: SIGNIFICANT CHANGE UP
EOSINOPHIL # BLD AUTO: 0 K/UL — SIGNIFICANT CHANGE UP (ref 0–0.5)
GLUCOSE SERPL-MCNC: 100 MG/DL — HIGH (ref 70–99)
HBV DNA # SERPL NAA+PROBE: SIGNIFICANT CHANGE UP IU/ML
HBV DNA SERPL NAA+PROBE-LOG#: SIGNIFICANT CHANGE UP LOGIU/ML
HCT VFR BLD CALC: 42.4 % — SIGNIFICANT CHANGE UP (ref 39–50)
HGB BLD-MCNC: 13.9 G/DL — SIGNIFICANT CHANGE UP (ref 13–17)
LDH SERPL L TO P-CCNC: 334 U/L — HIGH (ref 50–242)
LYMPHOCYTES # BLD AUTO: 18 % — SIGNIFICANT CHANGE UP (ref 13–44)
LYMPHOCYTES # BLD AUTO: 2.1 K/UL — SIGNIFICANT CHANGE UP (ref 1–3.3)
MAGNESIUM SERPL-MCNC: 2.2 MG/DL — SIGNIFICANT CHANGE UP (ref 1.6–2.6)
MCHC RBC-ENTMCNC: 29.2 PG — SIGNIFICANT CHANGE UP (ref 27–34)
MCHC RBC-ENTMCNC: 32.9 GM/DL — SIGNIFICANT CHANGE UP (ref 32–36)
MCV RBC AUTO: 88.7 FL — SIGNIFICANT CHANGE UP (ref 80–100)
MONOCYTES # BLD AUTO: 0 K/UL — SIGNIFICANT CHANGE UP (ref 0–0.9)
NEUTROPHILS # BLD AUTO: 18 K/UL — HIGH (ref 1.8–7.4)
NEUTROPHILS NFR BLD AUTO: 77 % — SIGNIFICANT CHANGE UP (ref 43–77)
PHOSPHATE SERPL-MCNC: 3.3 MG/DL — SIGNIFICANT CHANGE UP (ref 2.5–4.5)
PLATELET # BLD AUTO: 179 K/UL — SIGNIFICANT CHANGE UP (ref 150–400)
POTASSIUM SERPL-MCNC: 3.7 MMOL/L — SIGNIFICANT CHANGE UP (ref 3.5–5.3)
POTASSIUM SERPL-SCNC: 3.7 MMOL/L — SIGNIFICANT CHANGE UP (ref 3.5–5.3)
PROT SERPL-MCNC: 7.2 G/DL — SIGNIFICANT CHANGE UP (ref 6–8.3)
RBC # BLD: 4.77 M/UL — SIGNIFICANT CHANGE UP (ref 4.2–5.8)
RBC # FLD: 13.3 % — SIGNIFICANT CHANGE UP (ref 10.3–14.5)
SODIUM SERPL-SCNC: 140 MMOL/L — SIGNIFICANT CHANGE UP (ref 135–145)
SPECIMEN SOURCE: SIGNIFICANT CHANGE UP
SPECIMEN SOURCE: SIGNIFICANT CHANGE UP
URATE SERPL-MCNC: 2.4 MG/DL — LOW (ref 3.4–8.8)
WBC # BLD: 20.2 K/UL — HIGH (ref 3.8–10.5)
WBC # FLD AUTO: 20.2 K/UL — HIGH (ref 3.8–10.5)

## 2018-03-01 PROCEDURE — 99232 SBSQ HOSP IP/OBS MODERATE 35: CPT | Mod: GC

## 2018-03-01 PROCEDURE — 99231 SBSQ HOSP IP/OBS SF/LOW 25: CPT | Mod: GC

## 2018-03-01 RX ORDER — FILGRASTIM 480MCG/1.6
480 VIAL (ML) INJECTION DAILY
Qty: 0 | Refills: 0 | Status: DISCONTINUED | OUTPATIENT
Start: 2018-03-01 | End: 2018-03-06

## 2018-03-01 RX ORDER — ENOXAPARIN SODIUM 100 MG/ML
40 INJECTION SUBCUTANEOUS EVERY 24 HOURS
Qty: 0 | Refills: 0 | Status: DISCONTINUED | OUTPATIENT
Start: 2018-03-01 | End: 2018-03-04

## 2018-03-01 RX ADMIN — Medication 480 MICROGRAM(S): at 18:00

## 2018-03-01 RX ADMIN — Medication 400 MILLIGRAM(S): at 05:50

## 2018-03-01 RX ADMIN — Medication 400 MILLIGRAM(S): at 21:48

## 2018-03-01 RX ADMIN — Medication 400 MILLIGRAM(S): at 14:02

## 2018-03-01 RX ADMIN — ENOXAPARIN SODIUM 40 MILLIGRAM(S): 100 INJECTION SUBCUTANEOUS at 18:00

## 2018-03-01 RX ADMIN — Medication 300 MILLIGRAM(S): at 11:55

## 2018-03-01 NOTE — PROGRESS NOTE ADULT - PROBLEM SELECTOR PLAN 3
Patient non-compliant with  medication prior to admission, possibly lost to follow-up  Continue Triumeq 1 tab daily  ID following  Patient to be discharged once medication is delivered

## 2018-03-01 NOTE — PROGRESS NOTE ADULT - ASSESSMENT
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 LP completed with IT MTX on 2/23, flow cytometry positive for malignant cells. The patients hospital course has been complicated by neutropenic fevers now resolved.

## 2018-03-01 NOTE — PROGRESS NOTE ADULT - ATTENDING COMMENTS
41 yo M with HIV on HAART (but hx noncompliance), newly dx'ed high grade B-cell lymphoma myc+ stage I admitted for C1 dose-adjusted R-EPOCH day 5  Rituxan given on 2/22. well tolerated  -IT MTx LP on 2/23 -CSF (-)  For repeat LP/IT MTX today,.  Pt aware CSF is (+); otherwise w/o c/o.  work with pt, home care, drug plan (if any) to ensure delivery of HAART to pt ASAP  Home today if HAART delivered to home.  home on Bactrim/Acyclovir prophylaxis.  Pt again advised of importance of taking HAART 39 yo M with HIV on HAART (but hx noncompliance), newly dx'ed high grade B-cell lymphoma myc+ stage I admitted for C1 dose-adjusted R-EPOCH day 5  Rituxan given on 2/22. well tolerated  -IT MTx LP on 2/23 -CSF (-)  Repeat LP/IT MTX 2/28, await resuklts. Pt aware CSF is (+); otherwise w/o c/o.  work with pt, home care, drug plan (if any) to ensure delivery of HAART to pt ASAP  Home as soon as HAART available  home on Bactrim/Acyclovir prophylaxis.  Cont xarxio

## 2018-03-01 NOTE — PROGRESS NOTE ADULT - PROBLEM SELECTOR PLAN 4
Lovenox 40mg SQ daily to start 24 hours after LP  D/C if PLT's < 50k Lovenox 40mg SQ daily to start 24 hours after LP  D/C if PLT's < 50 K        Contact Information [phone]

## 2018-03-01 NOTE — PROGRESS NOTE ADULT - SUBJECTIVE AND OBJECTIVE BOX
Diagnosis: High grade B-Cell Lymphoma    Protocol/Chemo Regimen: DA-R-EPOCH    Day: 7     ID: [id number]    Pt endorsed: no complaints    Review of Systems: Patient denies headache, dizziness, visual changes, chest pain, palpitations, SOB, abdominal pain, nausea, vomiting, diarrhea or dysuria.    Pain scale:  0                                       Diet: regular    Allergies: No Known Allergies      ANTIMICROBIALS  abacavir 600 mG/dolutegravir 50 mG/lamiVUDine 300 mG 1 Tablet(s) Oral daily  acyclovir   Tablet 400 milliGRAM(s) Oral three times a day  trimethoprim  160 mG/sulfamethoxazole 800 mG 1 Tablet(s) Oral <User Schedule>      HEME/ONC MEDICATIONS  methotrexate PF IntraThecal 12 milliGRAM(s) IntraThecal once      STANDING MEDICATIONS  allopurinol 300 milliGRAM(s) Oral daily  filgrastim-sndz Injectable 480 MICROGram(s) SubCutaneous daily      PRN MEDICATIONS  acetaminophen   Tablet 650 milliGRAM(s) Oral every 6 hours PRN  hydrocortisone sodium succinate Injectable 100 milliGRAM(s) IV Push once PRN  metoclopramide Injectable 10 milliGRAM(s) IV Push every 6 hours PRN      Vital Signs Last 24 Hrs  T(C): 36.9 (01 Mar 2018 00:38), Max: 36.9 (28 Feb 2018 19:15)  T(F): 98.5 (01 Mar 2018 00:38), Max: 98.5 (01 Mar 2018 00:38)  HR: 95 (01 Mar 2018 00:38) (80 - 106)  BP: 109/73 (01 Mar 2018 00:38) (109/73 - 155/81)  BP(mean): --  RR: 18 (01 Mar 2018 00:38) (18 - 20)  SpO2: 96% (01 Mar 2018 00:38) (96% - 98%)      PHYSICAL EXAM  General: NAD  HEENT: PERRLA, EOMI, clear oropharynx, anicteric sclera, pink conjunctiva  Neck: supple  CV: (+) S1/S2 RRR  Lungs: clear to auscultation, no wheezes or rales  Abdomen: soft, non-tender, non-distended (+) BS  Ext: no clubbing, cyanosis or edema  Skin: no rashes and no petechiae  Neuro: alert and oriented X 3, no focal deficits  Central Line: R ACW Mediport C/D/I      LABS:                        13.9   20.2  )-----------( 179      ( 01 Mar 2018 07:23 )             42.4         Mean Cell Volume : 88.7 fl  Mean Cell Hemoglobin : 29.2 pg  Mean Cell Hemoglobin Concentration : 32.9 gm/dL  Auto Neutrophil # : 18.0 K/uL  Auto Lymphocyte # : 2.1 K/uL  Auto Monocyte # : 0.0 K/uL  Auto Eosinophil # : 0.0 K/uL  Auto Basophil # : 0.0 K/uL  Auto Neutrophil % : 77.0 %  Auto Lymphocyte % : 18.0 %  Auto Monocyte % : x  Auto Eosinophil % : x  Auto Basophil % : x      03-01    140  |  99  |  22  ----------------------------<  100<H>  3.7   |  28  |  0.97    Ca    8.6      01 Mar 2018 07:08  Phos  3.3     03-01  Mg     2.2     03-01    TPro  7.2  /  Alb  3.3  /  TBili  0.5  /  DBili  x   /  AST  33  /  ALT  75<H>  /  AlkPhos  67  03-01      Mg 2.2  Phos 3.3    Uric Acid 2.4    PT/INR - ( 28 Feb 2018 08:34 )   PT: 10.4 sec;   INR: 0.96 ratio        PTT - ( 28 Feb 2018 08:34 )  PTT:24.2 sec    RECENT CULTURES:    Culture - Urine (02.24.18 @ 03:08)    Specimen Source: .Urine Clean Catch (Midstream)    Culture Results:   <10,000 CFU/ml Normal Urogenital michoacano present    Culture - Blood (02.24.18 @ 00:18)    Specimen Source: .Blood Blood-Venous    Culture Results:   No growth to date.      RADIOLOGY & ADDITIONAL STUDIES:    EXAM:  XR CHEST PORTABLE URGENT 1V                        PROCEDURE DATE:  02/23/2018    IMPRESSION: No focal consolidation. Diagnosis: High grade B-Cell Lymphoma    Protocol/Chemo Regimen: DA-R-EPOCH    Day: 7    : [id number]    Pt endorsed: no complaints    Review of Systems: Patient denies headache, dizziness, visual changes, chest pain, palpitations, SOB, abdominal pain, nausea, vomiting, diarrhea or dysuria.    Pain scale:  0                                       Diet: regular    Allergies: No Known Allergies      ANTIMICROBIALS  abacavir 600 mG/dolutegravir 50 mG/lamiVUDine 300 mG 1 Tablet(s) Oral daily  acyclovir   Tablet 400 milliGRAM(s) Oral three times a day  trimethoprim  160 mG/sulfamethoxazole 800 mG 1 Tablet(s) Oral <User Schedule>      HEME/ONC MEDICATIONS  methotrexate PF IntraThecal 12 milliGRAM(s) IntraThecal once      STANDING MEDICATIONS  allopurinol 300 milliGRAM(s) Oral daily  filgrastim-sndz Injectable 480 MICROGram(s) SubCutaneous daily      PRN MEDICATIONS  acetaminophen   Tablet 650 milliGRAM(s) Oral every 6 hours PRN  hydrocortisone sodium succinate Injectable 100 milliGRAM(s) IV Push once PRN  metoclopramide Injectable 10 milliGRAM(s) IV Push every 6 hours PRN      Vital Signs Last 24 Hrs  T(C): 36.9 (01 Mar 2018 00:38), Max: 36.9 (28 Feb 2018 19:15)  T(F): 98.5 (01 Mar 2018 00:38), Max: 98.5 (01 Mar 2018 00:38)  HR: 95 (01 Mar 2018 00:38) (80 - 106)  BP: 109/73 (01 Mar 2018 00:38) (109/73 - 155/81)  BP(mean): --  RR: 18 (01 Mar 2018 00:38) (18 - 20)  SpO2: 96% (01 Mar 2018 00:38) (96% - 98%)      PHYSICAL EXAM  General: NAD  HEENT: PERRLA, EOMI, clear oropharynx, anicteric sclera, pink conjunctiva  Neck: supple  CV: (+) S1/S2 RRR  Lungs: clear to auscultation, no wheezes or rales  Abdomen: soft, non-tender, non-distended (+) BS  Ext: no clubbing, cyanosis or edema  Skin: no rashes and no petechiae  Neuro: alert and oriented X 3, no focal deficits  Central Line: R ACW Mediport C/D/I      LABS:                        13.9   20.2  )-----------( 179      ( 01 Mar 2018 07:23 )             42.4         Mean Cell Volume : 88.7 fl  Mean Cell Hemoglobin : 29.2 pg  Mean Cell Hemoglobin Concentration : 32.9 gm/dL  Auto Neutrophil # : 18.0 K/uL  Auto Lymphocyte # : 2.1 K/uL  Auto Monocyte # : 0.0 K/uL  Auto Eosinophil # : 0.0 K/uL  Auto Basophil # : 0.0 K/uL  Auto Neutrophil % : 77.0 %  Auto Lymphocyte % : 18.0 %  Auto Monocyte % : x  Auto Eosinophil % : x  Auto Basophil % : x      03-01    140  |  99  |  22  ----------------------------<  100<H>  3.7   |  28  |  0.97    Ca    8.6      01 Mar 2018 07:08  Phos  3.3     03-01  Mg     2.2     03-01    TPro  7.2  /  Alb  3.3  /  TBili  0.5  /  DBili  x   /  AST  33  /  ALT  75<H>  /  AlkPhos  67  03-01      Mg 2.2  Phos 3.3    Uric Acid 2.4    PT/INR - ( 28 Feb 2018 08:34 )   PT: 10.4 sec;   INR: 0.96 ratio        PTT - ( 28 Feb 2018 08:34 )  PTT:24.2 sec    RECENT CULTURES:    Culture - Urine (02.24.18 @ 03:08)    Specimen Source: .Urine Clean Catch (Midstream)    Culture Results:   <10,000 CFU/ml Normal Urogenital [place] present    Culture - Blood (02.24.18 @ 00:18)    Specimen Source: .Blood Blood-Venous    Culture Results:   No growth to date.      RADIOLOGY & ADDITIONAL STUDIES:    EXAM:  XR CHEST PORTABLE URGENT 1V                        PROCEDURE DATE:  02/23/2018    IMPRESSION: No focal consolidation.

## 2018-03-01 NOTE — PROGRESS NOTE ADULT - ASSESSMENT
42M with HIV (CD4 762, viral load 65 copies) history of poor medication adherence and AIDs, recently diagnosed with B-cell lymphoma, was admitted 2/22/18 for his first cycle of chemotherapy.  Negative Toxoplasma antibody, Syphilis screen, Quantiferon gold (4/2016), Hep C (4/2016). Past Hep B infection- core and surface antibody positive.     Continue Triumeq   f/u strongyloides ab    If patient is discharged he can follow up in ID clinic by calling [phone]    Guille Berry MD  [phone]  After 5pm/weekends [phone] 42M with HIV (CD4 762, viral load 65 copies) history of poor medication adherence and AIDs, recently diagnosed with B-cell lymphoma, was admitted 2/22/18 for his first cycle of chemotherapy.  Negative Toxoplasma antibody, Syphilis screen, Quantiferon gold (4/2016), Hep C (4/2016). Past Hep B infection- core and surface antibody positive.     Continue Triumeq   f/u strongyloides ab  should also check HTLV-1 ab.    If patient is discharged he can follow up in ID clinic by calling [phone]    Guille Berry MD  [phone]  After 5pm/weekends [phone]

## 2018-03-01 NOTE — PROGRESS NOTE ADULT - SUBJECTIVE AND OBJECTIVE BOX
Follow Up:  HIV, Non Hodgkin Lymphoma     Interval History/ROS: Feels fine today, has just a little pain to his lower back where lumbar puncture was performed. No headaches, fevers or chills. No cough, diarrhea or dysuria.     Allergies  No Known Allergies    ANTIMICROBIALS:  abacavir 600 mG/dolutegravir 50 mG/lamiVUDine 300 mG 1 daily  acyclovir   Tablet 400 three times a day  trimethoprim  160 mG/sulfamethoxazole 800 mG 1 <User Schedule>    OTHER MEDS:  MEDICATIONS  (STANDING):  acetaminophen   Tablet 650 every 6 hours PRN  allopurinol 300 daily  enoxaparin Injectable 40 every 24 hours  filgrastim-sndz Injectable 480 daily  hydrocortisone sodium succinate Injectable 100 once PRN  methotrexate PF IntraThecal 12 once  metoclopramide Injectable 10 every 6 hours PRN    Vital Signs Last 24 Hrs  T(C): 36.6 (01 Mar 2018 14:00), Max: 36.9 (2018 19:15)  T(F): 97.9 (01 Mar 2018 14:00), Max: 98.5 (01 Mar 2018 00:38)  HR: 97 (01 Mar 2018 14:00) (80 - 118)  BP: 107/60 (01 Mar 2018 14:00) (107/60 - 151/82)  BP(mean): --  RR: 18 (01 Mar 2018 14:00) (18 - 20)  SpO2: 98% (01 Mar 2018 14:00) (96% - 98%)    PHYSICAL EXAM:  General: WN/WD NAD, Non-toxic  Neurology: A&Ox3, nonfocal  Respiratory: nonlabored on room air, clear to auscultation bilaterally  CV: RRR, S1S2, no murmurs, rubs or gallops  Abdominal: Soft, Non-tender, non-distended, normal bowel sounds  Extremities: No edema, + peripheral pulses  Skin: No rash                          13.9   20.2  )-----------( 179      ( 01 Mar 2018 07:23 )             42.4       03-    140  |  99  |  22  ----------------------------<  100<H>  3.7   |  28  |  0.97    Ca    8.6      01 Mar 2018 07:08  Phos  3.3     03-  Mg     2.2     03-01    TPro  7.2  /  Alb  3.3  /  TBili  0.5  /  DBili  x   /  AST  33  /  ALT  75<H>  /  AlkPhos  67      MICROBIOLOGY:  Culture - Blood (18 @ 00:17)    Specimen Source: .Blood Blood-Peripheral    Culture Results:   No growth at 5 days.    Culture - Blood (18 @ 00:18)    Specimen Source: .Blood Blood-Venous    Culture Results:   No growth at 5 days.    Culture - Urine (18 @ 03:08)    Specimen Source: .Urine Clean Catch (Midstream)    Culture Results:   <10,000 CFU/ml Normal Urogenital mcihoacano present    HIV-1 RNA Quantitative, Viral Load (18 @ 22:38)    HIV-1 RNA Quantitative, Viral Load: 65    HIV-1 RNA Quantitative, Viral Load Lo.82    T Cell Subset (18 @ 19:27)    CD8 %: 55 %    4/8 Ratio: 0.69 RATIO    ABS CD3: 1873 /uL    ABS CD4: 762 /uL    ABS CD8: 1110 /uL    CD3 %: 94 %    CD4 %: 38 %    RADIOLOGY:  Xray Spinal Tap, Therapeutic (18 @ 16:50)   Successful intrathecal administration of chemotherapy.  4 cc of clear CSF was collected and handed over to the laboratory.

## 2018-03-01 NOTE — PROGRESS NOTE ADULT - PROBLEM SELECTOR PLAN 1
Admitted for Cycle 1 of DA- R-EPOCH  LP with IT MTX completed 2/23, Flow cytometry positive for malignant cells, will need biweekly LPs until cleared, follow up LP results from 2/28, next LP outpatient on Monday 3/5  Monitor CBC/Lytes and transfuse/replete PRN  Strict Is and Os/Daily weights/Mouth Care  Continue Allopurinol  Antiemetics Admitted for Cycle 1 of DA- R-EPOCH  LP with IT MTX completed 2/23, Flow cytometry positive for malignant cells, will need biweekly LPs until cleared, follow up LP results from 2/28, next LP on Monday 3/5  Continue Zarxio 480 mcg SQ daily as discharge is on hold  Monitor CBC/Lytes and transfuse/replete PRN  Strict Is and Os/Daily weights/Mouth Care  Continue Allopurinol  Antiemetics

## 2018-03-02 ENCOUNTER — APPOINTMENT (OUTPATIENT)
Dept: INFUSION THERAPY | Facility: HOSPITAL | Age: 43
End: 2018-03-02

## 2018-03-02 LAB
ALBUMIN SERPL ELPH-MCNC: 3.2 G/DL — LOW (ref 3.3–5)
ALP SERPL-CCNC: 61 U/L — SIGNIFICANT CHANGE UP (ref 40–120)
ALT FLD-CCNC: 130 U/L RC — HIGH (ref 10–45)
ANION GAP SERPL CALC-SCNC: 11 MMOL/L — SIGNIFICANT CHANGE UP (ref 5–17)
AST SERPL-CCNC: 52 U/L — HIGH (ref 10–40)
BASOPHILS # BLD AUTO: 0 K/UL — SIGNIFICANT CHANGE UP (ref 0–0.2)
BILIRUB SERPL-MCNC: 0.6 MG/DL — SIGNIFICANT CHANGE UP (ref 0.2–1.2)
BUN SERPL-MCNC: 15 MG/DL — SIGNIFICANT CHANGE UP (ref 7–23)
CALCIUM SERPL-MCNC: 8.1 MG/DL — LOW (ref 8.4–10.5)
CHLORIDE SERPL-SCNC: 101 MMOL/L — SIGNIFICANT CHANGE UP (ref 96–108)
CO2 SERPL-SCNC: 26 MMOL/L — SIGNIFICANT CHANGE UP (ref 22–31)
CREAT SERPL-MCNC: 1.01 MG/DL — SIGNIFICANT CHANGE UP (ref 0.5–1.3)
EOSINOPHIL # BLD AUTO: 0.1 K/UL — SIGNIFICANT CHANGE UP (ref 0–0.5)
EOSINOPHIL NFR BLD AUTO: 1 % — SIGNIFICANT CHANGE UP (ref 0–6)
GLUCOSE SERPL-MCNC: 124 MG/DL — HIGH (ref 70–99)
HCT VFR BLD CALC: 39.5 % — SIGNIFICANT CHANGE UP (ref 39–50)
HGB BLD-MCNC: 12.8 G/DL — LOW (ref 13–17)
LDH SERPL L TO P-CCNC: 187 U/L — SIGNIFICANT CHANGE UP (ref 50–242)
LYMPHOCYTES # BLD AUTO: 1.8 K/UL — SIGNIFICANT CHANGE UP (ref 1–3.3)
LYMPHOCYTES # BLD AUTO: 7 % — LOW (ref 13–44)
MAGNESIUM SERPL-MCNC: 2 MG/DL — SIGNIFICANT CHANGE UP (ref 1.6–2.6)
MCHC RBC-ENTMCNC: 28.8 PG — SIGNIFICANT CHANGE UP (ref 27–34)
MCHC RBC-ENTMCNC: 32.4 GM/DL — SIGNIFICANT CHANGE UP (ref 32–36)
MCV RBC AUTO: 89 FL — SIGNIFICANT CHANGE UP (ref 80–100)
MONOCYTES # BLD AUTO: 0 K/UL — SIGNIFICANT CHANGE UP (ref 0–0.9)
NEUTROPHILS # BLD AUTO: 15.5 K/UL — HIGH (ref 1.8–7.4)
NEUTROPHILS NFR BLD AUTO: 88 % — HIGH (ref 43–77)
PHOSPHATE SERPL-MCNC: 2.5 MG/DL — SIGNIFICANT CHANGE UP (ref 2.5–4.5)
PLATELET # BLD AUTO: 105 K/UL — LOW (ref 150–400)
POTASSIUM SERPL-MCNC: 3.9 MMOL/L — SIGNIFICANT CHANGE UP (ref 3.5–5.3)
POTASSIUM SERPL-SCNC: 3.9 MMOL/L — SIGNIFICANT CHANGE UP (ref 3.5–5.3)
PROT SERPL-MCNC: 6.6 G/DL — SIGNIFICANT CHANGE UP (ref 6–8.3)
RBC # BLD: 4.44 M/UL — SIGNIFICANT CHANGE UP (ref 4.2–5.8)
RBC # FLD: 13.1 % — SIGNIFICANT CHANGE UP (ref 10.3–14.5)
SODIUM SERPL-SCNC: 138 MMOL/L — SIGNIFICANT CHANGE UP (ref 135–145)
STRONGYLOIDES AB SER-ACNC: NEGATIVE — SIGNIFICANT CHANGE UP
TM INTERPRETATION: SIGNIFICANT CHANGE UP
URATE SERPL-MCNC: 3 MG/DL — LOW (ref 3.4–8.8)
WBC # BLD: 17.5 K/UL — HIGH (ref 3.8–10.5)
WBC # FLD AUTO: 17.5 K/UL — HIGH (ref 3.8–10.5)

## 2018-03-02 PROCEDURE — 99231 SBSQ HOSP IP/OBS SF/LOW 25: CPT

## 2018-03-02 RX ORDER — ACETAMINOPHEN 500 MG
650 TABLET ORAL EVERY 6 HOURS
Qty: 0 | Refills: 0 | Status: DISCONTINUED | OUTPATIENT
Start: 2018-03-02 | End: 2018-03-06

## 2018-03-02 RX ADMIN — Medication 30 MILLILITER(S): at 14:08

## 2018-03-02 RX ADMIN — Medication 650 MILLIGRAM(S): at 18:53

## 2018-03-02 RX ADMIN — ENOXAPARIN SODIUM 40 MILLIGRAM(S): 100 INJECTION SUBCUTANEOUS at 18:36

## 2018-03-02 RX ADMIN — Medication 400 MILLIGRAM(S): at 06:08

## 2018-03-02 RX ADMIN — Medication 650 MILLIGRAM(S): at 19:23

## 2018-03-02 RX ADMIN — Medication 1 TABLET(S): at 11:42

## 2018-03-02 RX ADMIN — Medication 400 MILLIGRAM(S): at 22:16

## 2018-03-02 RX ADMIN — Medication 480 MICROGRAM(S): at 11:42

## 2018-03-02 RX ADMIN — Medication 300 MILLIGRAM(S): at 11:42

## 2018-03-02 RX ADMIN — Medication 400 MILLIGRAM(S): at 14:49

## 2018-03-02 NOTE — PROGRESS NOTE ADULT - PROBLEM SELECTOR PLAN 4
Lovenox 40mg SQ daily to start 24 hours after LP  D/C if PLT's < 50 K        Contact Information [phone]

## 2018-03-02 NOTE — PROGRESS NOTE ADULT - SUBJECTIVE AND OBJECTIVE BOX
INFECTIOUS DISEASES FOLLOW UP-- Esther Berry  [phone]    This is a follow up note for this  42yMale with  Other specified type of non-Hodgkin lymphoma of lymph node of multiple sites  Other specified type of non-Hodgkin lymphoma of lymph node of multiple sites receiving chemotherapy      ROS:  CONSTITUTIONAL:  No fever, good appetite  CARDIOVASCULAR:  No chest pain or palpitations  RESPIRATORY:  No dyspnea  GASTROINTESTINAL:  No nausea, vomiting, diarrhea, or abdominal pain  GENITOURINARY:  No dysuria  NEUROLOGIC:  No headache,     Allergies    No Known Allergies    Intolerances        ANTIBIOTICS/RELEVANT:  antimicrobials  abacavir 600 mG/dolutegravir 50 mG/lamiVUDine 300 mG 1 Tablet(s) Oral daily  acyclovir   Tablet 400 milliGRAM(s) Oral three times a day  trimethoprim  160 mG/sulfamethoxazole 800 mG 1 Tablet(s) Oral <User Schedule>    immunologic:  filgrastim-sndz Injectable 480 MICROGram(s) SubCutaneous daily    OTHER:  acetaminophen   Tablet 650 milliGRAM(s) Oral every 6 hours PRN  allopurinol 300 milliGRAM(s) Oral daily  enoxaparin Injectable 40 milliGRAM(s) SubCutaneous every 24 hours  hydrocortisone sodium succinate Injectable 100 milliGRAM(s) IV Push once PRN  methotrexate PF IntraThecal 12 milliGRAM(s) IntraThecal once  metoclopramide Injectable 10 milliGRAM(s) IV Push every 6 hours PRN      Objective:  Vital Signs Last 24 Hrs  T(C): 36.3 (02 Mar 2018 13:30), Max: 37.3 (02 Mar 2018 10:16)  T(F): 97.3 (02 Mar 2018 13:30), Max: 99.1 (02 Mar 2018 10:16)  HR: 110 (02 Mar 2018 13:30) (95 - 110)  BP: 127/69 (02 Mar 2018 13:30) (106/66 - 130/78)  BP(mean): --  RR: 18 (02 Mar 2018 13:30) (18 - 20)  SpO2: 99% (02 Mar 2018 13:30) (97% - 99%)    PHYSICAL EXAM:  Constitutional:no acute distress  Eyes:SARAH, EOMI  Ear/Nose/Throat: no oral lesions, 	  Respiratory: clear BL- port site on the right is without erythema  Cardiovascular: S1S2  Gastrointestinal:soft, (+) BS, no tenderness  Extremities:no e/e/c  No Lymphadenopathy  IV sites not inflammed.    LABS:                        12.8   17.5  )-----------( 105      ( 02 Mar 2018 06:52 )             39.5     03-02    138  |  101  |  15  ----------------------------<  124<H>  3.9   |  26  |  1.01    Ca    8.1<L>      02 Mar 2018 06:52  Phos  2.5     03-02  Mg     2.0     03-02    TPro  6.6  /  Alb  3.2<L>  /  TBili  0.6  /  DBili  x   /  AST  52<H>  /  ALT  130<H>  /  AlkPhos  61  03-02          MICROBIOLOGY:            RECENT CULTURES:  02-24 @ 03:08  .Urine Clean Catch (Midstream)  --  --  --    <10,000 CFU/ml Normal Urogenital [place] present  --  02-24 @ 00:18  .Blood Blood-Venous  --  --  --    No growth at 5 days.  --  02-24 @ 00:17  .Blood Blood-Peripheral  --  --  --    No growth at 5 days.  --      RADIOLOGY & ADDITIONAL STUDIES:    < from: Xray Chest 1 View- PORTABLE-Urgent (02.23.18 @ 22:10) >  IMPRESSION:   No focal consolidation.    < end of copied text >

## 2018-03-02 NOTE — PROGRESS NOTE ADULT - ATTENDING COMMENTS
41 yo M with HIV on HAART (but hx noncompliance), newly dx'ed high grade B-cell lymphoma myc+ stage I admitted for C1 dose-adjusted R-EPOCH day 5  Rituxan given on 2/22. well tolerated  -IT MTx LP on 2/23 -CSF (-)  Repeat LP/IT MTX 2/28, await resuklts. Pt aware CSF is (+); otherwise w/o c/o.  work with pt, home care, drug plan (if any) to ensure delivery of HAART to pt ASAP  Home as soon as HAART available  home on Bactrim/Acyclovir prophylaxis.  Cont xarxio

## 2018-03-02 NOTE — PROGRESS NOTE ADULT - SUBJECTIVE AND OBJECTIVE BOX
Diagnosis: High grade B-Cell Lymphoma    Protocol/Chemo Regimen: DA-R-EPOCH    Day: 8    :     Pt endorsed: no complaints    Review of Systems: Patient denies headache, dizziness, visual changes, chest pain, palpitations, SOB, abdominal pain, nausea, vomiting, diarrhea or dysuria.    Pain scale:  0                                       Diet: regular    Allergies: No Known Allergies      ANTIMICROBIALS  abacavir 600 mG/dolutegravir 50 mG/lamiVUDine 300 mG 1 Tablet(s) Oral daily  acyclovir   Tablet 400 milliGRAM(s) Oral three times a day  trimethoprim  160 mG/sulfamethoxazole 800 mG 1 Tablet(s) Oral <User Schedule>      HEME/ONC MEDICATIONS  enoxaparin Injectable 40 milliGRAM(s) SubCutaneous every 24 hours  methotrexate PF IntraThecal 12 milliGRAM(s) IntraThecal once      STANDING MEDICATIONS  allopurinol 300 milliGRAM(s) Oral daily  filgrastim-sndz Injectable 480 MICROGram(s) SubCutaneous daily      PRN MEDICATIONS  acetaminophen   Tablet 650 milliGRAM(s) Oral every 6 hours PRN  hydrocortisone sodium succinate Injectable 100 milliGRAM(s) IV Push once PRN  metoclopramide Injectable 10 milliGRAM(s) IV Push every 6 hours PRN        Vital Signs Last 24 Hrs  T(C): 36.4 (02 Mar 2018 06:09), Max: 36.9 (01 Mar 2018 09:45)  T(F): 97.6 (02 Mar 2018 06:09), Max: 98.5 (01 Mar 2018 09:45)  HR: 96 (02 Mar 2018 06:09) (96 - 118)  BP: 106/66 (02 Mar 2018 06:09) (106/66 - 126/81)  RR: 18 (02 Mar 2018 06:09) (18 - 20)  SpO2: 97% (02 Mar 2018 06:09) (97% - 98%)      PHYSICAL EXAM  General: NAD  HEENT: PERRLA, EOMI, clear oropharynx, anicteric sclera, pink conjunctiva  Neck: supple  CV: (+) S1/S2 RRR  Lungs: clear to auscultation, no wheezes or rales  Abdomen: soft, non-tender, non-distended (+) BS  Ext: no clubbing, cyanosis or edema  Skin: no rashes and no petechiae  Neuro: alert and oriented X 3, no focal deficits  Central Line: R ACW Mediport C/D/I    LABS:                        12.8   17.5  )-----------( 105      ( 02 Mar 2018 06:52 )             39.5         Mean Cell Volume : 89.0 fl  Mean Cell Hemoglobin : 28.8 pg  Mean Cell Hemoglobin Concentration : 32.4 gm/dL  Auto Neutrophil # : x  Auto Lymphocyte # : x  Auto Monocyte # : x  Auto Eosinophil # : x  Auto Basophil # : x  Auto Neutrophil % : x  Auto Lymphocyte % : x  Auto Monocyte % : x  Auto Eosinophil % : x  Auto Basophil % : x      03-02    138  |  101  |  15  ----------------------------<  124<H>  3.9   |  26  |  1.01    Ca    8.1<L>      02 Mar 2018 06:52  Phos  2.5     03-02  Mg     2.0     03-02    TPro  6.6  /  Alb  3.2<L>  /  TBili  0.6  /  DBili  x   /  AST  52<H>  /  ALT  130<H>  /  AlkPhos  61  03-02    PT/INR - ( 28 Feb 2018 08:34 )   PT: 10.4 sec;   INR: 0.96 ratio    PTT - ( 28 Feb 2018 08:34 )  PTT:24.2 sec      Uric Acid 3.0                 RECENT CULTURES:    Culture - Urine (02.24.18 @ 03:08)    Specimen Source: .Urine Clean Catch (Midstream)    Culture Results:   <10,000 CFU/ml Normal Urogenital [place] present    Culture - Blood (02.24.18 @ 00:18)    Specimen Source: .Blood Blood-Venous    Culture Results:   No growth to date.    EXAM:  XR CHEST PORTABLE URGENT 1V                        PROCEDURE DATE:  02/23/2018    IMPRESSION: No focal consolidation.

## 2018-03-02 NOTE — PROGRESS NOTE ADULT - PROBLEM SELECTOR PLAN 1
Admitted for Cycle 1 of DA- R-EPOCH  LP with IT MTX completed 2/23, Flow cytometry positive for malignant cells, will need biweekly LPs until cleared, follow up LP results from 2/28, next LP on Monday 3/5  Continue Zarxio 480 mcg SQ daily as discharge is on hold  Monitor CBC/Lytes and transfuse/replete PRN  Strict Is and Os/Daily weights/Mouth Care  Continue Allopurinol  Antiemetics

## 2018-03-03 ENCOUNTER — APPOINTMENT (OUTPATIENT)
Dept: INFUSION THERAPY | Facility: HOSPITAL | Age: 43
End: 2018-03-03

## 2018-03-03 DIAGNOSIS — R74.0 NONSPECIFIC ELEVATION OF LEVELS OF TRANSAMINASE AND LACTIC ACID DEHYDROGENASE [LDH]: ICD-10-CM

## 2018-03-03 LAB
ALBUMIN SERPL ELPH-MCNC: 3.5 G/DL — SIGNIFICANT CHANGE UP (ref 3.3–5)
ALP SERPL-CCNC: 82 U/L — SIGNIFICANT CHANGE UP (ref 40–120)
ALT FLD-CCNC: 200 U/L RC — HIGH (ref 10–45)
ANION GAP SERPL CALC-SCNC: 10 MMOL/L — SIGNIFICANT CHANGE UP (ref 5–17)
AST SERPL-CCNC: 69 U/L — HIGH (ref 10–40)
BASOPHILS # BLD AUTO: 0 K/UL — SIGNIFICANT CHANGE UP (ref 0–0.2)
BASOPHILS NFR BLD AUTO: 0.2 % — SIGNIFICANT CHANGE UP (ref 0–2)
BILIRUB SERPL-MCNC: 0.3 MG/DL — SIGNIFICANT CHANGE UP (ref 0.2–1.2)
BUN SERPL-MCNC: 11 MG/DL — SIGNIFICANT CHANGE UP (ref 7–23)
CALCIUM SERPL-MCNC: 8.6 MG/DL — SIGNIFICANT CHANGE UP (ref 8.4–10.5)
CHLORIDE SERPL-SCNC: 99 MMOL/L — SIGNIFICANT CHANGE UP (ref 96–108)
CO2 SERPL-SCNC: 27 MMOL/L — SIGNIFICANT CHANGE UP (ref 22–31)
CREAT SERPL-MCNC: 1 MG/DL — SIGNIFICANT CHANGE UP (ref 0.5–1.3)
EOSINOPHIL # BLD AUTO: 0.2 K/UL — SIGNIFICANT CHANGE UP (ref 0–0.5)
EOSINOPHIL NFR BLD AUTO: 1.4 % — SIGNIFICANT CHANGE UP (ref 0–6)
GLUCOSE SERPL-MCNC: 94 MG/DL — SIGNIFICANT CHANGE UP (ref 70–99)
HCT VFR BLD CALC: 41.1 % — SIGNIFICANT CHANGE UP (ref 39–50)
HGB BLD-MCNC: 13 G/DL — SIGNIFICANT CHANGE UP (ref 13–17)
LDH SERPL L TO P-CCNC: 198 U/L — SIGNIFICANT CHANGE UP (ref 50–242)
LYMPHOCYTES # BLD AUTO: 1.8 K/UL — SIGNIFICANT CHANGE UP (ref 1–3.3)
LYMPHOCYTES # BLD AUTO: 15.9 % — SIGNIFICANT CHANGE UP (ref 13–44)
MAGNESIUM SERPL-MCNC: 2.1 MG/DL — SIGNIFICANT CHANGE UP (ref 1.6–2.6)
MCHC RBC-ENTMCNC: 28.3 PG — SIGNIFICANT CHANGE UP (ref 27–34)
MCHC RBC-ENTMCNC: 31.7 GM/DL — LOW (ref 32–36)
MCV RBC AUTO: 89.1 FL — SIGNIFICANT CHANGE UP (ref 80–100)
MONOCYTES # BLD AUTO: 0 K/UL — SIGNIFICANT CHANGE UP (ref 0–0.9)
MONOCYTES NFR BLD AUTO: 0.2 % — LOW (ref 2–14)
NEUTROPHILS # BLD AUTO: 9.5 K/UL — HIGH (ref 1.8–7.4)
NEUTROPHILS NFR BLD AUTO: 82.3 % — HIGH (ref 43–77)
PHOSPHATE SERPL-MCNC: 3.1 MG/DL — SIGNIFICANT CHANGE UP (ref 2.5–4.5)
PLATELET # BLD AUTO: 90 K/UL — LOW (ref 150–400)
POTASSIUM SERPL-MCNC: 4.2 MMOL/L — SIGNIFICANT CHANGE UP (ref 3.5–5.3)
POTASSIUM SERPL-SCNC: 4.2 MMOL/L — SIGNIFICANT CHANGE UP (ref 3.5–5.3)
PROT SERPL-MCNC: 6.9 G/DL — SIGNIFICANT CHANGE UP (ref 6–8.3)
RBC # BLD: 4.61 M/UL — SIGNIFICANT CHANGE UP (ref 4.2–5.8)
RBC # FLD: 13 % — SIGNIFICANT CHANGE UP (ref 10.3–14.5)
SODIUM SERPL-SCNC: 136 MMOL/L — SIGNIFICANT CHANGE UP (ref 135–145)
URATE SERPL-MCNC: 2.9 MG/DL — LOW (ref 3.4–8.8)
WBC # BLD: 11.5 K/UL — HIGH (ref 3.8–10.5)
WBC # FLD AUTO: 11.5 K/UL — HIGH (ref 3.8–10.5)

## 2018-03-03 PROCEDURE — 99231 SBSQ HOSP IP/OBS SF/LOW 25: CPT

## 2018-03-03 RX ORDER — SIMETHICONE 80 MG/1
80 TABLET, CHEWABLE ORAL EVERY 6 HOURS
Qty: 0 | Refills: 0 | Status: DISCONTINUED | OUTPATIENT
Start: 2018-03-03 | End: 2018-03-06

## 2018-03-03 RX ADMIN — Medication 400 MILLIGRAM(S): at 22:03

## 2018-03-03 RX ADMIN — Medication 480 MICROGRAM(S): at 11:13

## 2018-03-03 RX ADMIN — ENOXAPARIN SODIUM 40 MILLIGRAM(S): 100 INJECTION SUBCUTANEOUS at 17:15

## 2018-03-03 RX ADMIN — Medication 300 MILLIGRAM(S): at 11:14

## 2018-03-03 RX ADMIN — SIMETHICONE 80 MILLIGRAM(S): 80 TABLET, CHEWABLE ORAL at 11:13

## 2018-03-03 RX ADMIN — Medication 400 MILLIGRAM(S): at 13:43

## 2018-03-03 RX ADMIN — SIMETHICONE 80 MILLIGRAM(S): 80 TABLET, CHEWABLE ORAL at 17:15

## 2018-03-03 RX ADMIN — Medication 400 MILLIGRAM(S): at 06:13

## 2018-03-03 NOTE — PROGRESS NOTE ADULT - SUBJECTIVE AND OBJECTIVE BOX
Diagnosis:    Protocol/Chemo Regimen:  Day:     Pt endorsed:    Review of Systems:    Pain scale:                                Location:    Diet:     Allergies    No Known Allergies    Intolerances        ANTIMICROBIALS  abacavir 600 mG/dolutegravir 50 mG/lamiVUDine 300 mG 1 Tablet(s) Oral daily  acyclovir   Tablet 400 milliGRAM(s) Oral three times a day  trimethoprim  160 mG/sulfamethoxazole 800 mG 1 Tablet(s) Oral <User Schedule>      HEME/ONC MEDICATIONS  enoxaparin Injectable 40 milliGRAM(s) SubCutaneous every 24 hours  methotrexate PF IntraThecal 12 milliGRAM(s) IntraThecal once      STANDING MEDICATIONS  allopurinol 300 milliGRAM(s) Oral daily  filgrastim-sndz Injectable 480 MICROGram(s) SubCutaneous daily      PRN MEDICATIONS  acetaminophen   Tablet 650 milliGRAM(s) Oral every 6 hours PRN  acetaminophen   Tablet. 650 milliGRAM(s) Oral every 6 hours PRN  hydrocortisone sodium succinate Injectable 100 milliGRAM(s) IV Push once PRN  metoclopramide Injectable 10 milliGRAM(s) IV Push every 6 hours PRN        Vital Signs Last 24 Hrs  T(C): 36.8 (03 Mar 2018 09:23), Max: 37.3 (02 Mar 2018 10:16)  T(F): 98.3 (03 Mar 2018 09:23), Max: 99.1 (02 Mar 2018 10:16)  HR: 95 (03 Mar 2018 09:23) (85 - 110)  BP: 127/78 (03 Mar 2018 09:23) (106/68 - 130/78)  BP(mean): --  RR: 18 (03 Mar 2018 09:23) (18 - 18)  SpO2: 98% (03 Mar 2018 09:23) (98% - 99%)    PHYSICAL EXAM  General: adult in NAD  HEENT: clear oropharynx, anicteric sclera, pink conjunctiva  Neck: supple  CV: normal S1/S2 with no murmur rubs or gallops  Lungs: positive air movement b/l ant lungs,clear to auscultation, no wheezes, no rales  Abdomen: soft non-tender non-distended, no hepatosplenomegaly  Ext: no clubbing cyanosis or edema  Skin: no rashes and no petechiae  Neuro: alert and oriented X 4, no focal deficits  Central Line: normal    LABS:    Blood Cultures:                           13.0   11.5  )-----------( 90       ( 03 Mar 2018 07:14 )             41.1         Mean Cell Volume : 89.1 fl  Mean Cell Hemoglobin : 28.3 pg  Mean Cell Hemoglobin Concentration : 31.7 gm/dL  Auto Neutrophil # : 9.5 K/uL  Auto Lymphocyte # : 1.8 K/uL  Auto Monocyte # : 0.0 K/uL  Auto Eosinophil # : 0.2 K/uL  Auto Basophil # : 0.0 K/uL  Auto Neutrophil % : 82.3 %  Auto Lymphocyte % : 15.9 %  Auto Monocyte % : 0.2 %  Auto Eosinophil % : 1.4 %  Auto Basophil % : 0.2 %      03-03    136  |  99  |  11  ----------------------------<  94  4.2   |  27  |  1.00    Ca    8.6      03 Mar 2018 07:14  Phos  3.1     03-03  Mg     2.1     03-03    TPro  6.9  /  Alb  3.5  /  TBili  0.3  /  DBili  x   /  AST  69<H>  /  ALT  200<H>  /  AlkPhos  82  03-03      Mg 2.1  Phos 3.1            Uric Acid 2.9        Culture:  RECENT CULTURES:      MICROBIOLOGY:  abacavir 600 mG/dolutegravir 50 mG/lamiVUDine 300 mG 1 Tablet(s) Oral daily  acyclovir   Tablet 400 milliGRAM(s) Oral three times a day  trimethoprim  160 mG/sulfamethoxazole 800 mG 1 Tablet(s) Oral <User Schedule>    enoxaparin Injectable 40 milliGRAM(s) SubCutaneous every 24 hours  methotrexate PF IntraThecal 12 milliGRAM(s) IntraThecal once    allopurinol 300 milliGRAM(s) Oral daily  filgrastim-sndz Injectable 480 MICROGram(s) SubCutaneous daily      RADIOLOGY & ADDITIONAL STUDIES: Diagnosis: HIV associated DLBCL    Protocol/Chemo Regimen: PEPPER- REPZAK    Day: 9    Pt endorsed:    Review of Systems:    Pain scale: 0                               Location:    Diet: Regular    Allergies: No Known Allergies    ANTIMICROBIALS  abacavir 600 mG/dolutegravir 50 mG/lamiVUDine 300 mG 1 Tablet(s) Oral daily  acyclovir   Tablet 400 milliGRAM(s) Oral three times a day  trimethoprim  160 mG/sulfamethoxazole 800 mG 1 Tablet(s) Oral <User Schedule>      HEME/ONC MEDICATIONS  enoxaparin Injectable 40 milliGRAM(s) SubCutaneous every 24 hours  methotrexate PF IntraThecal 12 milliGRAM(s) IntraThecal once      STANDING MEDICATIONS  allopurinol 300 milliGRAM(s) Oral daily  filgrastim-sndz Injectable 480 MICROGram(s) SubCutaneous daily      PRN MEDICATIONS  acetaminophen   Tablet 650 milliGRAM(s) Oral every 6 hours PRN  acetaminophen   Tablet. 650 milliGRAM(s) Oral every 6 hours PRN  hydrocortisone sodium succinate Injectable 100 milliGRAM(s) IV Push once PRN  metoclopramide Injectable 10 milliGRAM(s) IV Push every 6 hours PRN        Vital Signs Last 24 Hrs  T(C): 36.8 (03 Mar 2018 09:23), Max: 37.3 (02 Mar 2018 10:16)  T(F): 98.3 (03 Mar 2018 09:23), Max: 99.1 (02 Mar 2018 10:16)  HR: 95 (03 Mar 2018 09:23) (85 - 110)  BP: 127/78 (03 Mar 2018 09:23) (106/68 - 130/78)  BP(mean): --  RR: 18 (03 Mar 2018 09:23) (18 - 18)  SpO2: 98% (03 Mar 2018 09:23) (98% - 99%)    PHYSICAL EXAM  General: adult in NAD  HEENT: clear oropharynx, anicteric sclera, pink conjunctiva  Neck: supple  CV: normal S1/S2 with no murmur rubs or gallops  Lungs: positive air movement b/l ant lungs,clear to auscultation, no wheezes, no rales  Abdomen: soft non-tender non-distended, no hepatosplenomegaly  Ext: no clubbing cyanosis or edema  Skin: no rashes and no petechiae  Neuro: alert and oriented X 4, no focal deficits  Central Line: normal    LABS:    Blood Cultures:                           13.0   11.5  )-----------( 90       ( 03 Mar 2018 07:14 )             41.1         Mean Cell Volume : 89.1 fl  Mean Cell Hemoglobin : 28.3 pg  Mean Cell Hemoglobin Concentration : 31.7 gm/dL  Auto Neutrophil # : 9.5 K/uL  Auto Lymphocyte # : 1.8 K/uL  Auto Monocyte # : 0.0 K/uL  Auto Eosinophil # : 0.2 K/uL  Auto Basophil # : 0.0 K/uL  Auto Neutrophil % : 82.3 %  Auto Lymphocyte % : 15.9 %  Auto Monocyte % : 0.2 %  Auto Eosinophil % : 1.4 %  Auto Basophil % : 0.2 %      03-03    136  |  99  |  11  ----------------------------<  94  4.2   |  27  |  1.00    Ca    8.6      03 Mar 2018 07:14  Phos  3.1     03-03  Mg     2.1     03-03    TPro  6.9  /  Alb  3.5  /  TBili  0.3  /  DBili  x   /  AST  69<H>  /  ALT  200<H>  /  AlkPhos  82  03-03      Mg 2.1  Phos 3.1            Uric Acid 2.9        Culture:  RECENT CULTURES:      MICROBIOLOGY:  abacavir 600 mG/dolutegravir 50 mG/lamiVUDine 300 mG 1 Tablet(s) Oral daily  acyclovir   Tablet 400 milliGRAM(s) Oral three times a day  trimethoprim  160 mG/sulfamethoxazole 800 mG 1 Tablet(s) Oral <User Schedule>    enoxaparin Injectable 40 milliGRAM(s) SubCutaneous every 24 hours  methotrexate PF IntraThecal 12 milliGRAM(s) IntraThecal once    allopurinol 300 milliGRAM(s) Oral daily  filgrastim-sndz Injectable 480 MICROGram(s) SubCutaneous daily      RADIOLOGY & ADDITIONAL STUDIES: Diagnosis: HIV associated DLBCL    Protocol/Chemo Regimen: DA- REPOCH    Day: 9    Pt endorsed: feeling well  + gas pain yesterdauy    Review of Systems: Denies any chest pain, palpitation, SOB, abdominal pain or dysuria.    Pain scale: 0                               Location:    Diet: Regular    Allergies: No Known Allergies    ANTIMICROBIALS  abacavir 600 mG/dolutegravir 50 mG/lamiVUDine 300 mG 1 Tablet(s) Oral daily  acyclovir   Tablet 400 milliGRAM(s) Oral three times a day  trimethoprim  160 mG/sulfamethoxazole 800 mG 1 Tablet(s) Oral <User Schedule>      HEME/ONC MEDICATIONS  enoxaparin Injectable 40 milliGRAM(s) SubCutaneous every 24 hours  methotrexate PF IntraThecal 12 milliGRAM(s) IntraThecal once      STANDING MEDICATIONS  allopurinol 300 milliGRAM(s) Oral daily  filgrastim-sndz Injectable 480 MICROGram(s) SubCutaneous daily      PRN MEDICATIONS  acetaminophen   Tablet 650 milliGRAM(s) Oral every 6 hours PRN  acetaminophen   Tablet. 650 milliGRAM(s) Oral every 6 hours PRN  hydrocortisone sodium succinate Injectable 100 milliGRAM(s) IV Push once PRN  metoclopramide Injectable 10 milliGRAM(s) IV Push every 6 hours PRN        Vital Signs Last 24 Hrs  T(C): 36.8 (03 Mar 2018 09:23), Max: 37.3 (02 Mar 2018 10:16)  T(F): 98.3 (03 Mar 2018 09:23), Max: 99.1 (02 Mar 2018 10:16)  HR: 95 (03 Mar 2018 09:23) (85 - 110)  BP: 127/78 (03 Mar 2018 09:23) (106/68 - 130/78)  BP(mean): --  RR: 18 (03 Mar 2018 09:23) (18 - 18)  SpO2: 98% (03 Mar 2018 09:23) (98% - 99%)    PHYSICAL EXAM  General: adult in NAD  HEENT: clear oropharynx, anicteric sclera, pink conjunctiva  Neck: supple  CV: normal S1/S2 with no murmur rubs or gallops  Lungs: positive air movement b/l ant lungs,clear to auscultation, no wheezes, no rales  Abdomen: soft non-tender non-distended, no hepatosplenomegaly  Ext: no clubbing cyanosis or edema  Skin: no rashes and no petechiae  Neuro: alert and oriented X 4, no focal deficits  Central Line: normal    LABS:    Blood Cultures:                           13.0   11.5  )-----------( 90       ( 03 Mar 2018 07:14 )             41.1         Mean Cell Volume : 89.1 fl  Mean Cell Hemoglobin : 28.3 pg  Mean Cell Hemoglobin Concentration : 31.7 gm/dL  Auto Neutrophil # : 9.5 K/uL  Auto Lymphocyte # : 1.8 K/uL  Auto Monocyte # : 0.0 K/uL  Auto Eosinophil # : 0.2 K/uL  Auto Basophil # : 0.0 K/uL  Auto Neutrophil % : 82.3 %  Auto Lymphocyte % : 15.9 %  Auto Monocyte % : 0.2 %  Auto Eosinophil % : 1.4 %  Auto Basophil % : 0.2 %      03-03    136  |  99  |  11  ----------------------------<  94  4.2   |  27  |  1.00    Ca    8.6      03 Mar 2018 07:14  Phos  3.1     03-03  Mg     2.1     03-03    TPro  6.9  /  Alb  3.5  /  TBili  0.3  /  DBili  x   /  AST  69<H>  /  ALT  200<H>  /  AlkPhos  82  03-03      Mg 2.1  Phos 3.1            Uric Acid 2.9        Culture:  RECENT CULTURES:      MICROBIOLOGY:  abacavir 600 mG/dolutegravir 50 mG/lamiVUDine 300 mG 1 Tablet(s) Oral daily  acyclovir   Tablet 400 milliGRAM(s) Oral three times a day  trimethoprim  160 mG/sulfamethoxazole 800 mG 1 Tablet(s) Oral <User Schedule>    enoxaparin Injectable 40 milliGRAM(s) SubCutaneous every 24 hours  methotrexate PF IntraThecal 12 milliGRAM(s) IntraThecal once    allopurinol 300 milliGRAM(s) Oral daily  filgrastim-sndz Injectable 480 MICROGram(s) SubCutaneous daily      RADIOLOGY & ADDITIONAL STUDIES: Diagnosis: HIV associated DLBCL    Protocol/Chemo Regimen: DA- REPZAK    Day: 9    Pt endorsed: feeling well  + gas pain yesterdauy    Review of Systems: Denies any chest pain, palpitation, SOB, abdominal pain or dysuria.    Pain scale: 0                               Location:    Diet: Regular    Allergies: No Known Allergies    ANTIMICROBIALS  abacavir 600 mG/dolutegravir 50 mG/lamiVUDine 300 mG 1 Tablet(s) Oral daily  acyclovir   Tablet 400 milliGRAM(s) Oral three times a day  trimethoprim  160 mG/sulfamethoxazole 800 mG 1 Tablet(s) Oral <User Schedule>      HEME/ONC MEDICATIONS  enoxaparin Injectable 40 milliGRAM(s) SubCutaneous every 24 hours  methotrexate PF IntraThecal 12 milliGRAM(s) IntraThecal once      STANDING MEDICATIONS  allopurinol 300 milliGRAM(s) Oral daily  filgrastim-sndz Injectable 480 MICROGram(s) SubCutaneous daily      PRN MEDICATIONS  acetaminophen   Tablet 650 milliGRAM(s) Oral every 6 hours PRN  acetaminophen   Tablet. 650 milliGRAM(s) Oral every 6 hours PRN  hydrocortisone sodium succinate Injectable 100 milliGRAM(s) IV Push once PRN  metoclopramide Injectable 10 milliGRAM(s) IV Push every 6 hours PRN        Vital Signs Last 24 Hrs  T(C): 36.8 (03 Mar 2018 09:23), Max: 37.3 (02 Mar 2018 10:16)  T(F): 98.3 (03 Mar 2018 09:23), Max: 99.1 (02 Mar 2018 10:16)  HR: 95 (03 Mar 2018 09:23) (85 - 110)  BP: 127/78 (03 Mar 2018 09:23) (106/68 - 130/78)  BP(mean): --  RR: 18 (03 Mar 2018 09:23) (18 - 18)  SpO2: 98% (03 Mar 2018 09:23) (98% - 99%)    PHYSICAL EXAM  General: adult in NAD  HEENT: clear oropharynx  Neck: supple  CV: normal S1/S2 ,RRR  Lungs: CTA B/L  Abdomen: soft non-tender non-distended, +BS  Ext: no  edema in BLE  Skin: no rashes or petechiae  Neuro: alert and oriented X 4  Central Line: RCW Mediport CDI    LABS:                              13.0   11.5  )-----------( 90       ( 03 Mar 2018 07:14 )             41.1         Mean Cell Volume : 89.1 fl  Mean Cell Hemoglobin : 28.3 pg  Mean Cell Hemoglobin Concentration : 31.7 gm/dL  Auto Neutrophil # : 9.5 K/uL  Auto Lymphocyte # : 1.8 K/uL  Auto Monocyte # : 0.0 K/uL  Auto Eosinophil # : 0.2 K/uL  Auto Basophil # : 0.0 K/uL  Auto Neutrophil % : 82.3 %  Auto Lymphocyte % : 15.9 %  Auto Monocyte % : 0.2 %  Auto Eosinophil % : 1.4 %  Auto Basophil % : 0.2 %      03-03    136  |  99  |  11  ----------------------------<  94  4.2   |  27  |  1.00    Ca    8.6      03 Mar 2018 07:14  Phos  3.1     03-03  Mg     2.1     03-03    TPro  6.9  /  Alb  3.5  /  TBili  0.3  /  DBili  x   /  AST  69<H>  /  ALT  200<H>  /  AlkPhos  82  03-03      Mg 2.1  Phos 3.1            Uric Acid 2.9        Culture:  RECENT CULTURES:      MICROBIOLOGY:  abacavir 600 mG/dolutegravir 50 mG/lamiVUDine 300 mG 1 Tablet(s) Oral daily  acyclovir   Tablet 400 milliGRAM(s) Oral three times a day  trimethoprim  160 mG/sulfamethoxazole 800 mG 1 Tablet(s) Oral <User Schedule>    enoxaparin Injectable 40 milliGRAM(s) SubCutaneous every 24 hours  methotrexate PF IntraThecal 12 milliGRAM(s) IntraThecal once    allopurinol 300 milliGRAM(s) Oral daily  filgrastim-sndz Injectable 480 MICROGram(s) SubCutaneous daily      RADIOLOGY & ADDITIONAL STUDIES: Diagnosis: HIV associated DLBCL    Protocol/Chemo Regimen: DA- REPZAK    Day: 9    Pt endorsed: feeling well, Interpretor ID # [id number] (creole)    + gas pain yesterday    Review of Systems: Denies any chest pain, palpitation, SOB, abdominal pain or dysuria.    Pain scale: 0                               Location:    Diet: Regular    Allergies: No Known Allergies    ANTIMICROBIALS  abacavir 600 mG/dolutegravir 50 mG/lamiVUDine 300 mG 1 Tablet(s) Oral daily  acyclovir   Tablet 400 milliGRAM(s) Oral three times a day  trimethoprim  160 mG/sulfamethoxazole 800 mG 1 Tablet(s) Oral <User Schedule>      HEME/ONC MEDICATIONS  enoxaparin Injectable 40 milliGRAM(s) SubCutaneous every 24 hours  methotrexate PF IntraThecal 12 milliGRAM(s) IntraThecal once      STANDING MEDICATIONS  allopurinol 300 milliGRAM(s) Oral daily  filgrastim-sndz Injectable 480 MICROGram(s) SubCutaneous daily      PRN MEDICATIONS  acetaminophen   Tablet 650 milliGRAM(s) Oral every 6 hours PRN  acetaminophen   Tablet. 650 milliGRAM(s) Oral every 6 hours PRN  hydrocortisone sodium succinate Injectable 100 milliGRAM(s) IV Push once PRN  metoclopramide Injectable 10 milliGRAM(s) IV Push every 6 hours PRN        Vital Signs Last 24 Hrs  T(C): 36.8 (03 Mar 2018 09:23), Max: 37.3 (02 Mar 2018 10:16)  T(F): 98.3 (03 Mar 2018 09:23), Max: 99.1 (02 Mar 2018 10:16)  HR: 95 (03 Mar 2018 09:23) (85 - 110)  BP: 127/78 (03 Mar 2018 09:23) (106/68 - 130/78)  BP(mean): --  RR: 18 (03 Mar 2018 09:23) (18 - 18)  SpO2: 98% (03 Mar 2018 09:23) (98% - 99%)    PHYSICAL EXAM  General: adult in NAD  HEENT: clear oropharynx  Neck: supple  CV: normal S1/S2 ,RRR  Lungs: CTA B/L  Abdomen: soft non-tender non-distended, +BS  Ext: no  edema in BLE  Skin: no rashes or petechiae  Neuro: alert and oriented X 4, speaks creole  Central Line: RCW Mediport CDI    LABS:                              13.0   11.5  )-----------( 90       ( 03 Mar 2018 07:14 )             41.1         Mean Cell Volume : 89.1 fl  Mean Cell Hemoglobin : 28.3 pg  Mean Cell Hemoglobin Concentration : 31.7 gm/dL  Auto Neutrophil # : 9.5 K/uL  Auto Lymphocyte # : 1.8 K/uL  Auto Monocyte # : 0.0 K/uL  Auto Eosinophil # : 0.2 K/uL  Auto Basophil # : 0.0 K/uL  Auto Neutrophil % : 82.3 %  Auto Lymphocyte % : 15.9 %  Auto Monocyte % : 0.2 %  Auto Eosinophil % : 1.4 %  Auto Basophil % : 0.2 %      03-03    136  |  99  |  11  ----------------------------<  94  4.2   |  27  |  1.00    Ca    8.6      03 Mar 2018 07:14  Phos  3.1     03-03  Mg     2.1     03-03    TPro  6.9  /  Alb  3.5  /  TBili  0.3  /  DBili  x   /  AST  69<H>  /  ALT  200<H>  /  AlkPhos  82  03-03      Mg 2.1  Phos 3.1            Uric Acid 2.9    MICROBIOLOGY:  abacavir 600 mG/dolutegravir 50 mG/lamiVUDine 300 mG 1 Tablet(s) Oral daily  acyclovir   Tablet 400 milliGRAM(s) Oral three times a day  trimethoprim  160 mG/sulfamethoxazole 800 mG 1 Tablet(s) Oral <User Schedule>    enoxaparin Injectable 40 milliGRAM(s) SubCutaneous every 24 hours  methotrexate PF IntraThecal 12 milliGRAM(s) IntraThecal once    allopurinol 300 milliGRAM(s) Oral daily  filgrastim-sndz Injectable 480 MICROGram(s) SubCutaneous daily      RADIOLOGY & ADDITIONAL STUDIES: Diagnosis: HIV associated DLBCL    Protocol/Chemo Regimen: DA- R-EPOCH    Day: 9    Pt endorsed: feeling well, Interpretor ID # [id number] (creole)    + gas pain yesterday    Review of Systems: Denies any chest pain, palpitation, SOB, abdominal pain or dysuria.    Pain scale: 0                               Location:    Diet: Regular    Allergies: No Known Allergies    ANTIMICROBIALS  abacavir 600 mG/dolutegravir 50 mG/lamiVUDine 300 mG 1 Tablet(s) Oral daily  acyclovir   Tablet 400 milliGRAM(s) Oral three times a day  trimethoprim  160 mG/sulfamethoxazole 800 mG 1 Tablet(s) Oral <User Schedule>      HEME/ONC MEDICATIONS  enoxaparin Injectable 40 milliGRAM(s) SubCutaneous every 24 hours  methotrexate PF IntraThecal 12 milliGRAM(s) IntraThecal once      STANDING MEDICATIONS  allopurinol 300 milliGRAM(s) Oral daily  filgrastim-sndz Injectable 480 MICROGram(s) SubCutaneous daily      PRN MEDICATIONS  acetaminophen   Tablet 650 milliGRAM(s) Oral every 6 hours PRN  acetaminophen   Tablet. 650 milliGRAM(s) Oral every 6 hours PRN  hydrocortisone sodium succinate Injectable 100 milliGRAM(s) IV Push once PRN  metoclopramide Injectable 10 milliGRAM(s) IV Push every 6 hours PRN        Vital Signs Last 24 Hrs  T(C): 36.8 (03 Mar 2018 09:23), Max: 37.3 (02 Mar 2018 10:16)  T(F): 98.3 (03 Mar 2018 09:23), Max: 99.1 (02 Mar 2018 10:16)  HR: 95 (03 Mar 2018 09:23) (85 - 110)  BP: 127/78 (03 Mar 2018 09:23) (106/68 - 130/78)  BP(mean): --  RR: 18 (03 Mar 2018 09:23) (18 - 18)  SpO2: 98% (03 Mar 2018 09:23) (98% - 99%)    PHYSICAL EXAM  General: adult in NAD  HEENT: clear oropharynx  Neck: supple  CV: normal S1/S2 ,RRR  Lungs: CTA B/L  Abdomen: soft non-tender non-distended, +BS  Ext: no  edema in BLE  Skin: no rash  Neuro: alert and oriented X 4  Central Line: RCW Mediport CDI    LABS:                              13.0   11.5  )-----------( 90       ( 03 Mar 2018 07:14 )             41.1         Mean Cell Volume : 89.1 fl  Mean Cell Hemoglobin : 28.3 pg  Mean Cell Hemoglobin Concentration : 31.7 gm/dL  Auto Neutrophil # : 9.5 K/uL  Auto Lymphocyte # : 1.8 K/uL  Auto Monocyte # : 0.0 K/uL  Auto Eosinophil # : 0.2 K/uL  Auto Basophil # : 0.0 K/uL  Auto Neutrophil % : 82.3 %  Auto Lymphocyte % : 15.9 %  Auto Monocyte % : 0.2 %  Auto Eosinophil % : 1.4 %  Auto Basophil % : 0.2 %      03-03    136  |  99  |  11  ----------------------------<  94  4.2   |  27  |  1.00    Ca    8.6      03 Mar 2018 07:14  Phos  3.1     03-03  Mg     2.1     03-03    TPro  6.9  /  Alb  3.5  /  TBili  0.3  /  DBili  x   /  AST  69<H>  /  ALT  200<H>  /  AlkPhos  82  03-03      Mg 2.1  Phos 3.1      Uric Acid 2.9    MICROBIOLOGY:  abacavir 600 mG/dolutegravir 50 mG/lamiVUDine 300 mG 1 Tablet(s) Oral daily  acyclovir   Tablet 400 milliGRAM(s) Oral three times a day  trimethoprim  160 mG/sulfamethoxazole 800 mG 1 Tablet(s) Oral <User Schedule>    enoxaparin Injectable 40 milliGRAM(s) SubCutaneous every 24 hours  methotrexate PF IntraThecal 12 milliGRAM(s) IntraThecal once    allopurinol 300 milliGRAM(s) Oral daily  filgrastim-sndz Injectable 480 MICROGram(s) SubCutaneous daily      RADIOLOGY & ADDITIONAL STUDIES:

## 2018-03-03 NOTE — PROGRESS NOTE ADULT - ASSESSMENT
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 LP completed with IT MTX on 2/23, flow cytometry positive for malignant cells. The patients hospital course has been complicated by neutropenic fevers now resolved. 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 LP completed with IT MTX on 2/23, flow cytometry positive for malignant cells.

## 2018-03-03 NOTE — PROGRESS NOTE ADULT - PROBLEM SELECTOR PLAN 4
Lovenox 40mg SQ daily to start 24 hours after LP  D/C if PLT's < 50 K        Contact Information [phone] Continue on Lovenox 40mg SQ daily till platelets 50K.  D/C if PLT's < 50 K        Contact Information [phone]

## 2018-03-04 LAB
ALBUMIN SERPL ELPH-MCNC: 3.5 G/DL — SIGNIFICANT CHANGE UP (ref 3.3–5)
ALP SERPL-CCNC: 83 U/L — SIGNIFICANT CHANGE UP (ref 40–120)
ALT FLD-CCNC: 243 U/L RC — HIGH (ref 10–45)
ANION GAP SERPL CALC-SCNC: 11 MMOL/L — SIGNIFICANT CHANGE UP (ref 5–17)
AST SERPL-CCNC: 78 U/L — HIGH (ref 10–40)
BASOPHILS # BLD AUTO: 0 K/UL — SIGNIFICANT CHANGE UP (ref 0–0.2)
BASOPHILS NFR BLD AUTO: 0.2 % — SIGNIFICANT CHANGE UP (ref 0–2)
BILIRUB SERPL-MCNC: 0.5 MG/DL — SIGNIFICANT CHANGE UP (ref 0.2–1.2)
BUN SERPL-MCNC: 12 MG/DL — SIGNIFICANT CHANGE UP (ref 7–23)
CALCIUM SERPL-MCNC: 8.9 MG/DL — SIGNIFICANT CHANGE UP (ref 8.4–10.5)
CHLORIDE SERPL-SCNC: 97 MMOL/L — SIGNIFICANT CHANGE UP (ref 96–108)
CO2 SERPL-SCNC: 26 MMOL/L — SIGNIFICANT CHANGE UP (ref 22–31)
CREAT SERPL-MCNC: 1.04 MG/DL — SIGNIFICANT CHANGE UP (ref 0.5–1.3)
EOSINOPHIL # BLD AUTO: 0.1 K/UL — SIGNIFICANT CHANGE UP (ref 0–0.5)
EOSINOPHIL NFR BLD AUTO: 2.9 % — SIGNIFICANT CHANGE UP (ref 0–6)
GLUCOSE SERPL-MCNC: 102 MG/DL — HIGH (ref 70–99)
HCT VFR BLD CALC: 41.4 % — SIGNIFICANT CHANGE UP (ref 39–50)
HGB BLD-MCNC: 13.3 G/DL — SIGNIFICANT CHANGE UP (ref 13–17)
LDH SERPL L TO P-CCNC: 196 U/L — SIGNIFICANT CHANGE UP (ref 50–242)
LYMPHOCYTES # BLD AUTO: 1.6 K/UL — SIGNIFICANT CHANGE UP (ref 1–3.3)
LYMPHOCYTES # BLD AUTO: 35.5 % — SIGNIFICANT CHANGE UP (ref 13–44)
MAGNESIUM SERPL-MCNC: 2 MG/DL — SIGNIFICANT CHANGE UP (ref 1.6–2.6)
MCHC RBC-ENTMCNC: 28.4 PG — SIGNIFICANT CHANGE UP (ref 27–34)
MCHC RBC-ENTMCNC: 32 GM/DL — SIGNIFICANT CHANGE UP (ref 32–36)
MCV RBC AUTO: 88.7 FL — SIGNIFICANT CHANGE UP (ref 80–100)
MONOCYTES # BLD AUTO: 0.1 K/UL — SIGNIFICANT CHANGE UP (ref 0–0.9)
MONOCYTES NFR BLD AUTO: 1.4 % — LOW (ref 2–14)
NEUTROPHILS # BLD AUTO: 2.7 K/UL — SIGNIFICANT CHANGE UP (ref 1.8–7.4)
NEUTROPHILS NFR BLD AUTO: 60 % — SIGNIFICANT CHANGE UP (ref 43–77)
PHOSPHATE SERPL-MCNC: 3.6 MG/DL — SIGNIFICANT CHANGE UP (ref 2.5–4.5)
PLATELET # BLD AUTO: 78 K/UL — LOW (ref 150–400)
POTASSIUM SERPL-MCNC: 4.2 MMOL/L — SIGNIFICANT CHANGE UP (ref 3.5–5.3)
POTASSIUM SERPL-SCNC: 4.2 MMOL/L — SIGNIFICANT CHANGE UP (ref 3.5–5.3)
PROT SERPL-MCNC: 7.4 G/DL — SIGNIFICANT CHANGE UP (ref 6–8.3)
RBC # BLD: 4.67 M/UL — SIGNIFICANT CHANGE UP (ref 4.2–5.8)
RBC # FLD: 13 % — SIGNIFICANT CHANGE UP (ref 10.3–14.5)
SODIUM SERPL-SCNC: 134 MMOL/L — LOW (ref 135–145)
URATE SERPL-MCNC: 3.5 MG/DL — SIGNIFICANT CHANGE UP (ref 3.4–8.8)
WBC # BLD: 4.5 K/UL — SIGNIFICANT CHANGE UP (ref 3.8–10.5)
WBC # FLD AUTO: 4.5 K/UL — SIGNIFICANT CHANGE UP (ref 3.8–10.5)

## 2018-03-04 PROCEDURE — 99232 SBSQ HOSP IP/OBS MODERATE 35: CPT

## 2018-03-04 RX ADMIN — SIMETHICONE 80 MILLIGRAM(S): 80 TABLET, CHEWABLE ORAL at 17:39

## 2018-03-04 RX ADMIN — Medication 300 MILLIGRAM(S): at 11:21

## 2018-03-04 RX ADMIN — SIMETHICONE 80 MILLIGRAM(S): 80 TABLET, CHEWABLE ORAL at 05:58

## 2018-03-04 RX ADMIN — SIMETHICONE 80 MILLIGRAM(S): 80 TABLET, CHEWABLE ORAL at 11:21

## 2018-03-04 RX ADMIN — Medication 400 MILLIGRAM(S): at 14:02

## 2018-03-04 RX ADMIN — Medication 400 MILLIGRAM(S): at 21:32

## 2018-03-04 RX ADMIN — Medication 400 MILLIGRAM(S): at 05:59

## 2018-03-04 RX ADMIN — SIMETHICONE 80 MILLIGRAM(S): 80 TABLET, CHEWABLE ORAL at 00:27

## 2018-03-04 RX ADMIN — Medication 480 MICROGRAM(S): at 11:36

## 2018-03-04 NOTE — PROGRESS NOTE ADULT - ATTENDING COMMENTS
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 well tolerated  -IT MTx LP on 2/23 -CSF (+); Pt aware CSF is (+); otherwise w/o c/o. Continue LP with IT- MTX twice weekly  Transaminitis- likely drug induced; monitor LFT's  work with pt, home care, drug plan (if any) to ensure delivery of HAART to pt ASAP  Home as soon as HAART available  home on Bactrim/Acyclovir prophylaxis.  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 well tolerated  -IT MTx LP on 2/23 -CSF (+); Pt aware CSF is (+); otherwise w/o c/o. Continue LP with IT- MTX twice weekly  Transaminitis- likely drug induced; monitor LFT's  work with pt, home care, drug plan (if any) to ensure delivery of HAART to pt ASAP  Home as soon as HAART available  home on Bactrim/Acyclovir prophylaxis.  on Zarxio

## 2018-03-04 NOTE — PROGRESS NOTE ADULT - PROBLEM SELECTOR PLAN 3
Patient non-compliant with  medication prior to admission, possibly lost to follow-up  Continue Triumeq 1 tab daily  ID following  Patient to be discharged once medication is delivered Patient non-compliant with  medication prior to admission, possibly lost to follow-up  Continue Triumeq 1 tab daily  ID following  Patient to be discharged once medication is delivered - awaiting final approval and override from specialty pharmacy

## 2018-03-04 NOTE — PROGRESS NOTE ADULT - PROBLEM SELECTOR PLAN 4
Continue on Lovenox 40mg SQ daily till platelets 50K.  D/C if PLT's < 50 K        Contact Information [phone]

## 2018-03-04 NOTE — PROGRESS NOTE ADULT - SUBJECTIVE AND OBJECTIVE BOX
Diagnosis: HIV associated DLBCL    Protocol/Chemo Regimen: DA- R-EPOCH    Day: 9    Pt endorsed: feeling well, Interpretor ID # [id number] (creole)    + gas pain yesterday    Review of Systems: Denies any chest pain, palpitation, SOB, abdominal pain or dysuria.    Pain scale: 0                               Location:    Diet: Regular    Allergies: No Known Allergies      ANTIMICROBIALS  abacavir 600 mG/dolutegravir 50 mG/lamiVUDine 300 mG 1 Tablet(s) Oral daily  acyclovir   Tablet 400 milliGRAM(s) Oral three times a day  trimethoprim  160 mG/sulfamethoxazole 800 mG 1 Tablet(s) Oral <User Schedule>      HEME/ONC MEDICATIONS  enoxaparin Injectable 40 milliGRAM(s) SubCutaneous every 24 hours  methotrexate PF IntraThecal 12 milliGRAM(s) IntraThecal once      STANDING MEDICATIONS  allopurinol 300 milliGRAM(s) Oral daily  filgrastim-sndz Injectable 480 MICROGram(s) SubCutaneous daily  simethicone 80 milliGRAM(s) Chew every 6 hours      PRN MEDICATIONS  acetaminophen   Tablet 650 milliGRAM(s) Oral every 6 hours PRN  acetaminophen   Tablet. 650 milliGRAM(s) Oral every 6 hours PRN  hydrocortisone sodium succinate Injectable 100 milliGRAM(s) IV Push once PRN  metoclopramide Injectable 10 milliGRAM(s) IV Push every 6 hours PRN        Vital Signs Last 24 Hrs  T(C): 36.6 (04 Mar 2018 09:30), Max: 36.9 (03 Mar 2018 14:04)  T(F): 97.9 (04 Mar 2018 09:30), Max: 98.4 (03 Mar 2018 14:04)  HR: 90 (04 Mar 2018 09:30) (90 - 111)  BP: 116/71 (04 Mar 2018 09:30) (108/71 - 119/80)  BP(mean): --  RR: 18 (04 Mar 2018 09:30) (18 - 18)  SpO2: 99% (04 Mar 2018 09:30) (97% - 100%)    PHYSICAL EXAM  General: adult in NAD  HEENT: clear oropharynx, anicteric sclera, pink conjunctiva  Neck: supple  CV: normal S1/S2 RRR  Lungs: positive air movement b/l ant lungs,clear to auscultation, no wheezes, no rales  Abdomen: soft non-tender non-distended, no hepatosplenomegaly  Ext: no clubbing cyanosis or edema  Skin: no rashes and no petechiae  Neuro: alert and oriented X 4, no focal deficits  Central Line: normal    LABS:    Blood Cultures:                           13.3   4.5   )-----------( 78       ( 04 Mar 2018 07:07 )             41.4         Mean Cell Volume : 88.7 fl  Mean Cell Hemoglobin : 28.4 pg  Mean Cell Hemoglobin Concentration : 32.0 gm/dL  Auto Neutrophil # : 2.7 K/uL  Auto Lymphocyte # : 1.6 K/uL  Auto Monocyte # : 0.1 K/uL  Auto Eosinophil # : 0.1 K/uL  Auto Basophil # : 0.0 K/uL  Auto Neutrophil % : 60.0 %  Auto Lymphocyte % : 35.5 %  Auto Monocyte % : 1.4 %  Auto Eosinophil % : 2.9 %  Auto Basophil % : 0.2 %      03-04    134<L>  |  97  |  12  ----------------------------<  102<H>  4.2   |  26  |  1.04    Ca    8.9      04 Mar 2018 07:07  Phos  3.6     03-04  Mg     2.0     03-04    TPro  7.4  /  Alb  3.5  /  TBili  0.5  /  DBili  x   /  AST  78<H>  /  ALT  243<H>  /  AlkPhos  83  03-04      Mg 2.0  Phos 3.6            Uric Acid 3.5        RADIOLOGY & ADDITIONAL STUDIES: Diagnosis: HIV associated DLBCL, +CSF malignant cells    Protocol/Chemo Regimen: DA- R-EPOCH, Cycle 1    Day: 10    Jamison Sloan  ID: Coni # [id number]  ROS: + gas pains, slightly improved    Review of Systems: Denies any chest pain, palpitation, SOB, abdominal pain or dysuria.    Pain scale: 0                               .  Diet: Regular    Allergies: No Known Allergies      ANTIMICROBIALS  abacavir 600 mG/dolutegravir 50 mG/lamiVUDine 300 mG 1 Tablet(s) Oral daily  acyclovir   Tablet 400 milliGRAM(s) Oral three times a day  trimethoprim  160 mG/sulfamethoxazole 800 mG 1 Tablet(s) Oral <User Schedule>      HEME/ONC MEDICATIONS  enoxaparin Injectable 40 milliGRAM(s) SubCutaneous every 24 hours      STANDING MEDICATIONS  allopurinol 300 milliGRAM(s) Oral daily  filgrastim-sndz Injectable 480 MICROGram(s) SubCutaneous daily  simethicone 80 milliGRAM(s) Chew every 6 hours      PRN MEDICATIONS  acetaminophen   Tablet 650 milliGRAM(s) Oral every 6 hours PRN  acetaminophen   Tablet. 650 milliGRAM(s) Oral every 6 hours PRN  hydrocortisone sodium succinate Injectable 100 milliGRAM(s) IV Push once PRN  metoclopramide Injectable 10 milliGRAM(s) IV Push every 6 hours PRN        Vital Signs Last 24 Hrs  T(C): 36.6 (04 Mar 2018 09:30), Max: 36.9 (03 Mar 2018 14:04)  T(F): 97.9 (04 Mar 2018 09:30), Max: 98.4 (03 Mar 2018 14:04)  HR: 90 (04 Mar 2018 09:30) (90 - 111)  BP: 116/71 (04 Mar 2018 09:30) (108/71 - 119/80)  RR: 18 (04 Mar 2018 09:30) (18 - 18)  SpO2: 99% (04 Mar 2018 09:30) (97% - 100%)    PHYSICAL EXAM  General: adult in NAD  HEENT: clear oropharynx  Neck: supple  CV: normal S1/S2 ,RRR  Lungs: CTA B/L  Abdomen: soft non-tender non-distended, +BS  Ext: no  edema in BLE  Skin: no rash  Neuro: alert and oriented X 4  Central Line: RCW Mediport CDI    LABS:                        13.3   4.5   )-----------( 78       ( 04 Mar 2018 07:07 )             41.4         Mean Cell Volume : 88.7 fl  Mean Cell Hemoglobin : 28.4 pg  Mean Cell Hemoglobin Concentration : 32.0 gm/dL  Auto Neutrophil # : 2.7 K/uL  Auto Lymphocyte # : 1.6 K/uL  Auto Monocyte # : 0.1 K/uL  Auto Eosinophil # : 0.1 K/uL  Auto Basophil # : 0.0 K/uL  Auto Neutrophil % : 60.0 %  Auto Lymphocyte % : 35.5 %  Auto Monocyte % : 1.4 %  Auto Eosinophil % : 2.9 %  Auto Basophil % : 0.2 %      03-04    134<L>  |  97  |  12  ----------------------------<  102<H>  4.2   |  26  |  1.04    Ca    8.9      04 Mar 2018 07:07  Phos  3.6     03-04  Mg     2.0     03-04    TPro  7.4  /  Alb  3.5  /  TBili  0.5  /  DBili  x   /  AST  78<H>  /  ALT  243<H>  /  AlkPhos  83  03-04        Uric Acid 3.5 Diagnosis: HIV associated DLBCL, +CSF malignant cells    Protocol/Chemo Regimen: DA- R-EPOCH, Cycle 1    Day: 10    Jamison Sloan  ID: Coni # [id number]  ROS: + gas pains, slightly improved    Review of Systems: Denies any chest pain, palpitation, SOB, abdominal pain or dysuria.    Pain scale: 0                               .  Diet: Regular    Allergies: No Known Allergies      ANTIMICROBIALS  abacavir 600 mG/dolutegravir 50 mG/lamiVUDine 300 mG 1 Tablet(s) Oral daily  acyclovir   Tablet 400 milliGRAM(s) Oral three times a day  trimethoprim  160 mG/sulfamethoxazole 800 mG 1 Tablet(s) Oral <User Schedule>      HEME/ONC MEDICATIONS  enoxaparin Injectable 40 milliGRAM(s) SubCutaneous every 24 hours      STANDING MEDICATIONS  allopurinol 300 milliGRAM(s) Oral daily  filgrastim-sndz Injectable 480 MICROGram(s) SubCutaneous daily  simethicone 80 milliGRAM(s) Chew every 6 hours      PRN MEDICATIONS  acetaminophen   Tablet 650 milliGRAM(s) Oral every 6 hours PRN  acetaminophen   Tablet. 650 milliGRAM(s) Oral every 6 hours PRN  hydrocortisone sodium succinate Injectable 100 milliGRAM(s) IV Push once PRN  metoclopramide Injectable 10 milliGRAM(s) IV Push every 6 hours PRN        Vital Signs Last 24 Hrs  T(C): 36.6 (04 Mar 2018 09:30), Max: 36.9 (03 Mar 2018 14:04)  T(F): 97.9 (04 Mar 2018 09:30), Max: 98.4 (03 Mar 2018 14:04)  HR: 90 (04 Mar 2018 09:30) (90 - 111)  BP: 116/71 (04 Mar 2018 09:30) (108/71 - 119/80)  RR: 18 (04 Mar 2018 09:30) (18 - 18)  SpO2: 99% (04 Mar 2018 09:30) (97% - 100%)    PHYSICAL EXAM  General: adult in NAD  HEENT: clear oropharynx, no mucositis  Neck: supple  CV: normal S1/S2 ,RRR  Lungs: CTA B/L  Abdomen: soft non-tender non-distended, +BS  Ext: no  edema in BLE  Skin: no rash  Neuro: alert and oriented X 4  Central Line: RCW Mediport CDI    LABS:                        13.3   4.5   )-----------( 78       ( 04 Mar 2018 07:07 )             41.4         Mean Cell Volume : 88.7 fl  Mean Cell Hemoglobin : 28.4 pg  Mean Cell Hemoglobin Concentration : 32.0 gm/dL  Auto Neutrophil # : 2.7 K/uL  Auto Lymphocyte # : 1.6 K/uL  Auto Monocyte # : 0.1 K/uL  Auto Eosinophil # : 0.1 K/uL  Auto Basophil # : 0.0 K/uL  Auto Neutrophil % : 60.0 %  Auto Lymphocyte % : 35.5 %  Auto Monocyte % : 1.4 %  Auto Eosinophil % : 2.9 %  Auto Basophil % : 0.2 %      03-04    134<L>  |  97  |  12  ----------------------------<  102<H>  4.2   |  26  |  1.04    Ca    8.9      04 Mar 2018 07:07  Phos  3.6     03-04  Mg     2.0     03-04    TPro  7.4  /  Alb  3.5  /  TBili  0.5  /  DBili  x   /  AST  78<H>  /  ALT  243<H>  /  AlkPhos  83  03-04        Uric Acid 3.5

## 2018-03-05 ENCOUNTER — APPOINTMENT (OUTPATIENT)
Dept: RADIOLOGY | Facility: HOSPITAL | Age: 43
End: 2018-03-05
Payer: COMMERCIAL

## 2018-03-05 ENCOUNTER — APPOINTMENT (OUTPATIENT)
Dept: HEMATOLOGY ONCOLOGY | Facility: CLINIC | Age: 43
End: 2018-03-05

## 2018-03-05 ENCOUNTER — APPOINTMENT (OUTPATIENT)
Dept: INFUSION THERAPY | Facility: HOSPITAL | Age: 43
End: 2018-03-05

## 2018-03-05 LAB
ALBUMIN SERPL ELPH-MCNC: 3.5 G/DL — SIGNIFICANT CHANGE UP (ref 3.3–5)
ALP SERPL-CCNC: 83 U/L — SIGNIFICANT CHANGE UP (ref 40–120)
ALT FLD-CCNC: 269 U/L RC — HIGH (ref 10–45)
ANION GAP SERPL CALC-SCNC: 10 MMOL/L — SIGNIFICANT CHANGE UP (ref 5–17)
APPEARANCE CSF: CLEAR — SIGNIFICANT CHANGE UP
APPEARANCE SPUN FLD: COLORLESS — SIGNIFICANT CHANGE UP
APTT BLD: 28.8 SEC — SIGNIFICANT CHANGE UP (ref 27.5–37.4)
AST SERPL-CCNC: 82 U/L — HIGH (ref 10–40)
BASOPHILS # BLD AUTO: 0 K/UL — SIGNIFICANT CHANGE UP (ref 0–0.2)
BASOPHILS NFR BLD AUTO: 1.5 % — SIGNIFICANT CHANGE UP (ref 0–2)
BILIRUB SERPL-MCNC: 0.3 MG/DL — SIGNIFICANT CHANGE UP (ref 0.2–1.2)
BUN SERPL-MCNC: 14 MG/DL — SIGNIFICANT CHANGE UP (ref 7–23)
CALCIUM SERPL-MCNC: 8.8 MG/DL — SIGNIFICANT CHANGE UP (ref 8.4–10.5)
CHLORIDE SERPL-SCNC: 98 MMOL/L — SIGNIFICANT CHANGE UP (ref 96–108)
CO2 SERPL-SCNC: 28 MMOL/L — SIGNIFICANT CHANGE UP (ref 22–31)
COLOR CSF: SIGNIFICANT CHANGE UP
CREAT SERPL-MCNC: 1.16 MG/DL — SIGNIFICANT CHANGE UP (ref 0.5–1.3)
EOSINOPHIL # BLD AUTO: 0.1 K/UL — SIGNIFICANT CHANGE UP (ref 0–0.5)
EOSINOPHIL NFR BLD AUTO: 3.1 % — SIGNIFICANT CHANGE UP (ref 0–6)
GLUCOSE CSF-MCNC: 74 MG/DL — HIGH (ref 40–70)
GLUCOSE SERPL-MCNC: 106 MG/DL — HIGH (ref 70–99)
HCT VFR BLD CALC: 40.5 % — SIGNIFICANT CHANGE UP (ref 39–50)
HGB BLD-MCNC: 13.4 G/DL — SIGNIFICANT CHANGE UP (ref 13–17)
INR BLD: 1.01 RATIO — SIGNIFICANT CHANGE UP (ref 0.88–1.16)
LDH CSF L TO P-CCNC: 25 U/L — SIGNIFICANT CHANGE UP
LDH FLD-CCNC: 25 U/L — SIGNIFICANT CHANGE UP
LDH SERPL L TO P-CCNC: 180 U/L — SIGNIFICANT CHANGE UP (ref 50–242)
LYMPHOCYTES # BLD AUTO: 1.6 K/UL — SIGNIFICANT CHANGE UP (ref 1–3.3)
LYMPHOCYTES # BLD AUTO: 51 % — HIGH (ref 13–44)
LYMPHOCYTES # CSF: 94 % — HIGH (ref 40–80)
MAGNESIUM SERPL-MCNC: 1.8 MG/DL — SIGNIFICANT CHANGE UP (ref 1.6–2.6)
MCHC RBC-ENTMCNC: 29.4 PG — SIGNIFICANT CHANGE UP (ref 27–34)
MCHC RBC-ENTMCNC: 33.1 GM/DL — SIGNIFICANT CHANGE UP (ref 32–36)
MCV RBC AUTO: 88.8 FL — SIGNIFICANT CHANGE UP (ref 80–100)
MONOCYTES # BLD AUTO: 0.1 K/UL — SIGNIFICANT CHANGE UP (ref 0–0.9)
MONOCYTES NFR BLD AUTO: 3.9 % — SIGNIFICANT CHANGE UP (ref 2–14)
MONOS+MACROS NFR CSF: 6 % — LOW (ref 15–45)
NEUTROPHILS # BLD AUTO: 1.2 K/UL — LOW (ref 1.8–7.4)
NEUTROPHILS # CSF: 0 % — SIGNIFICANT CHANGE UP (ref 0–6)
NEUTROPHILS NFR BLD AUTO: 40.5 % — LOW (ref 43–77)
NRBC NFR CSF: 3 /UL — SIGNIFICANT CHANGE UP (ref 0–5)
PHOSPHATE SERPL-MCNC: 3.6 MG/DL — SIGNIFICANT CHANGE UP (ref 2.5–4.5)
PLATELET # BLD AUTO: 68 K/UL — LOW (ref 150–400)
POTASSIUM SERPL-MCNC: 4.3 MMOL/L — SIGNIFICANT CHANGE UP (ref 3.5–5.3)
POTASSIUM SERPL-SCNC: 4.3 MMOL/L — SIGNIFICANT CHANGE UP (ref 3.5–5.3)
PROT CSF-MCNC: 29 MG/DL — SIGNIFICANT CHANGE UP (ref 15–45)
PROT SERPL-MCNC: 7.3 G/DL — SIGNIFICANT CHANGE UP (ref 6–8.3)
PROTHROM AB SERPL-ACNC: 10.9 SEC — SIGNIFICANT CHANGE UP (ref 9.8–12.7)
RBC # BLD: 4.56 M/UL — SIGNIFICANT CHANGE UP (ref 4.2–5.8)
RBC # CSF: 2 /UL — HIGH (ref 0–0)
RBC # FLD: 12.9 % — SIGNIFICANT CHANGE UP (ref 10.3–14.5)
SODIUM SERPL-SCNC: 136 MMOL/L — SIGNIFICANT CHANGE UP (ref 135–145)
TUBE TYPE: SIGNIFICANT CHANGE UP
URATE SERPL-MCNC: 3.8 MG/DL — SIGNIFICANT CHANGE UP (ref 3.4–8.8)
WBC # BLD: 3.1 K/UL — LOW (ref 3.8–10.5)
WBC # FLD AUTO: 3.1 K/UL — LOW (ref 3.8–10.5)

## 2018-03-05 PROCEDURE — 77003 FLUOROGUIDE FOR SPINE INJECT: CPT | Mod: 26

## 2018-03-05 PROCEDURE — 62272 THER SPI PNXR DRG CSF: CPT

## 2018-03-05 PROCEDURE — 88188 FLOWCYTOMETRY/READ 9-15: CPT

## 2018-03-05 PROCEDURE — 99232 SBSQ HOSP IP/OBS MODERATE 35: CPT

## 2018-03-05 PROCEDURE — 99231 SBSQ HOSP IP/OBS SF/LOW 25: CPT | Mod: GC

## 2018-03-05 RX ORDER — METHOTREXATE 2.5 MG/1
12 TABLET ORAL ONCE
Qty: 0 | Refills: 0 | Status: DISCONTINUED | OUTPATIENT
Start: 2018-03-05 | End: 2018-03-06

## 2018-03-05 RX ORDER — ABACAVIR SULFATE, DOLUTEGRAVIR SODIUM, LAMIVUDINE 60-5-30 MG
1 KIT ORAL
Qty: 30 | Refills: 0 | OUTPATIENT
Start: 2018-03-05 | End: 2018-04-03

## 2018-03-05 RX ADMIN — Medication 480 MICROGRAM(S): at 11:56

## 2018-03-05 RX ADMIN — SIMETHICONE 80 MILLIGRAM(S): 80 TABLET, CHEWABLE ORAL at 05:08

## 2018-03-05 RX ADMIN — Medication 400 MILLIGRAM(S): at 21:05

## 2018-03-05 RX ADMIN — Medication 1 TABLET(S): at 11:57

## 2018-03-05 RX ADMIN — SIMETHICONE 80 MILLIGRAM(S): 80 TABLET, CHEWABLE ORAL at 23:03

## 2018-03-05 RX ADMIN — Medication 300 MILLIGRAM(S): at 11:57

## 2018-03-05 RX ADMIN — Medication 400 MILLIGRAM(S): at 05:08

## 2018-03-05 RX ADMIN — Medication 400 MILLIGRAM(S): at 13:02

## 2018-03-05 RX ADMIN — SIMETHICONE 80 MILLIGRAM(S): 80 TABLET, CHEWABLE ORAL at 13:02

## 2018-03-05 RX ADMIN — SIMETHICONE 80 MILLIGRAM(S): 80 TABLET, CHEWABLE ORAL at 00:22

## 2018-03-05 NOTE — PROGRESS NOTE ADULT - SUBJECTIVE AND OBJECTIVE BOX
Clinical Indication: Non hodgkins lymphoma, HIV, for intrathecal chemotherapy    PREPROCEDURE:    Patient presents for diagnostic lumbar puncture.  Risks and benefits were discussed with patient and/or health care proxy.  Risks include but are not limited to headache, bleeding, infection and nerve damage.    Patient and/or health care proxy understands and consents to procedure.    POSTPROCEDURE:    Lumbar puncture was performed at the L4-5  level using a 22 gauge needle using fluoroscopic guidance. 4  cc of CSF  was collected and hand delivered to the lab. 12 mg of Methotrexate were intrathecally injected without incident.    Patient tolerated the procedure well and left the department in stable condition.

## 2018-03-05 NOTE — PROGRESS NOTE ADULT - SUBJECTIVE AND OBJECTIVE BOX
Diagnosis: HIV associated DLBCL, +CSF malignant cells    Protocol/Chemo Regimen: DA- R-EPOCH, Cycle 1    Day: 11    Jamison Sloan  ID:  ROS:    Review of Systems: Denies any chest pain, palpitation, SOB, abdominal pain or dysuria.    Pain scale: 0                               .  Diet: Regular    Allergies: No Known Allergies      ANTIMICROBIALS  abacavir 600 mG/dolutegravir 50 mG/lamiVUDine 300 mG 1 Tablet(s) Oral daily  acyclovir   Tablet 400 milliGRAM(s) Oral three times a day  trimethoprim  160 mG/sulfamethoxazole 800 mG 1 Tablet(s) Oral <User Schedule>    STANDING MEDICATIONS  allopurinol 300 milliGRAM(s) Oral daily  filgrastim-sndz Injectable 480 MICROGram(s) SubCutaneous daily  simethicone 80 milliGRAM(s) Chew every 6 hours      PRN MEDICATIONS  acetaminophen   Tablet 650 milliGRAM(s) Oral every 6 hours PRN  acetaminophen   Tablet. 650 milliGRAM(s) Oral every 6 hours PRN  aluminum hydroxide/magnesium hydroxide/simethicone Suspension 30 milliLiter(s) Oral every 4 hours PRN  hydrocortisone sodium succinate Injectable 100 milliGRAM(s) IV Push once PRN  metoclopramide Injectable 10 milliGRAM(s) IV Push every 6 hours PRN      Vital Signs Last 24 Hrs  T(C): 36.2 (05 Mar 2018 05:44), Max: 36.6 (04 Mar 2018 09:30)  T(F): 97.1 (05 Mar 2018 05:44), Max: 97.9 (04 Mar 2018 09:30)  HR: 92 (05 Mar 2018 05:44) (90 - 102)  BP: 100/69 (05 Mar 2018 05:44) (100/69 - 128/84)  RR: 18 (05 Mar 2018 05:44) (18 - 18)  SpO2: 97% (05 Mar 2018 05:44) (97% - 99%)    PHYSICAL EXAM  General: adult in NAD  HEENT: clear oropharynx, no mucositis  Neck: supple  CV: normal S1/S2 ,RRR  Lungs: CTA B/L  Abdomen: soft non-tender non-distended, +BS  Ext: no  edema in BLE  Skin: no rash  Neuro: alert and oriented X 4  Central Line: RCW Mediport CDI    LABS:                          13.4   3.1   )-----------( 68       ( 05 Mar 2018 06:54 )             40.5         Mean Cell Volume : 88.8 fl  Mean Cell Hemoglobin : 29.4 pg  Mean Cell Hemoglobin Concentration : 33.1 gm/dL  Auto Neutrophil # : 1.2 K/uL  Auto Lymphocyte # : 1.6 K/uL  Auto Monocyte # : 0.1 K/uL  Auto Eosinophil # : 0.1 K/uL  Auto Basophil # : 0.0 K/uL  Auto Neutrophil % : 40.5 %  Auto Lymphocyte % : 51.0 %  Auto Monocyte % : 3.9 %  Auto Eosinophil % : 3.1 %  Auto Basophil % : 1.5 %      03-05    136  |  98  |  14  ----------------------------<  106<H>  4.3   |  28  |  1.16    Ca    8.8      05 Mar 2018 06:54  Phos  3.6     03-05  Mg     1.8     03-05    TPro  7.3  /  Alb  3.5  /  TBili  0.3  /  DBili  x   /  AST  82<H>  /  ALT  269<H>  /  AlkPhos  83  03-05        Uric Acid 3.8    RADIOLOGY & ADDITIONAL STUDIES: Diagnosis: HIV associated DLBCL, +CSF malignant cells    Protocol/Chemo Regimen: DA- R-EPOCH, Cycle 1    Day: 11    Endorsed: no complaints.    Review of Systems: Denies any chest pain, palpitation, SOB, abdominal pain or dysuria.    Pain scale: 0                               .  Diet: Regular    Allergies: No Known Allergies      ANTIMICROBIALS  abacavir 600 mG/dolutegravir 50 mG/lamiVUDine 300 mG 1 Tablet(s) Oral daily  acyclovir   Tablet 400 milliGRAM(s) Oral three times a day  trimethoprim  160 mG/sulfamethoxazole 800 mG 1 Tablet(s) Oral <User Schedule>    STANDING MEDICATIONS  allopurinol 300 milliGRAM(s) Oral daily  filgrastim-sndz Injectable 480 MICROGram(s) SubCutaneous daily  simethicone 80 milliGRAM(s) Chew every 6 hours      PRN MEDICATIONS  acetaminophen   Tablet 650 milliGRAM(s) Oral every 6 hours PRN  acetaminophen   Tablet. 650 milliGRAM(s) Oral every 6 hours PRN  aluminum hydroxide/magnesium hydroxide/simethicone Suspension 30 milliLiter(s) Oral every 4 hours PRN  hydrocortisone sodium succinate Injectable 100 milliGRAM(s) IV Push once PRN  metoclopramide Injectable 10 milliGRAM(s) IV Push every 6 hours PRN      Vital Signs Last 24 Hrs  T(C): 36.2 (05 Mar 2018 05:44), Max: 36.6 (04 Mar 2018 09:30)  T(F): 97.1 (05 Mar 2018 05:44), Max: 97.9 (04 Mar 2018 09:30)  HR: 92 (05 Mar 2018 05:44) (90 - 102)  BP: 100/69 (05 Mar 2018 05:44) (100/69 - 128/84)  RR: 18 (05 Mar 2018 05:44) (18 - 18)  SpO2: 97% (05 Mar 2018 05:44) (97% - 99%)    PHYSICAL EXAM  General: adult in NAD  HEENT: clear oropharynx, no mucositis  Neck: supple  CV: normal S1/S2 ,RRR  Lungs: CTA B/L  Abdomen: soft non-tender non-distended, +BS  Ext: no  edema in BLE  Skin: no rash  Neuro: alert and oriented X 4  Central Line: RCW Mediport CDI    LABS:                          13.4   3.1   )-----------( 68       ( 05 Mar 2018 06:54 )             40.5         Mean Cell Volume : 88.8 fl  Mean Cell Hemoglobin : 29.4 pg  Mean Cell Hemoglobin Concentration : 33.1 gm/dL  Auto Neutrophil # : 1.2 K/uL  Auto Lymphocyte # : 1.6 K/uL  Auto Monocyte # : 0.1 K/uL  Auto Eosinophil # : 0.1 K/uL  Auto Basophil # : 0.0 K/uL  Auto Neutrophil % : 40.5 %  Auto Lymphocyte % : 51.0 %  Auto Monocyte % : 3.9 %  Auto Eosinophil % : 3.1 %  Auto Basophil % : 1.5 %      03-05    136  |  98  |  14  ----------------------------<  106<H>  4.3   |  28  |  1.16    Ca    8.8      05 Mar 2018 06:54  Phos  3.6     03-05  Mg     1.8     03-05    TPro  7.3  /  Alb  3.5  /  TBili  0.3  /  DBili  x   /  AST  82<H>  /  ALT  269<H>  /  AlkPhos  83  03-05        Uric Acid 3.8

## 2018-03-05 NOTE — PROGRESS NOTE ADULT - ATTENDING COMMENTS
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 well tolerated  -IT MTx LP on 2/23 -CSF (+); Pt aware CSF is (+); otherwise w/o c/o. Continue LP with IT- MTX twice weekly  Transaminitis- likely drug induced; monitor LFT's  work with pt, home care, drug plan (if any) to ensure delivery of HAART to pt ASAP  Home as soon as HAART available  home on Bactrim/Acyclovir prophylaxis.  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 well tolerated  -IT MTx LP on 2/23 -CSF (+); Pt aware CSF is (+);  2nd LP (-) will repeat today with IT rx  Transaminitis- likely drug induced; monitor LFT's  work with pt, home care, drug plan (if any) to ensure delivery of HAART to pt ASAP  Home as soon as HAART available  home on Bactrim/Acyclovir prophylaxis.  on Zarxio

## 2018-03-05 NOTE — PROGRESS NOTE ADULT - PROBLEM SELECTOR PLAN 1
Admitted for Cycle 1 of DA- R-EPOCH  LP with IT MTX completed 2/23, Flow cytometry positive for malignant cells, will need biweekly LPs until cleared, follow up LP results from 2/28, next LP on Monday 3/5  Continue Zarxio 480 mcg SQ daily as discharge is on hold  Monitor CBC/Lytes and transfuse/replete PRN  Strict Is and Os/Daily weights/Mouth Care  Continue Allopurinol  Antiemetics Admitted for Cycle 1 of DA- R-EPOCH  LP with IT MTX completed 2/23, Flow cytometry positive for malignant cells, will need biweekly LPs until cleared  LP from 2/28 absent B-cells  next LP on Monday 3/5  Continue Zarxio 480 mcg SQ daily as discharge is on hold  Monitor CBC/Lytes and transfuse/replete PRN  Strict Is and Os/Daily weights/Mouth Care  Continue Allopurinol  Antiemetics

## 2018-03-05 NOTE — PROGRESS NOTE ADULT - ASSESSMENT
42M with HIV (CD4 762, viral load 65 copies) history of poor medication adherence and AIDs, recently diagnosed with B-cell lymphoma, was admitted 2/22/18 for his first cycle of chemotherapy.  Negative Toxoplasma antibody, Syphilis screen, Quantiferon gold (4/2016), Hep C (4/2016). Past Hep B infection- core and surface antibody positive.     Continue Triumeq   f/u strongyloides ab  should also check HTLV-1 ab.    If patient is discharged he can follow up in ID clinic by calling [phone]    Guille Berry MD  [phone]  After 5pm/weekends [phone]

## 2018-03-05 NOTE — PROGRESS NOTE ADULT - PROBLEM SELECTOR PLAN 3
Patient non-compliant with  medication prior to admission, possibly lost to follow-up  Continue Triumeq 1 tab daily  ID following  Patient to be discharged once medication is delivered - awaiting final approval and override from specialty pharmacy

## 2018-03-05 NOTE — PROGRESS NOTE ADULT - SUBJECTIVE AND OBJECTIVE BOX
INFECTIOUS DISEASES FOLLOW UP-- Esther Berry  [phone]    This is a follow up note for this  42yMale with  Other specified type of non-Hodgkin lymphoma of lymph node of multiple sites  Other specified type of non-Hodgkin lymphoma of lymph node of multiple sites      ROS:  CONSTITUTIONAL:  No fever, good appetite  CARDIOVASCULAR:  No chest pain or palpitations  RESPIRATORY:  No dyspnea  GASTROINTESTINAL:  No nausea, vomiting, diarrhea, or abdominal pain  GENITOURINARY:  No dysuria  NEUROLOGIC:  No headache,     Allergies    No Known Allergies    Intolerances        ANTIBIOTICS/RELEVANT:  antimicrobials  abacavir 600 mG/dolutegravir 50 mG/lamiVUDine 300 mG 1 Tablet(s) Oral daily  acyclovir   Tablet 400 milliGRAM(s) Oral three times a day  trimethoprim  160 mG/sulfamethoxazole 800 mG 1 Tablet(s) Oral <User Schedule>    immunologic:  filgrastim-sndz Injectable 480 MICROGram(s) SubCutaneous daily    OTHER:  acetaminophen   Tablet 650 milliGRAM(s) Oral every 6 hours PRN  acetaminophen   Tablet. 650 milliGRAM(s) Oral every 6 hours PRN  allopurinol 300 milliGRAM(s) Oral daily  aluminum hydroxide/magnesium hydroxide/simethicone Suspension 30 milliLiter(s) Oral every 4 hours PRN  hydrocortisone sodium succinate Injectable 100 milliGRAM(s) IV Push once PRN  methotrexate PF IntraThecal 12 milliGRAM(s) IntraThecal once  metoclopramide Injectable 10 milliGRAM(s) IV Push every 6 hours PRN  simethicone 80 milliGRAM(s) Chew every 6 hours      Objective:  Vital Signs Last 24 Hrs  T(C): 36.2 (05 Mar 2018 09:57), Max: 36.6 (04 Mar 2018 21:48)  T(F): 97.2 (05 Mar 2018 09:57), Max: 97.8 (04 Mar 2018 21:48)  HR: 105 (05 Mar 2018 09:57) (92 - 105)  BP: 117/69 (05 Mar 2018 09:57) (100/69 - 123/78)  BP(mean): --  RR: 18 (05 Mar 2018 09:57) (18 - 18)  SpO2: 100% (05 Mar 2018 09:57) (97% - 100%)    PHYSICAL EXAM:  Constitutional:no acute distress  Eyes:SARAH, EOMI  Ear/Nose/Throat: no oral lesions, 	  Respiratory: clear BL  Cardiovascular: S1S2  Gastrointestinal:soft, (+) BS, no tenderness  Extremities:no e/e/c  No Lymphadenopathy  IV sites not inflammed.    LABS:                        13.4   3.1   )-----------( 68       ( 05 Mar 2018 06:54 )             40.5     -    136  |  98  |  14  ----------------------------<  106<H>  4.3   |  28  |  1.16    Ca    8.8      05 Mar 2018 06:54  Phos  3.6     -  Mg     1.8         TPro  7.3  /  Alb  3.5  /  TBili  0.3  /  DBili  x   /  AST  82<H>  /  ALT  269<H>  /  AlkPhos  83  -    PT/INR - ( 05 Mar 2018 08:27 )   PT: 10.9 sec;   INR: 1.01 ratio         PTT - ( 05 Mar 2018 08:27 )  PTT:28.8 sec      MICROBIOLOGY:      CSF Segmented Neutrophils: 0 % ( @ 15:18)  CSF Lymphocytes: 94 % ( @ 15:18)        RECENT CULTURES:  Hepatitis B PCR Quantitative (18 @ 22:38)    Hepatitis B DNA PCR Log: NotDetec: HBV DNA Quantification by Real Time PCR using Abbott m2000:  Assay dynamic range:  15 IU/mL to 1,000,000,000 HBV DNA IU/mL  1.18 (log10) IU/mL to 9.00(log10) IU/mL  Assay reference range: Not Detected  The results of this test should be interpreted with consideration of all  clinical and laboratory findings. A result of  No HBV DNA Detected does  not preclude the possibility of infection with hepatitis B virus.  In  particular, caution should be used when interpreting low level positive  results when the test is used for diagnostic purposes. LogIU/mL    Hepatitis B PCR Quantitative: NotDetec IU/mL    HIV-1 RNA Quantitative, Viral Load (18 @ 22:38)    HIV-1 RNA Quantitative, Vir Load Interp: See Comment METHOD: Transcription Mediated Amplification (TMA) – [employer] [place].  Results from HIV-1 RNA tests that use other  methods might differ.  Abbreviations:  DET. = Detected,  not det. = Not Detected  n/a = not available  .    HIV-1 RNA Quantitative, Viral Load Lo.82    HIV-1 RNA Quantitative, Viral Load:   65    HIV-1 Viral Load Result: DET.        RADIOLOGY & ADDITIONAL STUDIES:    < from: Xray Spinal Tap, Therapeutic (18 @ 15:12) >  IMPRESSION:    Successful intrathecal administration of chemotherapy.    < end of copied text >

## 2018-03-05 NOTE — PROGRESS NOTE ADULT - PROBLEM SELECTOR PLAN 4
Continue on Lovenox 40mg SQ daily till platelets 50K.  D/C if PLT's < 50 K        Contact Information [phone]

## 2018-03-06 VITALS
HEART RATE: 103 BPM | SYSTOLIC BLOOD PRESSURE: 116 MMHG | RESPIRATION RATE: 18 BRPM | OXYGEN SATURATION: 100 % | DIASTOLIC BLOOD PRESSURE: 70 MMHG | TEMPERATURE: 98 F

## 2018-03-06 LAB
ALBUMIN SERPL ELPH-MCNC: 3.8 G/DL — SIGNIFICANT CHANGE UP (ref 3.3–5)
ALP SERPL-CCNC: 80 U/L — SIGNIFICANT CHANGE UP (ref 40–120)
ALT FLD-CCNC: 266 U/L RC — HIGH (ref 10–45)
ANION GAP SERPL CALC-SCNC: 11 MMOL/L — SIGNIFICANT CHANGE UP (ref 5–17)
AST SERPL-CCNC: 68 U/L — HIGH (ref 10–40)
BASOPHILS # BLD AUTO: 0 K/UL — SIGNIFICANT CHANGE UP (ref 0–0.2)
BASOPHILS NFR BLD AUTO: 0.7 % — SIGNIFICANT CHANGE UP (ref 0–2)
BILIRUB SERPL-MCNC: 0.3 MG/DL — SIGNIFICANT CHANGE UP (ref 0.2–1.2)
BUN SERPL-MCNC: 13 MG/DL — SIGNIFICANT CHANGE UP (ref 7–23)
CALCIUM SERPL-MCNC: 9.3 MG/DL — SIGNIFICANT CHANGE UP (ref 8.4–10.5)
CHLORIDE SERPL-SCNC: 97 MMOL/L — SIGNIFICANT CHANGE UP (ref 96–108)
CO2 SERPL-SCNC: 27 MMOL/L — SIGNIFICANT CHANGE UP (ref 22–31)
CREAT SERPL-MCNC: 1.19 MG/DL — SIGNIFICANT CHANGE UP (ref 0.5–1.3)
EOSINOPHIL # BLD AUTO: 0.1 K/UL — SIGNIFICANT CHANGE UP (ref 0–0.5)
EOSINOPHIL NFR BLD AUTO: 2.2 % — SIGNIFICANT CHANGE UP (ref 0–6)
GLUCOSE SERPL-MCNC: 102 MG/DL — HIGH (ref 70–99)
HCT VFR BLD CALC: 41.9 % — SIGNIFICANT CHANGE UP (ref 39–50)
HGB BLD-MCNC: 13.5 G/DL — SIGNIFICANT CHANGE UP (ref 13–17)
LDH SERPL L TO P-CCNC: 142 U/L — SIGNIFICANT CHANGE UP (ref 50–242)
LYMPHOCYTES # BLD AUTO: 2.3 K/UL — SIGNIFICANT CHANGE UP (ref 1–3.3)
LYMPHOCYTES # BLD AUTO: 58.3 % — HIGH (ref 13–44)
MAGNESIUM SERPL-MCNC: 2 MG/DL — SIGNIFICANT CHANGE UP (ref 1.6–2.6)
MCHC RBC-ENTMCNC: 28.4 PG — SIGNIFICANT CHANGE UP (ref 27–34)
MCHC RBC-ENTMCNC: 32.1 GM/DL — SIGNIFICANT CHANGE UP (ref 32–36)
MCV RBC AUTO: 88.4 FL — SIGNIFICANT CHANGE UP (ref 80–100)
MONOCYTES # BLD AUTO: 0.1 K/UL — SIGNIFICANT CHANGE UP (ref 0–0.9)
MONOCYTES NFR BLD AUTO: 3.6 % — SIGNIFICANT CHANGE UP (ref 2–14)
NEUTROPHILS # BLD AUTO: 1.4 K/UL — LOW (ref 1.8–7.4)
NEUTROPHILS NFR BLD AUTO: 35.3 % — LOW (ref 43–77)
PHOSPHATE SERPL-MCNC: 3.8 MG/DL — SIGNIFICANT CHANGE UP (ref 2.5–4.5)
PLATELET # BLD AUTO: 62 K/UL — LOW (ref 150–400)
POTASSIUM SERPL-MCNC: 4.4 MMOL/L — SIGNIFICANT CHANGE UP (ref 3.5–5.3)
POTASSIUM SERPL-SCNC: 4.4 MMOL/L — SIGNIFICANT CHANGE UP (ref 3.5–5.3)
PROT SERPL-MCNC: 7.6 G/DL — SIGNIFICANT CHANGE UP (ref 6–8.3)
RBC # BLD: 4.74 M/UL — SIGNIFICANT CHANGE UP (ref 4.2–5.8)
RBC # FLD: 12.9 % — SIGNIFICANT CHANGE UP (ref 10.3–14.5)
SODIUM SERPL-SCNC: 135 MMOL/L — SIGNIFICANT CHANGE UP (ref 135–145)
URATE SERPL-MCNC: 4.4 MG/DL — SIGNIFICANT CHANGE UP (ref 3.4–8.8)
WBC # BLD: 4 K/UL — SIGNIFICANT CHANGE UP (ref 3.8–10.5)
WBC # FLD AUTO: 4 K/UL — SIGNIFICANT CHANGE UP (ref 3.8–10.5)

## 2018-03-06 PROCEDURE — 89051 BODY FLUID CELL COUNT: CPT

## 2018-03-06 PROCEDURE — 62272 THER SPI PNXR DRG CSF: CPT

## 2018-03-06 PROCEDURE — 86708 HEPATITIS A ANTIBODY: CPT

## 2018-03-06 PROCEDURE — 85610 PROTHROMBIN TIME: CPT

## 2018-03-06 PROCEDURE — 93005 ELECTROCARDIOGRAM TRACING: CPT

## 2018-03-06 PROCEDURE — 36430 TRANSFUSION BLD/BLD COMPNT: CPT

## 2018-03-06 PROCEDURE — 86682 HELMINTH ANTIBODY: CPT

## 2018-03-06 PROCEDURE — 77003 FLUOROGUIDE FOR SPINE INJECT: CPT

## 2018-03-06 PROCEDURE — 85027 COMPLETE CBC AUTOMATED: CPT

## 2018-03-06 PROCEDURE — 85384 FIBRINOGEN ACTIVITY: CPT

## 2018-03-06 PROCEDURE — 83615 LACTATE (LD) (LDH) ENZYME: CPT

## 2018-03-06 PROCEDURE — 86780 TREPONEMA PALLIDUM: CPT

## 2018-03-06 PROCEDURE — 86900 BLOOD TYPING SEROLOGIC ABO: CPT

## 2018-03-06 PROCEDURE — 85379 FIBRIN DEGRADATION QUANT: CPT

## 2018-03-06 PROCEDURE — 84550 ASSAY OF BLOOD/URIC ACID: CPT

## 2018-03-06 PROCEDURE — 85730 THROMBOPLASTIN TIME PARTIAL: CPT

## 2018-03-06 PROCEDURE — 71045 X-RAY EXAM CHEST 1 VIEW: CPT

## 2018-03-06 PROCEDURE — 88184 FLOWCYTOMETRY/ TC 1 MARKER: CPT

## 2018-03-06 PROCEDURE — 87040 BLOOD CULTURE FOR BACTERIA: CPT

## 2018-03-06 PROCEDURE — P9037: CPT

## 2018-03-06 PROCEDURE — 86901 BLOOD TYPING SEROLOGIC RH(D): CPT

## 2018-03-06 PROCEDURE — 87536 HIV-1 QUANT&REVRSE TRNSCRPJ: CPT

## 2018-03-06 PROCEDURE — 87517 HEPATITIS B DNA QUANT: CPT

## 2018-03-06 PROCEDURE — 86777 TOXOPLASMA ANTIBODY: CPT

## 2018-03-06 PROCEDURE — 86850 RBC ANTIBODY SCREEN: CPT

## 2018-03-06 PROCEDURE — 82945 GLUCOSE OTHER FLUID: CPT

## 2018-03-06 PROCEDURE — 86360 T CELL ABSOLUTE COUNT/RATIO: CPT

## 2018-03-06 PROCEDURE — 81001 URINALYSIS AUTO W/SCOPE: CPT

## 2018-03-06 PROCEDURE — 87086 URINE CULTURE/COLONY COUNT: CPT

## 2018-03-06 PROCEDURE — 88185 FLOWCYTOMETRY/TC ADD-ON: CPT

## 2018-03-06 PROCEDURE — 80053 COMPREHEN METABOLIC PANEL: CPT

## 2018-03-06 PROCEDURE — 84100 ASSAY OF PHOSPHORUS: CPT

## 2018-03-06 PROCEDURE — 83735 ASSAY OF MAGNESIUM: CPT

## 2018-03-06 PROCEDURE — 84157 ASSAY OF PROTEIN OTHER: CPT

## 2018-03-06 PROCEDURE — 99238 HOSP IP/OBS DSCHRG MGMT 30/<: CPT

## 2018-03-06 RX ORDER — ATOVAQUONE 750 MG/5ML
5 SUSPENSION ORAL
Qty: 45000 | Refills: 0 | OUTPATIENT
Start: 2018-03-06 | End: 2018-04-04

## 2018-03-06 RX ADMIN — Medication 300 MILLIGRAM(S): at 11:09

## 2018-03-06 RX ADMIN — Medication 400 MILLIGRAM(S): at 13:32

## 2018-03-06 RX ADMIN — SIMETHICONE 80 MILLIGRAM(S): 80 TABLET, CHEWABLE ORAL at 06:01

## 2018-03-06 RX ADMIN — Medication 480 MICROGRAM(S): at 13:31

## 2018-03-06 RX ADMIN — SIMETHICONE 80 MILLIGRAM(S): 80 TABLET, CHEWABLE ORAL at 11:09

## 2018-03-06 RX ADMIN — Medication 400 MILLIGRAM(S): at 06:00

## 2018-03-06 NOTE — PROGRESS NOTE ADULT - PROBLEM SELECTOR PROBLEM 3
HIV (human immunodeficiency virus infection)
Infectious disease
HIV (human immunodeficiency virus infection)

## 2018-03-06 NOTE — PROGRESS NOTE ADULT - PROBLEM SELECTOR PLAN 3
Patient non-compliant with  medication prior to admission, possibly lost to follow-up  Continue Triumeq 1 tab daily  ID following  Triumeq to be delivered to patient's friend today  ID offered follow up in clinic- provided number for call back

## 2018-03-06 NOTE — PROGRESS NOTE ADULT - PROBLEM SELECTOR PROBLEM 2
Infectious disease
HIV (human immunodeficiency virus infection)
Infectious disease

## 2018-03-06 NOTE — PROGRESS NOTE ADULT - PROBLEM SELECTOR PROBLEM 1
High grade B-cell lymphoma

## 2018-03-06 NOTE — PROGRESS NOTE ADULT - PROBLEM SELECTOR PLAN 4
Continue on Lovenox 40mg SQ daily till platelets 50K.  D/C if PLT's < 50 K        Contact Information [phone]

## 2018-03-06 NOTE — PROGRESS NOTE ADULT - PROVIDER SPECIALTY LIST ADULT
Heme/Onc
Infectious Disease
Radiology
Heme/Onc

## 2018-03-06 NOTE — PROGRESS NOTE ADULT - PROBLEM SELECTOR PLAN 5
Monitor LFTS daily.  Avoid hepatotoxic agents.
AST 5.9x ULN stablizing from yesterday, AST 1.7x ULN  Monitor LFTS daily.  Avoid hepatotoxic agents.
Monitor LFTS daily.  Avoid hepatotoxic agents.
Monitor LFTS daily.  Avoid hepatotoxic agents.

## 2018-03-06 NOTE — PROGRESS NOTE ADULT - ATTENDING COMMENTS
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 well tolerated  -IT MTx LP on 2/23 -CSF (+); Pt aware CSF is (+);  2nd LP (-) will repeat today with IT rx  Transaminitis- likely drug induced; monitor LFT's  work with pt, home care, drug plan (if any) to ensure delivery of HAART to pt ASAP  Home as soon as HAART available  home on Bactrim/Acyclovir prophylaxis.  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 well tolerated  -IT MTx LP on 2/23 -CSF (+); Pt aware CSF is (+);  2nd LP (-); repeated 3/5/18 with IT rx  Transaminitis- likely drug induced; monitor LFT's.  Will switch from TMP/SMX to Mepron.  HAART to be delivered today.  d/c home today.  OPD f/u at [facility] on 3/9/18.  Last dose G-CSF today.

## 2018-03-06 NOTE — PROGRESS NOTE ADULT - SUBJECTIVE AND OBJECTIVE BOX
Diagnosis: HIV associated DLBCL, +CSF malignant cells    Protocol/Chemo Regimen: DA- R-EPOCH, Cycle 1    Day: 12    Endorsed: no complaints.    Review of Systems: Denies any chest pain, palpitation, SOB, abdominal pain or dysuria.    Pain scale: 0                               .  Diet: Regular    Allergies: No Known Allergies      ANTIMICROBIALS  abacavir 600 mG/dolutegravir 50 mG/lamiVUDine 300 mG 1 Tablet(s) Oral daily  acyclovir   Tablet 400 milliGRAM(s) Oral three times a day  trimethoprim  160 mG/sulfamethoxazole 800 mG 1 Tablet(s) Oral <User Schedule>      HEME/ONC MEDICATIONS  methotrexate PF IntraThecal 12 milliGRAM(s) IntraThecal once      STANDING MEDICATIONS  allopurinol 300 milliGRAM(s) Oral daily  filgrastim-sndz Injectable 480 MICROGram(s) SubCutaneous daily  simethicone 80 milliGRAM(s) Chew every 6 hours      PRN MEDICATIONS  acetaminophen   Tablet 650 milliGRAM(s) Oral every 6 hours PRN  acetaminophen   Tablet. 650 milliGRAM(s) Oral every 6 hours PRN  aluminum hydroxide/magnesium hydroxide/simethicone Suspension 30 milliLiter(s) Oral every 4 hours PRN  hydrocortisone sodium succinate Injectable 100 milliGRAM(s) IV Push once PRN  metoclopramide Injectable 10 milliGRAM(s) IV Push every 6 hours PRN        Vital Signs Last 24 Hrs  T(C): 36.3 (06 Mar 2018 05:58), Max: 36.9 (05 Mar 2018 17:28)  T(F): 97.3 (06 Mar 2018 05:58), Max: 98.4 (05 Mar 2018 17:28)  HR: 96 (06 Mar 2018 05:58) (96 - 112)  BP: 110/70 (06 Mar 2018 05:58) (100/67 - 117/79)  BP(mean): --  RR: 18 (06 Mar 2018 05:58) (18 - 18)  SpO2: 98% (06 Mar 2018 05:58) (98% - 100%)    PHYSICAL EXAM  General: adult in NAD  HEENT: clear oropharynx, anicteric sclera, pink conjunctiva  Neck: supple  CV: normal S1/S2 RRR  Lungs: positive air movement b/l ant lungs,clear to auscultation, no wheezes, no rales  Abdomen: soft non-tender non-distended, no hepatosplenomegaly  Ext: no clubbing cyanosis or edema  Skin: no rashes and no petechiae  Neuro: alert and oriented X 4, no focal deficits  Central Line: normal    LABS:                        13.5   4.0   )-----------( 62       ( 06 Mar 2018 06:51 )             41.9       Mean Cell Volume : 88.4 fl  Mean Cell Hemoglobin : 28.4 pg  Mean Cell Hemoglobin Concentration : 32.1 gm/dL  Auto Neutrophil # : 1.4 K/uL  Auto Lymphocyte # : 2.3 K/uL  Auto Monocyte # : 0.1 K/uL  Auto Eosinophil # : 0.1 K/uL  Auto Basophil # : 0.0 K/uL  Auto Neutrophil % : 35.3 %  Auto Lymphocyte % : 58.3 %  Auto Monocyte % : 3.6 %  Auto Eosinophil % : 2.2 %  Auto Basophil % : 0.7 %      03-06    135  |  97  |  13  ----------------------------<  102<H>  4.4   |  27  |  1.19    Ca    9.3      06 Mar 2018 06:51  Phos  3.8     03-06  Mg     2.0     03-06    TPro  7.6  /  Alb  3.8  /  TBili  0.3  /  DBili  x   /  AST  68<H>  /  ALT  266<H>  /  AlkPhos  80  03-06      PT/INR - ( 05 Mar 2018 08:27 )   PT: 10.9 sec;   INR: 1.01 ratio    PTT - ( 05 Mar 2018 08:27 )  PTT:28.8 sec      Uric Acid 4.4    Cerebrospinal Fluid Cell Count-1 (03.05.18 @ 15:18)    Total Nucleated Cell Count, CSF: 3 /uL    RBC Count - Spinal Fluid: 2 /uL    CSF Color: No Color    Tube Type: Tube 2    CSF Appearance: Clear    CSF Lymphocytes: 94 %    CSF Monocytes/Macrophages: 6 %    CSF Segmented Neutrophils: 0: Differential based on 100 cells counted after cytocentrifugation. %    Appearance Spun: Colorless      RADIOLOGY & ADDITIONAL STUDIES:  from: Xray Spinal Tap, Therapeutic (03.05.18 @ 15:12)     IMPRESSION:    Successful intrathecal administration of chemotherapy.    4 cc of clear CSF was collected and handed over to the laboratory.

## 2018-03-06 NOTE — PROGRESS NOTE ADULT - PROBLEM SELECTOR PLAN 1
Admitted for Cycle 1 of DA- R-EPOCH  LP with IT MTX completed 2/23, Flow cytometry positive for malignant cells, will need biweekly LPs until cleared  LP from 2/28 absent B-cells  s/p LP#2 3/5- flow pending, cell counts normal  Continue Zarxio 480 mcg SQ daily as discharge is on hold  Monitor CBC/Lytes and transfuse/replete PRN  Strict Is and Os/Daily weights/Mouth Care  Continue Allopurinol  Antiemetics

## 2018-03-08 ENCOUNTER — OUTPATIENT (OUTPATIENT)
Dept: OUTPATIENT SERVICES | Facility: HOSPITAL | Age: 43
LOS: 1 days | End: 2018-03-08
Payer: COMMERCIAL

## 2018-03-08 ENCOUNTER — APPOINTMENT (OUTPATIENT)
Dept: HEMATOLOGY ONCOLOGY | Facility: CLINIC | Age: 43
End: 2018-03-08

## 2018-03-08 DIAGNOSIS — C85.88 OTHER SPECIFIED TYPES OF NON-HODGKIN LYMPHOMA, LYMPH NODES OF MULTIPLE SITES: ICD-10-CM

## 2018-03-08 LAB
MISCELLANEOUS TEST NAME: SIGNIFICANT CHANGE UP
TM INTERPRETATION: SIGNIFICANT CHANGE UP

## 2018-03-09 ENCOUNTER — APPOINTMENT (OUTPATIENT)
Dept: HEMATOLOGY ONCOLOGY | Facility: CLINIC | Age: 43
End: 2018-03-09
Payer: COMMERCIAL

## 2018-03-09 ENCOUNTER — LABORATORY RESULT (OUTPATIENT)
Age: 43
End: 2018-03-09

## 2018-03-09 ENCOUNTER — RESULT REVIEW (OUTPATIENT)
Age: 43
End: 2018-03-09

## 2018-03-09 ENCOUNTER — APPOINTMENT (OUTPATIENT)
Dept: INFUSION THERAPY | Facility: HOSPITAL | Age: 43
End: 2018-03-09

## 2018-03-09 VITALS
WEIGHT: 182.98 LBS | TEMPERATURE: 98.6 F | OXYGEN SATURATION: 99 % | DIASTOLIC BLOOD PRESSURE: 89 MMHG | RESPIRATION RATE: 17 BRPM | SYSTOLIC BLOOD PRESSURE: 147 MMHG | BODY MASS INDEX: 27.04 KG/M2 | HEART RATE: 112 BPM

## 2018-03-09 LAB
BASOPHILS # BLD AUTO: 0 K/UL — SIGNIFICANT CHANGE UP (ref 0–0.2)
BASOPHILS NFR BLD AUTO: 1 % — SIGNIFICANT CHANGE UP (ref 0–2)
EOSINOPHIL # BLD AUTO: 0 K/UL — SIGNIFICANT CHANGE UP (ref 0–0.5)
EOSINOPHIL NFR BLD AUTO: 0.9 % — SIGNIFICANT CHANGE UP (ref 0–6)
HCT VFR BLD CALC: 37.2 % — LOW (ref 39–50)
HGB BLD-MCNC: 12.7 G/DL — LOW (ref 13–17)
LYMPHOCYTES # BLD AUTO: 3.2 K/UL — SIGNIFICANT CHANGE UP (ref 1–3.3)
LYMPHOCYTES # BLD AUTO: 64.5 % — HIGH (ref 13–44)
MCHC RBC-ENTMCNC: 29.9 PG — SIGNIFICANT CHANGE UP (ref 27–34)
MCHC RBC-ENTMCNC: 34.2 G/DL — SIGNIFICANT CHANGE UP (ref 32–36)
MCV RBC AUTO: 87.4 FL — SIGNIFICANT CHANGE UP (ref 80–100)
MONOCYTES # BLD AUTO: 0.3 K/UL — SIGNIFICANT CHANGE UP (ref 0–0.9)
MONOCYTES NFR BLD AUTO: 5.8 % — SIGNIFICANT CHANGE UP (ref 2–14)
NEUTROPHILS # BLD AUTO: 1.4 K/UL — LOW (ref 1.8–7.4)
NEUTROPHILS NFR BLD AUTO: 27.8 % — LOW (ref 43–77)
PLATELET # BLD AUTO: 115 K/UL — LOW (ref 150–400)
RBC # BLD: 4.26 M/UL — SIGNIFICANT CHANGE UP (ref 4.2–5.8)
RBC # FLD: 14 % — SIGNIFICANT CHANGE UP (ref 10.3–14.5)
WBC # BLD: 4.9 K/UL — SIGNIFICANT CHANGE UP (ref 3.8–10.5)
WBC # FLD AUTO: 4.9 K/UL — SIGNIFICANT CHANGE UP (ref 3.8–10.5)

## 2018-03-09 PROCEDURE — 99214 OFFICE O/P EST MOD 30 MIN: CPT

## 2018-03-09 RX ORDER — ALLOPURINOL 300 MG/1
300 TABLET ORAL
Refills: 0 | Status: DISCONTINUED | COMMUNITY
Start: 2018-01-30 | End: 2018-03-09

## 2018-03-13 LAB
ALBUMIN SERPL ELPH-MCNC: 3.9 G/DL
ALP BLD-CCNC: 90 U/L
ALT SERPL-CCNC: 186 U/L
ANION GAP SERPL CALC-SCNC: 13 MMOL/L
AST SERPL-CCNC: 39 U/L
BILIRUB SERPL-MCNC: <0.2 MG/DL
BUN SERPL-MCNC: 13 MG/DL
CALCIUM SERPL-MCNC: 9.3 MG/DL
CHLORIDE SERPL-SCNC: 101 MMOL/L
CO2 SERPL-SCNC: 27 MMOL/L
CREAT SERPL-MCNC: 1.15 MG/DL
GLUCOSE SERPL-MCNC: 125 MG/DL
LDH SERPL-CCNC: 188 U/L
POTASSIUM SERPL-SCNC: 4.4 MMOL/L
PROT SERPL-MCNC: 7.3 G/DL
SODIUM SERPL-SCNC: 141 MMOL/L

## 2018-03-14 ENCOUNTER — LABORATORY RESULT (OUTPATIENT)
Age: 43
End: 2018-03-14

## 2018-03-14 ENCOUNTER — RESULT REVIEW (OUTPATIENT)
Age: 43
End: 2018-03-14

## 2018-03-14 ENCOUNTER — APPOINTMENT (OUTPATIENT)
Dept: HEMATOLOGY ONCOLOGY | Facility: CLINIC | Age: 43
End: 2018-03-14

## 2018-03-14 LAB
BASOPHILS # BLD AUTO: 0 K/UL — SIGNIFICANT CHANGE UP (ref 0–0.2)
BASOPHILS NFR BLD AUTO: 0.8 % — SIGNIFICANT CHANGE UP (ref 0–2)
EOSINOPHIL # BLD AUTO: 0 K/UL — SIGNIFICANT CHANGE UP (ref 0–0.5)
EOSINOPHIL NFR BLD AUTO: 0.6 % — SIGNIFICANT CHANGE UP (ref 0–6)
HCT VFR BLD CALC: 40.7 % — SIGNIFICANT CHANGE UP (ref 39–50)
HGB BLD-MCNC: 13.5 G/DL — SIGNIFICANT CHANGE UP (ref 13–17)
LYMPHOCYTES # BLD AUTO: 2 K/UL — SIGNIFICANT CHANGE UP (ref 1–3.3)
LYMPHOCYTES # BLD AUTO: 58 % — HIGH (ref 13–44)
MCHC RBC-ENTMCNC: 29.3 PG — SIGNIFICANT CHANGE UP (ref 27–34)
MCHC RBC-ENTMCNC: 33.2 G/DL — SIGNIFICANT CHANGE UP (ref 32–36)
MCV RBC AUTO: 88.2 FL — SIGNIFICANT CHANGE UP (ref 80–100)
MONOCYTES # BLD AUTO: 0.3 K/UL — SIGNIFICANT CHANGE UP (ref 0–0.9)
MONOCYTES NFR BLD AUTO: 8.9 % — SIGNIFICANT CHANGE UP (ref 2–14)
NEUTROPHILS # BLD AUTO: 1.1 K/UL — LOW (ref 1.8–7.4)
NEUTROPHILS NFR BLD AUTO: 31.7 % — LOW (ref 43–77)
PLATELET # BLD AUTO: 248 K/UL — SIGNIFICANT CHANGE UP (ref 150–400)
RBC # BLD: 4.61 M/UL — SIGNIFICANT CHANGE UP (ref 4.2–5.8)
RBC # FLD: 15.1 % — HIGH (ref 10.3–14.5)
WBC # BLD: 3.5 K/UL — LOW (ref 3.8–10.5)
WBC # FLD AUTO: 3.5 K/UL — LOW (ref 3.8–10.5)

## 2018-03-15 ENCOUNTER — INPATIENT (INPATIENT)
Facility: HOSPITAL | Age: 43
LOS: 4 days | Discharge: HOME | DRG: 846 | End: 2018-03-20
Attending: INTERNAL MEDICINE | Admitting: INTERNAL MEDICINE
Payer: COMMERCIAL

## 2018-03-15 ENCOUNTER — TRANSCRIPTION ENCOUNTER (OUTPATIENT)
Age: 43
End: 2018-03-15

## 2018-03-15 VITALS
DIASTOLIC BLOOD PRESSURE: 72 MMHG | HEART RATE: 117 BPM | TEMPERATURE: 99 F | WEIGHT: 182.76 LBS | RESPIRATION RATE: 18 BRPM | SYSTOLIC BLOOD PRESSURE: 138 MMHG | HEIGHT: 68.9 IN | OXYGEN SATURATION: 98 %

## 2018-03-15 DIAGNOSIS — C85.10 UNSPECIFIED B-CELL LYMPHOMA, UNSPECIFIED SITE: ICD-10-CM

## 2018-03-15 DIAGNOSIS — Z29.9 ENCOUNTER FOR PROPHYLACTIC MEASURES, UNSPECIFIED: ICD-10-CM

## 2018-03-15 DIAGNOSIS — B20 HUMAN IMMUNODEFICIENCY VIRUS [HIV] DISEASE: ICD-10-CM

## 2018-03-15 LAB
APPEARANCE CSF: CLEAR — SIGNIFICANT CHANGE UP
APPEARANCE SPUN FLD: COLORLESS — SIGNIFICANT CHANGE UP
COLOR CSF: SIGNIFICANT CHANGE UP
GLUCOSE CSF-MCNC: 69 MG/DL — SIGNIFICANT CHANGE UP (ref 40–70)
LDH CSF L TO P-CCNC: 29 U/L — SIGNIFICANT CHANGE UP
LDH FLD-CCNC: 29 U/L — SIGNIFICANT CHANGE UP
LYMPHOCYTES # CSF: 97 % — HIGH (ref 40–80)
MONOS+MACROS NFR CSF: 3 % — LOW (ref 15–45)
NEUTROPHILS # CSF: 0 % — SIGNIFICANT CHANGE UP (ref 0–6)
NRBC NFR CSF: 4 /UL — SIGNIFICANT CHANGE UP (ref 0–5)
PROT CSF-MCNC: 35 MG/DL — SIGNIFICANT CHANGE UP (ref 15–45)
RBC # CSF: 2 /UL — HIGH (ref 0–0)
TUBE TYPE: SIGNIFICANT CHANGE UP

## 2018-03-15 PROCEDURE — 77003 FLUOROGUIDE FOR SPINE INJECT: CPT | Mod: 26

## 2018-03-15 PROCEDURE — 88108 CYTOPATH CONCENTRATE TECH: CPT | Mod: 26

## 2018-03-15 PROCEDURE — 62272 THER SPI PNXR DRG CSF: CPT

## 2018-03-15 PROCEDURE — 93010 ELECTROCARDIOGRAM REPORT: CPT

## 2018-03-15 RX ORDER — ACETAMINOPHEN 500 MG
650 TABLET ORAL EVERY 6 HOURS
Qty: 0 | Refills: 0 | Status: DISCONTINUED | OUTPATIENT
Start: 2018-03-15 | End: 2018-03-20

## 2018-03-15 RX ORDER — DIPHENHYDRAMINE HCL 50 MG
50 CAPSULE ORAL ONCE
Qty: 0 | Refills: 0 | Status: COMPLETED | OUTPATIENT
Start: 2018-03-15 | End: 2018-03-15

## 2018-03-15 RX ORDER — HYDROCORTISONE 20 MG
100 TABLET ORAL ONCE
Qty: 0 | Refills: 0 | Status: DISCONTINUED | OUTPATIENT
Start: 2018-03-15 | End: 2018-03-20

## 2018-03-15 RX ORDER — ACETAMINOPHEN 500 MG
650 TABLET ORAL ONCE
Qty: 0 | Refills: 0 | Status: COMPLETED | OUTPATIENT
Start: 2018-03-15 | End: 2018-03-15

## 2018-03-15 RX ORDER — ACYCLOVIR SODIUM 500 MG
400 VIAL (EA) INTRAVENOUS THREE TIMES A DAY
Qty: 0 | Refills: 0 | Status: DISCONTINUED | OUTPATIENT
Start: 2018-03-15 | End: 2018-03-20

## 2018-03-15 RX ORDER — SODIUM CHLORIDE 9 MG/ML
1000 INJECTION INTRAMUSCULAR; INTRAVENOUS; SUBCUTANEOUS
Qty: 0 | Refills: 0 | Status: DISCONTINUED | OUTPATIENT
Start: 2018-03-15 | End: 2018-03-20

## 2018-03-15 RX ORDER — RITUXIMAB 10 MG/ML
746 INJECTION, SOLUTION INTRAVENOUS ONCE
Qty: 0 | Refills: 0 | Status: DISCONTINUED | OUTPATIENT
Start: 2018-03-15 | End: 2018-03-20

## 2018-03-15 RX ORDER — METHOTREXATE 2.5 MG/1
12 TABLET ORAL ONCE
Qty: 0 | Refills: 0 | Status: DISCONTINUED | OUTPATIENT
Start: 2018-03-15 | End: 2018-03-20

## 2018-03-15 RX ORDER — ATOVAQUONE 750 MG/5ML
750 SUSPENSION ORAL EVERY 12 HOURS
Qty: 0 | Refills: 0 | Status: DISCONTINUED | OUTPATIENT
Start: 2018-03-15 | End: 2018-03-20

## 2018-03-15 RX ADMIN — Medication 50 MILLIGRAM(S): at 16:00

## 2018-03-15 RX ADMIN — ATOVAQUONE 750 MILLIGRAM(S): 750 SUSPENSION ORAL at 17:02

## 2018-03-15 RX ADMIN — Medication 650 MILLIGRAM(S): at 16:00

## 2018-03-15 RX ADMIN — SODIUM CHLORIDE 50 MILLILITER(S): 9 INJECTION INTRAMUSCULAR; INTRAVENOUS; SUBCUTANEOUS at 12:45

## 2018-03-15 RX ADMIN — Medication 400 MILLIGRAM(S): at 22:31

## 2018-03-15 RX ADMIN — SODIUM CHLORIDE 50 MILLILITER(S): 9 INJECTION INTRAMUSCULAR; INTRAVENOUS; SUBCUTANEOUS at 22:32

## 2018-03-15 NOTE — PROGRESS NOTE ADULT - SUBJECTIVE AND OBJECTIVE BOX
CLINICAL INDICATION: B-cell lymphoma, for intrathecal chemotherapy    Patient presents for fluoroscopically guided lumbar puncture and intrathecal chemotherapy administration.      ]Risks and benefits were discussed with the patient and/or patient health care proxy.  Risks discussed included bleeding, infection, nerve damage, and headache.       Status post lumbar puncture at the L2-L3 level utilizing a 22G spinal needle.      4cc of clear cerebral spinal fluid was obtained.    12 mg of methotrexate was intrathecally injected.      No immediate complications.

## 2018-03-15 NOTE — DISCHARGE NOTE ADULT - HOSPITAL COURSE
Patient is a 41-year-old male who is HIV-positive and has been diagnosed with a high-grade B-cell, CD10+ lymphoma admitted for cycle 2 DA-R-EPOCH. Patient is a 41-year-old male who is HIV-positive and has been diagnosed with a high-grade B-cell, CD10+ lymphoma admitted for cycle 2 DA-R-EPOCH with 20% dose escalation.    Upon admission 3/15, pt had chest Mediport accessed, started on IV Hydration. Patient received Rituxan 375 mg/m2 and IT MTX on Day 1. Patient then received Doxorubicin 10mg/m2 on Days 2-5, Etoposide 50mg/m2 on days 2-5, Vincristine 0.4mg/m2 on days 2-5, Prednisone 60mg/m2 on days 2-6, and Cyclophosphamide x 1 on day 6. Patient was pre-medicated prior to Rituxan with tylenol, benadryl, and hydrocortisone, and received anti-emetics Emend 150mg x 1 on day 2, Zofran 8mg IV q8 on days 2-6. Flow cytometry from CSF on 3/15 was positive for malignant cells, patient underwent LP with intrathecal chemotherapy on 3/19 Flow cytometry results from LP on 3/19----  During the hospital stay Pt’s CBCs chemistries were monitored very closely and supplemented as needed. Vitals signs were monitored every 4 hrs and strict I/O was maintained. Patient is to be discharged to home with antimicrobials and will follow up as an outpatient at the [facility]. Patient is a 41-year-old male who is HIV-positive and has been diagnosed with a high-grade B-cell, CD10+ lymphoma admitted for cycle 2 DA-R-EPOCH with 20% dose escalation.    Upon admission 3/15, pt had chest Mediport accessed, started on IV Hydration. Patient received Rituxan 375 mg/m2 and IT MTX on Day 1. Patient then received Doxorubicin 10mg/m2 on Days 2-5, Etoposide 50mg/m2 on days 2-5, Vincristine 0.4mg/m2 on days 2-5, Prednisone 60mg/m2 on days 2-6, and Cyclophosphamide x 1 on day 6. Pt received LP with IT MTX on 3/15,  cell count was inadequate for flow cytometry Flow cytometry from CSF on 3/15  patient underwent LP with intrathecal chemotherapy on 3/19 Flow cytometry results from LP on 3/19----  During the hospital stay Pt’s CBCs chemistries were monitored very closely and supplemented as needed. Vitals signs were monitored every 4 hrs and strict I/O was maintained. Patient is to be discharged to home with antimicrobials and will follow up as an outpatient at the [facility]. Patient is a 41-year-old male who is HIV-positive and has been diagnosed with a high-grade B-cell, CD10+ lymphoma admitted for cycle 2 DA-R-EPOCH with 20% dose escalation.    Upon admission 3/15, pt had chest Mediport accessed, started on IV Hydration. Patient received Rituxan 375 mg/m2 and IT MTX on Day 1. Pt received LP with IT MTX on 3/15,  cell count was inadequate for flow cytometry.  Patient underwent LP with intrathecal chemotherapy on 3/19 Flow cytometry results from LP on 3/19---- Neuro onc was consulted and recommended biweekly LPwith IT MTX for 4 weeks and then weekly.  During the hospital stay Pt’s CBCs chemistries were monitored very closely and supplemented as needed. Vitals signs were monitored every 4 hrs and strict I/O was maintained. Patient is to be discharged to home with antimicrobials and will follow up as an outpatient at the [facility]. Patient is a 41-year-old male who is HIV-positive and has been diagnosed with a high-grade B-cell, CD10+ lymphoma admitted for cycle 2 DA-R-EPOCH with 20% dose escalation.    Upon admission 3/15, pt had chest Mediport accessed, started on IV Hydration. Patient received Rituxan 375 mg/m2 and IT MTX on Day 1. Pt tolerated chemotherapy very well.  Pt received LP with IT MTX on 3/15,  cell count was inadequate for flow cytometry.  Patient underwent LP with intrathecal chemotherapy on 3/19 Flow cytometry results from LP on 3/19---- Neuro onc was consulted and recommended biweekly LPwith IT MTX for 4 weeks and then weekly.  During the hospital stay Pt’s CBCs chemistries were monitored very closely and supplemented as needed. Vitals signs were monitored every 4 hrs and strict I/O was maintained. Patient is to be discharged to home with antimicrobials and will follow up as an outpatient at the [facility].

## 2018-03-15 NOTE — ADVANCED PRACTICE NURSE CONSULT - ASSESSMENT
Pt admitted for chemotherapy oriented to unit routine alert and oriented times four. Pt ambulate to bathroom with steady gait no distress noted. Mediport to right chest wall access by primary nurse, remain  with + blood return and flush easily. Chemotherapy teaching done with help of  pt verbalized understanding, also given drug card which outline side effect. Pt lab result was reviewed by Dr Cortes. Pt was pre-medicated with Tylenol 650 mg PO, and Benadryl 50 mg ivss. Drug verification done with primary nurse. At 1630 start Rituxan 375 mg/m2= 746 mg iv to run over 4-6 hours. Pt was monitor during the initial hour vital sign taken and charted no reaction noted.  Report given to primary nurse. Left pt in bed sleeping.

## 2018-03-15 NOTE — H&P ADULT - PROBLEM SELECTOR PLAN 3
Prophylactic anticoagulation for DVT prophylaxis on hold for LP today    Contact information: [phone]

## 2018-03-15 NOTE — DISCHARGE NOTE ADULT - PATIENT PORTAL LINK FT
You can access the [facility] Patient Portal, offered by [facility], by registering with the following website: [email or web address]

## 2018-03-15 NOTE — H&P ADULT - HISTORY OF PRESENT ILLNESS
Patient is a 41-year-old male who is HIV-positive and has been diagnosed with a high-grade B-cell, CD10+ lymphoma admitted for cycle 2 DA-R-EPOCH.    Patient was initially seen at  [facility] with fever. He was found to have left axillary lymphadenopathy. He had not been fully compliant with his antiretroviral therapy until December of 2017 when he restarted his medication. His CD4 count in December 2017 was in 800 range.  CT of chest showed left axillary lymphadenopathy with a node measuring up to 5 cm. Minimal bibasilar and right middle lobe atelectasis was seen with a tiny left pleural effusion. Mild cardiomegaly was noted. CT of abdomen and pelvis showed no hepatosplenomegaly or mass and no significant adenopathy.. Gastric wall thickening was seen of unclear etiology.  Left axillary lymph node biopsy showed a high-grade CD10 positive B cell lymphoma expressing CD20 and BCL 6. 65% of nuclei showed a myc rearrangement. BCL-2 and BCL 6 were not rearranged. Ki-67 was about 90%.. LDH was 618, but I think it was tested using a higher range. CBC, creat were normal. Total protein and globulins were increased. He defervesced on antibiotics and was discharged home.

## 2018-03-15 NOTE — DISCHARGE NOTE ADULT - CARE PLAN
Principal Discharge DX:	High grade B-cell lymphoma  Goal:	Maintain counts and free from infection  Assessment and plan of treatment:	Notify MD or report to ER for fever greater or equal to 100.4, persistent nausea, vomiting, diarrhea, bleeding. Principal Discharge DX:	High grade B-cell lymphoma  Goal:	Maintain counts and free from infection  Assessment and plan of treatment:	Notify MD or report to ER for fever greater or equal to 100.4, persistent nausea, vomiting, diarrhea, bleeding.  Secondary Diagnosis:	HIV (human immunodeficiency virus infection)  Goal:	Controlled  Assessment and plan of treatment:	Continue on Triumeq 1 tab PO daily

## 2018-03-15 NOTE — H&P ADULT - PROBLEM SELECTOR PLAN 1
Admit to 7 [facility]  Plan for therapeutic LP today   Cycle 2 Da-R-EPOCH  CBC, CMP daily  Transfuse and replace electrolytes as needed  Daily weight/strict I/O's   Antiemetics and IVF

## 2018-03-15 NOTE — DISCHARGE NOTE ADULT - ADDITIONAL INSTRUCTIONS
Follow up with Dr. Cortes at [facility] on _____ To [facility] on Thursday 3/22/18 at 9:30 am with LUIZ Crawford and blood work.   You also have an appointment on the same day at 12 for Neulasta. To [facility] on Thursday 3/22/18 at 9:30 am with LUIZ Crawford and blood work.   You also have an appointment on the same day at 12 for Neulasta.  You are scheduled for your next lumbar puncture as an outpatient at [facility] at 1:30pm

## 2018-03-15 NOTE — H&P ADULT - ASSESSMENT
Patient is a 41-year-old male who is HIV-positive and has been diagnosed with a high-grade B-cell, CD10+ lymphoma admitted for cycle 2 DA-R-EPOCH.

## 2018-03-15 NOTE — DISCHARGE NOTE ADULT - CARE PROVIDER_API CALL
Niall Cortes), Hematology; Internal Medicine; Medical Oncology  [address] [place] [address]  Phone: [phone]  Fax: [phone] Niall Cortes), Hematology; Internal Medicine; Medical Oncology  [address] [place] [address]  Phone: [phone]  Fax: [phone]    Amira Crawford (ANP-C), NP in Adult Health  [address] [place] [address]  Phone: [phone]  Fax: [phone]

## 2018-03-15 NOTE — DISCHARGE NOTE ADULT - PLAN OF CARE
Maintain counts and free from infection Notify MD or report to ER for fever greater or equal to 100.4, persistent nausea, vomiting, diarrhea, bleeding. Controlled Continue on Triumeq 1 tab PO daily

## 2018-03-15 NOTE — DISCHARGE NOTE ADULT - MEDICATION SUMMARY - MEDICATIONS TO TAKE
I will START or STAY ON the medications listed below when I get home from the hospital:    Reglan 10 mg oral tablet  -- 1 tab(s) by mouth every 6 hours, As Needed for nausea.  -- Indication: For Nausea    acyclovir 400 mg oral tablet  -- 1 tab(s) by mouth 3 times a day  -- Indication: For Prophylactic measure    Triumeq oral tablet  -- 1 tab(s) by mouth once a day  -- Indication: For HIV (human immunodeficiency virus infection)    Mepron 750 mg/5 mL oral suspension  -- 5 milliliter(s) by mouth 2 times a day   -- Finish all this medication unless otherwise directed by prescriber.  Shake well before use.  Take with food or milk.    -- Indication: For Prophylactic measure

## 2018-03-16 LAB
ALBUMIN SERPL ELPH-MCNC: 3.5 G/DL — SIGNIFICANT CHANGE UP (ref 3.3–5)
ALP SERPL-CCNC: 60 U/L — SIGNIFICANT CHANGE UP (ref 40–120)
ALT FLD-CCNC: 82 U/L RC — HIGH (ref 10–45)
ANION GAP SERPL CALC-SCNC: 8 MMOL/L — SIGNIFICANT CHANGE UP (ref 5–17)
APTT BLD: 28 SEC — SIGNIFICANT CHANGE UP (ref 27.5–37.4)
AST SERPL-CCNC: 23 U/L — SIGNIFICANT CHANGE UP (ref 10–40)
BASOPHILS # BLD AUTO: 0 K/UL — SIGNIFICANT CHANGE UP (ref 0–0.2)
BASOPHILS NFR BLD AUTO: 1 % — SIGNIFICANT CHANGE UP (ref 0–2)
BILIRUB SERPL-MCNC: 0.3 MG/DL — SIGNIFICANT CHANGE UP (ref 0.2–1.2)
BLD GP AB SCN SERPL QL: NEGATIVE — SIGNIFICANT CHANGE UP
BUN SERPL-MCNC: 8 MG/DL — SIGNIFICANT CHANGE UP (ref 7–23)
CALCIUM SERPL-MCNC: 8.8 MG/DL — SIGNIFICANT CHANGE UP (ref 8.4–10.5)
CHLORIDE SERPL-SCNC: 104 MMOL/L — SIGNIFICANT CHANGE UP (ref 96–108)
CO2 SERPL-SCNC: 27 MMOL/L — SIGNIFICANT CHANGE UP (ref 22–31)
CREAT SERPL-MCNC: 1.11 MG/DL — SIGNIFICANT CHANGE UP (ref 0.5–1.3)
EOSINOPHIL # BLD AUTO: 0 K/UL — SIGNIFICANT CHANGE UP (ref 0–0.5)
EOSINOPHIL NFR BLD AUTO: 0.6 % — SIGNIFICANT CHANGE UP (ref 0–6)
GLUCOSE SERPL-MCNC: 96 MG/DL — SIGNIFICANT CHANGE UP (ref 70–99)
HCT VFR BLD CALC: 38.5 % — LOW (ref 39–50)
HGB BLD-MCNC: 12.4 G/DL — LOW (ref 13–17)
INR BLD: 1.01 RATIO — SIGNIFICANT CHANGE UP (ref 0.88–1.16)
LDH SERPL L TO P-CCNC: 145 U/L — SIGNIFICANT CHANGE UP (ref 50–242)
LYMPHOCYTES # BLD AUTO: 2.1 K/UL — SIGNIFICANT CHANGE UP (ref 1–3.3)
LYMPHOCYTES # BLD AUTO: 45.1 % — HIGH (ref 13–44)
MAGNESIUM SERPL-MCNC: 1.8 MG/DL — SIGNIFICANT CHANGE UP (ref 1.6–2.6)
MCHC RBC-ENTMCNC: 29.1 PG — SIGNIFICANT CHANGE UP (ref 27–34)
MCHC RBC-ENTMCNC: 32.3 GM/DL — SIGNIFICANT CHANGE UP (ref 32–36)
MCV RBC AUTO: 90.2 FL — SIGNIFICANT CHANGE UP (ref 80–100)
MONOCYTES # BLD AUTO: 0.6 K/UL — SIGNIFICANT CHANGE UP (ref 0–0.9)
MONOCYTES NFR BLD AUTO: 12.3 % — SIGNIFICANT CHANGE UP (ref 2–14)
NEUTROPHILS # BLD AUTO: 1.9 K/UL — SIGNIFICANT CHANGE UP (ref 1.8–7.4)
NEUTROPHILS NFR BLD AUTO: 41 % — LOW (ref 43–77)
PHOSPHATE SERPL-MCNC: 3.1 MG/DL — SIGNIFICANT CHANGE UP (ref 2.5–4.5)
PLATELET # BLD AUTO: 274 K/UL — SIGNIFICANT CHANGE UP (ref 150–400)
POTASSIUM SERPL-MCNC: 3.9 MMOL/L — SIGNIFICANT CHANGE UP (ref 3.5–5.3)
POTASSIUM SERPL-SCNC: 3.9 MMOL/L — SIGNIFICANT CHANGE UP (ref 3.5–5.3)
PROT SERPL-MCNC: 6.9 G/DL — SIGNIFICANT CHANGE UP (ref 6–8.3)
PROTHROM AB SERPL-ACNC: 11.4 SEC — SIGNIFICANT CHANGE UP (ref 10–13.1)
RBC # BLD: 4.27 M/UL — SIGNIFICANT CHANGE UP (ref 4.2–5.8)
RBC # FLD: 15.2 % — HIGH (ref 10.3–14.5)
RH IG SCN BLD-IMP: POSITIVE — SIGNIFICANT CHANGE UP
SODIUM SERPL-SCNC: 139 MMOL/L — SIGNIFICANT CHANGE UP (ref 135–145)
URATE SERPL-MCNC: 4.5 MG/DL — SIGNIFICANT CHANGE UP (ref 3.4–8.8)
WBC # BLD: 4.7 K/UL — SIGNIFICANT CHANGE UP (ref 3.8–10.5)
WBC # FLD AUTO: 4.7 K/UL — SIGNIFICANT CHANGE UP (ref 3.8–10.5)

## 2018-03-16 PROCEDURE — 99232 SBSQ HOSP IP/OBS MODERATE 35: CPT | Mod: GC

## 2018-03-16 RX ORDER — FOSAPREPITANT DIMEGLUMINE 150 MG/5ML
150 INJECTION, POWDER, LYOPHILIZED, FOR SOLUTION INTRAVENOUS ONCE
Qty: 0 | Refills: 0 | Status: COMPLETED | OUTPATIENT
Start: 2018-03-16 | End: 2018-03-16

## 2018-03-16 RX ORDER — ETOPOSIDE 20 MG/ML
120 VIAL (ML) INTRAVENOUS DAILY
Qty: 0 | Refills: 0 | Status: DISCONTINUED | OUTPATIENT
Start: 2018-03-16 | End: 2018-03-20

## 2018-03-16 RX ORDER — ONDANSETRON 8 MG/1
8 TABLET, FILM COATED ORAL EVERY 8 HOURS
Qty: 0 | Refills: 0 | Status: DISCONTINUED | OUTPATIENT
Start: 2018-03-16 | End: 2018-03-20

## 2018-03-16 RX ORDER — ENOXAPARIN SODIUM 100 MG/ML
40 INJECTION SUBCUTANEOUS DAILY
Qty: 0 | Refills: 0 | Status: DISCONTINUED | OUTPATIENT
Start: 2018-03-16 | End: 2018-03-18

## 2018-03-16 RX ORDER — DOXORUBICIN HYDROCHLORIDE 2 MG/ML
24 INJECTION, SOLUTION INTRAVENOUS DAILY
Qty: 0 | Refills: 0 | Status: DISCONTINUED | OUTPATIENT
Start: 2018-03-16 | End: 2018-03-20

## 2018-03-16 RX ADMIN — ONDANSETRON 8 MILLIGRAM(S): 8 TABLET, FILM COATED ORAL at 21:32

## 2018-03-16 RX ADMIN — ATOVAQUONE 750 MILLIGRAM(S): 750 SUSPENSION ORAL at 06:42

## 2018-03-16 RX ADMIN — ONDANSETRON 8 MILLIGRAM(S): 8 TABLET, FILM COATED ORAL at 14:25

## 2018-03-16 RX ADMIN — ATOVAQUONE 750 MILLIGRAM(S): 750 SUSPENSION ORAL at 18:05

## 2018-03-16 RX ADMIN — FOSAPREPITANT DIMEGLUMINE 300 MILLIGRAM(S): 150 INJECTION, POWDER, LYOPHILIZED, FOR SOLUTION INTRAVENOUS at 10:00

## 2018-03-16 RX ADMIN — Medication 400 MILLIGRAM(S): at 06:42

## 2018-03-16 RX ADMIN — Medication 400 MILLIGRAM(S): at 21:32

## 2018-03-16 RX ADMIN — SODIUM CHLORIDE 50 MILLILITER(S): 9 INJECTION INTRAMUSCULAR; INTRAVENOUS; SUBCUTANEOUS at 21:33

## 2018-03-16 RX ADMIN — ENOXAPARIN SODIUM 40 MILLIGRAM(S): 100 INJECTION SUBCUTANEOUS at 21:32

## 2018-03-16 RX ADMIN — Medication 120 MILLIGRAM(S): at 18:05

## 2018-03-16 RX ADMIN — SODIUM CHLORIDE 50 MILLILITER(S): 9 INJECTION INTRAMUSCULAR; INTRAVENOUS; SUBCUTANEOUS at 06:42

## 2018-03-16 RX ADMIN — SODIUM CHLORIDE 50 MILLILITER(S): 9 INJECTION INTRAMUSCULAR; INTRAVENOUS; SUBCUTANEOUS at 14:25

## 2018-03-16 RX ADMIN — Medication 120 MILLIGRAM(S): at 06:42

## 2018-03-16 RX ADMIN — Medication 400 MILLIGRAM(S): at 14:25

## 2018-03-16 NOTE — PROGRESS NOTE ADULT - PROBLEM SELECTOR PLAN 1
LP 3/15  Cycle 2 Da-R-EPOCH  CBC, CMP daily  Transfuse and replace electrolytes as needed  Daily weight/strict I/O's   Antiemetics and IVF

## 2018-03-16 NOTE — PROGRESS NOTE ADULT - PROBLEM SELECTOR PLAN 3
Prophylactic anticoagulation for DVT prophylaxis on hold for LP today    Contact information: [phone]

## 2018-03-16 NOTE — PROGRESS NOTE ADULT - SUBJECTIVE AND OBJECTIVE BOX
· Subjective and Objective: 	  Diagnosis: HIV associated DLBCL, +CSF malignant cells    Protocol/Chemo Regimen: DA- R-EPOCH, Cycle 2    Day: 1    Endorsed: no complaints.    Review of Systems: Denies any chest pain, palpitation, SOB, abdominal pain or dysuria.    Pain scale: 0                               .  Diet: Regular     Allergies    No Known Allergies    Intolerances        ANTIMICROBIALS  abacavir 600 mG/dolutegravir 50 mG/lamiVUDine 300 mG 1 Tablet(s) Oral daily  acyclovir   Tablet 400 milliGRAM(s) Oral three times a day  atovaquone Suspension 750 milliGRAM(s) Oral every 12 hours      HEME/ONC MEDICATIONS  methotrexate PF IntraThecal 12 milliGRAM(s) IntraThecal once  riTUXimab IVPB 746 milliGRAM(s) IV Intermittent once      STANDING MEDICATIONS  predniSONE   Tablet 120 milliGRAM(s) Oral two times a day  sodium chloride 0.9%. 1000 milliLiter(s) IV Continuous <Continuous>      PRN MEDICATIONS  acetaminophen   Tablet 650 milliGRAM(s) Oral every 6 hours PRN  acetaminophen   Tablet. 650 milliGRAM(s) Oral every 6 hours PRN  hydrocortisone sodium succinate Injectable 100 milliGRAM(s) IV Push once PRN        Vital Signs Last 24 Hrs  T(C): 36.4 (16 Mar 2018 05:49), Max: 37.1 (15 Mar 2018 10:48)  T(F): 97.5 (16 Mar 2018 05:49), Max: 98.7 (15 Mar 2018 10:48)  HR: 80 (16 Mar 2018 05:49) (77 - 117)  BP: 121/77 (16 Mar 2018 05:49) (109/64 - 155/76)  BP(mean): --  RR: 18 (16 Mar 2018 05:49) (16 - 20)  SpO2: 100% (16 Mar 2018 05:49) (98% - 100%)    PHYSICAL EXAM  General: adult in NAD  HEENT: clear oropharynx, anicteric sclera, pink conjunctiva  Neck: supple  CV: normal S1/S2 RRR  Lungs: positive air movement b/l ant lungs,clear to auscultation, no wheezes, no rales  Abdomen: soft non-tender non-distended, no hepatosplenomegaly  Ext: no clubbing cyanosis or edema  Skin: no rashes and no petechiae  Neuro: alert and oriented X 4, no focal deficits  Central Line: normal mediport    RECENT CULTURES:        LABS:                        12.4   4.7   )-----------( 274      ( 16 Mar 2018 07:03 )             38.5         Mean Cell Volume : 90.2 fl  Mean Cell Hemoglobin : 29.1 pg  Mean Cell Hemoglobin Concentration : 32.3 gm/dL  Auto Neutrophil # : 1.9 K/uL  Auto Lymphocyte # : 2.1 K/uL  Auto Monocyte # : 0.6 K/uL  Auto Eosinophil # : 0.0 K/uL  Auto Basophil # : 0.0 K/uL  Auto Neutrophil % : 41.0 %  Auto Lymphocyte % : 45.1 %  Auto Monocyte % : 12.3 %  Auto Eosinophil % : 0.6 %  Auto Basophil % : 1.0 %      03-16    139  |  104  |  8   ----------------------------<  96  3.9   |  27  |  1.11    Ca    8.8      16 Mar 2018 07:03  Phos  3.1     03-16  Mg     1.8     03-16    TPro  6.9  /  Alb  3.5  /  TBili  0.3  /  DBili  x   /  AST  23  /  ALT  82<H>  /  AlkPhos  60  03-16      Mg 1.8  Phos 3.1              RADIOLOGY & ADDITIONAL STUDIES:

## 2018-03-16 NOTE — ADVANCED PRACTICE NURSE CONSULT - ASSESSMENT
Pt admitted for chemotherapy oriented to unit routine alert and oriented times four. Pt ambulate to bathroom with steady gait no distress noted. Mediport to right chest wall access by primary nurse, remain  with + blood return and flush easily. Chemotherapy teaching done with help of  pt verbalized understanding, also given drug card which outline side effect. Pt lab result was reviewed by Dr Arevalo during rounds. Pt was pre-medicated with zofran 16 mg ivss . Pt s/p MUGA scan result on chart. Pt was started on Doxorubicin 12 mg/m2= 24 mg, Vincristine 0.4 mg/m2= 0.8 mg and Etoposide 60 mg/m2= 120 mg iv all to run over 24 hours in separate 500 cc normal saline Report given to primary nurse. Left pt in bed with visitors at bedside. Report given to primary nurse.

## 2018-03-16 NOTE — PROGRESS NOTE ADULT - ATTENDING COMMENTS
40 yo male with HIV and dlbcl + myc rearrangement admitted for cycle 2 da-epoch. pt with hx of poor compliance with ARVs due to high out-of-pocket costs.  - s/p LP 3/15, f/u CSF flow cytometry results  - cont chemo as planned  - cont ARVs  - ID f/u for optimizing HIV management  - LP with IT MTX for 3/19  - anticipated dc on 3/20 pending no complications  - hold lovenox px 24 hrs prior to LP

## 2018-03-17 LAB
ALBUMIN SERPL ELPH-MCNC: 3.5 G/DL — SIGNIFICANT CHANGE UP (ref 3.3–5)
ALP SERPL-CCNC: 56 U/L — SIGNIFICANT CHANGE UP (ref 40–120)
ALT FLD-CCNC: 75 U/L RC — HIGH (ref 10–45)
ANION GAP SERPL CALC-SCNC: 12 MMOL/L — SIGNIFICANT CHANGE UP (ref 5–17)
AST SERPL-CCNC: 24 U/L — SIGNIFICANT CHANGE UP (ref 10–40)
BASOPHILS # BLD AUTO: 0.1 K/UL — SIGNIFICANT CHANGE UP (ref 0–0.2)
BASOPHILS NFR BLD AUTO: 0.6 % — SIGNIFICANT CHANGE UP (ref 0–2)
BILIRUB SERPL-MCNC: 0.2 MG/DL — SIGNIFICANT CHANGE UP (ref 0.2–1.2)
BUN SERPL-MCNC: 9 MG/DL — SIGNIFICANT CHANGE UP (ref 7–23)
CALCIUM SERPL-MCNC: 8.3 MG/DL — LOW (ref 8.4–10.5)
CHLORIDE SERPL-SCNC: 104 MMOL/L — SIGNIFICANT CHANGE UP (ref 96–108)
CO2 SERPL-SCNC: 23 MMOL/L — SIGNIFICANT CHANGE UP (ref 22–31)
CREAT SERPL-MCNC: 0.9 MG/DL — SIGNIFICANT CHANGE UP (ref 0.5–1.3)
EOSINOPHIL # BLD AUTO: 0 K/UL — SIGNIFICANT CHANGE UP (ref 0–0.5)
EOSINOPHIL NFR BLD AUTO: 0.1 % — SIGNIFICANT CHANGE UP (ref 0–6)
GLUCOSE SERPL-MCNC: 122 MG/DL — HIGH (ref 70–99)
HCT VFR BLD CALC: 37.8 % — LOW (ref 39–50)
HGB BLD-MCNC: 12.6 G/DL — LOW (ref 13–17)
LDH SERPL L TO P-CCNC: 193 U/L — SIGNIFICANT CHANGE UP (ref 50–242)
LYMPHOCYTES # BLD AUTO: 1.7 K/UL — SIGNIFICANT CHANGE UP (ref 1–3.3)
LYMPHOCYTES # BLD AUTO: 14.4 % — SIGNIFICANT CHANGE UP (ref 13–44)
MAGNESIUM SERPL-MCNC: 1.8 MG/DL — SIGNIFICANT CHANGE UP (ref 1.6–2.6)
MCHC RBC-ENTMCNC: 29.9 PG — SIGNIFICANT CHANGE UP (ref 27–34)
MCHC RBC-ENTMCNC: 33.2 GM/DL — SIGNIFICANT CHANGE UP (ref 32–36)
MCV RBC AUTO: 90 FL — SIGNIFICANT CHANGE UP (ref 80–100)
MONOCYTES # BLD AUTO: 0.4 K/UL — SIGNIFICANT CHANGE UP (ref 0–0.9)
MONOCYTES NFR BLD AUTO: 3.3 % — SIGNIFICANT CHANGE UP (ref 2–14)
NEUTROPHILS # BLD AUTO: 9.6 K/UL — HIGH (ref 1.8–7.4)
NEUTROPHILS NFR BLD AUTO: 81.6 % — HIGH (ref 43–77)
PHOSPHATE SERPL-MCNC: 3.4 MG/DL — SIGNIFICANT CHANGE UP (ref 2.5–4.5)
PLATELET # BLD AUTO: 323 K/UL — SIGNIFICANT CHANGE UP (ref 150–400)
POTASSIUM SERPL-MCNC: 3.8 MMOL/L — SIGNIFICANT CHANGE UP (ref 3.5–5.3)
POTASSIUM SERPL-SCNC: 3.8 MMOL/L — SIGNIFICANT CHANGE UP (ref 3.5–5.3)
PROT SERPL-MCNC: 6.8 G/DL — SIGNIFICANT CHANGE UP (ref 6–8.3)
RBC # BLD: 4.21 M/UL — SIGNIFICANT CHANGE UP (ref 4.2–5.8)
RBC # FLD: 15.2 % — HIGH (ref 10.3–14.5)
SODIUM SERPL-SCNC: 139 MMOL/L — SIGNIFICANT CHANGE UP (ref 135–145)
URATE SERPL-MCNC: 3.5 MG/DL — SIGNIFICANT CHANGE UP (ref 3.4–8.8)
WBC # BLD: 11.8 K/UL — HIGH (ref 3.8–10.5)
WBC # FLD AUTO: 11.8 K/UL — HIGH (ref 3.8–10.5)

## 2018-03-17 PROCEDURE — 99232 SBSQ HOSP IP/OBS MODERATE 35: CPT

## 2018-03-17 RX ORDER — ZALEPLON 10 MG
5 CAPSULE ORAL ONCE
Qty: 0 | Refills: 0 | Status: DISCONTINUED | OUTPATIENT
Start: 2018-03-17 | End: 2018-03-17

## 2018-03-17 RX ADMIN — Medication 400 MILLIGRAM(S): at 06:23

## 2018-03-17 RX ADMIN — Medication 120 MILLIGRAM(S): at 17:35

## 2018-03-17 RX ADMIN — SODIUM CHLORIDE 50 MILLILITER(S): 9 INJECTION INTRAMUSCULAR; INTRAVENOUS; SUBCUTANEOUS at 17:35

## 2018-03-17 RX ADMIN — ATOVAQUONE 750 MILLIGRAM(S): 750 SUSPENSION ORAL at 06:22

## 2018-03-17 RX ADMIN — ONDANSETRON 8 MILLIGRAM(S): 8 TABLET, FILM COATED ORAL at 06:23

## 2018-03-17 RX ADMIN — ONDANSETRON 8 MILLIGRAM(S): 8 TABLET, FILM COATED ORAL at 14:00

## 2018-03-17 RX ADMIN — ATOVAQUONE 750 MILLIGRAM(S): 750 SUSPENSION ORAL at 17:35

## 2018-03-17 RX ADMIN — Medication 5 MILLIGRAM(S): at 23:34

## 2018-03-17 RX ADMIN — Medication 400 MILLIGRAM(S): at 21:27

## 2018-03-17 RX ADMIN — Medication 120 MILLIGRAM(S): at 06:23

## 2018-03-17 RX ADMIN — SODIUM CHLORIDE 50 MILLILITER(S): 9 INJECTION INTRAMUSCULAR; INTRAVENOUS; SUBCUTANEOUS at 06:22

## 2018-03-17 RX ADMIN — ONDANSETRON 8 MILLIGRAM(S): 8 TABLET, FILM COATED ORAL at 21:28

## 2018-03-17 RX ADMIN — ENOXAPARIN SODIUM 40 MILLIGRAM(S): 100 INJECTION SUBCUTANEOUS at 11:47

## 2018-03-17 RX ADMIN — Medication 400 MILLIGRAM(S): at 13:23

## 2018-03-17 NOTE — PROGRESS NOTE ADULT - SUBJECTIVE AND OBJECTIVE BOX
Diagnosis: HIV associated DLBCL with CNS involvement     Protocol/Chemo Regimen: DA-R-EPOCH Cycle 2    Day: 2    Pt endorsed: No acute complaints     Review of Systems: Patient denies headache, dizziness, visual changes, chest pain, palpitations, SOB, abdominal pain, vomiting, diarrhea or dysuria.     Pain scale: 0    Diet: Regular     Allergies    No Known Allergies    Intolerances        ANTIMICROBIALS  abacavir 600 mG/dolutegravir 50 mG/lamiVUDine 300 mG 1 Tablet(s) Oral daily  acyclovir   Tablet 400 milliGRAM(s) Oral three times a day  atovaquone Suspension 750 milliGRAM(s) Oral every 12 hours      HEME/ONC MEDICATIONS  DOXOrubicin IVPB w/vinCRIStine 24 milliGRAM(s) IV Intermittent daily  enoxaparin Injectable 40 milliGRAM(s) SubCutaneous daily  etoposide IVPB 120 milliGRAM(s) IV Intermittent daily  methotrexate PF IntraThecal 12 milliGRAM(s) IntraThecal once  riTUXimab IVPB 746 milliGRAM(s) IV Intermittent once      STANDING MEDICATIONS  ondansetron Injectable 8 milliGRAM(s) IV Push every 8 hours  predniSONE   Tablet 120 milliGRAM(s) Oral two times a day  sodium chloride 0.9%. 1000 milliLiter(s) IV Continuous <Continuous>      PRN MEDICATIONS  acetaminophen   Tablet 650 milliGRAM(s) Oral every 6 hours PRN  acetaminophen   Tablet. 650 milliGRAM(s) Oral every 6 hours PRN  hydrocortisone sodium succinate Injectable 100 milliGRAM(s) IV Push once PRN        Vital Signs Last 24 Hrs  T(C): 36.8 (17 Mar 2018 09:50), Max: 37 (16 Mar 2018 13:58)  T(F): 98.3 (17 Mar 2018 09:50), Max: 98.6 (16 Mar 2018 13:58)  HR: 99 (17 Mar 2018 09:50) (77 - 110)  BP: 135/67 (17 Mar 2018 09:50) (118/65 - 137/73)  BP(mean): --  RR: 18 (17 Mar 2018 09:50) (18 - 20)  SpO2: 98% (17 Mar 2018 09:50) (97% - 99%)    PHYSICAL EXAM  General: NAD  HEENT: PERRLA, EOMOI, clear oropharynx, anicteric sclera, pink conjunctiva  Neck: supple  CV: (+) S1/S2 RRR  Lungs: clear to auscultation, no wheezes or rales  Abdomen: soft, non-tender, non-distended (+) BS  Ext: no clubbing, cyanosis or edema  Skin: no rashes and no petechiae  Neuro: alert and oriented X 3, no focal deficits  Central Line: Mediport C/D/I     RECENT CULTURES:        LABS:                        12.6   11.8  )-----------( 323      ( 17 Mar 2018 07:15 )             37.8         Mean Cell Volume : 90.0 fl  Mean Cell Hemoglobin : 29.9 pg  Mean Cell Hemoglobin Concentration : 33.2 gm/dL  Auto Neutrophil # : 9.6 K/uL  Auto Lymphocyte # : 1.7 K/uL  Auto Monocyte # : 0.4 K/uL  Auto Eosinophil # : 0.0 K/uL  Auto Basophil # : 0.1 K/uL  Auto Neutrophil % : 81.6 %  Auto Lymphocyte % : 14.4 %  Auto Monocyte % : 3.3 %  Auto Eosinophil % : 0.1 %  Auto Basophil % : 0.6 %      03-17    139  |  104  |  9   ----------------------------<  122<H>  3.8   |  23  |  0.90    Ca    8.3<L>      17 Mar 2018 07:15  Phos  3.4     03-17  Mg     1.8     03-17    TPro  6.8  /  Alb  3.5  /  TBili  0.2  /  DBili  x   /  AST  24  /  ALT  75<H>  /  AlkPhos  56  03-17      Mg 1.8  Phos 3.4      PT/INR - ( 16 Mar 2018 07:42 )   PT: 11.4 sec;   INR: 1.01 ratio         PTT - ( 16 Mar 2018 07:42 )  PTT:28.0 sec      Uric Acid 3.5        RADIOLOGY & ADDITIONAL STUDIES:

## 2018-03-17 NOTE — PROGRESS NOTE ADULT - PROBLEM SELECTOR PLAN 3
Prophylactic anticoagulation for DVT prophylaxis on hold for LP today    Contact information: [phone] Prophylactic anticoagulation with Lovenox.     Contact information: [phone]

## 2018-03-17 NOTE — PROGRESS NOTE ADULT - ATTENDING COMMENTS
42 yo male with HIV and dlbcl + myc rearrangement admitted for cycle 2 da-epoch. pt with hx of poor compliance with ARVs due to high out-of-pocket costs.  - s/p LP 3/15, f/u CSF flow cytometry results  - cont chemo as planned  - cont ARVs  - ID f/u for optimizing HIV management  - LP with IT MTX for 3/19  - anticipated dc on 3/20 pending no complications  - hold lovenox px 24 hrs prior to LP 42 yo male with HIV and dlbcl + myc rearrangement admitted for cycle 2 da-epoch. pt with hx of poor compliance with ARVs due to high out-of-pocket costs.  - s/p LP 3/15, f/u CSF flow cytometry results  - cont chemo as planned  - cont ARVs  - ID f/u for optimizing HIV management  - LP with IT MTX for 3/19  - anticipated d/c on 3/20 pending no complications  - hold lovenox px 24 hrs prior to LP

## 2018-03-17 NOTE — ADVANCED PRACTICE NURSE CONSULT - ASSESSMENT
Labs today WBC 11.8 HGB 12.6 HCT 37.8 PLTS 323 CR. 0.90 TBILI. 0.2 Pt found in bed, alert and oriented x4, v/s stable afebrile, n/c offered.  Right chest wall mediport in place. Site without redness or swelling. Lumen previously accessed with + blood return flushes well with NS. Pt premedicated with  Zofran 8 mg IVSS prior to chemotherapy. Chemotherapy verified by 2 RNs prior to administration. [id number] treated with etoposide 60mg/m2= 120 mg,vincristine 0.4mg/m2= 0.8mg,doxorubicin 12mg/m2= 24mg civi over 24 hours Pt remains in bed/chair with n/c offered. Primary RN aware of treatment plan.

## 2018-03-18 LAB
ALBUMIN SERPL ELPH-MCNC: 3.9 G/DL — SIGNIFICANT CHANGE UP (ref 3.3–5)
ALP SERPL-CCNC: 72 U/L — SIGNIFICANT CHANGE UP (ref 40–120)
ALT FLD-CCNC: 79 U/L RC — HIGH (ref 10–45)
ANION GAP SERPL CALC-SCNC: 14 MMOL/L — SIGNIFICANT CHANGE UP (ref 5–17)
AST SERPL-CCNC: 24 U/L — SIGNIFICANT CHANGE UP (ref 10–40)
BASOPHILS # BLD AUTO: 0 K/UL — SIGNIFICANT CHANGE UP (ref 0–0.2)
BASOPHILS NFR BLD AUTO: 0.5 % — SIGNIFICANT CHANGE UP (ref 0–2)
BILIRUB SERPL-MCNC: 0.3 MG/DL — SIGNIFICANT CHANGE UP (ref 0.2–1.2)
BUN SERPL-MCNC: 12 MG/DL — SIGNIFICANT CHANGE UP (ref 7–23)
CALCIUM SERPL-MCNC: 9.4 MG/DL — SIGNIFICANT CHANGE UP (ref 8.4–10.5)
CHLORIDE SERPL-SCNC: 102 MMOL/L — SIGNIFICANT CHANGE UP (ref 96–108)
CO2 SERPL-SCNC: 24 MMOL/L — SIGNIFICANT CHANGE UP (ref 22–31)
CREAT SERPL-MCNC: 0.97 MG/DL — SIGNIFICANT CHANGE UP (ref 0.5–1.3)
EOSINOPHIL # BLD AUTO: 0 K/UL — SIGNIFICANT CHANGE UP (ref 0–0.5)
EOSINOPHIL NFR BLD AUTO: 0.1 % — SIGNIFICANT CHANGE UP (ref 0–6)
GLUCOSE SERPL-MCNC: 119 MG/DL — HIGH (ref 70–99)
HCT VFR BLD CALC: 39.5 % — SIGNIFICANT CHANGE UP (ref 39–50)
HGB BLD-MCNC: 13.2 G/DL — SIGNIFICANT CHANGE UP (ref 13–17)
LDH SERPL L TO P-CCNC: 209 U/L — SIGNIFICANT CHANGE UP (ref 50–242)
LYMPHOCYTES # BLD AUTO: 2 K/UL — SIGNIFICANT CHANGE UP (ref 1–3.3)
LYMPHOCYTES # BLD AUTO: 20.9 % — SIGNIFICANT CHANGE UP (ref 13–44)
MAGNESIUM SERPL-MCNC: 2 MG/DL — SIGNIFICANT CHANGE UP (ref 1.6–2.6)
MCHC RBC-ENTMCNC: 29.9 PG — SIGNIFICANT CHANGE UP (ref 27–34)
MCHC RBC-ENTMCNC: 33.4 GM/DL — SIGNIFICANT CHANGE UP (ref 32–36)
MCV RBC AUTO: 89.4 FL — SIGNIFICANT CHANGE UP (ref 80–100)
MONOCYTES # BLD AUTO: 0.4 K/UL — SIGNIFICANT CHANGE UP (ref 0–0.9)
MONOCYTES NFR BLD AUTO: 3.9 % — SIGNIFICANT CHANGE UP (ref 2–14)
NEUTROPHILS # BLD AUTO: 7 K/UL — SIGNIFICANT CHANGE UP (ref 1.8–7.4)
NEUTROPHILS NFR BLD AUTO: 74.6 % — SIGNIFICANT CHANGE UP (ref 43–77)
PHOSPHATE SERPL-MCNC: 3.5 MG/DL — SIGNIFICANT CHANGE UP (ref 2.5–4.5)
PLATELET # BLD AUTO: 352 K/UL — SIGNIFICANT CHANGE UP (ref 150–400)
POTASSIUM SERPL-MCNC: 3.7 MMOL/L — SIGNIFICANT CHANGE UP (ref 3.5–5.3)
POTASSIUM SERPL-SCNC: 3.7 MMOL/L — SIGNIFICANT CHANGE UP (ref 3.5–5.3)
PROT SERPL-MCNC: 7.4 G/DL — SIGNIFICANT CHANGE UP (ref 6–8.3)
RBC # BLD: 4.42 M/UL — SIGNIFICANT CHANGE UP (ref 4.2–5.8)
RBC # FLD: 15.2 % — HIGH (ref 10.3–14.5)
SODIUM SERPL-SCNC: 140 MMOL/L — SIGNIFICANT CHANGE UP (ref 135–145)
URATE SERPL-MCNC: 3.3 MG/DL — LOW (ref 3.4–8.8)
WBC # BLD: 9.4 K/UL — SIGNIFICANT CHANGE UP (ref 3.8–10.5)
WBC # FLD AUTO: 9.4 K/UL — SIGNIFICANT CHANGE UP (ref 3.8–10.5)

## 2018-03-18 PROCEDURE — 99232 SBSQ HOSP IP/OBS MODERATE 35: CPT

## 2018-03-18 RX ADMIN — ONDANSETRON 8 MILLIGRAM(S): 8 TABLET, FILM COATED ORAL at 13:32

## 2018-03-18 RX ADMIN — Medication 400 MILLIGRAM(S): at 13:32

## 2018-03-18 RX ADMIN — Medication 400 MILLIGRAM(S): at 21:27

## 2018-03-18 RX ADMIN — ATOVAQUONE 750 MILLIGRAM(S): 750 SUSPENSION ORAL at 05:57

## 2018-03-18 RX ADMIN — SODIUM CHLORIDE 50 MILLILITER(S): 9 INJECTION INTRAMUSCULAR; INTRAVENOUS; SUBCUTANEOUS at 05:58

## 2018-03-18 RX ADMIN — Medication 120 MILLIGRAM(S): at 18:23

## 2018-03-18 RX ADMIN — ATOVAQUONE 750 MILLIGRAM(S): 750 SUSPENSION ORAL at 18:23

## 2018-03-18 RX ADMIN — Medication 400 MILLIGRAM(S): at 05:57

## 2018-03-18 RX ADMIN — ONDANSETRON 8 MILLIGRAM(S): 8 TABLET, FILM COATED ORAL at 21:27

## 2018-03-18 RX ADMIN — Medication 120 MILLIGRAM(S): at 05:58

## 2018-03-18 RX ADMIN — ONDANSETRON 8 MILLIGRAM(S): 8 TABLET, FILM COATED ORAL at 05:58

## 2018-03-18 NOTE — PROGRESS NOTE ADULT - ATTENDING COMMENTS
40 yo male with HIV and dlbcl + myc rearrangement admitted for cycle 2 da-epoch. pt with hx of poor compliance with ARVs due to high out-of-pocket costs.  - s/p LP 3/15, f/u CSF flow cytometry results  - cont chemo as planned  - cont ARVs  - ID f/u for optimizing HIV management  - LP with IT MTX for 3/19  - anticipated d/c on 3/20 pending no complications  - hold lovenox px 24 hrs prior to LP 42 yo male with HIV and dlbcl + myc rearrangement admitted for cycle 2 da-epoch, today is day 3. pt with hx of poor compliance with ARVs due to high out-of-pocket costs.  - s/p LP 3/15, f/u CSF flow cytometry results  - cont chemo as planned  - cont ARVs  - ID f/u for optimizing HIV management  - LP with IT MTX for 3/19  - anticipated d/c on 3/20 pending no complications  - hold lovenox px 24 hrs prior to LP

## 2018-03-18 NOTE — PROGRESS NOTE ADULT - PROBLEM SELECTOR PLAN 3
Prophylactic anticoagulation with Lovenox.     Contact information: [phone] Prophylactic anticoagulation with Lovenox  on hold for planned LP in am     Contact information: [phone]

## 2018-03-18 NOTE — ADVANCED PRACTICE NURSE CONSULT - ASSESSMENT
Labs today WBC 9.4 HGB 13.2 HCT 39.5 PLTS 352 CR. 0.97 TBILI. 0.3 Pt found in bed, alert and oriented x4, v/s stable afebrile, n/c offered.  Right chest wall mediport in place. Site without redness or swelling. Lumen previously accessed with + blood return flushes well with NS. Pt premedicated with  Zofran 8 mg IVSS prior to chemotherapy. Chemotherapy verified by 2 RNs prior to administration. [id number] treated with etoposide 60mg/m2= 120 mg,vincristine 0.4mg/m2= 0.8mg,doxorubicin 12mg/m2= 24mg civi over 24 hours Pt remains in bedwith n/c offered. Primary RN aware of treatment plan.

## 2018-03-18 NOTE — PROGRESS NOTE ADULT - PROBLEM SELECTOR PLAN 1
LP 3/15  Cycle 2 Da-R-EPOCH  CBC, CMP daily  Transfuse and replace electrolytes as needed  Daily weight/strict I/O's   Antiemetics and IVF s/p LP 3/15  Cycle 2 Da-R-EPOCH  CBC, CMP daily  for repeat LP on 3/19   Transfuse and replace electrolytes as needed  Daily weight/strict I/O's   Antiemetics and IVF

## 2018-03-18 NOTE — PROGRESS NOTE ADULT - SUBJECTIVE AND OBJECTIVE BOX
Diagnosis:    Protocol/Chemo Regimen:    Day:     Pt endorsed:    Review of Systems:     Pain scale:     Diet:     Allergies    No Known Allergies    Intolerances        ANTIMICROBIALS  abacavir 600 mG/dolutegravir 50 mG/lamiVUDine 300 mG 1 Tablet(s) Oral daily  acyclovir   Tablet 400 milliGRAM(s) Oral three times a day  atovaquone Suspension 750 milliGRAM(s) Oral every 12 hours      HEME/ONC MEDICATIONS  DOXOrubicin IVPB w/vinCRIStine 24 milliGRAM(s) IV Intermittent daily  etoposide IVPB 120 milliGRAM(s) IV Intermittent daily  methotrexate PF IntraThecal 12 milliGRAM(s) IntraThecal once  riTUXimab IVPB 746 milliGRAM(s) IV Intermittent once      STANDING MEDICATIONS  ondansetron Injectable 8 milliGRAM(s) IV Push every 8 hours  predniSONE   Tablet 120 milliGRAM(s) Oral two times a day  sodium chloride 0.9%. 1000 milliLiter(s) IV Continuous <Continuous>      PRN MEDICATIONS  acetaminophen   Tablet 650 milliGRAM(s) Oral every 6 hours PRN  acetaminophen   Tablet. 650 milliGRAM(s) Oral every 6 hours PRN  hydrocortisone sodium succinate Injectable 100 milliGRAM(s) IV Push once PRN        Vital Signs Last 24 Hrs  T(C): 36.6 (18 Mar 2018 13:30), Max: 36.7 (17 Mar 2018 17:28)  T(F): 97.8 (18 Mar 2018 13:30), Max: 98.1 (17 Mar 2018 17:28)  HR: 88 (18 Mar 2018 13:30) (80 - 93)  BP: 143/80 (18 Mar 2018 13:30) (130/68 - 154/83)  BP(mean): --  RR: 18 (18 Mar 2018 13:30) (18 - 18)  SpO2: 98% (18 Mar 2018 13:30) (95% - 98%)    PHYSICAL EXAM  General: NAD  HEENT: PERRLA, EOMOI, clear oropharynx, anicteric sclera, pink conjunctiva  Neck: supple  CV: (+) S1/S2 RRR  Lungs: clear to auscultation, no wheezes or rales  Abdomen: soft, non-tender, non-distended (+) BS  Ext: no clubbing, cyanosis or edema  Skin: no rashes and no petechiae  Neuro: alert and oriented X 3, no focal deficits  Central Line:     RECENT CULTURES:        LABS:                        13.2   9.4   )-----------( 352      ( 18 Mar 2018 07:03 )             39.5         Mean Cell Volume : 89.4 fl  Mean Cell Hemoglobin : 29.9 pg  Mean Cell Hemoglobin Concentration : 33.4 gm/dL  Auto Neutrophil # : 7.0 K/uL  Auto Lymphocyte # : 2.0 K/uL  Auto Monocyte # : 0.4 K/uL  Auto Eosinophil # : 0.0 K/uL  Auto Basophil # : 0.0 K/uL  Auto Neutrophil % : 74.6 %  Auto Lymphocyte % : 20.9 %  Auto Monocyte % : 3.9 %  Auto Eosinophil % : 0.1 %  Auto Basophil % : 0.5 %      03-18    140  |  102  |  12  ----------------------------<  119<H>  3.7   |  24  |  0.97    Ca    9.4      18 Mar 2018 07:03  Phos  3.5     03-18  Mg     2.0     03-18    TPro  7.4  /  Alb  3.9  /  TBili  0.3  /  DBili  x   /  AST  24  /  ALT  79<H>  /  AlkPhos  72  03-18      Mg 2.0  Phos 3.5            Uric Acid 3.3        RADIOLOGY & ADDITIONAL STUDIES: Diagnosis: HIV associated DLBCL with CNS involvement     Protocol/Chemo Regimen: DA-R-EPOCH Cycle 2    Day: 3    Pt endorsed: No acute complaints     Review of Systems: Patient denies headache, dizziness, visual changes, chest pain, palpitations, SOB, abdominal pain, vomiting, diarrhea or dysuria.     Pain scale: 0    Diet: Regular     Allergies    No Known Allergies    Intolerances        ANTIMICROBIALS  abacavir 600 mG/dolutegravir 50 mG/lamiVUDine 300 mG 1 Tablet(s) Oral daily  acyclovir   Tablet 400 milliGRAM(s) Oral three times a day  atovaquone Suspension 750 milliGRAM(s) Oral every 12 hours      HEME/ONC MEDICATIONS  DOXOrubicin IVPB w/vinCRIStine 24 milliGRAM(s) IV Intermittent daily  etoposide IVPB 120 milliGRAM(s) IV Intermittent daily  methotrexate PF IntraThecal 12 milliGRAM(s) IntraThecal once  riTUXimab IVPB 746 milliGRAM(s) IV Intermittent once      STANDING MEDICATIONS  ondansetron Injectable 8 milliGRAM(s) IV Push every 8 hours  predniSONE   Tablet 120 milliGRAM(s) Oral two times a day  sodium chloride 0.9%. 1000 milliLiter(s) IV Continuous <Continuous>      PRN MEDICATIONS  acetaminophen   Tablet 650 milliGRAM(s) Oral every 6 hours PRN  acetaminophen   Tablet. 650 milliGRAM(s) Oral every 6 hours PRN  hydrocortisone sodium succinate Injectable 100 milliGRAM(s) IV Push once PRN        Vital Signs Last 24 Hrs  T(C): 36.6 (18 Mar 2018 13:30), Max: 36.7 (17 Mar 2018 17:28)  T(F): 97.8 (18 Mar 2018 13:30), Max: 98.1 (17 Mar 2018 17:28)  HR: 88 (18 Mar 2018 13:30) (80 - 93)  BP: 143/80 (18 Mar 2018 13:30) (130/68 - 154/83)  BP(mean): --  RR: 18 (18 Mar 2018 13:30) (18 - 18)  SpO2: 98% (18 Mar 2018 13:30) (95% - 98%)    PHYSICAL EXAM  General: NAD  HEENT: PERRLA, EOMOI, clear oropharynx, anicteric sclera, pink conjunctiva  Neck: supple  CV: (+) S1/S2 RRR  Lungs: clear to auscultation, no wheezes or rales  Abdomen: soft, non-tender, non-distended (+) BS  Ext: no clubbing, cyanosis or edema  Skin: no rashes and no petechiae  Neuro: alert and oriented X 3, no focal deficits  Central Line: Mediport C/D/I     RECENT CULTURES:         LABS:                        13.2   9.4   )-----------( 352      ( 18 Mar 2018 07:03 )             39.5         Mean Cell Volume : 89.4 fl  Mean Cell Hemoglobin : 29.9 pg  Mean Cell Hemoglobin Concentration : 33.4 gm/dL  Auto Neutrophil # : 7.0 K/uL  Auto Lymphocyte # : 2.0 K/uL  Auto Monocyte # : 0.4 K/uL  Auto Eosinophil # : 0.0 K/uL  Auto Basophil # : 0.0 K/uL  Auto Neutrophil % : 74.6 %  Auto Lymphocyte % : 20.9 %  Auto Monocyte % : 3.9 %  Auto Eosinophil % : 0.1 %  Auto Basophil % : 0.5 %      03-18    140  |  102  |  12  ----------------------------<  119<H>  3.7   |  24  |  0.97    Ca    9.4      18 Mar 2018 07:03  Phos  3.5     03-18  Mg     2.0     03-18    TPro  7.4  /  Alb  3.9  /  TBili  0.3  /  DBili  x   /  AST  24  /  ALT  79<H>  /  AlkPhos  72  03-18      Mg 2.0  Phos 3.5            Uric Acid 3.3        RADIOLOGY & ADDITIONAL STUDIES:

## 2018-03-19 DIAGNOSIS — R74.0 NONSPECIFIC ELEVATION OF LEVELS OF TRANSAMINASE AND LACTIC ACID DEHYDROGENASE [LDH]: ICD-10-CM

## 2018-03-19 LAB
ALBUMIN SERPL ELPH-MCNC: 3.6 G/DL — SIGNIFICANT CHANGE UP (ref 3.3–5)
ALP SERPL-CCNC: 62 U/L — SIGNIFICANT CHANGE UP (ref 40–120)
ALT FLD-CCNC: 68 U/L RC — HIGH (ref 10–45)
ANION GAP SERPL CALC-SCNC: 14 MMOL/L — SIGNIFICANT CHANGE UP (ref 5–17)
APPEARANCE CSF: CLEAR — SIGNIFICANT CHANGE UP
APPEARANCE SPUN FLD: COLORLESS — SIGNIFICANT CHANGE UP
APTT BLD: 22.6 SEC — LOW (ref 27.5–37.4)
AST SERPL-CCNC: 21 U/L — SIGNIFICANT CHANGE UP (ref 10–40)
BASOPHILS # BLD AUTO: 0 K/UL — SIGNIFICANT CHANGE UP (ref 0–0.2)
BASOPHILS NFR BLD AUTO: 0.7 % — SIGNIFICANT CHANGE UP (ref 0–2)
BILIRUB SERPL-MCNC: 0.4 MG/DL — SIGNIFICANT CHANGE UP (ref 0.2–1.2)
BLD GP AB SCN SERPL QL: NEGATIVE — SIGNIFICANT CHANGE UP
BUN SERPL-MCNC: 16 MG/DL — SIGNIFICANT CHANGE UP (ref 7–23)
CALCIUM SERPL-MCNC: 8.8 MG/DL — SIGNIFICANT CHANGE UP (ref 8.4–10.5)
CHLORIDE SERPL-SCNC: 99 MMOL/L — SIGNIFICANT CHANGE UP (ref 96–108)
CO2 SERPL-SCNC: 24 MMOL/L — SIGNIFICANT CHANGE UP (ref 22–31)
COLOR CSF: SIGNIFICANT CHANGE UP
CREAT SERPL-MCNC: 1 MG/DL — SIGNIFICANT CHANGE UP (ref 0.5–1.3)
EOSINOPHIL # BLD AUTO: 0 K/UL — SIGNIFICANT CHANGE UP (ref 0–0.5)
EOSINOPHIL NFR BLD AUTO: 0.2 % — SIGNIFICANT CHANGE UP (ref 0–6)
GLUCOSE CSF-MCNC: 81 MG/DL — HIGH (ref 40–70)
GLUCOSE SERPL-MCNC: 110 MG/DL — HIGH (ref 70–99)
HCT VFR BLD CALC: 39.1 % — SIGNIFICANT CHANGE UP (ref 39–50)
HGB BLD-MCNC: 12.8 G/DL — LOW (ref 13–17)
INR BLD: 0.96 RATIO — SIGNIFICANT CHANGE UP (ref 0.88–1.16)
LDH CSF L TO P-CCNC: 35 U/L — SIGNIFICANT CHANGE UP
LDH FLD-CCNC: 35 U/L — SIGNIFICANT CHANGE UP
LDH SERPL L TO P-CCNC: 176 U/L — SIGNIFICANT CHANGE UP (ref 50–242)
LYMPHOCYTES # BLD AUTO: 1.5 K/UL — SIGNIFICANT CHANGE UP (ref 1–3.3)
LYMPHOCYTES # BLD AUTO: 25.7 % — SIGNIFICANT CHANGE UP (ref 13–44)
LYMPHOCYTES # CSF: 86 % — HIGH (ref 40–80)
MAGNESIUM SERPL-MCNC: 1.9 MG/DL — SIGNIFICANT CHANGE UP (ref 1.6–2.6)
MCHC RBC-ENTMCNC: 29.1 PG — SIGNIFICANT CHANGE UP (ref 27–34)
MCHC RBC-ENTMCNC: 32.6 GM/DL — SIGNIFICANT CHANGE UP (ref 32–36)
MCV RBC AUTO: 89.1 FL — SIGNIFICANT CHANGE UP (ref 80–100)
MONOCYTES # BLD AUTO: 0.2 K/UL — SIGNIFICANT CHANGE UP (ref 0–0.9)
MONOCYTES NFR BLD AUTO: 3 % — SIGNIFICANT CHANGE UP (ref 2–14)
MONOS+MACROS NFR CSF: 14 % — LOW (ref 15–45)
NEUTROPHILS # BLD AUTO: 4.1 K/UL — SIGNIFICANT CHANGE UP (ref 1.8–7.4)
NEUTROPHILS # CSF: 0 % — SIGNIFICANT CHANGE UP (ref 0–6)
NEUTROPHILS NFR BLD AUTO: 70.4 % — SIGNIFICANT CHANGE UP (ref 43–77)
NRBC NFR CSF: 4 /UL — SIGNIFICANT CHANGE UP (ref 0–5)
PHOSPHATE SERPL-MCNC: 3.4 MG/DL — SIGNIFICANT CHANGE UP (ref 2.5–4.5)
PLATELET # BLD AUTO: 332 K/UL — SIGNIFICANT CHANGE UP (ref 150–400)
POTASSIUM SERPL-MCNC: 3.4 MMOL/L — LOW (ref 3.5–5.3)
POTASSIUM SERPL-SCNC: 3.4 MMOL/L — LOW (ref 3.5–5.3)
PROT CSF-MCNC: 34 MG/DL — SIGNIFICANT CHANGE UP (ref 15–45)
PROT SERPL-MCNC: 6.9 G/DL — SIGNIFICANT CHANGE UP (ref 6–8.3)
PROTHROM AB SERPL-ACNC: 10.5 SEC — SIGNIFICANT CHANGE UP (ref 9.8–12.7)
RBC # BLD: 4.38 M/UL — SIGNIFICANT CHANGE UP (ref 4.2–5.8)
RBC # CSF: 9 /UL — HIGH (ref 0–0)
RBC # FLD: 14.8 % — HIGH (ref 10.3–14.5)
RH IG SCN BLD-IMP: POSITIVE — SIGNIFICANT CHANGE UP
SODIUM SERPL-SCNC: 137 MMOL/L — SIGNIFICANT CHANGE UP (ref 135–145)
TM INTERPRETATION: SIGNIFICANT CHANGE UP
TUBE TYPE: SIGNIFICANT CHANGE UP
URATE SERPL-MCNC: 3.2 MG/DL — LOW (ref 3.4–8.8)
WBC # BLD: 5.9 K/UL — SIGNIFICANT CHANGE UP (ref 3.8–10.5)
WBC # FLD AUTO: 5.9 K/UL — SIGNIFICANT CHANGE UP (ref 3.8–10.5)

## 2018-03-19 PROCEDURE — 99223 1ST HOSP IP/OBS HIGH 75: CPT

## 2018-03-19 PROCEDURE — 77003 FLUOROGUIDE FOR SPINE INJECT: CPT | Mod: 26

## 2018-03-19 PROCEDURE — 99231 SBSQ HOSP IP/OBS SF/LOW 25: CPT | Mod: GC

## 2018-03-19 PROCEDURE — 88188 FLOWCYTOMETRY/READ 9-15: CPT

## 2018-03-19 PROCEDURE — 62272 THER SPI PNXR DRG CSF: CPT

## 2018-03-19 RX ORDER — POTASSIUM CHLORIDE 20 MEQ
20 PACKET (EA) ORAL ONCE
Qty: 0 | Refills: 0 | Status: COMPLETED | OUTPATIENT
Start: 2018-03-19 | End: 2018-03-19

## 2018-03-19 RX ORDER — METOCLOPRAMIDE HCL 10 MG
1 TABLET ORAL
Qty: 40 | Refills: 0 | OUTPATIENT
Start: 2018-03-19 | End: 2018-03-28

## 2018-03-19 RX ORDER — METHOTREXATE 2.5 MG/1
12 TABLET ORAL ONCE
Qty: 0 | Refills: 0 | Status: DISCONTINUED | OUTPATIENT
Start: 2018-03-19 | End: 2018-03-20

## 2018-03-19 RX ADMIN — ONDANSETRON 8 MILLIGRAM(S): 8 TABLET, FILM COATED ORAL at 06:46

## 2018-03-19 RX ADMIN — ATOVAQUONE 750 MILLIGRAM(S): 750 SUSPENSION ORAL at 06:46

## 2018-03-19 RX ADMIN — Medication 120 MILLIGRAM(S): at 18:00

## 2018-03-19 RX ADMIN — ATOVAQUONE 750 MILLIGRAM(S): 750 SUSPENSION ORAL at 18:00

## 2018-03-19 RX ADMIN — Medication 400 MILLIGRAM(S): at 14:23

## 2018-03-19 RX ADMIN — Medication 20 MILLIEQUIVALENT(S): at 09:11

## 2018-03-19 RX ADMIN — Medication 120 MILLIGRAM(S): at 06:46

## 2018-03-19 RX ADMIN — Medication 400 MILLIGRAM(S): at 06:46

## 2018-03-19 RX ADMIN — ONDANSETRON 8 MILLIGRAM(S): 8 TABLET, FILM COATED ORAL at 14:23

## 2018-03-19 RX ADMIN — ONDANSETRON 8 MILLIGRAM(S): 8 TABLET, FILM COATED ORAL at 22:01

## 2018-03-19 RX ADMIN — Medication 400 MILLIGRAM(S): at 22:00

## 2018-03-19 RX ADMIN — SODIUM CHLORIDE 50 MILLILITER(S): 9 INJECTION INTRAMUSCULAR; INTRAVENOUS; SUBCUTANEOUS at 06:47

## 2018-03-19 NOTE — ADVANCED PRACTICE NURSE CONSULT - ASSESSMENT
Pt admitted for chemotherapy oriented to unit routine alert and oriented times four. Pt ambulate to bathroom with steady gait no distress noted. Mediport to right chest wall access by primary nurse, remain  with + blood return and flush easily. Chemotherapy teaching done with help of  pt verbalized understanding, also given drug card which outline side effect. Pt lab result was reviewed by Dr Arevalo during rounds. Pt was pre-medicated with zofran 16 mg ivss . Pt s/p MUGA scan result on chart. Pt was started on Doxorubicin 12 mg/m2= 24 mg, Vincristine 0.4 mg/m2= 0.8 mg and Etoposide 60 mg/m2= 120 mg iv all to run over 24 hours in separate 500 cc normal saline at 1800.  Report given to primary nurse. Left pt in bed with visitors at bedside. Report given to primary nurse.

## 2018-03-19 NOTE — CONSULT NOTE ADULT - SUBJECTIVE AND OBJECTIVE BOX
I was asked to provide input on management of CSF involvement by neoplasm. bRIEFLY, this is a 41 yo RH man with PMHx HIV (+), now with DLBC lymphoma with CSF involvement.    He has been admitted for cycle #2 of DA-R-EPOCH regimen. Plan is to send home tomorrow after receiving IT MTX via LP today. (dose #2)    He has no neurological complaints. Specifically, he denies trouble thinking, headache, dizziness, double vision, trouble swallowing, hearing loss, sensory loss, or any weakness. His walking is normal.  He underwent biopsy of left axillary node on 2/7/18, revealing MYC (+) DLBCL. CSF sent on 2/23/18 showed 4 WBC despite 0 RBCs and (+) flow cytometry. The LDH at that time was 19.    PHYSICAL EXAMINATION    AF GG=441/88 HR=67 RR=18  Awake, alert and oriented to person, time & place IN [place]   Fluent speech - normal naming, repetition, and comprehension.    q	II: Visual fields are full. 	  q	II, IV, and VI: PERRLA. EOMs are normal; no nystagmus.	  q	V: Facial sensation is normal bilaterally.	  q	VII: Facial strength is normal bilaterally.	  q	VIII: Hearing is intact.	  q	IX, X: Palate is midline and gag intact.	  q	XI: SCM and trapezius have normal strength bilaterally.	  q	XII: Tongue is midline and there is no atrophy or fasciculations.	  q	Muscle tone: no evidence of rigidity or resistance.  Muscle strength: arms and legs, proximal and distal, flexors and extensors are normal. No atrophy or fasciculations.  All present and normal at biceps, triceps, and brachioradialis bilaterally. Knees and ankle jerks are normal bilaterally as well.   Rapid alternating movements are normal in the upper and lower extremities. Finger/nose & heel/knee/shin are normal bilaterally.    LABORATORY STUDIES    CSF DATE - WBC - RBC - LDH  3/15/18-4-2-29  3/5/18-3-2-25 (-) FLOW  2/28/18-4-92-22 (-) FLOW  2/23/18-4-0-19 (+) FLOW    5.9\12.8/332      /39.1\  137|99|16/110   3.4 |24 |1\

## 2018-03-19 NOTE — PROGRESS NOTE ADULT - PROBLEM SELECTOR PLAN 4
Prophylactic anticoagulation with Lovenox  on hold for planned LP in am     Contact information: [phone] Prophylactic anticoagulation with Lovenox  on hold for planned LP today    Contact information: [phone]

## 2018-03-19 NOTE — PROGRESS NOTE ADULT - SUBJECTIVE AND OBJECTIVE BOX
Clinical Indication: Lymphoma for intrathecal chemotherapy    PREPROCEDURE:    Patient presents for diagnostic lumbar puncture.  Risks and benefits were discussed with patient and/or health care proxy.  Risks include but are not limited to headache, bleeding, infection and nerve damage.    Patient and/or health care proxy understands and consents to procedure.    POSTPROCEDURE:    Lumbar puncture was performed at the L3-4  level using a 22 gauge needle using fluoroscopic guidance. 4 cc of CSF  was collected and hand delivered to the lab. 12 mg of Methotrexate were intrathecally injected without incident.    Patient tolerated the procedure well and left the department in stable condition.

## 2018-03-19 NOTE — CONSULT NOTE ADULT - ASSESSMENT
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    LYMPHOMATOUS MENINGITIS -- agree wtih plan for MTX via LP today. Recommend consideration for OMMAYA placement, as will likely have cytopenias making frequent LPs risky. Moreover, Ommaya will allow frequent and easy CSF access for evaluation of response to therapy.  INTRA-CSF CHEMOTHERAPY -- would treat with intra-CSF methotrexate 12mg twice weekly for four weeks, then weekly for four weeks, then re-assess to determine regimen required (it depends on response to therapy). Please send CSF for flow cytometry with each treatment.  DISPO -- happy to see as outpatient if needed.

## 2018-03-19 NOTE — PROGRESS NOTE ADULT - SUBJECTIVE AND OBJECTIVE BOX
Diagnosis: HIV associated DLBCL with CNS involvement     Protocol/Chemo Regimen: DA-R-EPOCH Cycle 2    Day: 4    Pt endorsed: No acute complaints     Review of Systems: Patient denies headache, dizziness, visual changes, chest pain, palpitations, SOB, abdominal pain, vomiting, diarrhea or dysuria.     Pain scale: 0    Diet: Regular     Allergies    No Known Allergies    Intolerances        ANTIMICROBIALS  abacavir 600 mG/dolutegravir 50 mG/lamiVUDine 300 mG 1 Tablet(s) Oral daily  acyclovir   Tablet 400 milliGRAM(s) Oral three times a day  atovaquone Suspension 750 milliGRAM(s) Oral every 12 hours      HEME/ONC MEDICATIONS  DOXOrubicin IVPB w/vinCRIStine 24 milliGRAM(s) IV Intermittent daily  etoposide IVPB 120 milliGRAM(s) IV Intermittent daily  methotrexate PF IntraThecal 12 milliGRAM(s) IntraThecal once        STANDING MEDICATIONS  ondansetron Injectable 8 milliGRAM(s) IV Push every 8 hours  predniSONE   Tablet 120 milliGRAM(s) Oral two times a day  sodium chloride 0.9%. 1000 milliLiter(s) IV Continuous <Continuous>      PRN MEDICATIONS  acetaminophen   Tablet 650 milliGRAM(s) Oral every 6 hours PRN  acetaminophen   Tablet. 650 milliGRAM(s) Oral every 6 hours PRN  hydrocortisone sodium succinate Injectable 100 milliGRAM(s) IV Push once PRN        Vital Signs Last 24 Hrs  T(C): 36.5 (19 Mar 2018 05:41), Max: 36.7 (18 Mar 2018 21:53)  T(F): 97.7 (19 Mar 2018 05:41), Max: 98.1 (19 Mar 2018 01:30)  HR: 67 (19 Mar 2018 05:41) (67 - 88)  BP: 138/88 (19 Mar 2018 05:41) (130/55 - 152/91)  BP(mean): --  RR: 18 (19 Mar 2018 05:41) (17 - 18)  SpO2: 98% (19 Mar 2018 05:41) (97% - 98%)  PHYSICAL EXAM  General: NAD  HEENT: PERRLA, EOMOI, clear oropharynx, anicteric sclera, pink conjunctiva  Neck: supple  CV: (+) S1/S2 RRR  Lungs: clear to auscultation, no wheezes or rales  Abdomen: soft, non-tender, non-distended (+) BS  Ext: no clubbing, cyanosis or edema  Skin: no rashes and no petechiae  Neuro: alert and oriented X 3, no focal deficits  Central Line: Mediport CDI    LABS:    Blood Cultures:                           12.8   5.9   )-----------( 332      ( 19 Mar 2018 07:03 )             39.1         Mean Cell Volume : 89.1 fl  Mean Cell Hemoglobin : 29.1 pg  Mean Cell Hemoglobin Concentration : 32.6 gm/dL  Auto Neutrophil # : 4.1 K/uL  Auto Lymphocyte # : 1.5 K/uL  Auto Monocyte # : 0.2 K/uL  Auto Eosinophil # : 0.0 K/uL  Auto Basophil # : 0.0 K/uL  Auto Neutrophil % : 70.4 %  Auto Lymphocyte % : 25.7 %  Auto Monocyte % : 3.0 %  Auto Eosinophil % : 0.2 %  Auto Basophil % : 0.7 %      03-19    137  |  99  |  16  ----------------------------<  110<H>  3.4<L>   |  24  |  1.00    Ca    8.8      19 Mar 2018 07:03  Phos  3.4     03-19  Mg     1.9     03-19    TPro  6.9  /  Alb  3.6  /  TBili  0.4  /  DBili  x   /  AST  21  /  ALT  68<H>  /  AlkPhos  62  03-19      Mg 1.9  Phos 3.4      PT/INR - ( 19 Mar 2018 07:03 )   PT: 10.5 sec;   INR: 0.96 ratio         PTT - ( 19 Mar 2018 07:03 )  PTT:22.6 sec      Uric Acid 3.2      RADIOLOGY & ADDITIONAL STUDIES: Diagnosis: HIV associated DLBCL with CNS involvement     Protocol/Chemo Regimen: DA-R-EPOCH Cycle 2    Day: 4    Pt endorsed: No acute complaints     Review of Systems: Patient denies headache, dizziness, visual changes, chest pain, palpitations, SOB, abdominal pain, vomiting, diarrhea or dysuria.     Pain scale: 0    Diet: Regular     Allergies    No Known Allergies    Intolerances        ANTIMICROBIALS  abacavir 600 mG/dolutegravir 50 mG/lamiVUDine 300 mG 1 Tablet(s) Oral daily  acyclovir   Tablet 400 milliGRAM(s) Oral three times a day  atovaquone Suspension 750 milliGRAM(s) Oral every 12 hours      HEME/ONC MEDICATIONS  DOXOrubicin IVPB w/vinCRIStine 24 milliGRAM(s) IV Intermittent daily  etoposide IVPB 120 milliGRAM(s) IV Intermittent daily  methotrexate PF IntraThecal 12 milliGRAM(s) IntraThecal once        STANDING MEDICATIONS  ondansetron Injectable 8 milliGRAM(s) IV Push every 8 hours  predniSONE   Tablet 120 milliGRAM(s) Oral two times a day  sodium chloride 0.9%. 1000 milliLiter(s) IV Continuous <Continuous>      PRN MEDICATIONS  acetaminophen   Tablet 650 milliGRAM(s) Oral every 6 hours PRN  acetaminophen   Tablet. 650 milliGRAM(s) Oral every 6 hours PRN  hydrocortisone sodium succinate Injectable 100 milliGRAM(s) IV Push once PRN        Vital Signs Last 24 Hrs  T(C): 36.5 (19 Mar 2018 05:41), Max: 36.7 (18 Mar 2018 21:53)  T(F): 97.7 (19 Mar 2018 05:41), Max: 98.1 (19 Mar 2018 01:30)  HR: 67 (19 Mar 2018 05:41) (67 - 88)  BP: 138/88 (19 Mar 2018 05:41) (130/55 - 152/91)  BP(mean): --  RR: 18 (19 Mar 2018 05:41) (17 - 18)  SpO2: 98% (19 Mar 2018 05:41) (97% - 98%)  PHYSICAL EXAM  General: NAD  HEENT: PERRLA, EOMOI, clear oropharynx, anicteric sclera, pink conjunctiva  Neck: supple  CV: (+) S1/S2 RRR  Lungs: clear to auscultation, no wheezes or rales  Abdomen: soft, non-tender, non-distended (+) BS  Ext: no clubbing, cyanosis or edema  Skin: no rashes and no petechiae  Neuro: alert and oriented X 3, no focal deficits  Central Line: Mediport CDI    LABS:                              12.8   5.9   )-----------( 332      ( 19 Mar 2018 07:03 )             39.1         Mean Cell Volume : 89.1 fl  Mean Cell Hemoglobin : 29.1 pg  Mean Cell Hemoglobin Concentration : 32.6 gm/dL  Auto Neutrophil # : 4.1 K/uL  Auto Lymphocyte # : 1.5 K/uL  Auto Monocyte # : 0.2 K/uL  Auto Eosinophil # : 0.0 K/uL  Auto Basophil # : 0.0 K/uL  Auto Neutrophil % : 70.4 %  Auto Lymphocyte % : 25.7 %  Auto Monocyte % : 3.0 %  Auto Eosinophil % : 0.2 %  Auto Basophil % : 0.7 %      03-19    137  |  99  |  16  ----------------------------<  110<H>  3.4<L>   |  24  |  1.00    Ca    8.8      19 Mar 2018 07:03  Phos  3.4     03-19  Mg     1.9     03-19    TPro  6.9  /  Alb  3.6  /  TBili  0.4  /  DBili  x   /  AST  21  /  ALT  68<H>  /  AlkPhos  62  03-19      Mg 1.9  Phos 3.4      PT/INR - ( 19 Mar 2018 07:03 )   PT: 10.5 sec;   INR: 0.96 ratio         PTT - ( 19 Mar 2018 07:03 )  PTT:22.6 sec      Uric Acid 3.2      RADIOLOGY & ADDITIONAL STUDIES:

## 2018-03-19 NOTE — PROGRESS NOTE ADULT - ATTENDING COMMENTS
42 yo male with HIV and dlbcl + myc rearrangement admitted for cycle 2 da-epoch, today is day 3. pt with hx of poor compliance with ARVs due to high out-of-pocket costs.  - s/p LP 3/15, f/u CSF flow cytometry results  - cont chemo as planned  - cont ARVs  - ID f/u for optimizing HIV management  - LP with IT MTX for 3/19  - anticipated d/c on 3/20 pending no complications  - hold lovenox px 24 hrs prior to LP 42 yo male with HIV and dlbcl + myc rearrangement admitted for cycle 2 da-epoch, today is day 3. pt with hx of poor compliance with ARVs due to high out-of-pocket costs.  - s/p LP 3/15, inadequate cell ct for CSF flow cytometry   - for LP with IT MTX today, will f/u CSF results  - cont chemo as planned  - cont ARVs  - SW /ID eval for optimizing HIV med management  - anticipated d/c on 3/20 pending no complications  - hold lovenox

## 2018-03-19 NOTE — PROGRESS NOTE ADULT - PROBLEM SELECTOR PLAN 1
s/p LP 3/15  Cycle 2 Da-R-EPOCH  CBC, CMP daily   repeat LP on 3/19   Transfuse and replace electrolytes as needed  Daily weight/strict I/O's   Antiemetics and IVF s/p LP 3/15  Cycle 2 Da-R-EPOCH  CBC, CMP daily   repeat LP on 3/19   Transfuse and replace electrolytes as needed  Daily weight/strict I/O's   Antiemetics and IVF  Neuro onc following

## 2018-03-20 VITALS
RESPIRATION RATE: 18 BRPM | TEMPERATURE: 99 F | HEART RATE: 80 BPM | SYSTOLIC BLOOD PRESSURE: 148 MMHG | OXYGEN SATURATION: 97 % | DIASTOLIC BLOOD PRESSURE: 82 MMHG

## 2018-03-20 LAB
ALBUMIN SERPL ELPH-MCNC: 3.5 G/DL — SIGNIFICANT CHANGE UP (ref 3.3–5)
ALP SERPL-CCNC: 56 U/L — SIGNIFICANT CHANGE UP (ref 40–120)
ALT FLD-CCNC: 70 U/L RC — HIGH (ref 10–45)
ANION GAP SERPL CALC-SCNC: 8 MMOL/L — SIGNIFICANT CHANGE UP (ref 5–17)
APTT BLD: 23.3 SEC — LOW (ref 27.5–37.4)
AST SERPL-CCNC: 22 U/L — SIGNIFICANT CHANGE UP (ref 10–40)
BASOPHILS # BLD AUTO: 0 K/UL — SIGNIFICANT CHANGE UP (ref 0–0.2)
BASOPHILS NFR BLD AUTO: 0.7 % — SIGNIFICANT CHANGE UP (ref 0–2)
BILIRUB SERPL-MCNC: 0.7 MG/DL — SIGNIFICANT CHANGE UP (ref 0.2–1.2)
BUN SERPL-MCNC: 14 MG/DL — SIGNIFICANT CHANGE UP (ref 7–23)
CALCIUM SERPL-MCNC: 8.8 MG/DL — SIGNIFICANT CHANGE UP (ref 8.4–10.5)
CHLORIDE SERPL-SCNC: 99 MMOL/L — SIGNIFICANT CHANGE UP (ref 96–108)
CO2 SERPL-SCNC: 30 MMOL/L — SIGNIFICANT CHANGE UP (ref 22–31)
CREAT SERPL-MCNC: 0.93 MG/DL — SIGNIFICANT CHANGE UP (ref 0.5–1.3)
EOSINOPHIL # BLD AUTO: 0 K/UL — SIGNIFICANT CHANGE UP (ref 0–0.5)
EOSINOPHIL NFR BLD AUTO: 0.1 % — SIGNIFICANT CHANGE UP (ref 0–6)
GLUCOSE SERPL-MCNC: 117 MG/DL — HIGH (ref 70–99)
HCT VFR BLD CALC: 37.3 % — LOW (ref 39–50)
HGB BLD-MCNC: 12.5 G/DL — LOW (ref 13–17)
LDH SERPL L TO P-CCNC: 155 U/L — SIGNIFICANT CHANGE UP (ref 50–242)
LYMPHOCYTES # BLD AUTO: 1.3 K/UL — SIGNIFICANT CHANGE UP (ref 1–3.3)
LYMPHOCYTES # BLD AUTO: 28.5 % — SIGNIFICANT CHANGE UP (ref 13–44)
MAGNESIUM SERPL-MCNC: 2 MG/DL — SIGNIFICANT CHANGE UP (ref 1.6–2.6)
MCHC RBC-ENTMCNC: 29.9 PG — SIGNIFICANT CHANGE UP (ref 27–34)
MCHC RBC-ENTMCNC: 33.6 GM/DL — SIGNIFICANT CHANGE UP (ref 32–36)
MCV RBC AUTO: 89.1 FL — SIGNIFICANT CHANGE UP (ref 80–100)
MONOCYTES # BLD AUTO: 0.1 K/UL — SIGNIFICANT CHANGE UP (ref 0–0.9)
MONOCYTES NFR BLD AUTO: 1.3 % — LOW (ref 2–14)
NEUTROPHILS # BLD AUTO: 3.1 K/UL — SIGNIFICANT CHANGE UP (ref 1.8–7.4)
NEUTROPHILS NFR BLD AUTO: 69.4 % — SIGNIFICANT CHANGE UP (ref 43–77)
PHOSPHATE SERPL-MCNC: 3.5 MG/DL — SIGNIFICANT CHANGE UP (ref 2.5–4.5)
PLATELET # BLD AUTO: 316 K/UL — SIGNIFICANT CHANGE UP (ref 150–400)
POTASSIUM SERPL-MCNC: 3.5 MMOL/L — SIGNIFICANT CHANGE UP (ref 3.5–5.3)
POTASSIUM SERPL-SCNC: 3.5 MMOL/L — SIGNIFICANT CHANGE UP (ref 3.5–5.3)
PROT SERPL-MCNC: 6.7 G/DL — SIGNIFICANT CHANGE UP (ref 6–8.3)
RBC # BLD: 4.18 M/UL — LOW (ref 4.2–5.8)
RBC # FLD: 14.5 % — SIGNIFICANT CHANGE UP (ref 10.3–14.5)
SODIUM SERPL-SCNC: 137 MMOL/L — SIGNIFICANT CHANGE UP (ref 135–145)
TM INTERPRETATION: SIGNIFICANT CHANGE UP
URATE SERPL-MCNC: 3.1 MG/DL — LOW (ref 3.4–8.8)
WBC # BLD: 4.5 K/UL — SIGNIFICANT CHANGE UP (ref 3.8–10.5)
WBC # FLD AUTO: 4.5 K/UL — SIGNIFICANT CHANGE UP (ref 3.8–10.5)

## 2018-03-20 PROCEDURE — 83735 ASSAY OF MAGNESIUM: CPT

## 2018-03-20 PROCEDURE — 86900 BLOOD TYPING SEROLOGIC ABO: CPT

## 2018-03-20 PROCEDURE — 86901 BLOOD TYPING SEROLOGIC RH(D): CPT

## 2018-03-20 PROCEDURE — 88184 FLOWCYTOMETRY/ TC 1 MARKER: CPT

## 2018-03-20 PROCEDURE — 83615 LACTATE (LD) (LDH) ENZYME: CPT

## 2018-03-20 PROCEDURE — 80053 COMPREHEN METABOLIC PANEL: CPT

## 2018-03-20 PROCEDURE — 89051 BODY FLUID CELL COUNT: CPT

## 2018-03-20 PROCEDURE — 88185 FLOWCYTOMETRY/TC ADD-ON: CPT

## 2018-03-20 PROCEDURE — 82945 GLUCOSE OTHER FLUID: CPT

## 2018-03-20 PROCEDURE — 62272 THER SPI PNXR DRG CSF: CPT

## 2018-03-20 PROCEDURE — 84100 ASSAY OF PHOSPHORUS: CPT

## 2018-03-20 PROCEDURE — 85027 COMPLETE CBC AUTOMATED: CPT

## 2018-03-20 PROCEDURE — 84550 ASSAY OF BLOOD/URIC ACID: CPT

## 2018-03-20 PROCEDURE — 93005 ELECTROCARDIOGRAM TRACING: CPT

## 2018-03-20 PROCEDURE — 84157 ASSAY OF PROTEIN OTHER: CPT

## 2018-03-20 PROCEDURE — 85610 PROTHROMBIN TIME: CPT

## 2018-03-20 PROCEDURE — 77003 FLUOROGUIDE FOR SPINE INJECT: CPT

## 2018-03-20 PROCEDURE — 99239 HOSP IP/OBS DSCHRG MGMT >30: CPT

## 2018-03-20 PROCEDURE — 86850 RBC ANTIBODY SCREEN: CPT

## 2018-03-20 PROCEDURE — 85730 THROMBOPLASTIN TIME PARTIAL: CPT

## 2018-03-20 RX ORDER — CYCLOPHOSPHAMIDE 100 MG
1791 VIAL (EA) INTRAVENOUS ONCE
Qty: 0 | Refills: 0 | Status: DISCONTINUED | OUTPATIENT
Start: 2018-03-20 | End: 2018-03-20

## 2018-03-20 RX ADMIN — Medication 120 MILLIGRAM(S): at 05:40

## 2018-03-20 RX ADMIN — Medication 400 MILLIGRAM(S): at 14:36

## 2018-03-20 RX ADMIN — Medication 400 MILLIGRAM(S): at 05:39

## 2018-03-20 RX ADMIN — ATOVAQUONE 750 MILLIGRAM(S): 750 SUSPENSION ORAL at 17:53

## 2018-03-20 RX ADMIN — SODIUM CHLORIDE 50 MILLILITER(S): 9 INJECTION INTRAMUSCULAR; INTRAVENOUS; SUBCUTANEOUS at 05:41

## 2018-03-20 RX ADMIN — ATOVAQUONE 750 MILLIGRAM(S): 750 SUSPENSION ORAL at 05:39

## 2018-03-20 RX ADMIN — ONDANSETRON 8 MILLIGRAM(S): 8 TABLET, FILM COATED ORAL at 14:35

## 2018-03-20 RX ADMIN — SODIUM CHLORIDE 50 MILLILITER(S): 9 INJECTION INTRAMUSCULAR; INTRAVENOUS; SUBCUTANEOUS at 07:40

## 2018-03-20 RX ADMIN — Medication 120 MILLIGRAM(S): at 17:53

## 2018-03-20 RX ADMIN — ONDANSETRON 8 MILLIGRAM(S): 8 TABLET, FILM COATED ORAL at 05:39

## 2018-03-20 NOTE — PROGRESS NOTE ADULT - PROBLEM SELECTOR PLAN 1
s/p LP 3/15  Cycle 2 Da-R-EPOCH  CBC, CMP daily  repeat LP on 3/19   Transfuse and replace electrolytes as needed  Daily weight/strict I/O's   Antiemetics and IVF  Neuro onc following

## 2018-03-20 NOTE — PROGRESS NOTE ADULT - PROBLEM SELECTOR PLAN 4
Prophylactic anticoagulation with Lovenox  on hold for planned LP today    Contact information: [phone]

## 2018-03-20 NOTE — PROGRESS NOTE ADULT - SUBJECTIVE AND OBJECTIVE BOX
· Subjective and Objective: 	  Diagnosis: HIV associated DLBCL with CNS involvement     Protocol/Chemo Regimen: DA-R-EPOCH Cycle 2    Day: 5    Pt endorsed: No acute complaints     Review of Systems: Patient denies headache, dizziness, visual changes, chest pain, palpitations, SOB, abdominal pain, vomiting, diarrhea or dysuria.     Pain scale: 0    Diet: Regular   Allergies    No Known Allergies    Intolerances        ANTIMICROBIALS  abacavir 600 mG/dolutegravir 50 mG/lamiVUDine 300 mG 1 Tablet(s) Oral daily  acyclovir   Tablet 400 milliGRAM(s) Oral three times a day  atovaquone Suspension 750 milliGRAM(s) Oral every 12 hours      HEME/ONC MEDICATIONS  DOXOrubicin IVPB w/vinCRIStine 24 milliGRAM(s) IV Intermittent daily  etoposide IVPB 120 milliGRAM(s) IV Intermittent daily  methotrexate PF IntraThecal 12 milliGRAM(s) IntraThecal once  methotrexate PF IntraThecal 12 milliGRAM(s) IntraThecal once  riTUXimab IVPB 746 milliGRAM(s) IV Intermittent once      STANDING MEDICATIONS  ondansetron Injectable 8 milliGRAM(s) IV Push every 8 hours  predniSONE   Tablet 120 milliGRAM(s) Oral two times a day  sodium chloride 0.9%. 1000 milliLiter(s) IV Continuous <Continuous>      PRN MEDICATIONS  acetaminophen   Tablet 650 milliGRAM(s) Oral every 6 hours PRN  acetaminophen   Tablet. 650 milliGRAM(s) Oral every 6 hours PRN  hydrocortisone sodium succinate Injectable 100 milliGRAM(s) IV Push once PRN        Vital Signs Last 24 Hrs  T(C): 36.3 (20 Mar 2018 05:45), Max: 37.7 (19 Mar 2018 21:54)  T(F): 97.3 (20 Mar 2018 05:45), Max: 99.8 (19 Mar 2018 21:54)  HR: 63 (20 Mar 2018 05:45) (63 - 95)  BP: 144/81 (20 Mar 2018 05:45) (121/57 - 153/83)  BP(mean): --  RR: 18 (20 Mar 2018 05:45) (18 - 18)  SpO2: 98% (20 Mar 2018 05:45) (97% - 99%)    PHYSICAL EXAM  General: NAD  HEENT: PERRLA, EOMOI, clear oropharynx, anicteric sclera, pink conjunctiva  Neck: supple  CV: (+) S1/S2 RRR  Lungs: clear to auscultation, no wheezes or rales  Abdomen: soft, non-tender, non-distended (+) BS  Ext: no edema  Skin: no rashes and no petechiae  Neuro: alert and oriented X 3, no focal deficits  Central Line: Mediport CDI    RECENT CULTURES:        LABS:                        12.5   4.5   )-----------( 316      ( 20 Mar 2018 06:53 )             37.3         Mean Cell Volume : 89.1 fl  Mean Cell Hemoglobin : 29.9 pg  Mean Cell Hemoglobin Concentration : 33.6 gm/dL  Auto Neutrophil # : 3.1 K/uL  Auto Lymphocyte # : 1.3 K/uL  Auto Monocyte # : 0.1 K/uL  Auto Eosinophil # : 0.0 K/uL  Auto Basophil # : 0.0 K/uL  Auto Neutrophil % : 69.4 %  Auto Lymphocyte % : 28.5 %  Auto Monocyte % : 1.3 %  Auto Eosinophil % : 0.1 %  Auto Basophil % : 0.7 %      03-20    137  |  99  |  14  ----------------------------<  117<H>  3.5   |  30  |  0.93    Ca    8.8      20 Mar 2018 06:53  Phos  3.5     03-20  Mg     2.0     03-20    TPro  6.7  /  Alb  3.5  /  TBili  0.7  /  DBili  x   /  AST  22  /  ALT  70<H>  /  AlkPhos  56  03-20      Mg 2.0  Phos 3.5      PT/INR - ( 19 Mar 2018 07:03 )   PT: 10.5 sec;   INR: 0.96 ratio         PTT - ( 20 Mar 2018 06:53 )  PTT:23.3 sec      Uric Acid 3.1        RADIOLOGY & ADDITIONAL STUDIES:

## 2018-03-20 NOTE — ADVANCED PRACTICE NURSE CONSULT - ASSESSMENT
Pt. seen in bed a/ox4,denies any discomfort at this time. Pt ambulate to bathroom with steady gait no distress noted. Mediport to right chest wall accessed  with + blood return and flushing  easily with 10 ml NS.Chemotherapy teaching done. Pt lab. result was reviewed by Dr Arevalo during rounds. Pt. on zofran given by primary RN  . Drug verification done by 2 RN.'s.At 1620 Cytoxan 900mg/u0=2884 mg IV started and infusing well over one hour to mediport via alaris pump.Left pt. comfortable in bed.Primary RN aware of present treatment.

## 2018-03-20 NOTE — PROGRESS NOTE ADULT - ATTENDING COMMENTS
42 yo male with HIV and dlbcl + myc rearrangement admitted for cycle 2 da-epoch, today is day 3. pt with hx of poor compliance with ARVs due to high out-of-pocket costs.  - s/p LP 3/15, inadequate cell ct for CSF flow cytometry   - for LP with IT MTX today, will f/u CSF results  - cont chemo as planned  - cont ARVs  - SW /ID eval for optimizing HIV med management  - anticipated d/c on 3/20 pending no complications  - hold lovenox 42 yo male with HIV and dlbcl + myc rearrangement admitted for cycle 2 da-epoch, today is day 3. pt with hx of poor compliance with ARVs due to high out-of-pocket costs.  - s/p LP 3/15, inadequate cell ct for CSF flow cytometry   - for LP with IT MTX 3/19, no complaints, f/u CSF  - for dc home today after complete chemo   - cont ARVs  - ID f/u OPD  - [place] f/u in 1 week  - LP due 3/22 OPD

## 2018-03-21 ENCOUNTER — FORM ENCOUNTER (OUTPATIENT)
Age: 43
End: 2018-03-21

## 2018-03-22 ENCOUNTER — APPOINTMENT (OUTPATIENT)
Dept: HEMATOLOGY ONCOLOGY | Facility: CLINIC | Age: 43
End: 2018-03-22
Payer: COMMERCIAL

## 2018-03-22 ENCOUNTER — APPOINTMENT (OUTPATIENT)
Dept: INFUSION THERAPY | Facility: HOSPITAL | Age: 43
End: 2018-03-22

## 2018-03-22 ENCOUNTER — OUTPATIENT (OUTPATIENT)
Dept: OUTPATIENT SERVICES | Facility: HOSPITAL | Age: 43
LOS: 1 days | End: 2018-03-22
Payer: COMMERCIAL

## 2018-03-22 ENCOUNTER — APPOINTMENT (OUTPATIENT)
Dept: RADIOLOGY | Facility: HOSPITAL | Age: 43
End: 2018-03-22

## 2018-03-22 ENCOUNTER — OTHER (OUTPATIENT)
Age: 43
End: 2018-03-22

## 2018-03-22 DIAGNOSIS — C85.10 UNSPECIFIED B-CELL LYMPHOMA, UNSPECIFIED SITE: ICD-10-CM

## 2018-03-22 DIAGNOSIS — M62.81 MUSCLE WEAKNESS (GENERALIZED): ICD-10-CM

## 2018-03-22 DIAGNOSIS — Z00.00 ENCOUNTER FOR GENERAL ADULT MEDICAL EXAMINATION WITHOUT ABNORMAL FINDINGS: ICD-10-CM

## 2018-03-22 LAB
APPEARANCE CSF: CLEAR — SIGNIFICANT CHANGE UP
APPEARANCE SPUN FLD: COLORLESS — SIGNIFICANT CHANGE UP
COLOR CSF: SIGNIFICANT CHANGE UP
GLUCOSE CSF-MCNC: 66 MG/DL — SIGNIFICANT CHANGE UP (ref 40–70)
LYMPHOCYTES # CSF: 99 % — HIGH (ref 40–80)
MONOS+MACROS NFR CSF: 1 % — LOW (ref 15–45)
NEUTROPHILS # CSF: 0 % — SIGNIFICANT CHANGE UP (ref 0–6)
NRBC NFR CSF: 2 /UL — SIGNIFICANT CHANGE UP (ref 0–5)
PROT CSF-MCNC: 31 MG/DL — SIGNIFICANT CHANGE UP (ref 15–45)
RBC # CSF: 1 /UL — HIGH (ref 0–0)
TUBE TYPE: SIGNIFICANT CHANGE UP

## 2018-03-22 PROCEDURE — 88188 FLOWCYTOMETRY/READ 9-15: CPT

## 2018-03-22 PROCEDURE — 77003 FLUOROGUIDE FOR SPINE INJECT: CPT | Mod: 26

## 2018-03-22 PROCEDURE — 88184 FLOWCYTOMETRY/ TC 1 MARKER: CPT

## 2018-03-22 PROCEDURE — 88185 FLOWCYTOMETRY/TC ADD-ON: CPT

## 2018-03-22 PROCEDURE — 62272 THER SPI PNXR DRG CSF: CPT

## 2018-03-22 PROCEDURE — 77003 FLUOROGUIDE FOR SPINE INJECT: CPT

## 2018-03-22 PROCEDURE — 72148 MRI LUMBAR SPINE W/O DYE: CPT

## 2018-03-22 PROCEDURE — 84157 ASSAY OF PROTEIN OTHER: CPT

## 2018-03-22 PROCEDURE — 82945 GLUCOSE OTHER FLUID: CPT

## 2018-03-22 PROCEDURE — 72148 MRI LUMBAR SPINE W/O DYE: CPT | Mod: 26

## 2018-03-22 PROCEDURE — 89051 BODY FLUID CELL COUNT: CPT

## 2018-03-22 RX ORDER — METHOTREXATE 2.5 MG/1
12 TABLET ORAL ONCE
Qty: 0 | Refills: 0 | Status: DISCONTINUED | OUTPATIENT
Start: 2018-03-22 | End: 2018-04-06

## 2018-03-23 ENCOUNTER — OUTPATIENT (OUTPATIENT)
Dept: OUTPATIENT SERVICES | Facility: HOSPITAL | Age: 43
LOS: 1 days | Discharge: HOME | End: 2018-03-23

## 2018-03-23 DIAGNOSIS — Z51.89 ENCOUNTER FOR OTHER SPECIFIED AFTERCARE: ICD-10-CM

## 2018-03-23 DIAGNOSIS — C85.88 OTHER SPECIFIED TYPES OF NON-HODGKIN LYMPHOMA, LYMPH NODES OF MULTIPLE SITES: ICD-10-CM

## 2018-03-26 ENCOUNTER — RESULT REVIEW (OUTPATIENT)
Age: 43
End: 2018-03-26

## 2018-03-26 ENCOUNTER — APPOINTMENT (OUTPATIENT)
Dept: HEMATOLOGY ONCOLOGY | Facility: CLINIC | Age: 43
End: 2018-03-26
Payer: COMMERCIAL

## 2018-03-26 VITALS
TEMPERATURE: 98.2 F | DIASTOLIC BLOOD PRESSURE: 70 MMHG | SYSTOLIC BLOOD PRESSURE: 120 MMHG | BODY MASS INDEX: 27.37 KG/M2 | HEART RATE: 110 BPM | WEIGHT: 185.19 LBS | RESPIRATION RATE: 16 BRPM | OXYGEN SATURATION: 99 %

## 2018-03-26 LAB
BASOPHILS # BLD AUTO: 0 K/UL — SIGNIFICANT CHANGE UP (ref 0–0.2)
BASOPHILS NFR BLD AUTO: 0.3 % — SIGNIFICANT CHANGE UP (ref 0–2)
EOSINOPHIL # BLD AUTO: 0 K/UL — SIGNIFICANT CHANGE UP (ref 0–0.5)
EOSINOPHIL NFR BLD AUTO: 0.7 % — SIGNIFICANT CHANGE UP (ref 0–6)
HCT VFR BLD CALC: 36.8 % — LOW (ref 39–50)
HGB BLD-MCNC: 12.5 G/DL — LOW (ref 13–17)
LYMPHOCYTES # BLD AUTO: 2.3 K/UL — SIGNIFICANT CHANGE UP (ref 1–3.3)
LYMPHOCYTES # BLD AUTO: 38.2 % — SIGNIFICANT CHANGE UP (ref 13–44)
MCHC RBC-ENTMCNC: 29.9 PG — SIGNIFICANT CHANGE UP (ref 27–34)
MCHC RBC-ENTMCNC: 33.8 G/DL — SIGNIFICANT CHANGE UP (ref 32–36)
MCV RBC AUTO: 88.5 FL — SIGNIFICANT CHANGE UP (ref 80–100)
MONOCYTES # BLD AUTO: 0.1 K/UL — SIGNIFICANT CHANGE UP (ref 0–0.9)
MONOCYTES NFR BLD AUTO: 1.9 % — LOW (ref 2–14)
NEUTROPHILS # BLD AUTO: 3.6 K/UL — SIGNIFICANT CHANGE UP (ref 1.8–7.4)
NEUTROPHILS NFR BLD AUTO: 59 % — SIGNIFICANT CHANGE UP (ref 43–77)
PLATELET # BLD AUTO: 90 K/UL — LOW (ref 150–400)
RBC # BLD: 4.16 M/UL — LOW (ref 4.2–5.8)
RBC # FLD: 14.5 % — SIGNIFICANT CHANGE UP (ref 10.3–14.5)
TM INTERPRETATION: SIGNIFICANT CHANGE UP
WBC # BLD: 6 K/UL — SIGNIFICANT CHANGE UP (ref 3.8–10.5)
WBC # FLD AUTO: 6 K/UL — SIGNIFICANT CHANGE UP (ref 3.8–10.5)

## 2018-03-26 PROCEDURE — 99214 OFFICE O/P EST MOD 30 MIN: CPT

## 2018-03-28 ENCOUNTER — FORM ENCOUNTER (OUTPATIENT)
Age: 43
End: 2018-03-28

## 2018-03-29 ENCOUNTER — RESULT REVIEW (OUTPATIENT)
Age: 43
End: 2018-03-29

## 2018-03-29 ENCOUNTER — APPOINTMENT (OUTPATIENT)
Dept: INFUSION THERAPY | Facility: HOSPITAL | Age: 43
End: 2018-03-29

## 2018-03-29 ENCOUNTER — OUTPATIENT (OUTPATIENT)
Dept: OUTPATIENT SERVICES | Facility: HOSPITAL | Age: 43
LOS: 1 days | End: 2018-03-29
Payer: COMMERCIAL

## 2018-03-29 ENCOUNTER — APPOINTMENT (OUTPATIENT)
Dept: RADIOLOGY | Facility: HOSPITAL | Age: 43
End: 2018-03-29

## 2018-03-29 ENCOUNTER — APPOINTMENT (OUTPATIENT)
Dept: HEMATOLOGY ONCOLOGY | Facility: CLINIC | Age: 43
End: 2018-03-29
Payer: COMMERCIAL

## 2018-03-29 VITALS
DIASTOLIC BLOOD PRESSURE: 80 MMHG | OXYGEN SATURATION: 98 % | RESPIRATION RATE: 16 BRPM | WEIGHT: 187.39 LBS | BODY MASS INDEX: 27.69 KG/M2 | TEMPERATURE: 99.4 F | HEART RATE: 123 BPM | SYSTOLIC BLOOD PRESSURE: 129 MMHG

## 2018-03-29 DIAGNOSIS — C90.00 MULTIPLE MYELOMA NOT HAVING ACHIEVED REMISSION: ICD-10-CM

## 2018-03-29 DIAGNOSIS — C85.80 OTHER SPECIFIED TYPES OF NON-HODGKIN LYMPHOMA, UNSPECIFIED SITE: ICD-10-CM

## 2018-03-29 LAB
ALBUMIN SERPL ELPH-MCNC: 4.2 G/DL
ALP BLD-CCNC: 80 U/L
ALT SERPL-CCNC: 66 U/L
ANION GAP SERPL CALC-SCNC: 11 MMOL/L
APPEARANCE CSF: CLEAR — SIGNIFICANT CHANGE UP
AST SERPL-CCNC: 22 U/L
BASOPHILS # BLD AUTO: 0.1 K/UL — SIGNIFICANT CHANGE UP (ref 0–0.2)
BASOPHILS NFR BLD AUTO: 0.8 % — SIGNIFICANT CHANGE UP (ref 0–2)
BILIRUB SERPL-MCNC: 0.2 MG/DL
BUN SERPL-MCNC: 14 MG/DL
CALCIUM SERPL-MCNC: 9.3 MG/DL
CHLORIDE SERPL-SCNC: 101 MMOL/L
CO2 SERPL-SCNC: 26 MMOL/L
COLOR CSF: SIGNIFICANT CHANGE UP
CREAT SERPL-MCNC: 1.07 MG/DL
EOSINOPHIL # BLD AUTO: 0.1 K/UL — SIGNIFICANT CHANGE UP (ref 0–0.5)
EOSINOPHIL NFR BLD AUTO: 0.7 % — SIGNIFICANT CHANGE UP (ref 0–6)
GLUCOSE CSF-MCNC: 78 MG/DL — HIGH (ref 40–70)
GLUCOSE SERPL-MCNC: 103 MG/DL
HCT VFR BLD CALC: 38.9 % — LOW (ref 39–50)
HGB BLD-MCNC: 13.1 G/DL — SIGNIFICANT CHANGE UP (ref 13–17)
LDH SERPL-CCNC: 221 U/L
LYMPHOCYTES # BLD AUTO: 21.5 % — SIGNIFICANT CHANGE UP (ref 13–44)
LYMPHOCYTES # BLD AUTO: 3.8 K/UL — HIGH (ref 1–3.3)
LYMPHOCYTES # CSF: 96 % — HIGH (ref 40–80)
MCHC RBC-ENTMCNC: 29.7 PG — SIGNIFICANT CHANGE UP (ref 27–34)
MCHC RBC-ENTMCNC: 33.7 G/DL — SIGNIFICANT CHANGE UP (ref 32–36)
MCV RBC AUTO: 88.2 FL — SIGNIFICANT CHANGE UP (ref 80–100)
MONOCYTES # BLD AUTO: 1.4 K/UL — HIGH (ref 0–0.9)
MONOCYTES NFR BLD AUTO: 7.7 % — SIGNIFICANT CHANGE UP (ref 2–14)
MONOS+MACROS NFR CSF: 4 % — LOW (ref 15–45)
NEUTROPHILS # BLD AUTO: 12.3 K/UL — HIGH (ref 1.8–7.4)
NEUTROPHILS # CSF: 0 % — SIGNIFICANT CHANGE UP (ref 0–6)
NEUTROPHILS NFR BLD AUTO: 69.3 % — SIGNIFICANT CHANGE UP (ref 43–77)
NRBC NFR CSF: 4 /UL — SIGNIFICANT CHANGE UP (ref 0–5)
PLATELET # BLD AUTO: 69 K/UL — LOW (ref 150–400)
POTASSIUM SERPL-SCNC: 4.7 MMOL/L
PROT CSF-MCNC: 35 MG/DL — SIGNIFICANT CHANGE UP (ref 15–45)
PROT SERPL-MCNC: 7 G/DL
RBC # BLD: 4.41 M/UL — SIGNIFICANT CHANGE UP (ref 4.2–5.8)
RBC # CSF: 0 /UL — SIGNIFICANT CHANGE UP (ref 0–0)
RBC # FLD: 15.5 % — HIGH (ref 10.3–14.5)
SODIUM SERPL-SCNC: 138 MMOL/L
TUBE TYPE: SIGNIFICANT CHANGE UP
WBC # BLD: 17.8 K/UL — HIGH (ref 3.8–10.5)
WBC # FLD AUTO: 17.8 K/UL — HIGH (ref 3.8–10.5)

## 2018-03-29 PROCEDURE — 89051 BODY FLUID CELL COUNT: CPT

## 2018-03-29 PROCEDURE — 99214 OFFICE O/P EST MOD 30 MIN: CPT

## 2018-03-29 PROCEDURE — 77003 FLUOROGUIDE FOR SPINE INJECT: CPT

## 2018-03-29 PROCEDURE — 82945 GLUCOSE OTHER FLUID: CPT

## 2018-03-29 PROCEDURE — 62272 THER SPI PNXR DRG CSF: CPT

## 2018-03-29 PROCEDURE — 77003 FLUOROGUIDE FOR SPINE INJECT: CPT | Mod: 26

## 2018-03-29 PROCEDURE — 88108 CYTOPATH CONCENTRATE TECH: CPT | Mod: 26

## 2018-03-29 PROCEDURE — 84157 ASSAY OF PROTEIN OTHER: CPT

## 2018-03-29 RX ORDER — METHOTREXATE 2.5 MG/1
12 TABLET ORAL ONCE
Qty: 0 | Refills: 0 | Status: DISCONTINUED | OUTPATIENT
Start: 2018-03-29 | End: 2018-04-13

## 2018-04-01 ENCOUNTER — OUTPATIENT (OUTPATIENT)
Dept: OUTPATIENT SERVICES | Facility: HOSPITAL | Age: 43
LOS: 1 days | Discharge: HOME | End: 2018-04-01

## 2018-04-01 ENCOUNTER — OUTPATIENT (OUTPATIENT)
Dept: OUTPATIENT SERVICES | Facility: HOSPITAL | Age: 43
LOS: 1 days | End: 2018-04-01
Payer: MEDICAID

## 2018-04-02 LAB — TM INTERPRETATION: SIGNIFICANT CHANGE UP

## 2018-04-05 ENCOUNTER — INPATIENT (INPATIENT)
Facility: HOSPITAL | Age: 43
LOS: 4 days | Discharge: HOME | DRG: 846 | End: 2018-04-10
Attending: INTERNAL MEDICINE | Admitting: INTERNAL MEDICINE
Payer: MEDICAID

## 2018-04-05 VITALS
RESPIRATION RATE: 18 BRPM | OXYGEN SATURATION: 100 % | HEART RATE: 120 BPM | TEMPERATURE: 98 F | DIASTOLIC BLOOD PRESSURE: 89 MMHG | SYSTOLIC BLOOD PRESSURE: 134 MMHG | WEIGHT: 185.63 LBS | HEIGHT: 70 IN

## 2018-04-05 DIAGNOSIS — C85.10 UNSPECIFIED B-CELL LYMPHOMA, UNSPECIFIED SITE: ICD-10-CM

## 2018-04-05 DIAGNOSIS — B20 HUMAN IMMUNODEFICIENCY VIRUS [HIV] DISEASE: ICD-10-CM

## 2018-04-05 DIAGNOSIS — E88.3 TUMOR LYSIS SYNDROME: ICD-10-CM

## 2018-04-05 LAB
ALBUMIN SERPL ELPH-MCNC: 4.2 G/DL — SIGNIFICANT CHANGE UP (ref 3.3–5)
ALP SERPL-CCNC: 96 U/L — SIGNIFICANT CHANGE UP (ref 40–120)
ALT FLD-CCNC: 59 U/L RC — HIGH (ref 10–45)
ANION GAP SERPL CALC-SCNC: 12 MMOL/L — SIGNIFICANT CHANGE UP (ref 5–17)
APTT BLD: 29.5 SEC — SIGNIFICANT CHANGE UP (ref 27.5–37.4)
AST SERPL-CCNC: 23 U/L — SIGNIFICANT CHANGE UP (ref 10–40)
BILIRUB SERPL-MCNC: 0.1 MG/DL — LOW (ref 0.2–1.2)
BLD GP AB SCN SERPL QL: NEGATIVE — SIGNIFICANT CHANGE UP
BUN SERPL-MCNC: 9 MG/DL — SIGNIFICANT CHANGE UP (ref 7–23)
CALCIUM SERPL-MCNC: 9.1 MG/DL — SIGNIFICANT CHANGE UP (ref 8.4–10.5)
CHLORIDE SERPL-SCNC: 103 MMOL/L — SIGNIFICANT CHANGE UP (ref 96–108)
CO2 SERPL-SCNC: 25 MMOL/L — SIGNIFICANT CHANGE UP (ref 22–31)
CREAT SERPL-MCNC: 1.16 MG/DL — SIGNIFICANT CHANGE UP (ref 0.5–1.3)
GLUCOSE SERPL-MCNC: 135 MG/DL — HIGH (ref 70–99)
HCT VFR BLD CALC: 35 % — LOW (ref 39–50)
HGB BLD-MCNC: 11.7 G/DL — LOW (ref 13–17)
INR BLD: 0.99 RATIO — SIGNIFICANT CHANGE UP (ref 0.88–1.16)
LDH SERPL L TO P-CCNC: 314 U/L — HIGH (ref 50–242)
MAGNESIUM SERPL-MCNC: 1.9 MG/DL — SIGNIFICANT CHANGE UP (ref 1.6–2.6)
MCHC RBC-ENTMCNC: 30 PG — SIGNIFICANT CHANGE UP (ref 27–34)
MCHC RBC-ENTMCNC: 33.4 GM/DL — SIGNIFICANT CHANGE UP (ref 32–36)
MCV RBC AUTO: 89.8 FL — SIGNIFICANT CHANGE UP (ref 80–100)
PHOSPHATE SERPL-MCNC: 2.5 MG/DL — SIGNIFICANT CHANGE UP (ref 2.5–4.5)
PLATELET # BLD AUTO: 204 K/UL — SIGNIFICANT CHANGE UP (ref 150–400)
POTASSIUM SERPL-MCNC: 4.2 MMOL/L — SIGNIFICANT CHANGE UP (ref 3.5–5.3)
POTASSIUM SERPL-SCNC: 4.2 MMOL/L — SIGNIFICANT CHANGE UP (ref 3.5–5.3)
PROT SERPL-MCNC: 7.5 G/DL — SIGNIFICANT CHANGE UP (ref 6–8.3)
PROTHROM AB SERPL-ACNC: 10.7 SEC — SIGNIFICANT CHANGE UP (ref 9.8–12.7)
RBC # BLD: 3.9 M/UL — LOW (ref 4.2–5.8)
RBC # FLD: 15.5 % — HIGH (ref 10.3–14.5)
RH IG SCN BLD-IMP: POSITIVE — SIGNIFICANT CHANGE UP
SODIUM SERPL-SCNC: 140 MMOL/L — SIGNIFICANT CHANGE UP (ref 135–145)
URATE SERPL-MCNC: 4.4 MG/DL — SIGNIFICANT CHANGE UP (ref 3.4–8.8)
WBC # BLD: 15.3 K/UL — HIGH (ref 3.8–10.5)
WBC # FLD AUTO: 15.3 K/UL — HIGH (ref 3.8–10.5)

## 2018-04-05 PROCEDURE — 99223 1ST HOSP IP/OBS HIGH 75: CPT

## 2018-04-05 PROCEDURE — 93010 ELECTROCARDIOGRAM REPORT: CPT

## 2018-04-05 PROCEDURE — 99222 1ST HOSP IP/OBS MODERATE 55: CPT | Mod: GC

## 2018-04-05 RX ORDER — RITUXIMAB 10 MG/ML
755 INJECTION, SOLUTION INTRAVENOUS ONCE
Qty: 0 | Refills: 0 | Status: COMPLETED | OUTPATIENT
Start: 2018-04-05 | End: 2018-04-05

## 2018-04-05 RX ORDER — DIPHENHYDRAMINE HCL 50 MG
25 CAPSULE ORAL ONCE
Qty: 0 | Refills: 0 | Status: COMPLETED | OUTPATIENT
Start: 2018-04-05 | End: 2018-04-05

## 2018-04-05 RX ORDER — SENNA PLUS 8.6 MG/1
2 TABLET ORAL AT BEDTIME
Qty: 0 | Refills: 0 | Status: DISCONTINUED | OUTPATIENT
Start: 2018-04-05 | End: 2018-04-10

## 2018-04-05 RX ORDER — ACETAMINOPHEN 500 MG
650 TABLET ORAL ONCE
Qty: 0 | Refills: 0 | Status: COMPLETED | OUTPATIENT
Start: 2018-04-05 | End: 2018-04-05

## 2018-04-05 RX ORDER — MEPERIDINE HYDROCHLORIDE 50 MG/ML
25 INJECTION INTRAMUSCULAR; INTRAVENOUS; SUBCUTANEOUS ONCE
Qty: 0 | Refills: 0 | Status: DISCONTINUED | OUTPATIENT
Start: 2018-04-05 | End: 2018-04-05

## 2018-04-05 RX ORDER — ACETAMINOPHEN 500 MG
650 TABLET ORAL EVERY 6 HOURS
Qty: 0 | Refills: 0 | Status: DISCONTINUED | OUTPATIENT
Start: 2018-04-05 | End: 2018-04-10

## 2018-04-05 RX ORDER — ACYCLOVIR SODIUM 500 MG
400 VIAL (EA) INTRAVENOUS THREE TIMES A DAY
Qty: 0 | Refills: 0 | Status: DISCONTINUED | OUTPATIENT
Start: 2018-04-05 | End: 2018-04-10

## 2018-04-05 RX ORDER — ATOVAQUONE 750 MG/5ML
750 SUSPENSION ORAL EVERY 12 HOURS
Qty: 0 | Refills: 0 | Status: DISCONTINUED | OUTPATIENT
Start: 2018-04-05 | End: 2018-04-10

## 2018-04-05 RX ORDER — HYDROCORTISONE 20 MG
100 TABLET ORAL ONCE
Qty: 0 | Refills: 0 | Status: DISCONTINUED | OUTPATIENT
Start: 2018-04-05 | End: 2018-04-10

## 2018-04-05 RX ORDER — DOCUSATE SODIUM 100 MG
100 CAPSULE ORAL
Qty: 0 | Refills: 0 | Status: DISCONTINUED | OUTPATIENT
Start: 2018-04-05 | End: 2018-04-10

## 2018-04-05 RX ORDER — ALLOPURINOL 300 MG
300 TABLET ORAL DAILY
Qty: 0 | Refills: 0 | Status: DISCONTINUED | OUTPATIENT
Start: 2018-04-05 | End: 2018-04-10

## 2018-04-05 RX ORDER — SODIUM CHLORIDE 9 MG/ML
1000 INJECTION INTRAMUSCULAR; INTRAVENOUS; SUBCUTANEOUS
Qty: 0 | Refills: 0 | Status: DISCONTINUED | OUTPATIENT
Start: 2018-04-05 | End: 2018-04-10

## 2018-04-05 RX ADMIN — Medication 25 MILLIGRAM(S): at 15:37

## 2018-04-05 RX ADMIN — Medication 300 MILLIGRAM(S): at 13:31

## 2018-04-05 RX ADMIN — SODIUM CHLORIDE 100 MILLILITER(S): 9 INJECTION INTRAMUSCULAR; INTRAVENOUS; SUBCUTANEOUS at 15:26

## 2018-04-05 RX ADMIN — Medication 650 MILLIGRAM(S): at 16:11

## 2018-04-05 RX ADMIN — Medication 100 MILLIGRAM(S): at 17:45

## 2018-04-05 RX ADMIN — Medication 400 MILLIGRAM(S): at 21:12

## 2018-04-05 RX ADMIN — Medication 650 MILLIGRAM(S): at 15:41

## 2018-04-05 RX ADMIN — SENNA PLUS 2 TABLET(S): 8.6 TABLET ORAL at 21:12

## 2018-04-05 RX ADMIN — Medication 400 MILLIGRAM(S): at 15:45

## 2018-04-05 RX ADMIN — ATOVAQUONE 750 MILLIGRAM(S): 750 SUSPENSION ORAL at 17:45

## 2018-04-05 NOTE — CONSULT NOTE ADULT - ATTENDING COMMENTS
Patient interviewed/examined during rounds.  Agree with history, ROS, PE, A/P as above.      Jeremias Millan MD   [phone]

## 2018-04-05 NOTE — ADVANCED PRACTICE NURSE CONSULT - ASSESSMENT
Cycle 3, day 1/6,Height and weight verified. Lab results as per Md lasha aware of same. Vital signs stable prior to chemotherapy, and  within accepectable parameters, see sunrise. Pt educated on the importance of saving urine, verbalizes good understanding. Pt education done re chemo regimen, drug effects and potential side effects,, pt states understanding. Reports that he/ has been tolerating chemotherapy well without side effects. Pt withRCW mediport line, site free from signs and symptoms of infection, good blood return obtained. Pre- medicated with benadryl 25 mg iv tylenol 650 mg po rituxan 375 mg/m2= 755 mg ivss began @ 100 ml/hr increased in increments of 100 to max rate 400 tolerated w/o incident vss [id number] Hr  afebrile p.o 100 % ra

## 2018-04-05 NOTE — H&P ADULT - HISTORY OF PRESENT ILLNESS
41M with HIV and diffuse large B-cell lymphoma on DA-EPOCH and intrathecal MTX is being admitted for IV chemotherapy. Pt denies fevers, chills, CP, SOB, endorses good appetite w/o dysphagia, odynophagia.  on ROS, endorses mild, transient lightheadedness for last 2 days 41M with HIV (last cd4 762) and diffuse large B-cell lymphoma on DA-EPOCH and intrathecal MTX is being admitted for IV chemotherapy. Pt denies fevers, chills, CP, SOB, endorses good appetite w/o dysphagia, odynophagia.  on ROS, endorses mild, transient lightheadedness for last 2 days

## 2018-04-05 NOTE — CONSULT NOTE ADULT - PROBLEM SELECTOR RECOMMENDATION 9
-Will continue with C3 da-R-EPOCH. Rituxan today. EPOCH starting tomorrow. CBC stable, will increase dose by 20% as compared to C2.  -Patient will get IT MTX tomorrow. Can be started on Lovenox ppx 24 hrs after IT MTX.  -Start IV hydration, Allopurinol.  -Continue on Mepron.  -CBC< CMP< LDH<uric acid daily.  -Appreciate medicine care.    Please page at [phone] with questions or concerns.

## 2018-04-05 NOTE — PATIENT PROFILE ADULT. - LANGUAGE ASSISTANCE NEEDED
Analy at bedside to translate/No-Patient/Caregiver offered and refused free interpretation services.

## 2018-04-05 NOTE — H&P ADULT - PROBLEM SELECTOR PLAN 1
plan for inpt chemo per heme recommendations  monitor for adverse effects  c/w acyclovir PPx plan for inpt chemo per heme recommendations- Rituximab today and EPOCH starting tomorrow  monitor for adverse effects  c/w acyclovir PPx; will add Mepron for PCP ppx   Senna/Colace to prevent constipation from vincristine

## 2018-04-05 NOTE — H&P ADULT - PROBLEM SELECTOR PLAN 3
pt is at risk for TLS- started on IVF and allopurinol. Will need daily tumor lysis lab monitoring (BMP, LDH, Mg, Phos, uric acid) pt is at risk for TLS- started on IVF and allopurinol. Will need daily tumor lysis lab monitoring (BMP, LDH, Mg, Phos, uric acid)  last MUGA with EF 64%

## 2018-04-05 NOTE — H&P ADULT - ATTENDING COMMENTS
DVT PPx- pt is ambulatory; added PAS- holding off on SQ agents in the event intrathecal chemo planed

## 2018-04-05 NOTE — CONSULT NOTE ADULT - SUBJECTIVE AND OBJECTIVE BOX
HPI:    Mr. Simmons, 42-year-old male who is HIV-positive and has been diagnosed with a high-grade B-cell, CD10+ lymphoma admitted for cycle 3 DA-R-EPOCH with IT MTX.    Patient is doing fine. He tolerated 2 cycles well. No headache, fatigue, chest pain, sob. However he noted that he is more dizzy today. He feels he has not been drinking well. No fever, chills, headache, chest pain, sob, abdominal pain. Bowel and bladder habits are normal.    Patient was initially seen at  [facility] with fever. He was found to have left axillary lymphadenopathy. He had not been fully compliant with his antiretroviral therapy until December of 2017 when he restarted his medication. His CD4 count in December 2017 was in 800 range.  CT of chest showed left axillary lymphadenopathy with a node measuring up to 5 cm. Minimal bibasilar and right middle lobe atelectasis was seen with a tiny left pleural effusion. Mild cardiomegaly was noted. CT of abdomen and pelvis showed no hepatosplenomegaly or mass and no significant adenopathy.. Gastric wall thickening was seen of unclear etiology.  Left axillary lymph node biopsy showed a high-grade CD10 positive B cell lymphoma expressing CD20 and BCL 6. 65% of nuclei showed a myc rearrangement. BCL-2 and BCL 6 were not rearranged. Ki-67 was about 90%.. LDH was 618, but I think it was tested using a higher range. CBC, creat were normal. Total protein and globulins were increased. He defervesced on antibiotics and was discharged home.     PAST MEDICAL & SURGICAL HISTORY:  HIV (human immunodeficiency virus infection)  High grade B-cell lymphoma  No significant past surgical history    FAMILY HISTORY:  No pertinent family history in first degree relatives    Social History  MEDICATIONS  (STANDING):  abacavir 600 mG/dolutegravir 50 mG/lamiVUDine 300 mG 1 Tablet(s) Oral daily  acyclovir   Tablet 400 milliGRAM(s) Oral three times a day  allopurinol 300 milliGRAM(s) Oral daily  atovaquone Suspension 750 milliGRAM(s) Oral every 12 hours  docusate sodium 100 milliGRAM(s) Oral two times a day  senna 2 Tablet(s) Oral at bedtime  sodium chloride 0.9%. 1000 milliLiter(s) (100 mL/Hr) IV Continuous <Continuous>    MEDICATIONS  (PRN):  acetaminophen   Tablet 650 milliGRAM(s) Oral every 6 hours PRN For Temp greater than 38 C (100.4 F)  hydrocortisone sodium succinate Injectable 100 milliGRAM(s) IV Push once PRN Rituxan reaction  meperidine     Injectable 25 milliGRAM(s) IV Push once PRN Rituxan reaction    No Known Allergies    REVIEW OF SYSTEMS    10 points ROS is negative except for the ones in HPI    Vital Signs Last 24 Hrs  T(C): 36.9 (05 Apr 2018 15:14), Max: 36.9 (05 Apr 2018 15:14)  T(F): 98.4 (05 Apr 2018 15:14), Max: 98.4 (05 Apr 2018 15:14)  HR: 106 (05 Apr 2018 15:14) (106 - 120)  BP: 133/89 (05 Apr 2018 15:14) (133/89 - 134/89)  BP(mean): --  RR: 18 (05 Apr 2018 15:14) (18 - 18)  SpO2: 99% (05 Apr 2018 15:14) (99% - 100%)    Physical Examination  Constitutional: Alert, oriented X3  Eyes: Normal  ENMT: normal  Neck: + lymphadenopathy  Respiratory: b/l clear to auscultation  Cardiovascular: S1,S2,M0  Abdomen: Soft, non tender, BS present  Gastrointestinal: soft, non tender, BS present  Extremities: soft, no edema  Neurological: intact  Skin: no petechiae  Musculoskeletal: normal  Psychiatric: normal                            11.7   15.3  )-----------( 204      ( 05 Apr 2018 13:52 )             35.0     04-05    140  |  103  |  9   ----------------------------<  135<H>  4.2   |  25  |  1.16    Ca    9.1      05 Apr 2018 13:52  Phos  2.5     04-05  Mg     1.9     04-05    TPro  7.5  /  Alb  4.2  /  TBili  0.1<L>  /  DBili  x   /  AST  23  /  ALT  59<H>  /  AlkPhos  96  04-05      Radiology  Assessment and Plan

## 2018-04-06 DIAGNOSIS — Z29.9 ENCOUNTER FOR PROPHYLACTIC MEASURES, UNSPECIFIED: ICD-10-CM

## 2018-04-06 LAB
ANION GAP SERPL CALC-SCNC: 11 MMOL/L — SIGNIFICANT CHANGE UP (ref 5–17)
APPEARANCE CSF: CLEAR — SIGNIFICANT CHANGE UP
APPEARANCE SPUN FLD: COLORLESS — SIGNIFICANT CHANGE UP
BACTERIAL AG PNL SER: 1 % — SIGNIFICANT CHANGE UP
BASOPHILS # BLD AUTO: 0.06 K/UL — SIGNIFICANT CHANGE UP (ref 0–0.2)
BASOPHILS NFR BLD AUTO: 0.5 % — SIGNIFICANT CHANGE UP (ref 0–2)
BUN SERPL-MCNC: 8 MG/DL — SIGNIFICANT CHANGE UP (ref 7–23)
CALCIUM SERPL-MCNC: 9.1 MG/DL — SIGNIFICANT CHANGE UP (ref 8.4–10.5)
CHLORIDE SERPL-SCNC: 103 MMOL/L — SIGNIFICANT CHANGE UP (ref 96–108)
CO2 SERPL-SCNC: 26 MMOL/L — SIGNIFICANT CHANGE UP (ref 22–31)
COLOR CSF: SIGNIFICANT CHANGE UP
CREAT SERPL-MCNC: 1.18 MG/DL — SIGNIFICANT CHANGE UP (ref 0.5–1.3)
EOSINOPHIL # BLD AUTO: 0.03 K/UL — SIGNIFICANT CHANGE UP (ref 0–0.5)
EOSINOPHIL NFR BLD AUTO: 0.2 % — SIGNIFICANT CHANGE UP (ref 0–6)
GLUCOSE CSF-MCNC: 68 MG/DL — SIGNIFICANT CHANGE UP (ref 40–70)
GLUCOSE SERPL-MCNC: 104 MG/DL — HIGH (ref 70–99)
HCT VFR BLD CALC: 34.3 % — LOW (ref 39–50)
HGB BLD-MCNC: 10.9 G/DL — LOW (ref 13–17)
IMM GRANULOCYTES NFR BLD AUTO: 4.1 % — HIGH (ref 0–1.5)
LDH CSF L TO P-CCNC: 35 U/L — SIGNIFICANT CHANGE UP
LDH FLD-CCNC: 35 U/L — SIGNIFICANT CHANGE UP
LDH SERPL L TO P-CCNC: 275 U/L — HIGH (ref 50–242)
LYMPHOCYTES # BLD AUTO: 18.1 % — SIGNIFICANT CHANGE UP (ref 13–44)
LYMPHOCYTES # BLD AUTO: 2.32 K/UL — SIGNIFICANT CHANGE UP (ref 1–3.3)
LYMPHOCYTES # CSF: 94 % — HIGH (ref 40–80)
MAGNESIUM SERPL-MCNC: 1.8 MG/DL — SIGNIFICANT CHANGE UP (ref 1.6–2.6)
MCHC RBC-ENTMCNC: 28.1 PG — SIGNIFICANT CHANGE UP (ref 27–34)
MCHC RBC-ENTMCNC: 31.8 GM/DL — LOW (ref 32–36)
MCV RBC AUTO: 88.4 FL — SIGNIFICANT CHANGE UP (ref 80–100)
MONOCYTES # BLD AUTO: 0.65 K/UL — SIGNIFICANT CHANGE UP (ref 0–0.9)
MONOCYTES NFR BLD AUTO: 5.1 % — SIGNIFICANT CHANGE UP (ref 2–14)
MONOS+MACROS NFR CSF: 3 % — LOW (ref 15–45)
NEUTROPHILS # BLD AUTO: 9.24 K/UL — HIGH (ref 1.8–7.4)
NEUTROPHILS # CSF: 2 % — SIGNIFICANT CHANGE UP (ref 0–6)
NEUTROPHILS NFR BLD AUTO: 72 % — SIGNIFICANT CHANGE UP (ref 43–77)
NRBC NFR CSF: 4 /UL — SIGNIFICANT CHANGE UP (ref 0–5)
PHOSPHATE SERPL-MCNC: 2.9 MG/DL — SIGNIFICANT CHANGE UP (ref 2.5–4.5)
PLATELET # BLD AUTO: 236 K/UL — SIGNIFICANT CHANGE UP (ref 150–400)
POTASSIUM SERPL-MCNC: 4 MMOL/L — SIGNIFICANT CHANGE UP (ref 3.5–5.3)
POTASSIUM SERPL-SCNC: 4 MMOL/L — SIGNIFICANT CHANGE UP (ref 3.5–5.3)
PROT CSF-MCNC: 40 MG/DL — SIGNIFICANT CHANGE UP (ref 15–45)
RBC # BLD: 3.88 M/UL — LOW (ref 4.2–5.8)
RBC # CSF: 4 /UL — HIGH (ref 0–0)
RBC # FLD: 16.2 % — HIGH (ref 10.3–14.5)
SODIUM SERPL-SCNC: 140 MMOL/L — SIGNIFICANT CHANGE UP (ref 135–145)
TUBE TYPE: SIGNIFICANT CHANGE UP
URATE SERPL-MCNC: 4.2 MG/DL — SIGNIFICANT CHANGE UP (ref 3.4–8.8)
WBC # BLD: 12.83 K/UL — HIGH (ref 3.8–10.5)
WBC # FLD AUTO: 12.83 K/UL — HIGH (ref 3.8–10.5)

## 2018-04-06 PROCEDURE — 99232 SBSQ HOSP IP/OBS MODERATE 35: CPT | Mod: GC

## 2018-04-06 PROCEDURE — 62272 THER SPI PNXR DRG CSF: CPT

## 2018-04-06 PROCEDURE — 77003 FLUOROGUIDE FOR SPINE INJECT: CPT | Mod: 26

## 2018-04-06 PROCEDURE — 88189 FLOWCYTOMETRY/READ 16 & >: CPT

## 2018-04-06 PROCEDURE — 99233 SBSQ HOSP IP/OBS HIGH 50: CPT

## 2018-04-06 RX ORDER — ETOPOSIDE 20 MG/ML
144 VIAL (ML) INTRAVENOUS DAILY
Qty: 0 | Refills: 0 | Status: DISCONTINUED | OUTPATIENT
Start: 2018-04-06 | End: 2018-04-10

## 2018-04-06 RX ORDER — FOSAPREPITANT DIMEGLUMINE 150 MG/5ML
150 INJECTION, POWDER, LYOPHILIZED, FOR SOLUTION INTRAVENOUS ONCE
Qty: 0 | Refills: 0 | Status: COMPLETED | OUTPATIENT
Start: 2018-04-06 | End: 2018-04-06

## 2018-04-06 RX ORDER — ONDANSETRON 8 MG/1
8 TABLET, FILM COATED ORAL EVERY 8 HOURS
Qty: 0 | Refills: 0 | Status: DISCONTINUED | OUTPATIENT
Start: 2018-04-06 | End: 2018-04-10

## 2018-04-06 RX ORDER — METOCLOPRAMIDE HCL 10 MG
10 TABLET ORAL EVERY 6 HOURS
Qty: 0 | Refills: 0 | Status: DISCONTINUED | OUTPATIENT
Start: 2018-04-06 | End: 2018-04-10

## 2018-04-06 RX ORDER — DOXORUBICIN HYDROCHLORIDE 2 MG/ML
28.8 INJECTION, SOLUTION INTRAVENOUS DAILY
Qty: 0 | Refills: 0 | Status: DISCONTINUED | OUTPATIENT
Start: 2018-04-06 | End: 2018-04-10

## 2018-04-06 RX ORDER — METHOTREXATE 2.5 MG/1
12 TABLET ORAL ONCE
Qty: 0 | Refills: 0 | Status: COMPLETED | OUTPATIENT
Start: 2018-04-06 | End: 2018-04-06

## 2018-04-06 RX ADMIN — ONDANSETRON 8 MILLIGRAM(S): 8 TABLET, FILM COATED ORAL at 15:55

## 2018-04-06 RX ADMIN — Medication 100 MILLIGRAM(S): at 05:04

## 2018-04-06 RX ADMIN — Medication 400 MILLIGRAM(S): at 05:04

## 2018-04-06 RX ADMIN — Medication 400 MILLIGRAM(S): at 16:31

## 2018-04-06 RX ADMIN — Medication 300 MILLIGRAM(S): at 11:11

## 2018-04-06 RX ADMIN — Medication 100 MILLIGRAM(S): at 18:43

## 2018-04-06 RX ADMIN — SENNA PLUS 2 TABLET(S): 8.6 TABLET ORAL at 22:10

## 2018-04-06 RX ADMIN — Medication 400 MILLIGRAM(S): at 22:11

## 2018-04-06 RX ADMIN — Medication 120 MILLIGRAM(S): at 18:43

## 2018-04-06 RX ADMIN — FOSAPREPITANT DIMEGLUMINE 300 MILLIGRAM(S): 150 INJECTION, POWDER, LYOPHILIZED, FOR SOLUTION INTRAVENOUS at 15:53

## 2018-04-06 RX ADMIN — ATOVAQUONE 750 MILLIGRAM(S): 750 SUSPENSION ORAL at 18:43

## 2018-04-06 RX ADMIN — ATOVAQUONE 750 MILLIGRAM(S): 750 SUSPENSION ORAL at 05:04

## 2018-04-06 NOTE — ADVANCED PRACTICE NURSE CONSULT - ASSESSMENT
Pt with day 1/5 tx, labs noted in sunrise, height, weight and bsa verified, okay to proceed with tx as per MD Preeshagul.  Pt with right chest wall mediport + blood return noted, no pain, redness or swelling noted at site.  Pt rec'd Rituxan on 4/5/  Pt s/p intrathecal MTX, no adverse reaction noted, lower back bandaid dry and intact.  Pt administered Emend 150 mg iv x 1 and zofran 8 mg ivp ATC.  Pt administered etoposide 72 mg/m2 = 144 mg civi at 22 ml/hr, doxorubicin 14.4 mg/m2 = 28.8 mg civi at 22 ml/hr and vincristine 0.4 mg/m2 = 0.8 mg civi at 22 ml/hr via locked pump.  Pt educated on chemo regimen and signs and symptoms to report on to area nurse and staff, pt verbalized understanding.  Emotional support provided.  Report given to area nurse.

## 2018-04-06 NOTE — PROGRESS NOTE ADULT - SUBJECTIVE AND OBJECTIVE BOX
Patient is a 42y old  Male who presents with a chief complaint of chemo (05 Apr 2018 13:00)      SUBJECTIVE / OVERNIGHT EVENTS:  no acute overnight events.  some lightheadedness, no vertigo  no n/v/d/constipation    MEDICATIONS  (STANDING):  abacavir 600 mG/dolutegravir 50 mG/lamiVUDine 300 mG 1 Tablet(s) Oral daily  acyclovir   Tablet 400 milliGRAM(s) Oral three times a day  allopurinol 300 milliGRAM(s) Oral daily  atovaquone Suspension 750 milliGRAM(s) Oral every 12 hours  docusate sodium 100 milliGRAM(s) Oral two times a day  senna 2 Tablet(s) Oral at bedtime  sodium chloride 0.9%. 1000 milliLiter(s) (100 mL/Hr) IV Continuous <Continuous>    MEDICATIONS  (PRN):  acetaminophen   Tablet 650 milliGRAM(s) Oral every 6 hours PRN For Temp greater than 38 C (100.4 F)  hydrocortisone sodium succinate Injectable 100 milliGRAM(s) IV Push once PRN Rituxan reaction  meperidine     Injectable 25 milliGRAM(s) IV Push once PRN Rituxan reaction      Vital Signs Last 24 Hrs  T(C): 36.9 (06 Apr 2018 07:59), Max: 36.9 (05 Apr 2018 15:14)  T(F): 98.4 (06 Apr 2018 07:59), Max: 98.4 (05 Apr 2018 15:14)  HR: 99 (06 Apr 2018 07:59) (97 - 106)  BP: 142/83 (06 Apr 2018 07:59) (123/75 - 142/83)  BP(mean): --  RR: 18 (06 Apr 2018 07:59) (18 - 18)  SpO2: 99% (06 Apr 2018 07:59) (98% - 99%)  CAPILLARY BLOOD GLUCOSE        I&O's Summary    05 Apr 2018 07:01  -  06 Apr 2018 07:00  --------------------------------------------------------  IN: 2785 mL / OUT: 2950 mL / NET: -165 mL    06 Apr 2018 07:01  -  06 Apr 2018 13:17  --------------------------------------------------------  IN: 240 mL / OUT: 1100 mL / NET: -860 mL        PHYSICAL EXAM:  GENERAL: NAD, well-developed  HEAD:  Atraumatic, Normocephalic  EYES: EOMI, conjunctiva and sclera clear  NECK: Supple, No JVD  CHEST/LUNG: Clear to auscultation bilaterally; No wheeze  HEART: Regular rate and rhythm; No murmurs, rubs, or gallops  ABDOMEN: Soft, Nontender, Nondistended; Bowel sounds present  EXTREMITIES:  2+ Peripheral Pulses, No clubbing, cyanosis, or edema  PSYCH: AAOx3    LABS:                        10.9   12.83 )-----------( 236      ( 06 Apr 2018 07:31 )             34.3     04-06    140  |  103  |  8   ----------------------------<  104<H>  4.0   |  26  |  1.18    Ca    9.1      06 Apr 2018 06:01  Phos  2.9     04-06  Mg     1.8     04-06    TPro  7.5  /  Alb  4.2  /  TBili  0.1<L>  /  DBili  x   /  AST  23  /  ALT  59<H>  /  AlkPhos  96  04-05    PT/INR - ( 05 Apr 2018 13:52 )   PT: 10.7 sec;   INR: 0.99 ratio         PTT - ( 05 Apr 2018 13:52 )  PTT:29.5 sec          RADIOLOGY & ADDITIONAL TESTS:    Imaging Personally Reviewed:    Consultant(s) Notes Reviewed:  heme    Care Discussed with Consultants/Other Providers:

## 2018-04-06 NOTE — PROGRESS NOTE ADULT - SUBJECTIVE AND OBJECTIVE BOX
CLINICAL INDICATION:   Large B-cell lymphoma    Patient presents for fluoroscopically guided lumbar puncture and intrathecal chemotherapy administration.      Risks and benefits were discussed with the patient and/or patient health care proxy.  Risks discussed included bleeding, infection, nerve damage, and headache.         Status post lumbar puncture at the L4-L5 level utilizing a 22G spinal needle.      4cc of clear CSF erebral spinal fluid was obtained.    12mg of methotrexate was intrathecally injected.      No immediate complications.

## 2018-04-06 NOTE — PROGRESS NOTE ADULT - SUBJECTIVE AND OBJECTIVE BOX
INTERVAL HPI/OVERNIGHT EVENTS:  Patient S&E at bedside. He is doing well. No dizziness or lightheadedness.    No o/n events, patient resting comfortably. No complaints at this time. Patient denies fever, chills, dizziness, weakness, CP, palpitations, SOB, cough, N/V/D/C, dysuria, changes in bowel movements, LE edema.    VITAL SIGNS:  T(F): 98.4 (04-06-18 @ 07:59)  HR: 99 (04-06-18 @ 07:59)  BP: 142/83 (04-06-18 @ 07:59)  RR: 18 (04-06-18 @ 07:59)  SpO2: 99% (04-06-18 @ 07:59)  Wt(kg): --    PHYSICAL EXAM:    Constitutional: WDWN, NAD,   Eyes: PERRL, EOMI, sclera non-icteric  Neck: supple, no masses, no JVD  Respiratory: CTA b/l, good air entry b/l  Cardiovascular: RRR, normal S1S2, no M/R/G  Gastrointestinal: soft, NTND, no masses palpable, BS normal in all four quadrants, no HSM  Extremities: WWP, no c/c/e  Neurological: AAOx3  Skin: Normal temperature    MEDICATIONS  (STANDING):  abacavir 600 mG/dolutegravir 50 mG/lamiVUDine 300 mG 1 Tablet(s) Oral daily  acyclovir   Tablet 400 milliGRAM(s) Oral three times a day  allopurinol 300 milliGRAM(s) Oral daily  atovaquone Suspension 750 milliGRAM(s) Oral every 12 hours  docusate sodium 100 milliGRAM(s) Oral two times a day  senna 2 Tablet(s) Oral at bedtime  sodium chloride 0.9%. 1000 milliLiter(s) (100 mL/Hr) IV Continuous <Continuous>    MEDICATIONS  (PRN):  acetaminophen   Tablet 650 milliGRAM(s) Oral every 6 hours PRN For Temp greater than 38 C (100.4 F)  hydrocortisone sodium succinate Injectable 100 milliGRAM(s) IV Push once PRN Rituxan reaction  meperidine     Injectable 25 milliGRAM(s) IV Push once PRN Rituxan reaction      Allergies    No Known Allergies    Intolerances        LABS:                        10.9   12.83 )-----------( 236      ( 06 Apr 2018 07:31 )             34.3     04-06    140  |  103  |  8   ----------------------------<  104<H>  4.0   |  26  |  1.18    Ca    9.1      06 Apr 2018 06:01  Phos  2.9     04-06  Mg     1.8     04-06    TPro  7.5  /  Alb  4.2  /  TBili  0.1<L>  /  DBili  x   /  AST  23  /  ALT  59<H>  /  AlkPhos  96  04-05    PT/INR - ( 05 Apr 2018 13:52 )   PT: 10.7 sec;   INR: 0.99 ratio         PTT - ( 05 Apr 2018 13:52 )  PTT:29.5 sec      RADIOLOGY & ADDITIONAL TESTS:  Studies reviewed.

## 2018-04-06 NOTE — PROGRESS NOTE ADULT - PROBLEM SELECTOR PLAN 1
patient admitted electively for C3 da-R-EPOCH  S/P Rituxan on 4/5  Patient to be started on EPOCH infusion today  Will also get IT MTX today.  -Continue with IV hydration, allopurinol, mepron.  -heme onc to schedule him for Neulasta at [facility] on 4/12.  -F/U with Dr. Cortes on d/c  -monitor for adverse effects  -c/w acyclovir PPx; Mepron for PCP ppx   -Senna/Colace to prevent constipation from vincristine

## 2018-04-07 LAB
ALBUMIN SERPL ELPH-MCNC: 4.4 G/DL — SIGNIFICANT CHANGE UP (ref 3.3–5)
ALP SERPL-CCNC: 89 U/L — SIGNIFICANT CHANGE UP (ref 40–120)
ALT FLD-CCNC: 63 U/L RC — HIGH (ref 10–45)
ANION GAP SERPL CALC-SCNC: 14 MMOL/L — SIGNIFICANT CHANGE UP (ref 5–17)
AST SERPL-CCNC: 20 U/L — SIGNIFICANT CHANGE UP (ref 10–40)
BASOPHILS # BLD AUTO: 0 K/UL — SIGNIFICANT CHANGE UP (ref 0–0.2)
BASOPHILS NFR BLD AUTO: 0 % — SIGNIFICANT CHANGE UP (ref 0–2)
BILIRUB SERPL-MCNC: 0.2 MG/DL — SIGNIFICANT CHANGE UP (ref 0.2–1.2)
BUN SERPL-MCNC: 9 MG/DL — SIGNIFICANT CHANGE UP (ref 7–23)
CALCIUM SERPL-MCNC: 9.6 MG/DL — SIGNIFICANT CHANGE UP (ref 8.4–10.5)
CHLORIDE SERPL-SCNC: 100 MMOL/L — SIGNIFICANT CHANGE UP (ref 96–108)
CO2 SERPL-SCNC: 22 MMOL/L — SIGNIFICANT CHANGE UP (ref 22–31)
CREAT SERPL-MCNC: 0.97 MG/DL — SIGNIFICANT CHANGE UP (ref 0.5–1.3)
EOSINOPHIL # BLD AUTO: 0 K/UL — SIGNIFICANT CHANGE UP (ref 0–0.5)
EOSINOPHIL NFR BLD AUTO: 0 % — SIGNIFICANT CHANGE UP (ref 0–6)
GLUCOSE SERPL-MCNC: 155 MG/DL — HIGH (ref 70–99)
HCT VFR BLD CALC: 36.7 % — LOW (ref 39–50)
HGB BLD-MCNC: 12.2 G/DL — LOW (ref 13–17)
LDH SERPL L TO P-CCNC: 309 U/L — HIGH (ref 50–242)
LYMPHOCYTES # BLD AUTO: 1.87 K/UL — SIGNIFICANT CHANGE UP (ref 1–3.3)
LYMPHOCYTES # BLD AUTO: 8 % — LOW (ref 13–44)
MAGNESIUM SERPL-MCNC: 2 MG/DL — SIGNIFICANT CHANGE UP (ref 1.6–2.6)
MANUAL SMEAR VERIFICATION: SIGNIFICANT CHANGE UP
MCHC RBC-ENTMCNC: 29.3 PG — SIGNIFICANT CHANGE UP (ref 27–34)
MCHC RBC-ENTMCNC: 33.2 GM/DL — SIGNIFICANT CHANGE UP (ref 32–36)
MCV RBC AUTO: 88.2 FL — SIGNIFICANT CHANGE UP (ref 80–100)
MONOCYTES # BLD AUTO: 0 K/UL — SIGNIFICANT CHANGE UP (ref 0–0.9)
MONOCYTES NFR BLD AUTO: 0 % — LOW (ref 2–14)
MYELOCYTES NFR BLD: 2 % — HIGH (ref 0–0)
NEUTROPHILS # BLD AUTO: 20.55 K/UL — HIGH (ref 1.8–7.4)
NEUTROPHILS NFR BLD AUTO: 88 % — HIGH (ref 43–77)
NRBC # BLD: 1 /100 — HIGH (ref 0–0)
PHOSPHATE SERPL-MCNC: 3.7 MG/DL — SIGNIFICANT CHANGE UP (ref 2.5–4.5)
PLAT MORPH BLD: NORMAL — SIGNIFICANT CHANGE UP
PLATELET # BLD AUTO: 309 K/UL — SIGNIFICANT CHANGE UP (ref 150–400)
POTASSIUM SERPL-MCNC: 4 MMOL/L — SIGNIFICANT CHANGE UP (ref 3.5–5.3)
POTASSIUM SERPL-SCNC: 4 MMOL/L — SIGNIFICANT CHANGE UP (ref 3.5–5.3)
PROT SERPL-MCNC: 8 G/DL — SIGNIFICANT CHANGE UP (ref 6–8.3)
RBC # BLD: 4.16 M/UL — LOW (ref 4.2–5.8)
RBC # FLD: 15.8 % — HIGH (ref 10.3–14.5)
RBC BLD AUTO: SIGNIFICANT CHANGE UP
SODIUM SERPL-SCNC: 136 MMOL/L — SIGNIFICANT CHANGE UP (ref 135–145)
URATE SERPL-MCNC: 2.6 MG/DL — LOW (ref 3.4–8.8)
URATE SERPL-MCNC: 3.3 MG/DL — LOW (ref 3.4–8.8)
VARIANT LYMPHS # BLD: 2 % — SIGNIFICANT CHANGE UP (ref 0–6)
WBC # BLD: 23.35 K/UL — HIGH (ref 3.8–10.5)
WBC # FLD AUTO: 23.35 K/UL — HIGH (ref 3.8–10.5)

## 2018-04-07 PROCEDURE — 99233 SBSQ HOSP IP/OBS HIGH 50: CPT

## 2018-04-07 PROCEDURE — 99232 SBSQ HOSP IP/OBS MODERATE 35: CPT

## 2018-04-07 RX ADMIN — ONDANSETRON 8 MILLIGRAM(S): 8 TABLET, FILM COATED ORAL at 05:53

## 2018-04-07 RX ADMIN — ATOVAQUONE 750 MILLIGRAM(S): 750 SUSPENSION ORAL at 05:54

## 2018-04-07 RX ADMIN — SENNA PLUS 2 TABLET(S): 8.6 TABLET ORAL at 21:22

## 2018-04-07 RX ADMIN — Medication 400 MILLIGRAM(S): at 05:54

## 2018-04-07 RX ADMIN — Medication 100 MILLIGRAM(S): at 05:54

## 2018-04-07 RX ADMIN — Medication 400 MILLIGRAM(S): at 21:22

## 2018-04-07 RX ADMIN — ONDANSETRON 8 MILLIGRAM(S): 8 TABLET, FILM COATED ORAL at 13:04

## 2018-04-07 RX ADMIN — SODIUM CHLORIDE 100 MILLILITER(S): 9 INJECTION INTRAMUSCULAR; INTRAVENOUS; SUBCUTANEOUS at 23:23

## 2018-04-07 RX ADMIN — Medication 120 MILLIGRAM(S): at 05:54

## 2018-04-07 RX ADMIN — Medication 100 MILLIGRAM(S): at 17:23

## 2018-04-07 RX ADMIN — ATOVAQUONE 750 MILLIGRAM(S): 750 SUSPENSION ORAL at 17:24

## 2018-04-07 RX ADMIN — Medication 120 MILLIGRAM(S): at 17:24

## 2018-04-07 RX ADMIN — ONDANSETRON 8 MILLIGRAM(S): 8 TABLET, FILM COATED ORAL at 21:22

## 2018-04-07 RX ADMIN — Medication 300 MILLIGRAM(S): at 13:05

## 2018-04-07 RX ADMIN — Medication 400 MILLIGRAM(S): at 13:05

## 2018-04-07 NOTE — PROGRESS NOTE ADULT - PROBLEM SELECTOR PLAN 1
-HIV associated. patient admitted electively for C3 da-R-EPOCH. S/P Rituxan on 4/5. Patient to be started on EPOCH infusion today. Will also get IT MTX today.  -Continue with IV hydration, allopurinol, mepron.  -Scheduled him for Neulasta at [facility] on 4/12 at 1:20 pm.  -F/U with Dr. Cortes on d/c.    Please page at [phone] with questions or concerns. -HIV associated. patient admitted electively for C3 da-R-EPOCH. S/P Rituxan on 4/5. Patient started on EPOCH infusion 3/6. also s/p IT MTX. Tolerating treatment well.   - continue monitoring daily CBC with diff, CMP  - monitor for fevers  -Continue with IV hydration, allopurinol, mepron.  -Scheduled him for Neulasta at [facility] on 4/12 at 1:20 pm.  -F/U with Dr. Cortes on d/c.    Please call [phone] with questions or concerns over weekend only

## 2018-04-07 NOTE — PROGRESS NOTE ADULT - PROBLEM SELECTOR PLAN 1
patient admitted electively for C3 da-R-EPOCH  S/P Rituxan on 4/5  Patient to be started on EPOCH infusion  -s/p IT MTX   -Continue with IV hydration - NS 100cc/hr (monitor lung exam), allopurinol, mepron.  -heme onc to schedule him for Neulasta at [facility] on 4/12.  -F/U with Dr. Cortes on d/c  -monitor for adverse effects  -c/w acyclovir PPx; Mepron for PCP ppx   -Senna/Colace to prevent constipation from vincristine

## 2018-04-07 NOTE — ADVANCED PRACTICE NURSE CONSULT - ASSESSMENT
Cycle # day 3/6 .Height and weight verified. Lab results as per Md lasha aware of same. Vital signs stable prior to the start of chemotherapy, see sunrise.  Pt educated on the importance of saving urine, verbalize understanding of same.Pt education done re chemo regimen, drug effects and potential side effects, written material provided, pt states understanding. Pt reports the he has tolerated previous chemotherapy without side effects.Pt with line, site free from signs and symptoms of infection, positive blood return obtained. Pt on zofran 8mg ivp q8 completed previous day infusion with no adverse effects pt started on day 3 of infusion etoposide 72mg/l3=322za CI at 23cchr and doxorubicin 14.4mg/m2=28.8mg mixed with vincristine 0.4mg/m2=0.8mg CI at 23cc/hr intiated at 1700 pt resting in bed report given to the area nurse pt made comfortable Cycle #3 day 3/5 . Lab results as per Md lasha aware of same. Vital signs stable prior to the start of chemotherapy, see sunrise.  Pt educated on the importance of saving urine, verbalize understanding of same.Pt education done re chemo regimen, drug effects and potential side effects,pt states understanding. Pt reports the he has tolerated previous chemotherapy without side effects.Pt with rt chest wall mediport  site free from signs and symptoms of infection, positive blood return obtained. Pt on zofran 8mg ivp q8 completed previous day infusion with no adverse effects pt started on day 3 of infusion etoposide 72mg/z9=264gx CI at 23cchr and doxorubicin 14.4mg/m2=28.8mg mixed with vincristine 0.4mg/m2=0.8mg CI at 23cc/hr intiated at 1700 pt resting in bed report given to the area nurse pt made comfortable

## 2018-04-07 NOTE — PROGRESS NOTE ADULT - SUBJECTIVE AND OBJECTIVE BOX
Chief Complaint: DLBCL     INTERVAL HPI/OVERNIGHT EVENTS:    MEDICATIONS  (STANDING):  abacavir 600 mG/dolutegravir 50 mG/lamiVUDine 300 mG 1 Tablet(s) Oral daily  acyclovir   Tablet 400 milliGRAM(s) Oral three times a day  allopurinol 300 milliGRAM(s) Oral daily  atovaquone Suspension 750 milliGRAM(s) Oral every 12 hours  docusate sodium 100 milliGRAM(s) Oral two times a day  DOXOrubicin IVPB w/vinCRIStine 28.8 milliGRAM(s) IV Intermittent daily  etoposide IVPB 144 milliGRAM(s) IV Intermittent daily  ondansetron Injectable 8 milliGRAM(s) IV Push every 8 hours  predniSONE   Tablet 120 milliGRAM(s) Oral two times a day  senna 2 Tablet(s) Oral at bedtime  sodium chloride 0.9%. 1000 milliLiter(s) (100 mL/Hr) IV Continuous <Continuous>    MEDICATIONS  (PRN):  acetaminophen   Tablet 650 milliGRAM(s) Oral every 6 hours PRN For Temp greater than 38 C (100.4 F)  hydrocortisone sodium succinate Injectable 100 milliGRAM(s) IV Push once PRN Rituxan reaction  metoclopramide Injectable 10 milliGRAM(s) IV Push every 6 hours PRN nausea/vomiting      Allergies    No Known Allergies    Intolerances        ROS: as above     Vital Signs Last 24 Hrs  T(C): 36.4 (07 Apr 2018 07:59), Max: 37.1 (06 Apr 2018 16:22)  T(F): 97.6 (07 Apr 2018 07:59), Max: 98.8 (06 Apr 2018 16:22)  HR: 96 (07 Apr 2018 07:59) (89 - 103)  BP: 133/77 (07 Apr 2018 07:59) (126/68 - 136/76)  BP(mean): --  RR: 18 (07 Apr 2018 07:59) (18 - 18)  SpO2: 96% (07 Apr 2018 07:59) (96% - 98%)    Physical Exam:   AAO x 3, NAD   RRR S1S2  CTA b/l   soft NTNDBS+  no c/c/e    LABS:                        10.9   12.83 )-----------( 236      ( 06 Apr 2018 07:31 )             34.3     04-07    136  |  100  |  9   ----------------------------<  155<H>  4.0   |  22  |  0.97    Ca    9.6      07 Apr 2018 07:32  Phos  3.7     04-07  Mg     2.0     04-07    TPro  8.0  /  Alb  4.4  /  TBili  0.2  /  DBili  x   /  AST  20  /  ALT  63<H>  /  AlkPhos  89  04-07    PT/INR - ( 05 Apr 2018 13:52 )   PT: 10.7 sec;   INR: 0.99 ratio         PTT - ( 05 Apr 2018 13:52 )  PTT:29.5 sec Chief Complaint: DLBCL     INTERVAL HPI/OVERNIGHT EVENTS: Tolerating chemo well thus far. Denies any c/o. Sister at bedside. No N/V/C/D, fevers/chills, no CP/SOB    MEDICATIONS  (STANDING):  abacavir 600 mG/dolutegravir 50 mG/lamiVUDine 300 mG 1 Tablet(s) Oral daily  acyclovir   Tablet 400 milliGRAM(s) Oral three times a day  allopurinol 300 milliGRAM(s) Oral daily  atovaquone Suspension 750 milliGRAM(s) Oral every 12 hours  docusate sodium 100 milliGRAM(s) Oral two times a day  DOXOrubicin IVPB w/vinCRIStine 28.8 milliGRAM(s) IV Intermittent daily  etoposide IVPB 144 milliGRAM(s) IV Intermittent daily  ondansetron Injectable 8 milliGRAM(s) IV Push every 8 hours  predniSONE   Tablet 120 milliGRAM(s) Oral two times a day  senna 2 Tablet(s) Oral at bedtime  sodium chloride 0.9%. 1000 milliLiter(s) (100 mL/Hr) IV Continuous <Continuous>    MEDICATIONS  (PRN):  acetaminophen   Tablet 650 milliGRAM(s) Oral every 6 hours PRN For Temp greater than 38 C (100.4 F)  hydrocortisone sodium succinate Injectable 100 milliGRAM(s) IV Push once PRN Rituxan reaction  metoclopramide Injectable 10 milliGRAM(s) IV Push every 6 hours PRN nausea/vomiting      Allergies    No Known Allergies    Intolerances        ROS: as above     Vital Signs Last 24 Hrs  T(C): 36.4 (07 Apr 2018 07:59), Max: 37.1 (06 Apr 2018 16:22)  T(F): 97.6 (07 Apr 2018 07:59), Max: 98.8 (06 Apr 2018 16:22)  HR: 96 (07 Apr 2018 07:59) (89 - 103)  BP: 133/77 (07 Apr 2018 07:59) (126/68 - 136/76)  BP(mean): --  RR: 18 (07 Apr 2018 07:59) (18 - 18)  SpO2: 96% (07 Apr 2018 07:59) (96% - 98%)    Physical Exam:   AAO x 3, NAD   RRR S1S2  CTA b/l   soft NTNDBS+  no c/c/e    LABS:                        10.9   12.83 )-----------( 236      ( 06 Apr 2018 07:31 )             34.3     04-07    136  |  100  |  9   ----------------------------<  155<H>  4.0   |  22  |  0.97    Ca    9.6      07 Apr 2018 07:32  Phos  3.7     04-07  Mg     2.0     04-07    TPro  8.0  /  Alb  4.4  /  TBili  0.2  /  DBili  x   /  AST  20  /  ALT  63<H>  /  AlkPhos  89  04-07    PT/INR - ( 05 Apr 2018 13:52 )   PT: 10.7 sec;   INR: 0.99 ratio         PTT - ( 05 Apr 2018 13:52 )  PTT:29.5 sec

## 2018-04-07 NOTE — PROGRESS NOTE ADULT - SUBJECTIVE AND OBJECTIVE BOX
Patient is a 42y old  Male who presents with a chief complaint of chemo (05 Apr 2018 13:00)        SUBJECTIVE / OVERNIGHT EVENTS:  Overnight, pt seen at bedside.  Denies any complaints. Denies n/v, cp, sob, abd pain, f/chills.    CAPILLARY BLOOD GLUCOSE    I&O's Summary    06 Apr 2018 07:01  -  07 Apr 2018 07:00  --------------------------------------------------------  IN: 3289 mL / OUT: 3700 mL / NET: -411 mL    07 Apr 2018 07:01  -  07 Apr 2018 14:36  --------------------------------------------------------  IN: 600 mL / OUT: 1000 mL / NET: -400 mL    Vital Signs Last 24 Hrs  T(C): 36.4 (07 Apr 2018 07:59), Max: 37.1 (06 Apr 2018 16:22)  T(F): 97.6 (07 Apr 2018 07:59), Max: 98.8 (06 Apr 2018 16:22)  HR: 96 (07 Apr 2018 07:59) (89 - 103)  BP: 133/77 (07 Apr 2018 07:59) (126/68 - 136/76)  BP(mean): --  RR: 18 (07 Apr 2018 07:59) (18 - 18)  SpO2: 96% (07 Apr 2018 07:59) (96% - 98%)    PHYSICAL EXAM:  GENERAL:  Well appearing M, in NAD  HEAD:  NCAT  EYES: PERRLA  NECK: Supple  CHEST/LUNG: CTA B/L. No w/r/r.  HEART: Reg rate. Normal S1, S2. No m/r/g.   ABDOMEN: SNTND. Bowel sounds present  EXTREMITIES:  2+ Peripheral Pulses, No clubbing, cyanosis, edema.  PSYCH: appropriate affect      LABS:                        12.2   23.35 )-----------( 309      ( 07 Apr 2018 10:37 )             36.7     04-07    136  |  100  |  9   ----------------------------<  155<H>  4.0   |  22  |  0.97    Ca    9.6      07 Apr 2018 07:32  Phos  3.7     04-07  Mg     2.0     04-07    TPro  8.0  /  Alb  4.4  /  TBili  0.2  /  DBili  x   /  AST  20  /  ALT  63<H>  /  AlkPhos  89  04-07    RADIOLOGY & ADDITIONAL TESTS:  Consultant(s) Notes Reviewed:  heme onc  Care Discussed with Consultants/Other Providers: Floor NP    MEDICATIONS  (STANDING):  abacavir 600 mG/dolutegravir 50 mG/lamiVUDine 300 mG 1 Tablet(s) Oral daily  acyclovir   Tablet 400 milliGRAM(s) Oral three times a day  allopurinol 300 milliGRAM(s) Oral daily  atovaquone Suspension 750 milliGRAM(s) Oral every 12 hours  docusate sodium 100 milliGRAM(s) Oral two times a day  DOXOrubicin IVPB w/vinCRIStine 28.8 milliGRAM(s) IV Intermittent daily  etoposide IVPB 144 milliGRAM(s) IV Intermittent daily  ondansetron Injectable 8 milliGRAM(s) IV Push every 8 hours  predniSONE   Tablet 120 milliGRAM(s) Oral two times a day  senna 2 Tablet(s) Oral at bedtime  sodium chloride 0.9%. 1000 milliLiter(s) (100 mL/Hr) IV Continuous <Continuous>    MEDICATIONS  (PRN):  acetaminophen   Tablet 650 milliGRAM(s) Oral every 6 hours PRN For Temp greater than 38 C (100.4 F)  hydrocortisone sodium succinate Injectable 100 milliGRAM(s) IV Push once PRN Rituxan reaction  metoclopramide Injectable 10 milliGRAM(s) IV Push every 6 hours PRN nausea/vomiting

## 2018-04-07 NOTE — PROGRESS NOTE ADULT - ASSESSMENT
Mr. Simmons, is a 43 y/o M, with h/o HIV associated DLBCL admitted for C3 da-R-EPOCH Mr. Simmons, is a 43 y/o M, with h/o HIV associated DLBCL admitted for C3 da-R-EPOCH, D2 today

## 2018-04-08 LAB
ALBUMIN SERPL ELPH-MCNC: 4 G/DL — SIGNIFICANT CHANGE UP (ref 3.3–5)
ALP SERPL-CCNC: 83 U/L — SIGNIFICANT CHANGE UP (ref 40–120)
ALT FLD-CCNC: 52 U/L RC — HIGH (ref 10–45)
ANION GAP SERPL CALC-SCNC: 14 MMOL/L — SIGNIFICANT CHANGE UP (ref 5–17)
AST SERPL-CCNC: 22 U/L — SIGNIFICANT CHANGE UP (ref 10–40)
BILIRUB SERPL-MCNC: 0.2 MG/DL — SIGNIFICANT CHANGE UP (ref 0.2–1.2)
BUN SERPL-MCNC: 11 MG/DL — SIGNIFICANT CHANGE UP (ref 7–23)
CALCIUM SERPL-MCNC: 9.6 MG/DL — SIGNIFICANT CHANGE UP (ref 8.4–10.5)
CHLORIDE SERPL-SCNC: 100 MMOL/L — SIGNIFICANT CHANGE UP (ref 96–108)
CO2 SERPL-SCNC: 24 MMOL/L — SIGNIFICANT CHANGE UP (ref 22–31)
CREAT SERPL-MCNC: 0.97 MG/DL — SIGNIFICANT CHANGE UP (ref 0.5–1.3)
GLUCOSE SERPL-MCNC: 130 MG/DL — HIGH (ref 70–99)
HCT VFR BLD CALC: 33 % — LOW (ref 39–50)
HGB BLD-MCNC: 11.5 G/DL — LOW (ref 13–17)
LDH SERPL L TO P-CCNC: 277 U/L — HIGH (ref 50–242)
MCHC RBC-ENTMCNC: 29.2 PG — SIGNIFICANT CHANGE UP (ref 27–34)
MCHC RBC-ENTMCNC: 34.8 GM/DL — SIGNIFICANT CHANGE UP (ref 32–36)
MCV RBC AUTO: 83.8 FL — SIGNIFICANT CHANGE UP (ref 80–100)
PLATELET # BLD AUTO: 345 K/UL — SIGNIFICANT CHANGE UP (ref 150–400)
POTASSIUM SERPL-MCNC: 3.8 MMOL/L — SIGNIFICANT CHANGE UP (ref 3.5–5.3)
POTASSIUM SERPL-SCNC: 3.8 MMOL/L — SIGNIFICANT CHANGE UP (ref 3.5–5.3)
PROT SERPL-MCNC: 7.3 G/DL — SIGNIFICANT CHANGE UP (ref 6–8.3)
RBC # BLD: 3.94 M/UL — LOW (ref 4.2–5.8)
RBC # FLD: 15.8 % — HIGH (ref 10.3–14.5)
SODIUM SERPL-SCNC: 138 MMOL/L — SIGNIFICANT CHANGE UP (ref 135–145)
WBC # BLD: 21.68 K/UL — HIGH (ref 3.8–10.5)
WBC # FLD AUTO: 21.68 K/UL — HIGH (ref 3.8–10.5)

## 2018-04-08 PROCEDURE — 99233 SBSQ HOSP IP/OBS HIGH 50: CPT

## 2018-04-08 PROCEDURE — 99232 SBSQ HOSP IP/OBS MODERATE 35: CPT

## 2018-04-08 RX ADMIN — SENNA PLUS 2 TABLET(S): 8.6 TABLET ORAL at 22:26

## 2018-04-08 RX ADMIN — ATOVAQUONE 750 MILLIGRAM(S): 750 SUSPENSION ORAL at 17:10

## 2018-04-08 RX ADMIN — Medication 400 MILLIGRAM(S): at 22:26

## 2018-04-08 RX ADMIN — Medication 400 MILLIGRAM(S): at 13:33

## 2018-04-08 RX ADMIN — Medication 120 MILLIGRAM(S): at 17:11

## 2018-04-08 RX ADMIN — Medication 400 MILLIGRAM(S): at 06:10

## 2018-04-08 RX ADMIN — ATOVAQUONE 750 MILLIGRAM(S): 750 SUSPENSION ORAL at 06:11

## 2018-04-08 RX ADMIN — ONDANSETRON 8 MILLIGRAM(S): 8 TABLET, FILM COATED ORAL at 06:11

## 2018-04-08 RX ADMIN — Medication 120 MILLIGRAM(S): at 06:11

## 2018-04-08 RX ADMIN — Medication 100 MILLIGRAM(S): at 06:11

## 2018-04-08 RX ADMIN — Medication 300 MILLIGRAM(S): at 11:39

## 2018-04-08 RX ADMIN — Medication 100 MILLIGRAM(S): at 17:10

## 2018-04-08 RX ADMIN — ONDANSETRON 8 MILLIGRAM(S): 8 TABLET, FILM COATED ORAL at 22:26

## 2018-04-08 RX ADMIN — SODIUM CHLORIDE 100 MILLILITER(S): 9 INJECTION INTRAMUSCULAR; INTRAVENOUS; SUBCUTANEOUS at 11:38

## 2018-04-08 RX ADMIN — ONDANSETRON 8 MILLIGRAM(S): 8 TABLET, FILM COATED ORAL at 13:33

## 2018-04-08 NOTE — PROGRESS NOTE ADULT - PROBLEM SELECTOR PLAN 1
patient admitted electively for C3 da-R-EPOCH  S/P Rituxan on 4/5  Patient to be started on EPOCH infusion  -s/p IT MTX   -Continue with IV hydration - NS 100cc/hr (monitor lung exam), allopurinol, mepron.  -heme onc to schedule him for Neulasta at [facility] on 4/12.  -F/U with Dr. Cortes on d/c  -monitor for adverse effects  -c/w acyclovir PPx; Mepron for PCP ppx   -Senna/Colace to prevent constipation from vincristine

## 2018-04-08 NOTE — PROGRESS NOTE ADULT - SUBJECTIVE AND OBJECTIVE BOX
Patient is a 42y old  Male who presents with a chief complaint of chemo (05 Apr 2018 13:00)        SUBJECTIVE / OVERNIGHT EVENTS:  Overnight, no acute events.  pt seen at bedside denies any complaints, denies n/v/f/chills, abd pain, cp, sob.    CAPILLARY BLOOD GLUCOSE    I&O's Summary    07 Apr 2018 07:01  -  08 Apr 2018 07:00  --------------------------------------------------------  IN: 4026 mL / OUT: 3950 mL / NET: 76 mL    08 Apr 2018 07:01  -  08 Apr 2018 14:16  --------------------------------------------------------  IN: 600 mL / OUT: 600 mL / NET: 0 mL    Vital Signs Last 24 Hrs  T(C): 36.3 (08 Apr 2018 07:00), Max: 37.3 (07 Apr 2018 21:15)  T(F): 97.4 (08 Apr 2018 07:00), Max: 99.1 (07 Apr 2018 21:15)  HR: 92 (08 Apr 2018 07:00) (92 - 119)  BP: 139/70 (08 Apr 2018 07:00) (118/72 - 139/70)  BP(mean): --  RR: 18 (08 Apr 2018 07:00) (18 - 18)  SpO2: 97% (08 Apr 2018 07:00) (97% - 98%)    PHYSICAL EXAM:  GENERAL:  Well appearing M, in NAD  HEAD:  NCAT  EYES: PERRLA  NECK: Supple  CHEST/LUNG: CTA B/L. No w/r/r.  HEART: Reg rate. Normal S1, S2. No m/r/g.   ABDOMEN: SNTND. Bowel sounds present  EXTREMITIES:  2+ Peripheral Pulses, No clubbing, cyanosis, edema.  PSYCH: appropriate affect    LABS:                        11.5   21.68 )-----------( 345      ( 08 Apr 2018 09:36 )             33.0     04-08    138  |  100  |  11  ----------------------------<  130<H>  3.8   |  24  |  0.97    Ca    9.6      08 Apr 2018 06:30  Phos  3.7     04-07  Mg     2.0     04-07    TPro  7.3  /  Alb  4.0  /  TBili  0.2  /  DBili  x   /  AST  22  /  ALT  52<H>  /  AlkPhos  83  04-08    RADIOLOGY & ADDITIONAL TESTS:  Consultant(s) Notes Reviewed:  heme onc  Care Discussed with Consultants/Other Providers: Floor NP, heme onc    MEDICATIONS  (STANDING):  abacavir 600 mG/dolutegravir 50 mG/lamiVUDine 300 mG 1 Tablet(s) Oral daily  acyclovir   Tablet 400 milliGRAM(s) Oral three times a day  allopurinol 300 milliGRAM(s) Oral daily  atovaquone Suspension 750 milliGRAM(s) Oral every 12 hours  docusate sodium 100 milliGRAM(s) Oral two times a day  DOXOrubicin IVPB w/vinCRIStine 28.8 milliGRAM(s) IV Intermittent daily  etoposide IVPB 144 milliGRAM(s) IV Intermittent daily  ondansetron Injectable 8 milliGRAM(s) IV Push every 8 hours  predniSONE   Tablet 120 milliGRAM(s) Oral two times a day  senna 2 Tablet(s) Oral at bedtime  sodium chloride 0.9%. 1000 milliLiter(s) (100 mL/Hr) IV Continuous <Continuous>    MEDICATIONS  (PRN):  acetaminophen   Tablet 650 milliGRAM(s) Oral every 6 hours PRN For Temp greater than 38 C (100.4 F)  hydrocortisone sodium succinate Injectable 100 milliGRAM(s) IV Push once PRN Rituxan reaction  metoclopramide Injectable 10 milliGRAM(s) IV Push every 6 hours PRN nausea/vomiting

## 2018-04-08 NOTE — PROGRESS NOTE ADULT - PROBLEM SELECTOR PLAN 1
-HIV associated. patient admitted electively for C3 da-R-EPOCH. S/P Rituxan on 4/5. Patient started on EPOCH infusion 4/6. also s/p IT MTX. Tolerating treatment well.   - continue monitoring daily CBC with diff, CMP  - monitor for fevers  -Continue with IV hydration, allopurinol, mepron.  -Scheduled him for Neulasta at [facility] on 4/12 at 1:20 pm.  -F/U with Dr. Cortes on d/c.    Please call [phone] with questions or concerns over weekend only

## 2018-04-08 NOTE — ADVANCED PRACTICE NURSE CONSULT - ASSESSMENT
Pt with day 3/5 tx, labs noted in sunrise, height, weight and bsa verified, okay to proceed with tx as per MD Mcclendon.  Pt with right chest wall port + blood return noted, no pain, redness or swelling noted at site.  Pt premedicated with zofran 8 mg ivp ATC.  Pt administered etoposide 72 mg/m2 = 144 mg civi at 23 ml/hr, doxorubicin 14.4 mg/m2 = 28.8 mg civi at 23 ml/hr and vincristine 0.4 mg/m2 = 0.8 mg civi at 23 ml/hr via locked pump.  Reinforced with pt chemo regimen and signs and symptoms to report on to area nurse and staff, pt verbalized understanding.  Emotional support provided.  Report given to area nurse.

## 2018-04-08 NOTE — PROGRESS NOTE ADULT - SUBJECTIVE AND OBJECTIVE BOX
Chief Complaint: lymphoma     INTERVAL HPI/OVERNIGHT EVENTS: Feels well. no c/o.     MEDICATIONS  (STANDING):  abacavir 600 mG/dolutegravir 50 mG/lamiVUDine 300 mG 1 Tablet(s) Oral daily  acyclovir   Tablet 400 milliGRAM(s) Oral three times a day  allopurinol 300 milliGRAM(s) Oral daily  atovaquone Suspension 750 milliGRAM(s) Oral every 12 hours  docusate sodium 100 milliGRAM(s) Oral two times a day  DOXOrubicin IVPB w/vinCRIStine 28.8 milliGRAM(s) IV Intermittent daily  etoposide IVPB 144 milliGRAM(s) IV Intermittent daily  ondansetron Injectable 8 milliGRAM(s) IV Push every 8 hours  predniSONE   Tablet 120 milliGRAM(s) Oral two times a day  senna 2 Tablet(s) Oral at bedtime  sodium chloride 0.9%. 1000 milliLiter(s) (100 mL/Hr) IV Continuous <Continuous>    MEDICATIONS  (PRN):  acetaminophen   Tablet 650 milliGRAM(s) Oral every 6 hours PRN For Temp greater than 38 C (100.4 F)  hydrocortisone sodium succinate Injectable 100 milliGRAM(s) IV Push once PRN Rituxan reaction  metoclopramide Injectable 10 milliGRAM(s) IV Push every 6 hours PRN nausea/vomiting      Allergies    No Known Allergies    Intolerances        ROS: as above     Vital Signs Last 24 Hrs  T(C): 36.8 (08 Apr 2018 22:13), Max: 36.9 (08 Apr 2018 16:10)  T(F): 98.3 (08 Apr 2018 22:13), Max: 98.4 (08 Apr 2018 16:10)  HR: 85 (08 Apr 2018 22:13) (85 - 92)  BP: 131/72 (08 Apr 2018 22:13) (131/72 - 141/86)  BP(mean): --  RR: 18 (08 Apr 2018 22:13) (18 - 20)  SpO2: 97% (08 Apr 2018 22:13) (96% - 97%)    Physical Exam:   AAO x 3, NAD   RRR S1S2  CTA b/l   soft NTNDBS+  no c/c/e    LABS:                        11.5   21.68 )-----------( 345      ( 08 Apr 2018 09:36 )             33.0     04-08    138  |  100  |  11  ----------------------------<  130<H>  3.8   |  24  |  0.97    Ca    9.6      08 Apr 2018 06:30  Phos  3.7     04-07  Mg     2.0     04-07    TPro  7.3  /  Alb  4.0  /  TBili  0.2  /  DBili  x   /  AST  22  /  ALT  52<H>  /  AlkPhos  83  04-08

## 2018-04-09 LAB
ALBUMIN SERPL ELPH-MCNC: 4.2 G/DL — SIGNIFICANT CHANGE UP (ref 3.3–5)
ALP SERPL-CCNC: 74 U/L — SIGNIFICANT CHANGE UP (ref 40–120)
ALT FLD-CCNC: 49 U/L RC — HIGH (ref 10–45)
ANION GAP SERPL CALC-SCNC: 13 MMOL/L — SIGNIFICANT CHANGE UP (ref 5–17)
AST SERPL-CCNC: 14 U/L — SIGNIFICANT CHANGE UP (ref 10–40)
BASOPHILS # BLD AUTO: 0 K/UL — SIGNIFICANT CHANGE UP (ref 0–0.2)
BASOPHILS NFR BLD AUTO: 0 % — SIGNIFICANT CHANGE UP (ref 0–2)
BILIRUB SERPL-MCNC: 0.2 MG/DL — SIGNIFICANT CHANGE UP (ref 0.2–1.2)
BUN SERPL-MCNC: 10 MG/DL — SIGNIFICANT CHANGE UP (ref 7–23)
CALCIUM SERPL-MCNC: 9.3 MG/DL — SIGNIFICANT CHANGE UP (ref 8.4–10.5)
CHLORIDE SERPL-SCNC: 99 MMOL/L — SIGNIFICANT CHANGE UP (ref 96–108)
CO2 SERPL-SCNC: 26 MMOL/L — SIGNIFICANT CHANGE UP (ref 22–31)
CREAT SERPL-MCNC: 1.01 MG/DL — SIGNIFICANT CHANGE UP (ref 0.5–1.3)
EOSINOPHIL # BLD AUTO: 0 K/UL — SIGNIFICANT CHANGE UP (ref 0–0.5)
EOSINOPHIL NFR BLD AUTO: 0 % — SIGNIFICANT CHANGE UP (ref 0–6)
GLUCOSE SERPL-MCNC: 101 MG/DL — HIGH (ref 70–99)
HCT VFR BLD CALC: 33.6 % — LOW (ref 39–50)
HGB BLD-MCNC: 11.8 G/DL — LOW (ref 13–17)
IMM GRANULOCYTES NFR BLD AUTO: 0.1 % — SIGNIFICANT CHANGE UP (ref 0–1.5)
LDH SERPL L TO P-CCNC: 260 U/L — HIGH (ref 50–242)
LYMPHOCYTES # BLD AUTO: 1.42 K/UL — SIGNIFICANT CHANGE UP (ref 1–3.3)
LYMPHOCYTES # BLD AUTO: 15.7 % — SIGNIFICANT CHANGE UP (ref 13–44)
MAGNESIUM SERPL-MCNC: 2.3 MG/DL — SIGNIFICANT CHANGE UP (ref 1.6–2.6)
MCHC RBC-ENTMCNC: 29.1 PG — SIGNIFICANT CHANGE UP (ref 27–34)
MCHC RBC-ENTMCNC: 35.1 GM/DL — SIGNIFICANT CHANGE UP (ref 32–36)
MCV RBC AUTO: 83 FL — SIGNIFICANT CHANGE UP (ref 80–100)
MONOCYTES # BLD AUTO: 0.34 K/UL — SIGNIFICANT CHANGE UP (ref 0–0.9)
MONOCYTES NFR BLD AUTO: 3.8 % — SIGNIFICANT CHANGE UP (ref 2–14)
NEUTROPHILS # BLD AUTO: 7.25 K/UL — SIGNIFICANT CHANGE UP (ref 1.8–7.4)
NEUTROPHILS NFR BLD AUTO: 80.4 % — HIGH (ref 43–77)
PHOSPHATE SERPL-MCNC: 3.1 MG/DL — SIGNIFICANT CHANGE UP (ref 2.5–4.5)
PLATELET # BLD AUTO: 332 K/UL — SIGNIFICANT CHANGE UP (ref 150–400)
POTASSIUM SERPL-MCNC: 3.6 MMOL/L — SIGNIFICANT CHANGE UP (ref 3.5–5.3)
POTASSIUM SERPL-SCNC: 3.6 MMOL/L — SIGNIFICANT CHANGE UP (ref 3.5–5.3)
PROT SERPL-MCNC: 7.3 G/DL — SIGNIFICANT CHANGE UP (ref 6–8.3)
RBC # BLD: 4.05 M/UL — LOW (ref 4.2–5.8)
RBC # FLD: 15.3 % — HIGH (ref 10.3–14.5)
SODIUM SERPL-SCNC: 138 MMOL/L — SIGNIFICANT CHANGE UP (ref 135–145)
URATE SERPL-MCNC: 2.8 MG/DL — LOW (ref 3.4–8.8)
WBC # BLD: 9.02 K/UL — SIGNIFICANT CHANGE UP (ref 3.8–10.5)
WBC # FLD AUTO: 9.02 K/UL — SIGNIFICANT CHANGE UP (ref 3.8–10.5)

## 2018-04-09 PROCEDURE — 99233 SBSQ HOSP IP/OBS HIGH 50: CPT

## 2018-04-09 PROCEDURE — 99232 SBSQ HOSP IP/OBS MODERATE 35: CPT | Mod: GC

## 2018-04-09 RX ADMIN — ONDANSETRON 8 MILLIGRAM(S): 8 TABLET, FILM COATED ORAL at 13:24

## 2018-04-09 RX ADMIN — Medication 120 MILLIGRAM(S): at 06:33

## 2018-04-09 RX ADMIN — Medication 400 MILLIGRAM(S): at 13:24

## 2018-04-09 RX ADMIN — SENNA PLUS 2 TABLET(S): 8.6 TABLET ORAL at 22:13

## 2018-04-09 RX ADMIN — ONDANSETRON 8 MILLIGRAM(S): 8 TABLET, FILM COATED ORAL at 22:13

## 2018-04-09 RX ADMIN — Medication 10 MILLIGRAM(S): at 16:55

## 2018-04-09 RX ADMIN — Medication 100 MILLIGRAM(S): at 06:33

## 2018-04-09 RX ADMIN — Medication 400 MILLIGRAM(S): at 06:33

## 2018-04-09 RX ADMIN — ATOVAQUONE 750 MILLIGRAM(S): 750 SUSPENSION ORAL at 16:56

## 2018-04-09 RX ADMIN — Medication 400 MILLIGRAM(S): at 22:13

## 2018-04-09 RX ADMIN — Medication 100 MILLIGRAM(S): at 16:56

## 2018-04-09 RX ADMIN — ONDANSETRON 8 MILLIGRAM(S): 8 TABLET, FILM COATED ORAL at 06:34

## 2018-04-09 RX ADMIN — Medication 120 MILLIGRAM(S): at 16:56

## 2018-04-09 RX ADMIN — Medication 300 MILLIGRAM(S): at 12:05

## 2018-04-09 RX ADMIN — ATOVAQUONE 750 MILLIGRAM(S): 750 SUSPENSION ORAL at 06:33

## 2018-04-09 RX ADMIN — SODIUM CHLORIDE 100 MILLILITER(S): 9 INJECTION INTRAMUSCULAR; INTRAVENOUS; SUBCUTANEOUS at 17:05

## 2018-04-09 NOTE — PROGRESS NOTE ADULT - SUBJECTIVE AND OBJECTIVE BOX
Patient is a 42y old  Male who presents with a chief complaint of chemo (05 Apr 2018 13:00)    HPI: Pt tolerating chemo. Feels well. Sister Analy by bedside.     Vital Signs Last 24 Hrs  T(C): 36.9 (09 Apr 2018 10:05), Max: 36.9 (08 Apr 2018 16:10)  T(F): 98.5 (09 Apr 2018 10:05), Max: 98.5 (09 Apr 2018 10:05)  HR: 89 (09 Apr 2018 10:05) (85 - 89)  BP: 157/86 (09 Apr 2018 10:05) (131/72 - 157/86)  BP(mean): --  RR: 18 (09 Apr 2018 10:05) (18 - 20)  SpO2: 98% (09 Apr 2018 10:05) (96% - 98%)                          11.8   9.02  )-----------( 332      ( 09 Apr 2018 07:46 )             33.6     04-09    138  |  99  |  10  ----------------------------<  101<H>  3.6   |  26  |  1.01    Ca    9.3      09 Apr 2018 06:57  Phos  3.1     04-09  Mg     2.3     04-09    TPro  7.3  /  Alb  4.2  /  TBili  0.2  /  DBili  x   /  AST  14  /  ALT  49<H>  /  AlkPhos  74  04-09    MEDICATIONS  (STANDING):  abacavir 600 mG/dolutegravir 50 mG/lamiVUDine 300 mG 1 Tablet(s) Oral daily  acyclovir   Tablet 400 milliGRAM(s) Oral three times a day  allopurinol 300 milliGRAM(s) Oral daily  atovaquone Suspension 750 milliGRAM(s) Oral every 12 hours  docusate sodium 100 milliGRAM(s) Oral two times a day  DOXOrubicin IVPB w/vinCRIStine 28.8 milliGRAM(s) IV Intermittent daily  etoposide IVPB 144 milliGRAM(s) IV Intermittent daily  ondansetron Injectable 8 milliGRAM(s) IV Push every 8 hours  predniSONE   Tablet 120 milliGRAM(s) Oral two times a day  senna 2 Tablet(s) Oral at bedtime  sodium chloride 0.9%. 1000 milliLiter(s) (100 mL/Hr) IV Continuous <Continuous>    MEDICATIONS  (PRN):  acetaminophen   Tablet 650 milliGRAM(s) Oral every 6 hours PRN For Temp greater than 38 C (100.4 F)  hydrocortisone sodium succinate Injectable 100 milliGRAM(s) IV Push once PRN Rituxan reaction  metoclopramide Injectable 10 milliGRAM(s) IV Push every 6 hours PRN nausea/vomiting

## 2018-04-09 NOTE — PROGRESS NOTE ADULT - PROBLEM SELECTOR PLAN 3
pt is at risk for TLS-  continue IVF and allopurinol. Will need daily tumor lysis lab monitoring (BMP, LDH, Mg, Phos, uric acid)  last MUGA with EF 64%

## 2018-04-09 NOTE — PROGRESS NOTE ADULT - PROBLEM SELECTOR PROBLEM 3
Tumor lysis syndrome following antineoplastic drug therapy

## 2018-04-09 NOTE — PROGRESS NOTE ADULT - SUBJECTIVE AND OBJECTIVE BOX
INTERVAL HPI/OVERNIGHT EVENTS:  Patient S&E at bedside. No o/n events, patient resting comfortably. No complaints at this time. Patient denies fever, chills, dizziness, weakness, CP, palpitations, SOB, cough, N/V/D/C, dysuria. Reports he is feeling well, walking around, appetite stable.     VITAL SIGNS:  T(F): 98.6 (04-09-18 @ 16:03)  HR: 117 (04-09-18 @ 16:03)  BP: 121/74 (04-09-18 @ 16:03)  RR: 18 (04-09-18 @ 16:03)  SpO2: 97% (04-09-18 @ 16:03)  Wt(kg): --    PHYSICAL EXAM:    Constitutional: NAD  Eyes: EOMI, sclera non-icteric  Neck: supple, no masses, no JVD  Respiratory: CTA b/l, good air entry b/l  Cardiovascular: RRR, no M/R/G  Gastrointestinal: soft, NTND, no masses palpable, + BS, no hepatosplenomegaly  Extremities: no c/c/e  Neurological: AAOx3      MEDICATIONS  (STANDING):  abacavir 600 mG/dolutegravir 50 mG/lamiVUDine 300 mG 1 Tablet(s) Oral daily  acyclovir   Tablet 400 milliGRAM(s) Oral three times a day  allopurinol 300 milliGRAM(s) Oral daily  atovaquone Suspension 750 milliGRAM(s) Oral every 12 hours  docusate sodium 100 milliGRAM(s) Oral two times a day  DOXOrubicin IVPB w/vinCRIStine 28.8 milliGRAM(s) IV Intermittent daily  etoposide IVPB 144 milliGRAM(s) IV Intermittent daily  ondansetron Injectable 8 milliGRAM(s) IV Push every 8 hours  predniSONE   Tablet 120 milliGRAM(s) Oral two times a day  senna 2 Tablet(s) Oral at bedtime  sodium chloride 0.9%. 1000 milliLiter(s) (100 mL/Hr) IV Continuous <Continuous>    MEDICATIONS  (PRN):  acetaminophen   Tablet 650 milliGRAM(s) Oral every 6 hours PRN For Temp greater than 38 C (100.4 F)  hydrocortisone sodium succinate Injectable 100 milliGRAM(s) IV Push once PRN Rituxan reaction  metoclopramide Injectable 10 milliGRAM(s) IV Push every 6 hours PRN nausea/vomiting      Allergies    No Known Allergies    Intolerances        LABS:                        11.8   9.02  )-----------( 332      ( 09 Apr 2018 07:46 )             33.6     04-09    138  |  99  |  10  ----------------------------<  101<H>  3.6   |  26  |  1.01    Ca    9.3      09 Apr 2018 06:57  Phos  3.1     04-09  Mg     2.3     04-09    TPro  7.3  /  Alb  4.2  /  TBili  0.2  /  DBili  x   /  AST  14  /  ALT  49<H>  /  AlkPhos  74  04-09          RADIOLOGY & ADDITIONAL TESTS:  Studies reviewed.    ASSESSMENT & PLAN:

## 2018-04-09 NOTE — PROGRESS NOTE ADULT - PROBLEM SELECTOR PLAN 1
patient admitted electively for C3 da-R-EPOCH  S/P Rituxan on 4/5  -s/p IT MTX   -Now on Vincristine, Etoposide for 4 doses.   -Continue with IV hydration - NS 100cc/hr, allopurinol, mepron.  -For Neulasta at [facility] on 4/12.  -F/U with Dr. Cortes on d/c  -monitor for adverse effects  -c/w acyclovir PPx; Mepron for PCP ppx   -Senna/Colace to prevent constipation from vincristine

## 2018-04-09 NOTE — PROGRESS NOTE ADULT - PROBLEM SELECTOR PLAN 1
-HIV associated. patient admitted electively for C3 da-R-EPOCH. S/P Rituxan on 4/5. Patient started on EPOCH infusion 4/6. also s/p IT MTX. Tolerating treatment well.   - continue monitoring daily CBC with diff, CMP  -Continue with IV hydration, allopurinol, mepron ppx  -f/u for Neulasta at [facility] on 4/12 at 1:20 pm.  - will need F/U with Dr. Cortes on d/c.

## 2018-04-09 NOTE — ADVANCED PRACTICE NURSE CONSULT - ASSESSMENT
Patient received in bed, alert and oriented x 3, capable of expressing all needs to staff. Cycle 3, day 4/5. Height and weight verified.  Consent in chart. Lab results as per Md lasha aware of same. Vital signs stable prior to chemotherapy and  within acceptable parameters, see sunrise. Pt re-educated on the importance of saving urine, verbalizes good understanding. Pt re-education done re chemo regimen, drug effects and potential side effects, states understanding. Reports that he has been tolerating chemotherapy well without side effects. Pt with Right chest wall Mediport , site free from signs and symptoms of infection, good blood return obtained. Pre- medicated  with Zofran 8 mg IVP  ATC.  Etoposide 72 mg/m2 = 144 mg continuos infusion at 23 ml/hr, Doxorubicin 14.4 mg/m2 = 28.8 mg continuous infusion at 23 ml/hr and Vincristine 0.4 mg/m2 = 0.8 mg continuous infusion at 23 ml/hr via locked pump. Patient tolerating same well without immediate side effects noted.

## 2018-04-10 ENCOUNTER — TRANSCRIPTION ENCOUNTER (OUTPATIENT)
Age: 43
End: 2018-04-10

## 2018-04-10 VITALS
DIASTOLIC BLOOD PRESSURE: 89 MMHG | OXYGEN SATURATION: 99 % | TEMPERATURE: 98 F | SYSTOLIC BLOOD PRESSURE: 145 MMHG | HEART RATE: 89 BPM | RESPIRATION RATE: 18 BRPM

## 2018-04-10 LAB
ANION GAP SERPL CALC-SCNC: 12 MMOL/L — SIGNIFICANT CHANGE UP (ref 5–17)
BUN SERPL-MCNC: 12 MG/DL — SIGNIFICANT CHANGE UP (ref 7–23)
CALCIUM SERPL-MCNC: 9 MG/DL — SIGNIFICANT CHANGE UP (ref 8.4–10.5)
CHLORIDE SERPL-SCNC: 99 MMOL/L — SIGNIFICANT CHANGE UP (ref 96–108)
CO2 SERPL-SCNC: 26 MMOL/L — SIGNIFICANT CHANGE UP (ref 22–31)
CREAT SERPL-MCNC: 0.95 MG/DL — SIGNIFICANT CHANGE UP (ref 0.5–1.3)
GLUCOSE SERPL-MCNC: 95 MG/DL — SIGNIFICANT CHANGE UP (ref 70–99)
HCT VFR BLD CALC: 34.2 % — LOW (ref 39–50)
HGB BLD-MCNC: 11.8 G/DL — LOW (ref 13–17)
LDH SERPL L TO P-CCNC: 210 U/L — SIGNIFICANT CHANGE UP (ref 50–242)
MCHC RBC-ENTMCNC: 30.2 PG — SIGNIFICANT CHANGE UP (ref 27–34)
MCHC RBC-ENTMCNC: 34.5 GM/DL — SIGNIFICANT CHANGE UP (ref 32–36)
MCV RBC AUTO: 87.6 FL — SIGNIFICANT CHANGE UP (ref 80–100)
PLATELET # BLD AUTO: 291 K/UL — SIGNIFICANT CHANGE UP (ref 150–400)
POTASSIUM SERPL-MCNC: 3.3 MMOL/L — LOW (ref 3.5–5.3)
POTASSIUM SERPL-SCNC: 3.3 MMOL/L — LOW (ref 3.5–5.3)
RBC # BLD: 3.91 M/UL — LOW (ref 4.2–5.8)
RBC # FLD: 15.1 % — HIGH (ref 10.3–14.5)
SODIUM SERPL-SCNC: 137 MMOL/L — SIGNIFICANT CHANGE UP (ref 135–145)
URATE SERPL-MCNC: 2.9 MG/DL — LOW (ref 3.4–8.8)
WBC # BLD: 6.4 K/UL — SIGNIFICANT CHANGE UP (ref 3.8–10.5)
WBC # FLD AUTO: 6.4 K/UL — SIGNIFICANT CHANGE UP (ref 3.8–10.5)

## 2018-04-10 PROCEDURE — 77003 FLUOROGUIDE FOR SPINE INJECT: CPT

## 2018-04-10 PROCEDURE — 83615 LACTATE (LD) (LDH) ENZYME: CPT

## 2018-04-10 PROCEDURE — 86900 BLOOD TYPING SEROLOGIC ABO: CPT

## 2018-04-10 PROCEDURE — 84100 ASSAY OF PHOSPHORUS: CPT

## 2018-04-10 PROCEDURE — 80048 BASIC METABOLIC PNL TOTAL CA: CPT

## 2018-04-10 PROCEDURE — 85027 COMPLETE CBC AUTOMATED: CPT

## 2018-04-10 PROCEDURE — 86850 RBC ANTIBODY SCREEN: CPT

## 2018-04-10 PROCEDURE — 89051 BODY FLUID CELL COUNT: CPT

## 2018-04-10 PROCEDURE — 99239 HOSP IP/OBS DSCHRG MGMT >30: CPT

## 2018-04-10 PROCEDURE — 80053 COMPREHEN METABOLIC PANEL: CPT

## 2018-04-10 PROCEDURE — 86901 BLOOD TYPING SEROLOGIC RH(D): CPT

## 2018-04-10 PROCEDURE — 88184 FLOWCYTOMETRY/ TC 1 MARKER: CPT

## 2018-04-10 PROCEDURE — 83735 ASSAY OF MAGNESIUM: CPT

## 2018-04-10 PROCEDURE — 82945 GLUCOSE OTHER FLUID: CPT

## 2018-04-10 PROCEDURE — 88185 FLOWCYTOMETRY/TC ADD-ON: CPT

## 2018-04-10 PROCEDURE — 93005 ELECTROCARDIOGRAM TRACING: CPT

## 2018-04-10 PROCEDURE — 84550 ASSAY OF BLOOD/URIC ACID: CPT

## 2018-04-10 PROCEDURE — 62272 THER SPI PNXR DRG CSF: CPT

## 2018-04-10 PROCEDURE — 85730 THROMBOPLASTIN TIME PARTIAL: CPT

## 2018-04-10 PROCEDURE — 85610 PROTHROMBIN TIME: CPT

## 2018-04-10 PROCEDURE — 84157 ASSAY OF PROTEIN OTHER: CPT

## 2018-04-10 RX ORDER — ALLOPURINOL 300 MG
1 TABLET ORAL
Qty: 0 | Refills: 0 | COMMUNITY
Start: 2018-04-10

## 2018-04-10 RX ORDER — ONDANSETRON 8 MG/1
1 TABLET, FILM COATED ORAL
Qty: 28 | Refills: 0 | OUTPATIENT
Start: 2018-04-10 | End: 2018-04-16

## 2018-04-10 RX ORDER — ACYCLOVIR SODIUM 500 MG
1 VIAL (EA) INTRAVENOUS
Qty: 90 | Refills: 0 | OUTPATIENT
Start: 2018-04-10 | End: 2018-05-09

## 2018-04-10 RX ORDER — ABACAVIR SULFATE, DOLUTEGRAVIR SODIUM, LAMIVUDINE 60-5-30 MG
1 KIT ORAL
Qty: 30 | Refills: 0 | OUTPATIENT
Start: 2018-04-10 | End: 2018-05-09

## 2018-04-10 RX ORDER — CYCLOPHOSPHAMIDE 100 MG
2160 VIAL (EA) INTRAVENOUS ONCE
Qty: 0 | Refills: 0 | Status: DISCONTINUED | OUTPATIENT
Start: 2018-04-10 | End: 2018-04-10

## 2018-04-10 RX ORDER — ONDANSETRON 8 MG/1
1 TABLET, FILM COATED ORAL
Qty: 9 | Refills: 0 | OUTPATIENT
Start: 2018-04-10 | End: 2018-04-12

## 2018-04-10 RX ADMIN — Medication 300 MILLIGRAM(S): at 13:32

## 2018-04-10 RX ADMIN — Medication 100 MILLIGRAM(S): at 05:49

## 2018-04-10 RX ADMIN — Medication 400 MILLIGRAM(S): at 13:33

## 2018-04-10 RX ADMIN — ATOVAQUONE 750 MILLIGRAM(S): 750 SUSPENSION ORAL at 18:02

## 2018-04-10 RX ADMIN — Medication 400 MILLIGRAM(S): at 05:49

## 2018-04-10 RX ADMIN — Medication 120 MILLIGRAM(S): at 18:02

## 2018-04-10 RX ADMIN — Medication 100 MILLIGRAM(S): at 18:03

## 2018-04-10 RX ADMIN — Medication 120 MILLIGRAM(S): at 05:49

## 2018-04-10 RX ADMIN — ONDANSETRON 8 MILLIGRAM(S): 8 TABLET, FILM COATED ORAL at 05:49

## 2018-04-10 RX ADMIN — ONDANSETRON 8 MILLIGRAM(S): 8 TABLET, FILM COATED ORAL at 13:33

## 2018-04-10 RX ADMIN — ATOVAQUONE 750 MILLIGRAM(S): 750 SUSPENSION ORAL at 05:49

## 2018-04-10 RX ADMIN — SODIUM CHLORIDE 100 MILLILITER(S): 9 INJECTION INTRAMUSCULAR; INTRAVENOUS; SUBCUTANEOUS at 13:34

## 2018-04-10 NOTE — PROGRESS NOTE ADULT - PROVIDER SPECIALTY LIST ADULT
Heme/Onc
Hospitalist
Radiology
Heme/Onc
Hospitalist

## 2018-04-10 NOTE — PROGRESS NOTE ADULT - PROBLEM SELECTOR PROBLEM 1
High grade B-cell lymphoma

## 2018-04-10 NOTE — DISCHARGE NOTE ADULT - CARE PROVIDER_API CALL
Niall Cortes), Hematology; Internal Medicine; Medical Oncology  [address] [place] [address]  Phone: [phone]  Fax: [phone]

## 2018-04-10 NOTE — DISCHARGE NOTE ADULT - HOSPITAL COURSE
40 yo M w HIV on ARV, DLBCL admitted for inpatient chemotherapy.     Problem/Plan - 1:  ·  Problem: High grade B-cell lymphoma.  Plan: patient admitted electively for C3 da-R-EPOCH  S/P Rituxan on 4/5  -s/p IT MTX   -Now on Vincristine, Etoposide for 4 doses- to complete today.   -For Neulasta at [facility] on 4/12.  -F/U with Dr. Cortes on discharge  -Senna/Colace to prevent constipation from vincristine.      Problem/Plan - 2:  ·  Problem: HIV (human immunodeficiency virus infection).  Plan: continue with Triumeq.   Discharge home 40 yo M w HIV on ARV, DLBCL admitted for inpatient chemotherapy.     Problem/Plan - 1:  ·  Problem: High grade B-cell lymphoma.  Plan: patient admitted electively for C3 da-R-EPOCH  S/P Rituxan on 4/5  -Given IT MTX   -Given Vincristine, Etoposide for 4 doses  -Tumor lysis labs monitored- Allopurinol given for prophylaxis    -For Neulasta at [facility] on 4/12.  -F/U with Dr. Cortes on discharge  -Senna/Colace given to prevent constipation from vincristine.      Problem/Plan - 2:  ·  Problem: HIV (human immunodeficiency virus infection).  Plan: continued on Triumeq.     Diagnoses: Admission for chemotherapy; Diffuse large B cell lymphoma; AIDS; Hypokalemia; Dehydration; Nausea due to chemotherapy

## 2018-04-10 NOTE — PROGRESS NOTE ADULT - PROBLEM SELECTOR PROBLEM 2
HIV (human immunodeficiency virus infection)

## 2018-04-10 NOTE — DISCHARGE NOTE ADULT - PATIENT PORTAL LINK FT
You can access the [facility] Patient Portal, offered by [facility], by registering with the following website: [email or web address]

## 2018-04-10 NOTE — ADVANCED PRACTICE NURSE CONSULT - ASSESSMENT
Patient received in bed, alert and oriented x 3, capable of expressing all needs to staff. Cycle 3, day 5/5. Height and weight verified.  Consent in chart. Lab results as per Md lasha aware of same. Vital signs stable prior to chemotherapy and  within acceptable parameters, see sunrise. Pt re-educated on the importance of saving urine, verbalizes good understanding. Pt re-education done re chemo regimen, drug effects and potential side effects, states understanding. Reports that he has been tolerating chemotherapy well without side effects. Pt with Right chest wall Mediport , site free from signs and symptoms of infection, good blood return obtained. Pre- medicated  with Zofran 8 mg IVP  ATC. Cyclophosphamide 1080mg/x2=9075vn IVSS on day #5. Same given via locked pump over one hour as directed Patient received in bed, alert and oriented x 3, capable of expressing all needs to staff. Cycle 3, day 5/5. Height and weight verified.  Consent in chart. Lab results as per Md lasha aware of same. Vital signs stable prior to chemotherapy and  within acceptable parameters, see sunrise. Pt re-educated on the importance of saving urine, verbalizes good understanding. Pt re-education done re chemo regimen, drug effects and potential side effects, states understanding. Reports that he has been tolerating chemotherapy well without side effects. Pt with Right chest wall Mediport , site free from signs and symptoms of infection, good blood return obtained. Pre- medicated  with Zofran 8 mg IVP  ATC. Cyclophosphamide 1080mg/f1=4223kj IVSS on day #5. Same given via locked pump over one hour as directed. Infusion completed without signs or symptoms of infusion reactions noted.

## 2018-04-10 NOTE — PROGRESS NOTE ADULT - SUBJECTIVE AND OBJECTIVE BOX
Patient is a 42y old  Male who presents with a chief complaint of chemo (10 Apr 2018 14:24)    HPI: Pt feels well.    Vital Signs Last 24 Hrs  T(C): 36.9 (10 Apr 2018 09:36), Max: 37.1 (09 Apr 2018 22:28)  T(F): 98.5 (10 Apr 2018 09:36), Max: 98.7 (09 Apr 2018 22:28)  HR: 99 (10 Apr 2018 09:36) (81 - 117)  BP: 143/84 (10 Apr 2018 09:36) (121/74 - 143/84)  BP(mean): --  RR: 18 (10 Apr 2018 09:36) (18 - 18)  SpO2: 96% (10 Apr 2018 09:36) (96% - 97%)                            11.8   6.4   )-----------( 291      ( 10 Apr 2018 07:13 )             34.2     04-10    137  |  99  |  12  ----------------------------<  95  3.3<L>   |  26  |  0.95    Ca    9.0      10 Apr 2018 07:13  Phos  3.1     04-09  Mg     2.3     04-09    TPro  7.3  /  Alb  4.2  /  TBili  0.2  /  DBili  x   /  AST  14  /  ALT  49<H>  /  AlkPhos  74  04-09    MEDICATIONS  (STANDING):  abacavir 600 mG/dolutegravir 50 mG/lamiVUDine 300 mG 1 Tablet(s) Oral daily  acyclovir   Tablet 400 milliGRAM(s) Oral three times a day  allopurinol 300 milliGRAM(s) Oral daily  atovaquone Suspension 750 milliGRAM(s) Oral every 12 hours  cyclophosphamide IVPB 2160 milliGRAM(s) IV Intermittent once  docusate sodium 100 milliGRAM(s) Oral two times a day  DOXOrubicin IVPB w/vinCRIStine 28.8 milliGRAM(s) IV Intermittent daily  etoposide IVPB 144 milliGRAM(s) IV Intermittent daily  ondansetron Injectable 8 milliGRAM(s) IV Push every 8 hours  predniSONE   Tablet 120 milliGRAM(s) Oral two times a day  senna 2 Tablet(s) Oral at bedtime  sodium chloride 0.9%. 1000 milliLiter(s) (100 mL/Hr) IV Continuous <Continuous>    MEDICATIONS  (PRN):  acetaminophen   Tablet 650 milliGRAM(s) Oral every 6 hours PRN For Temp greater than 38 C (100.4 F)  hydrocortisone sodium succinate Injectable 100 milliGRAM(s) IV Push once PRN Rituxan reaction  metoclopramide Injectable 10 milliGRAM(s) IV Push every 6 hours PRN nausea/vomiting

## 2018-04-10 NOTE — PROGRESS NOTE ADULT - ATTENDING COMMENTS
Charmaine Merritt MD  HOSPITALIST  #[phone]
doing well no complaints  ok for dc post chemo  OPD f/u at [facility]
D/c home after chemo. D/c time 40 minutes
43 y/o M, with h/o HIV associated DLBCL admitted for C3 da-R-EPOCH, D3 today  cont chemo as scheduled  no acute complications  cont supportive care  opd f/u Dr Cortes on dc  Neulasta at [facility] 4/12
call [phone] with any questions over the weekend
Patient interviewed/examined during rounds.  Agree with history, PE, A/P as above.    Continue chemo as ordered.     Jeremias Millan MD   [phone]
Beeper: [phone]
Charmaine Merritt MD  HOSPITALIST  #[phone]

## 2018-04-10 NOTE — DISCHARGE NOTE ADULT - MEDICATION SUMMARY - MEDICATIONS TO STOP TAKING
I will STOP taking the medications listed below when I get home from the hospital:    Reglan 10 mg oral tablet  -- 1 tab(s) by mouth every 6 hours, As Needed for nausea.

## 2018-04-10 NOTE — PROGRESS NOTE ADULT - ASSESSMENT
Mr. Simmons, is a 43 y/o M, with h/o HIV associated DLBCL admitted for C3 da-R-EPOCH, D6 today       High grade B-cell lymphoma.  HIV associated. patient admitted electively for C3 da-R-EPOCH. S/P Rituxan on 4/5. Patient started on EPOCH infusion 4/6. also s/p IT MTX. Tolerating treatment well.   - continue monitoring daily CBC with diff, CMP  -Continue with IV hydration, allopurinol, mepron ppx  -f/u for Neulasta at [facility] on 4/12 at 1:20 pm.  - will need F/U with Dr. Cortes on d/c.

## 2018-04-10 NOTE — ADVANCED PRACTICE NURSE CONSULT - RECOMMEDATIONS
Full report given to area nurse, aware to monitor Full report given to area nurse, aware to monitor. Plan for d/c later today

## 2018-04-10 NOTE — DISCHARGE NOTE ADULT - PLAN OF CARE
chemotherapy Follow up with your physician undetected  viral load Follow up with your physician 4/12/2018 at 1:20 pm [facility] for Neulasta

## 2018-04-10 NOTE — DISCHARGE NOTE ADULT - MEDICATION SUMMARY - MEDICATIONS TO TAKE
I will START or STAY ON the medications listed below when I get home from the hospital:    predniSONE 20 mg oral tablet  -- 6 tab(s) by mouth 2 times a day  -- Indication: For B-cell lymphoma    ondansetron 4 mg oral tablet  -- 1 tab(s) by mouth every 6 hours, As Needed   -- Indication: For nausea    allopurinol 300 mg oral tablet  -- 1 tab(s) by mouth once a day  -- Indication: For Prevent uric acid elevation    Triumeq oral tablet  -- 1 tab(s) by mouth once a day  -- Indication: For HIV (human immunodeficiency virus infection)    acyclovir 400 mg oral tablet  -- 1 tab(s) by mouth 3 times a day  -- Indication: For Prevention

## 2018-04-10 NOTE — PROGRESS NOTE ADULT - SUBJECTIVE AND OBJECTIVE BOX
INTERVAL HPI/OVERNIGHT EVENTS:  Patient S&E at bedside. says he feels good. no complaints    no fevers, no chills, no n/v/diarrhea    VITAL SIGNS:  T(F): 98.1 (04-10-18 @ 18:29)  HR: 89 (04-10-18 @ 18:29)  BP: 145/89 (04-10-18 @ 18:29)  RR: 18 (04-10-18 @ 18:29)  SpO2: 99% (04-10-18 @ 18:29)  Wt(kg): --    PHYSICAL EXAM:    Constitutional: NAD  Eyes: EOMI, sclera non-icteric  Neck: supple, no masses, no JVD  Respiratory: CTA b/l, good air entry b/l  Cardiovascular: RRR, no M/R/G  Gastrointestinal: soft, NTND, no masses palpable, + BS, no hepatosplenomegaly  Extremities: no c/c/e  Neurological: AAOx3      MEDICATIONS  (STANDING):  abacavir 600 mG/dolutegravir 50 mG/lamiVUDine 300 mG 1 Tablet(s) Oral daily  acyclovir   Tablet 400 milliGRAM(s) Oral three times a day  allopurinol 300 milliGRAM(s) Oral daily  atovaquone Suspension 750 milliGRAM(s) Oral every 12 hours  cyclophosphamide IVPB 2160 milliGRAM(s) IV Intermittent once  docusate sodium 100 milliGRAM(s) Oral two times a day  DOXOrubicin IVPB w/vinCRIStine 28.8 milliGRAM(s) IV Intermittent daily  etoposide IVPB 144 milliGRAM(s) IV Intermittent daily  ondansetron Injectable 8 milliGRAM(s) IV Push every 8 hours  predniSONE   Tablet 120 milliGRAM(s) Oral two times a day  senna 2 Tablet(s) Oral at bedtime  sodium chloride 0.9%. 1000 milliLiter(s) (100 mL/Hr) IV Continuous <Continuous>    MEDICATIONS  (PRN):  acetaminophen   Tablet 650 milliGRAM(s) Oral every 6 hours PRN For Temp greater than 38 C (100.4 F)  hydrocortisone sodium succinate Injectable 100 milliGRAM(s) IV Push once PRN Rituxan reaction  metoclopramide Injectable 10 milliGRAM(s) IV Push every 6 hours PRN nausea/vomiting      Allergies    No Known Allergies    Intolerances        LABS:                        11.8   6.4   )-----------( 291      ( 10 Apr 2018 07:13 )             34.2     04-10    137  |  99  |  12  ----------------------------<  95  3.3<L>   |  26  |  0.95    Ca    9.0      10 Apr 2018 07:13  Phos  3.1     04-09  Mg     2.3     04-09    TPro  7.3  /  Alb  4.2  /  TBili  0.2  /  DBili  x   /  AST  14  /  ALT  49<H>  /  AlkPhos  74  04-09          RADIOLOGY & ADDITIONAL TESTS:  Studies reviewed.

## 2018-04-10 NOTE — DISCHARGE NOTE ADULT - CARE PLAN
Principal Discharge DX:	High grade B-cell lymphoma  Goal:	chemotherapy  Assessment and plan of treatment:	Follow up with your physician  Secondary Diagnosis:	HIV (human immunodeficiency virus infection)  Goal:	undetected  viral load  Assessment and plan of treatment:	Follow up with your physician Principal Discharge DX:	High grade B-cell lymphoma  Goal:	chemotherapy  Assessment and plan of treatment:	Follow up with your physician 4/12/2018 at 1:20 pm [facility]  Secondary Diagnosis:	HIV (human immunodeficiency virus infection)  Goal:	undetected  viral load  Assessment and plan of treatment:	Follow up with your physician

## 2018-04-10 NOTE — PROGRESS NOTE ADULT - PROBLEM SELECTOR PLAN 1
patient admitted electively for C3 da-R-EPOCH  S/P Rituxan on 4/5  -s/p IT MTX   -Now on Vincristine, Etoposide for 4 doses- to complete today.   -For Neulasta at [facility] on 4/12.  -F/U with Dr. Cortes on d/c  -Senna/Colace to prevent constipation from vincristine

## 2018-04-11 LAB — TM INTERPRETATION: SIGNIFICANT CHANGE UP

## 2018-04-12 ENCOUNTER — APPOINTMENT (OUTPATIENT)
Dept: INFUSION THERAPY | Facility: HOSPITAL | Age: 43
End: 2018-04-12

## 2018-04-13 DIAGNOSIS — Z51.89 ENCOUNTER FOR OTHER SPECIFIED AFTERCARE: ICD-10-CM

## 2018-04-15 ENCOUNTER — EMERGENCY (EMERGENCY)
Facility: HOSPITAL | Age: 43
LOS: 1 days | Discharge: HOME | End: 2018-04-15
Attending: EMERGENCY MEDICINE
Payer: MEDICAID

## 2018-04-15 VITALS
SYSTOLIC BLOOD PRESSURE: 131 MMHG | RESPIRATION RATE: 20 BRPM | HEART RATE: 105 BPM | OXYGEN SATURATION: 100 % | DIASTOLIC BLOOD PRESSURE: 80 MMHG

## 2018-04-15 VITALS
RESPIRATION RATE: 20 BRPM | HEART RATE: 136 BPM | WEIGHT: 175.05 LBS | DIASTOLIC BLOOD PRESSURE: 80 MMHG | TEMPERATURE: 99 F | SYSTOLIC BLOOD PRESSURE: 143 MMHG

## 2018-04-15 LAB
ALBUMIN SERPL ELPH-MCNC: 4.1 G/DL — SIGNIFICANT CHANGE UP (ref 3.3–5)
ALP SERPL-CCNC: 102 U/L — SIGNIFICANT CHANGE UP (ref 40–120)
ALT FLD-CCNC: 113 U/L RC — HIGH (ref 10–45)
ANION GAP SERPL CALC-SCNC: 14 MMOL/L — SIGNIFICANT CHANGE UP (ref 5–17)
APPEARANCE UR: CLEAR — SIGNIFICANT CHANGE UP
APTT BLD: 26 SEC — LOW (ref 27.5–37.4)
AST SERPL-CCNC: 42 U/L — HIGH (ref 10–40)
BACTERIA # UR AUTO: ABNORMAL /HPF
BASOPHILS # BLD AUTO: 0 K/UL — SIGNIFICANT CHANGE UP (ref 0–0.2)
BILIRUB SERPL-MCNC: 0.2 MG/DL — SIGNIFICANT CHANGE UP (ref 0.2–1.2)
BILIRUB UR-MCNC: NEGATIVE — SIGNIFICANT CHANGE UP
BUN SERPL-MCNC: 10 MG/DL — SIGNIFICANT CHANGE UP (ref 7–23)
CALCIUM SERPL-MCNC: 9.2 MG/DL — SIGNIFICANT CHANGE UP (ref 8.4–10.5)
CHLORIDE SERPL-SCNC: 99 MMOL/L — SIGNIFICANT CHANGE UP (ref 96–108)
CO2 SERPL-SCNC: 27 MMOL/L — SIGNIFICANT CHANGE UP (ref 22–31)
COLOR SPEC: COLORLESS — SIGNIFICANT CHANGE UP
CREAT SERPL-MCNC: 1.05 MG/DL — SIGNIFICANT CHANGE UP (ref 0.5–1.3)
DIFF PNL FLD: NEGATIVE — SIGNIFICANT CHANGE UP
EOSINOPHIL # BLD AUTO: 0 K/UL — SIGNIFICANT CHANGE UP (ref 0–0.5)
FLUAV H3 RNA SPEC QL NAA+PROBE: DETECTED
GAS PNL BLDV: SIGNIFICANT CHANGE UP
GLUCOSE SERPL-MCNC: 188 MG/DL — HIGH (ref 70–99)
GLUCOSE UR QL: NEGATIVE — SIGNIFICANT CHANGE UP
HCT VFR BLD CALC: 31.1 % — LOW (ref 39–50)
HGB BLD-MCNC: 10.5 G/DL — LOW (ref 13–17)
INR BLD: 1.07 RATIO — SIGNIFICANT CHANGE UP (ref 0.88–1.16)
KETONES UR-MCNC: NEGATIVE — SIGNIFICANT CHANGE UP
LEUKOCYTE ESTERASE UR-ACNC: NEGATIVE — SIGNIFICANT CHANGE UP
LYMPHOCYTES # BLD AUTO: 1 K/UL — SIGNIFICANT CHANGE UP (ref 1–3.3)
LYMPHOCYTES # BLD AUTO: 7 % — LOW (ref 13–44)
MCHC RBC-ENTMCNC: 30 PG — SIGNIFICANT CHANGE UP (ref 27–34)
MCHC RBC-ENTMCNC: 33.7 GM/DL — SIGNIFICANT CHANGE UP (ref 32–36)
MCV RBC AUTO: 89.2 FL — SIGNIFICANT CHANGE UP (ref 80–100)
MONOCYTES # BLD AUTO: 0.2 K/UL — SIGNIFICANT CHANGE UP (ref 0–0.9)
MONOCYTES NFR BLD AUTO: 2 % — SIGNIFICANT CHANGE UP (ref 2–14)
NEUTROPHILS # BLD AUTO: 9.4 K/UL — HIGH (ref 1.8–7.4)
NEUTROPHILS NFR BLD AUTO: 81 % — HIGH (ref 43–77)
NITRITE UR-MCNC: NEGATIVE — SIGNIFICANT CHANGE UP
PH UR: 7 — SIGNIFICANT CHANGE UP (ref 5–8)
PLATELET # BLD AUTO: 100 K/UL — LOW (ref 150–400)
POTASSIUM SERPL-MCNC: 3.3 MMOL/L — LOW (ref 3.5–5.3)
POTASSIUM SERPL-SCNC: 3.3 MMOL/L — LOW (ref 3.5–5.3)
PROT SERPL-MCNC: 6.7 G/DL — SIGNIFICANT CHANGE UP (ref 6–8.3)
PROT UR-MCNC: NEGATIVE — SIGNIFICANT CHANGE UP
PROTHROM AB SERPL-ACNC: 11.6 SEC — SIGNIFICANT CHANGE UP (ref 9.8–12.7)
RAPID RVP RESULT: DETECTED
RBC # BLD: 3.49 M/UL — LOW (ref 4.2–5.8)
RBC # FLD: 15.2 % — HIGH (ref 10.3–14.5)
RV+EV RNA SPEC QL NAA+PROBE: DETECTED
SODIUM SERPL-SCNC: 140 MMOL/L — SIGNIFICANT CHANGE UP (ref 135–145)
SP GR SPEC: <1.005 — LOW (ref 1.01–1.02)
UROBILINOGEN FLD QL: NEGATIVE — SIGNIFICANT CHANGE UP
WBC # BLD: 10.6 K/UL — HIGH (ref 3.8–10.5)
WBC # FLD AUTO: 10.6 K/UL — HIGH (ref 3.8–10.5)
WBC UR QL: SIGNIFICANT CHANGE UP /HPF (ref 0–5)

## 2018-04-15 PROCEDURE — 87486 CHLMYD PNEUM DNA AMP PROBE: CPT

## 2018-04-15 PROCEDURE — 87040 BLOOD CULTURE FOR BACTERIA: CPT

## 2018-04-15 PROCEDURE — 87086 URINE CULTURE/COLONY COUNT: CPT

## 2018-04-15 PROCEDURE — 80053 COMPREHEN METABOLIC PANEL: CPT

## 2018-04-15 PROCEDURE — 82947 ASSAY GLUCOSE BLOOD QUANT: CPT

## 2018-04-15 PROCEDURE — 82435 ASSAY OF BLOOD CHLORIDE: CPT

## 2018-04-15 PROCEDURE — 87798 DETECT AGENT NOS DNA AMP: CPT

## 2018-04-15 PROCEDURE — 83605 ASSAY OF LACTIC ACID: CPT

## 2018-04-15 PROCEDURE — 84132 ASSAY OF SERUM POTASSIUM: CPT

## 2018-04-15 PROCEDURE — 87633 RESP VIRUS 12-25 TARGETS: CPT

## 2018-04-15 PROCEDURE — 93010 ELECTROCARDIOGRAM REPORT: CPT

## 2018-04-15 PROCEDURE — 84295 ASSAY OF SERUM SODIUM: CPT

## 2018-04-15 PROCEDURE — 85610 PROTHROMBIN TIME: CPT

## 2018-04-15 PROCEDURE — 93005 ELECTROCARDIOGRAM TRACING: CPT

## 2018-04-15 PROCEDURE — 82330 ASSAY OF CALCIUM: CPT

## 2018-04-15 PROCEDURE — 71046 X-RAY EXAM CHEST 2 VIEWS: CPT

## 2018-04-15 PROCEDURE — 85014 HEMATOCRIT: CPT

## 2018-04-15 PROCEDURE — 99284 EMERGENCY DEPT VISIT MOD MDM: CPT | Mod: 25

## 2018-04-15 PROCEDURE — 85730 THROMBOPLASTIN TIME PARTIAL: CPT

## 2018-04-15 PROCEDURE — 81001 URINALYSIS AUTO W/SCOPE: CPT

## 2018-04-15 PROCEDURE — 85027 COMPLETE CBC AUTOMATED: CPT

## 2018-04-15 PROCEDURE — 96374 THER/PROPH/DIAG INJ IV PUSH: CPT

## 2018-04-15 PROCEDURE — 87581 M.PNEUMON DNA AMP PROBE: CPT

## 2018-04-15 PROCEDURE — 82803 BLOOD GASES ANY COMBINATION: CPT

## 2018-04-15 PROCEDURE — 71046 X-RAY EXAM CHEST 2 VIEWS: CPT | Mod: 26

## 2018-04-15 RX ORDER — ACETAMINOPHEN 500 MG
1000 TABLET ORAL ONCE
Qty: 0 | Refills: 0 | Status: COMPLETED | OUTPATIENT
Start: 2018-04-15 | End: 2018-04-15

## 2018-04-15 RX ORDER — POTASSIUM CHLORIDE 20 MEQ
40 PACKET (EA) ORAL ONCE
Qty: 0 | Refills: 0 | Status: COMPLETED | OUTPATIENT
Start: 2018-04-15 | End: 2018-04-15

## 2018-04-15 RX ORDER — SODIUM CHLORIDE 9 MG/ML
2000 INJECTION INTRAMUSCULAR; INTRAVENOUS; SUBCUTANEOUS ONCE
Qty: 0 | Refills: 0 | Status: COMPLETED | OUTPATIENT
Start: 2018-04-15 | End: 2018-04-15

## 2018-04-15 RX ADMIN — Medication 40 MILLIEQUIVALENT(S): at 19:04

## 2018-04-15 RX ADMIN — Medication 400 MILLIGRAM(S): at 17:46

## 2018-04-15 RX ADMIN — Medication 75 MILLIGRAM(S): at 21:51

## 2018-04-15 RX ADMIN — SODIUM CHLORIDE 8000 MILLILITER(S): 9 INJECTION INTRAMUSCULAR; INTRAVENOUS; SUBCUTANEOUS at 17:46

## 2018-04-15 NOTE — ED PROVIDER NOTE - PLAN OF CARE
Follow up with your medical doctor and hematologist/oncologist in 2-3 days or call our clinic at [phone] and state you were seen in the Emergency Department and would like to be seen in clinic.     Please take tamiflu twice a day for the next 5 days.   Drink at least 1 Liter or 32 Ounces of water each day.  Return for any persistent, worsening symptoms, or ANY concerns at all.

## 2018-04-15 NOTE — ED ADULT NURSE NOTE - OBJECTIVE STATEMENT
41 y/o male hx leukemia, HIV/AIDS on chemo (last chemo last week) presents to the ED c/o fever tmax 103F x 1 week. Pt was seen in ED last week for similar symptoms advised by MD to come to ED if fever worsened. Endorsing dry cough, overall body aches. Denies sick contact, n/v, abdominal pain, burning/diff urinating. Pt A&Ox3, in no respiratory distress, no CP, lungs CTA, abdomen soft, nontender, nondistended. PT resting comfortably with family at bedside and safety maintained.

## 2018-04-15 NOTE — ED PROVIDER NOTE - ATTENDING CONTRIBUTION TO CARE
Alice - 43yo M w HIV on HAART, lymphoma on chemo, last session 4 days ago, in the ER for 2 days of gen weakness, fevers, dry cough. Mild SOB today, episodic, self-limited. Well appearing, NAD, tachy to 130s, lungs clear, abdomen soft, nontender. Will get labs, hydrate, CXR, reevaluate

## 2018-04-15 NOTE — ED PROVIDER NOTE - CARE PLAN
Principal Discharge DX:	Fever Principal Discharge DX:	Fever  Assessment and plan of treatment:	Follow up with your medical doctor and hematologist/oncologist in 2-3 days or call our clinic at [phone] and state you were seen in the Emergency Department and would like to be seen in clinic.     Please take tamiflu twice a day for the next 5 days.   Drink at least 1 Liter or 32 Ounces of water each day.  Return for any persistent, worsening symptoms, or ANY concerns at all.

## 2018-04-15 NOTE — ED PROVIDER NOTE - MEDICAL DECISION MAKING DETAILS
tachycardia and fever in setting of HIV/AIDS with lymphoma on chemo, otherwise not in distress. Plan: bcx, rvp, fluids, cxr, ekg, labs, vbg, likely admit

## 2018-04-15 NOTE — ED PROVIDER NOTE - PROGRESS NOTE DETAILS
Nemes - Heme consult requested over the phone. Unable to make recommendations based on stx or labs. Possibly lymphoma-related. Will reevaluate, consider admission vs DC Flu pos. HR down to 105, feels much better. Labs wnl. Will Dc w tamiflu and Heme f/u

## 2018-04-15 NOTE — ED ADULT NURSE NOTE - CHPI ED SYMPTOMS NEG
no dizziness/no chills/no vomiting/no numbness/no nausea/no decreased eating/drinking/no weakness/no pain/no tingling

## 2018-04-15 NOTE — ED PROVIDER NOTE - OBJECTIVE STATEMENT
Patient is 42 y M with PMH HIV/AIDS HAART, high grade B cell lymphoma dx 14 yrs ago presenting with fever to 100, dry cough, body aches x 1 day. Had SOB earlier but now resolved, no chest pain, no urinary complaints, no abdominal pain. On weekly chemo, Rituxin 4/15, Neulasta 4/12.     PMD: Alexandro  Onc: Milton (matthew)  ID: Diego  ROS: Denies palpitations, recent sickness, HA, vision changes, chest pain, abdominal pain, n/v/d/c, dysuria, hematuria, rash, new joint aches, sick contacts, and recent travel.

## 2018-04-15 NOTE — ED ADULT NURSE REASSESSMENT NOTE - NS ED NURSE REASSESS COMMENT FT1
Received report this PM from Hiral BALDERAS. PT observed resting comfortably in bed, pt provided urinal. Pt denies any needs at this time. Plan of care reviewed with pt. Pt educated on call bell system and provided call bell. Safety and comfort maintained.

## 2018-04-15 NOTE — ED ADULT NURSE REASSESSMENT NOTE - NS ED NURSE REASSESS COMMENT FT1
Pt transported to x-ray. Pt disconnected from monitor and sweater with metal removed. All valuables placed on stretcher with patient. Pt denies any needs at this time. Both side rails up. Safety and comfort maintained. Vital signs stable.

## 2018-04-16 DIAGNOSIS — R69 ILLNESS, UNSPECIFIED: ICD-10-CM

## 2018-04-16 LAB
CULTURE RESULTS: NO GROWTH — SIGNIFICANT CHANGE UP
SPECIMEN SOURCE: SIGNIFICANT CHANGE UP

## 2018-04-19 ENCOUNTER — APPOINTMENT (OUTPATIENT)
Dept: HEMATOLOGY ONCOLOGY | Facility: CLINIC | Age: 43
End: 2018-04-19
Payer: MEDICAID

## 2018-04-19 ENCOUNTER — RESULT REVIEW (OUTPATIENT)
Age: 43
End: 2018-04-19

## 2018-04-19 VITALS
OXYGEN SATURATION: 99 % | SYSTOLIC BLOOD PRESSURE: 120 MMHG | TEMPERATURE: 98.8 F | WEIGHT: 189.58 LBS | HEART RATE: 117 BPM | RESPIRATION RATE: 16 BRPM | DIASTOLIC BLOOD PRESSURE: 78 MMHG | BODY MASS INDEX: 28.02 KG/M2

## 2018-04-19 LAB
ANISOCYTOSIS BLD QL: SLIGHT — SIGNIFICANT CHANGE UP
BASOPHILS # BLD AUTO: 0 K/UL — SIGNIFICANT CHANGE UP (ref 0–0.2)
EOSINOPHIL # BLD AUTO: 0.1 K/UL — SIGNIFICANT CHANGE UP (ref 0–0.5)
HCT VFR BLD CALC: 37.8 % — LOW (ref 39–50)
HGB BLD-MCNC: 12 G/DL — LOW (ref 13–17)
LYMPHOCYTES # BLD AUTO: 15 % — SIGNIFICANT CHANGE UP (ref 13–44)
LYMPHOCYTES # BLD AUTO: 3.3 K/UL — SIGNIFICANT CHANGE UP (ref 1–3.3)
MACROCYTES BLD QL: SLIGHT — SIGNIFICANT CHANGE UP
MCHC RBC-ENTMCNC: 28.2 PG — SIGNIFICANT CHANGE UP (ref 27–34)
MCHC RBC-ENTMCNC: 31.6 G/DL — LOW (ref 32–36)
MCV RBC AUTO: 89 FL — SIGNIFICANT CHANGE UP (ref 80–100)
METAMYELOCYTES # FLD: 1 % — HIGH (ref 0–0)
MICROCYTES BLD QL: SLIGHT — SIGNIFICANT CHANGE UP
MONOCYTES # BLD AUTO: 1.6 K/UL — HIGH (ref 0–0.9)
MONOCYTES NFR BLD AUTO: 3 % — SIGNIFICANT CHANGE UP (ref 2–14)
MYELOCYTES NFR BLD: 5 % — HIGH (ref 0–0)
NEUTROPHILS # BLD AUTO: 23.3 K/UL — HIGH (ref 1.8–7.4)
NEUTROPHILS NFR BLD AUTO: 72 % — SIGNIFICANT CHANGE UP (ref 43–77)
NEUTS BAND # BLD: 3 % — SIGNIFICANT CHANGE UP (ref 0–8)
PLAT MORPH BLD: NORMAL — SIGNIFICANT CHANGE UP
PLATELET # BLD AUTO: 102 K/UL — LOW (ref 150–400)
POIKILOCYTOSIS BLD QL AUTO: SLIGHT — SIGNIFICANT CHANGE UP
POLYCHROMASIA BLD QL SMEAR: SLIGHT — SIGNIFICANT CHANGE UP
PROMYELOCYTES # FLD: 1 % — HIGH (ref 0–0)
RBC # BLD: 4.25 M/UL — SIGNIFICANT CHANGE UP (ref 4.2–5.8)
RBC # FLD: 16.3 % — HIGH (ref 10.3–14.5)
RBC BLD AUTO: ABNORMAL
WBC # BLD: 28.3 K/UL — HIGH (ref 3.8–10.5)
WBC # FLD AUTO: 28.3 K/UL — HIGH (ref 3.8–10.5)

## 2018-04-19 PROCEDURE — 99214 OFFICE O/P EST MOD 30 MIN: CPT

## 2018-04-20 LAB
CULTURE RESULTS: SIGNIFICANT CHANGE UP
CULTURE RESULTS: SIGNIFICANT CHANGE UP
SPECIMEN SOURCE: SIGNIFICANT CHANGE UP
SPECIMEN SOURCE: SIGNIFICANT CHANGE UP

## 2018-04-24 ENCOUNTER — FORM ENCOUNTER (OUTPATIENT)
Age: 43
End: 2018-04-24

## 2018-04-24 DIAGNOSIS — C85.88 OTHER SPECIFIED TYPES OF NON-HODGKIN LYMPHOMA, LYMPH NODES OF MULTIPLE SITES: ICD-10-CM

## 2018-04-25 ENCOUNTER — APPOINTMENT (OUTPATIENT)
Dept: NUCLEAR MEDICINE | Facility: CLINIC | Age: 43
End: 2018-04-25
Payer: MEDICAID

## 2018-04-25 ENCOUNTER — OUTPATIENT (OUTPATIENT)
Dept: OUTPATIENT SERVICES | Facility: HOSPITAL | Age: 43
LOS: 1 days | End: 2018-04-25

## 2018-04-25 DIAGNOSIS — C85.10 UNSPECIFIED B-CELL LYMPHOMA, UNSPECIFIED SITE: ICD-10-CM

## 2018-04-25 PROCEDURE — 78815 PET IMAGE W/CT SKULL-THIGH: CPT | Mod: 26,PS

## 2018-04-26 ENCOUNTER — INPATIENT (INPATIENT)
Facility: HOSPITAL | Age: 43
LOS: 4 days | Discharge: HOME | DRG: 847 | End: 2018-05-01
Attending: INTERNAL MEDICINE | Admitting: INTERNAL MEDICINE
Payer: MEDICAID

## 2018-04-26 ENCOUNTER — TRANSCRIPTION ENCOUNTER (OUTPATIENT)
Age: 43
End: 2018-04-26

## 2018-04-26 VITALS
SYSTOLIC BLOOD PRESSURE: 149 MMHG | RESPIRATION RATE: 18 BRPM | HEART RATE: 112 BPM | OXYGEN SATURATION: 99 % | DIASTOLIC BLOOD PRESSURE: 89 MMHG | HEIGHT: 69 IN | WEIGHT: 182.98 LBS | TEMPERATURE: 99 F

## 2018-04-26 DIAGNOSIS — C85.10 UNSPECIFIED B-CELL LYMPHOMA, UNSPECIFIED SITE: ICD-10-CM

## 2018-04-26 DIAGNOSIS — B99.9 UNSPECIFIED INFECTIOUS DISEASE: ICD-10-CM

## 2018-04-26 DIAGNOSIS — Z29.9 ENCOUNTER FOR PROPHYLACTIC MEASURES, UNSPECIFIED: ICD-10-CM

## 2018-04-26 LAB
ALBUMIN SERPL ELPH-MCNC: 4.1 G/DL — SIGNIFICANT CHANGE UP (ref 3.3–5)
ALP SERPL-CCNC: 97 U/L — SIGNIFICANT CHANGE UP (ref 40–120)
ALT FLD-CCNC: 32 U/L — SIGNIFICANT CHANGE UP (ref 10–45)
ANION GAP SERPL CALC-SCNC: 11 MMOL/L — SIGNIFICANT CHANGE UP (ref 5–17)
APPEARANCE CSF: CLEAR — SIGNIFICANT CHANGE UP
APPEARANCE SPUN FLD: COLORLESS — SIGNIFICANT CHANGE UP
APTT BLD: 32.9 SEC — SIGNIFICANT CHANGE UP (ref 27.5–37.4)
AST SERPL-CCNC: 17 U/L — SIGNIFICANT CHANGE UP (ref 10–40)
BASOPHILS # BLD AUTO: 0 K/UL — SIGNIFICANT CHANGE UP (ref 0–0.2)
BASOPHILS NFR BLD AUTO: 0.3 % — SIGNIFICANT CHANGE UP (ref 0–2)
BILIRUB SERPL-MCNC: 0.2 MG/DL — SIGNIFICANT CHANGE UP (ref 0.2–1.2)
BUN SERPL-MCNC: 13 MG/DL — SIGNIFICANT CHANGE UP (ref 7–23)
CALCIUM SERPL-MCNC: 9.6 MG/DL — SIGNIFICANT CHANGE UP (ref 8.4–10.5)
CHLORIDE SERPL-SCNC: 100 MMOL/L — SIGNIFICANT CHANGE UP (ref 96–108)
CO2 SERPL-SCNC: 26 MMOL/L — SIGNIFICANT CHANGE UP (ref 22–31)
COLOR CSF: SIGNIFICANT CHANGE UP
CREAT SERPL-MCNC: 1.08 MG/DL — SIGNIFICANT CHANGE UP (ref 0.5–1.3)
EOSINOPHIL # BLD AUTO: 0 K/UL — SIGNIFICANT CHANGE UP (ref 0–0.5)
EOSINOPHIL NFR BLD AUTO: 0.3 % — SIGNIFICANT CHANGE UP (ref 0–6)
GLUCOSE CSF-MCNC: 64 MG/DL — SIGNIFICANT CHANGE UP (ref 40–70)
GLUCOSE SERPL-MCNC: 104 MG/DL — HIGH (ref 70–99)
HCT VFR BLD CALC: 35.3 % — LOW (ref 39–50)
HGB BLD-MCNC: 11.7 G/DL — LOW (ref 13–17)
INR BLD: 1.04 RATIO — SIGNIFICANT CHANGE UP (ref 0.88–1.16)
LDH CSF L TO P-CCNC: 30 U/L — SIGNIFICANT CHANGE UP
LDH FLD-CCNC: 30 U/L — SIGNIFICANT CHANGE UP
LYMPHOCYTES # BLD AUTO: 2.2 K/UL — SIGNIFICANT CHANGE UP (ref 1–3.3)
LYMPHOCYTES # BLD AUTO: 23.7 % — SIGNIFICANT CHANGE UP (ref 13–44)
LYMPHOCYTES # CSF: 95 % — HIGH (ref 40–80)
MAGNESIUM SERPL-MCNC: 2.1 MG/DL — SIGNIFICANT CHANGE UP (ref 1.6–2.6)
MCHC RBC-ENTMCNC: 29.9 PG — SIGNIFICANT CHANGE UP (ref 27–34)
MCHC RBC-ENTMCNC: 33.2 GM/DL — SIGNIFICANT CHANGE UP (ref 32–36)
MCV RBC AUTO: 89.9 FL — SIGNIFICANT CHANGE UP (ref 80–100)
MONOCYTES # BLD AUTO: 0.7 K/UL — SIGNIFICANT CHANGE UP (ref 0–0.9)
MONOCYTES NFR BLD AUTO: 7.3 % — SIGNIFICANT CHANGE UP (ref 2–14)
MONOS+MACROS NFR CSF: 4 % — LOW (ref 15–45)
NEUTROPHILS # BLD AUTO: 6.4 K/UL — SIGNIFICANT CHANGE UP (ref 1.8–7.4)
NEUTROPHILS # CSF: 1 % — SIGNIFICANT CHANGE UP (ref 0–6)
NEUTROPHILS NFR BLD AUTO: 68.4 % — SIGNIFICANT CHANGE UP (ref 43–77)
NRBC NFR CSF: 16 /UL — HIGH (ref 0–5)
PHOSPHATE SERPL-MCNC: 3.1 MG/DL — SIGNIFICANT CHANGE UP (ref 2.5–4.5)
PLATELET # BLD AUTO: 279 K/UL — SIGNIFICANT CHANGE UP (ref 150–400)
POTASSIUM SERPL-MCNC: 4 MMOL/L — SIGNIFICANT CHANGE UP (ref 3.5–5.3)
POTASSIUM SERPL-SCNC: 4 MMOL/L — SIGNIFICANT CHANGE UP (ref 3.5–5.3)
PROT CSF-MCNC: 39 MG/DL — SIGNIFICANT CHANGE UP (ref 15–45)
PROT SERPL-MCNC: 7.3 G/DL — SIGNIFICANT CHANGE UP (ref 6–8.3)
PROTHROM AB SERPL-ACNC: 11.4 SEC — SIGNIFICANT CHANGE UP (ref 9.8–12.7)
RBC # BLD: 3.93 M/UL — LOW (ref 4.2–5.8)
RBC # CSF: 7 /UL — HIGH (ref 0–0)
RBC # FLD: 15.8 % — HIGH (ref 10.3–14.5)
SODIUM SERPL-SCNC: 137 MMOL/L — SIGNIFICANT CHANGE UP (ref 135–145)
TUBE TYPE: SIGNIFICANT CHANGE UP
WBC # BLD: 9.4 K/UL — SIGNIFICANT CHANGE UP (ref 3.8–10.5)
WBC # FLD AUTO: 9.4 K/UL — SIGNIFICANT CHANGE UP (ref 3.8–10.5)

## 2018-04-26 PROCEDURE — 88189 FLOWCYTOMETRY/READ 16 & >: CPT

## 2018-04-26 PROCEDURE — 62272 THER SPI PNXR DRG CSF: CPT

## 2018-04-26 PROCEDURE — 77003 FLUOROGUIDE FOR SPINE INJECT: CPT | Mod: 26

## 2018-04-26 RX ORDER — ALLOPURINOL 300 MG
300 TABLET ORAL DAILY
Qty: 0 | Refills: 0 | Status: DISCONTINUED | OUTPATIENT
Start: 2018-04-26 | End: 2018-05-01

## 2018-04-26 RX ORDER — METHOTREXATE 2.5 MG/1
12 TABLET ORAL ONCE
Qty: 0 | Refills: 0 | Status: COMPLETED | OUTPATIENT
Start: 2018-04-26 | End: 2018-04-27

## 2018-04-26 RX ORDER — RITUXIMAB 10 MG/ML
746 INJECTION, SOLUTION INTRAVENOUS ONCE
Qty: 0 | Refills: 0 | Status: COMPLETED | OUTPATIENT
Start: 2018-04-26 | End: 2018-04-27

## 2018-04-26 RX ORDER — HYDROCORTISONE 20 MG
100 TABLET ORAL ONCE
Qty: 0 | Refills: 0 | Status: DISCONTINUED | OUTPATIENT
Start: 2018-04-26 | End: 2018-05-01

## 2018-04-26 RX ORDER — ACETAMINOPHEN 500 MG
650 TABLET ORAL EVERY 6 HOURS
Qty: 0 | Refills: 0 | Status: DISCONTINUED | OUTPATIENT
Start: 2018-04-26 | End: 2018-05-01

## 2018-04-26 RX ORDER — SODIUM CHLORIDE 9 MG/ML
1000 INJECTION INTRAMUSCULAR; INTRAVENOUS; SUBCUTANEOUS
Qty: 0 | Refills: 0 | Status: DISCONTINUED | OUTPATIENT
Start: 2018-04-26 | End: 2018-05-01

## 2018-04-26 RX ORDER — ACETAMINOPHEN 500 MG
650 TABLET ORAL ONCE
Qty: 0 | Refills: 0 | Status: DISCONTINUED | OUTPATIENT
Start: 2018-04-26 | End: 2018-05-01

## 2018-04-26 RX ORDER — ACYCLOVIR SODIUM 500 MG
400 VIAL (EA) INTRAVENOUS THREE TIMES A DAY
Qty: 0 | Refills: 0 | Status: DISCONTINUED | OUTPATIENT
Start: 2018-04-26 | End: 2018-05-01

## 2018-04-26 RX ORDER — DIPHENHYDRAMINE HCL 50 MG
50 CAPSULE ORAL ONCE
Qty: 0 | Refills: 0 | Status: DISCONTINUED | OUTPATIENT
Start: 2018-04-26 | End: 2018-05-01

## 2018-04-26 RX ADMIN — Medication 400 MILLIGRAM(S): at 14:09

## 2018-04-26 RX ADMIN — SODIUM CHLORIDE 75 MILLILITER(S): 9 INJECTION INTRAMUSCULAR; INTRAVENOUS; SUBCUTANEOUS at 22:05

## 2018-04-26 RX ADMIN — Medication 300 MILLIGRAM(S): at 14:09

## 2018-04-26 RX ADMIN — SODIUM CHLORIDE 75 MILLILITER(S): 9 INJECTION INTRAMUSCULAR; INTRAVENOUS; SUBCUTANEOUS at 18:11

## 2018-04-26 RX ADMIN — Medication 400 MILLIGRAM(S): at 22:05

## 2018-04-26 NOTE — DISCHARGE NOTE ADULT - CARE PROVIDERS DIRECT ADDRESSES
,[email or web address], [email or web address]

## 2018-04-26 NOTE — DISCHARGE NOTE ADULT - PLAN OF CARE
Maintain counts and remain free from infection Notify MD and report to the ER for any Temp greater than or equal to Stable Continue Triumeq and follow up with your Infectious Disease physician.

## 2018-04-26 NOTE — H&P ADULT - HISTORY OF PRESENT ILLNESS
This is a 42 yo M with PMHx of HIV and recent diagnosis of high-grade B-cell, CD10+ lymphoma admitted for Cycle 4 DA-R-EPOCH with 20% dose escalation.  Patient was initially seen at [facility] with fever. He was found to have left axillary lymphadenopathy. He had not been fully compliant with his antiretroviral therapy until December of 2017 when he restarted his medication. His CD4 count is actually normal.  CT of chest showed left axillary lymphadenopathy with a node measuring up to 5 cm. Minimal bibasilar and right middle lobe atelectasis was seen with a tiny left pleural effusion. Mild cardiomegaly was noted. CT of abdomen and pelvis showed no hepatosplenomegaly or mass and no significant adenopathy.. Gastric wall thickening was seen of unclear etiology.  Left axillary lymph node biopsy showed a high-grade CD10 positive B cell lymphoma expressing CD20 and BCL 6. 65% of nuclei showed a myc rearrangement. BCL-2 and BCL 6 were not rearranged. Ki-67 was about 90%.    After Cycle 3 of chemotherapy the patient presented to the ER on 4/15/18  for fever and chills, RVP was positive for Influenza A and Entero/Rhinovirus. The patient completed a course of Tamiflu and upon admission today denies any nasal congestion, sore throat, fever, headache, dizziness, visual changes, chest pain, palpitations, SOB, abdominal pain, nausea, vomiting, diarrhea or dysuria. This is a 42 yo M with PMHx of HIV and recent diagnosis of high-grade B-cell, CD10+ lymphoma with CNS involvement admitted for Cycle 4 DA-R-EPOCH with 20% dose escalation.  Patient was initially seen at [facility] with fever. He was found to have left axillary lymphadenopathy. He had During the hospital stay Pt’s CBCs chemistries were monitored very closely and supplemented as needed. Vitals signs were monitored every 4 hrs and strict I/O was maintained.ot been fully compliant with his antiretroviral therapy until December of 2017 when he restarted his medication. His CD4 count is actually normal.  CT of chest showed left axillary lymphadenopathy with a node measuring up to 5 cm. Minimal bibasilar and right middle lobe atelectasis was seen with a tiny left pleural effusion. Mild cardiomegaly was noted. CT of abdomen and pelvis showed no hepatosplenomegaly or mass and no significant adenopathy.. Gastric wall thickening was seen of unclear etiology.  Left axillary lymph node biopsy showed a high-grade CD10 positive B cell lymphoma expressing CD20 and BCL 6. 65% of nuclei showed a myc rearrangement. BCL-2 and BCL 6 were not rearranged. Ki-67 was about 90%.    After Cycle 3 of chemotherapy the patient presented to the ER on 4/15/18  for fever and chills, RVP was positive for Influenza A and Entero/Rhinovirus. The patient completed a course of Tamiflu and upon admission today denies any nasal congestion, sore throat, fever, headache, dizziness, visual changes, chest pain, palpitations, SOB, abdominal pain, nausea, vomiting, diarrhea or dysuria.

## 2018-04-26 NOTE — DISCHARGE NOTE ADULT - MEDICATION SUMMARY - MEDICATIONS TO CHANGE
I will SWITCH the dose or number of times a day I take the medications listed below when I get home from the hospital:    ondansetron 4 mg oral tablet  -- 1 tab(s) by mouth every 6 hours, As Needed I will SWITCH the dose or number of times a day I take the medications listed below when I get home from the hospital:  None

## 2018-04-26 NOTE — PROGRESS NOTE ADULT - SUBJECTIVE AND OBJECTIVE BOX
CLINICAL INDICATION: B-cell lymphoma    Patient presents for fluoroscopically guided lumbar puncture and intrathecal chemotherapy administration.      ]Risks and benefits were discussed with the patient and/or patient health care proxy.  Risks discussed included bleeding, infection, nerve damage, and headache.       Status post lumbar puncture at the L2-L3 level utilizing a 22G spinal needle.      5cc of straw colored cerebral spinal fluid was obtained.    12mg of methotrexate was intrathecally injected.      No immediate complications.

## 2018-04-26 NOTE — DISCHARGE NOTE ADULT - ADDITIONAL INSTRUCTIONS
To the [facility] on Thursday 5/3/18 at __________ for Neulasta injection.  To the [facility] on ____ at__________ to see Amira Crawford NP.  Twice weekly lab work to be completed on Thursday/Monday? [facility] 3[facility] for Neulasta injection. You will have lab work completed at this visit.  [facility] Monday 5/7/18 at 1:20pm to see Amira Crawford NP. You will have lab work completed at this visit.

## 2018-04-26 NOTE — DISCHARGE NOTE ADULT - CARE PROVIDER_API CALL
Niall Cortes), Hematology; Internal Medicine; Medical Oncology  [address] [place] [address]  Phone: [phone]  Fax: [phone]

## 2018-04-26 NOTE — DISCHARGE NOTE ADULT - MEDICATION SUMMARY - MEDICATIONS TO TAKE
I will START or STAY ON the medications listed below when I get home from the hospital:    Zofran 8 mg oral tablet  -- 1 tab(s) by mouth every 8 hours, As Needed  -- Indication: For Antiemetic    allopurinol 300 mg oral tablet  -- 1 tab(s) by mouth once a day  -- Indication: For Prevention of Hyperuricemia    Triumeq oral tablet  -- 1 tab(s) by mouth once a day  -- Indication: For HIV (human immunodeficiency virus infection)    acyclovir 400 mg oral tablet  -- 1 tab(s) by mouth 3 times a day  -- Indication: For PPX I will START or STAY ON the medications listed below when I get home from the hospital:    predniSONE 20 mg oral tablet  -- 6 tab(s) by mouth 2 times a day. Take 6 tabs the evening of Tuesday 5/1 and then the remaining 6 tabs the morning of Wednesday 5/2 MDD:6  -- Indication: For Part of chemotherapy    Zofran 8 mg oral tablet  -- 1 tab(s) by mouth every 8 hours, As Needed  -- Indication: For Antiemetic    allopurinol 300 mg oral tablet  -- 1 tab(s) by mouth once a day  -- Indication: For Prevention of Hyperuricemia    Triumeq oral tablet  -- 1 tab(s) by mouth once a day  -- Indication: For HIV (human immunodeficiency virus infection)    acyclovir 400 mg oral tablet  -- 1 tab(s) by mouth 3 times a day  -- Indication: For PPX I will START or STAY ON the medications listed below when I get home from the hospital:    predniSONE  -- 120 milligram(s) by mouth once tonight at 11pm   -- Indication: For Part of chemotherapy    Zofran 8 mg oral tablet  -- 1 tab(s) by mouth every 8 hours, As Needed  -- Indication: For Antiemetic    allopurinol 300 mg oral tablet  -- 1 tab(s) by mouth once a day  -- Indication: For Prevention of Hyperuricemia    Triumeq oral tablet  -- 1 tab(s) by mouth once a day  -- Indication: For HIV (human immunodeficiency virus infection)    acyclovir 400 mg oral tablet  -- 1 tab(s) by mouth 3 times a day  -- Indication: For PPX

## 2018-04-26 NOTE — H&P ADULT - ASSESSMENT
This is a 42 yo M with PMHx of HIV and recent diagnosis of high-grade B-cell, CD10+ lymphoma admitted for Cycle 4 DA-R-EPOCH with 20% dose escalation. This is a 42 yo M with PMHx of HIV and recent diagnosis of high-grade B-cell, CD10+ lymphoma with CNS involvement admitted for Cycle 4 DA-R-EPOCH with 20% dose escalation.

## 2018-04-26 NOTE — H&P ADULT - NSHPLABSRESULTS_GEN_ALL_CORE
LABS:               11.7   9.4   )-----------( 279      ( 26 Apr 2018 10:55 )             35.3         Mean Cell Volume : 89.9 fl  Mean Cell Hemoglobin : 29.9 pg  Mean Cell Hemoglobin Concentration : 33.2 gm/dL  Auto Neutrophil # : 6.4 K/uL  Auto Lymphocyte # : 2.2 K/uL  Auto Monocyte # : 0.7 K/uL  Auto Eosinophil # : 0.0 K/uL  Auto Basophil # : 0.0 K/uL  Auto Neutrophil % : 68.4 %  Auto Lymphocyte % : 23.7 %  Auto Monocyte % : 7.3 %  Auto Eosinophil % : 0.3 %  Auto Basophil % : 0.3 %      04-26    137  |  100  |  13  ----------------------------<  104<H>  4.0   |  26  |  1.08    Ca    9.6      26 Apr 2018 10:55  Phos  3.1     04-26  Mg     2.1     04-26    TPro  7.3  /  Alb  4.1  /  TBili  0.2  /  DBili  x   /  AST  17  /  ALT  32  /  AlkPhos  97  04-26      Mg 2.1  Phos 3.1      PT/INR - ( 26 Apr 2018 10:55 )   PT: 11.4 sec;   INR: 1.04 ratio         PTT - ( 26 Apr 2018 10:55 )  PTT:32.9 sec

## 2018-04-26 NOTE — H&P ADULT - LYMPHATIC
anterior cervical L/anterior cervical R/supraclavicular R/femoral R/axillary R/supraclavicular L/axillary L/posterior cervical L/posterior cervical R/inguinal L/inguinal R/femoral L

## 2018-04-26 NOTE — DISCHARGE NOTE ADULT - CARE PLAN
Principal Discharge DX:	High grade B-cell lymphoma  Goal:	Maintain counts and remain free from infection  Assessment and plan of treatment:	Notify MD and report to the ER for any Temp greater than or equal to  Secondary Diagnosis:	HIV (human immunodeficiency virus infection) Principal Discharge DX:	High grade B-cell lymphoma  Goal:	Maintain counts and remain free from infection  Assessment and plan of treatment:	Notify MD and report to the ER for any Temp greater than or equal to  Secondary Diagnosis:	HIV (human immunodeficiency virus infection)  Goal:	Stable  Assessment and plan of treatment:	Continue Triumeq and follow up with your Infectious Disease physician.

## 2018-04-26 NOTE — H&P ADULT - PROBLEM SELECTOR PLAN 2
The patient is not neutropenic  If febrile Pan Cx and CXR  HIV (+) continue antiretrovirals The patient is not neutropenic  If febrile Pan Cx and CXR  HIV (+) continue antiretrovirals  Continue Acyclovir and Bactrim ppx

## 2018-04-26 NOTE — DISCHARGE NOTE ADULT - HOSPITAL COURSE
Patient is a 41-year-old male who is HIV-positive and has been diagnosed with a high-grade B-cell, CD10+ lymphoma admitted for Cycle 4 DA-R-EPOCH with _________.    Upon admission, pt had chest Mediport accessed and started on IVF. Patient received Rituxan 375 mg/m2 and IT MTX on Day 1. Pt tolerated chemotherapy well.  Pt received LP with IT MTX on 4/26,  cell count was _________. During the hospital stay Pt’s CBC and chemistries were monitored very closely and supplemented as needed. Vitals signs were monitored every 4 hrs and strict I/O was maintained. Patient is to be discharged to home with antimicrobials and will follow up as an outpatient at the [facility]. Patient is a 41-year-old male who is HIV-positive and has been diagnosed with a high-grade B-cell, CD10+ lymphoma admitted for Cycle 4 DA-R-EPOCH with 20% dose escalation.    Upon admission, pt had chest Mediport accessed and started on IVF. Patient received Rituxan 375 mg/m2 and IT MTX on Day 1. Pt tolerated chemotherapy well.  Pt received LP with IT MTX on 4/26,  cell count was _________. During the hospital stay Pt’s CBC and chemistries were monitored very closely and supplemented as needed. Vitals signs were monitored every 4 hrs and strict I/O was maintained. Patient is to be discharged to home with antimicrobials and will follow up as an outpatient at the [facility]. Patient is a 41-year-old male who is HIV-positive and has been diagnosed with a high-grade B-cell, CD10+ lymphoma admitted for Cycle 4 DA-R-EPOCH with 20% dose escalation.    Upon admission, pt had chest Mediport accessed and started on IVF. Patient received Rituxan 375 mg/m2 and IT MTX on Day 1. Pt tolerated chemotherapy well.  Pt received LP with IT MTX on 4/26,  cell count was _________. During the hospital stay Pt’s CBC and chemistries were monitored very closely and supplemented as needed. Vitals signs were monitored every 4 hrs and strict I/O was maintained. Patient is to be discharged to home with antimicrobials and will follow up as an outpatient at the [facility]. Patient is a 41-year-old male who is HIV-positive and has been diagnosed with a high-grade B-cell, CD10+ lymphoma admitted for Cycle 4 DA-R-EPOCH with 20% dose escalation.    Upon admission, pt had chest Mediport accessed and started on IVF. Patient received Rituxan 375 mg/m2 and IT MTX on Day 1. Pt tolerated chemotherapy well.  Pt received LP with IT MTX on 4/26 and flow was negative for malignant cells. Another LP was completed on Monday 4/30 and results of flow were pending at time of discharge. During the hospital stay Pt’s CBC and chemistries were monitored very closely and supplemented as needed. Vitals signs were monitored every 4 hrs and strict I/O was maintained.     Patient is to be discharged to home with antimicrobials and will follow up as an outpatient at the [facility]. Patient is a 41-year-old male who is HIV-positive and has been diagnosed with a high-grade B-cell, CD10+ lymphoma admitted for Cycle 4 DA-R-EPOCH with 20% dose escalation.    Upon admission, pt had chest Mediport accessed and started on IVF. Patient received Rituxan 375 mg/m2 and IT MTX on Day 1. Pt tolerated chemotherapy well.  Pt received LP with IT MTX on 4/26 and flow was negative for malignant cells. Another LP was completed on Monday 4/30 and results of flow were pending at time of discharge. During the hospital stay Pt’s CBC and chemistries were monitored very closely and supplemented as needed. Vitals signs were monitored every 4 hrs and strict I/O was maintained.     Patient is to be discharged to home with antimicrobials and will follow up as an outpatient at the [facility].

## 2018-04-26 NOTE — H&P ADULT - PROBLEM SELECTOR PLAN 1
Admit to [facility] for Cycle 4 of DA-R-EPOCH  Rituxan 375mg/m2 on Day 1  Doxorubicin ________ continuous IV infusion Day 2-5  Etoposide __________ continuous IV infusion Day 2-5  Vincristine 0.4 mg/m2 continuous IV infusion Day 2-5  Prednisone 60 mg/m2 PO BID Day 2-6  Cyclophosphamide __________ IV x 1 on Day 6  Monitor CBC/Lytes and transfuse/replete PRN    Antiemetics  IVF Admit to [facility] for Cycle 4 of DA-R-EPOCH  Rituxan 375mg/m2 on Day 1  Doxorubicin ________ continuous IV infusion Day 2-5  Etoposide __________ continuous IV infusion Day 2-5  Vincristine 0.4 mg/m2 continuous IV infusion Day 2-5  Prednisone 60 mg/m2 PO BID Day 2-6  Cyclophosphamide __________ IV x 1 on Day 6  Monitor CBC/Lytes and transfuse/replete PRN  Strict Is and Os/Daily weights/Mouth Care  Antiemetics  IVF Admit to [facility] for Cycle 4 of DA-R-EPOCH with 20% dose escalation  Rituxan 375mg/m2 on Day 1  Doxorubicin 17.3mg/m2 continuous IV infusion Day 2-5  Etoposide 86.4 mg/m2 continuous IV infusion Day 2-5  Vincristine 0.4 mg/m2 continuous IV infusion Day 2-5  Prednisone 60 mg/m2 PO BID Day 2-6  Cyclophosphamide 1296 mg/m2 IV x 1 on Day 6  LP with IT MTX x 2 during admission  Monitor CBC/Lytes and transfuse/replete PRN  Strict Is and Os/Daily weights/Mouth Care  Antiemetics  IVF

## 2018-04-27 LAB
ALBUMIN SERPL ELPH-MCNC: 3.6 G/DL — SIGNIFICANT CHANGE UP (ref 3.3–5)
ALP SERPL-CCNC: 81 U/L — SIGNIFICANT CHANGE UP (ref 40–120)
ALT FLD-CCNC: 24 U/L — SIGNIFICANT CHANGE UP (ref 10–45)
ANION GAP SERPL CALC-SCNC: 11 MMOL/L — SIGNIFICANT CHANGE UP (ref 5–17)
AST SERPL-CCNC: 14 U/L — SIGNIFICANT CHANGE UP (ref 10–40)
BASOPHILS # BLD AUTO: 0 K/UL — SIGNIFICANT CHANGE UP (ref 0–0.2)
BASOPHILS NFR BLD AUTO: 0.2 % — SIGNIFICANT CHANGE UP (ref 0–2)
BILIRUB SERPL-MCNC: 0.2 MG/DL — SIGNIFICANT CHANGE UP (ref 0.2–1.2)
BLD GP AB SCN SERPL QL: NEGATIVE — SIGNIFICANT CHANGE UP
BUN SERPL-MCNC: 10 MG/DL — SIGNIFICANT CHANGE UP (ref 7–23)
CALCIUM SERPL-MCNC: 9.3 MG/DL — SIGNIFICANT CHANGE UP (ref 8.4–10.5)
CHLORIDE SERPL-SCNC: 102 MMOL/L — SIGNIFICANT CHANGE UP (ref 96–108)
CO2 SERPL-SCNC: 26 MMOL/L — SIGNIFICANT CHANGE UP (ref 22–31)
CREAT SERPL-MCNC: 1.09 MG/DL — SIGNIFICANT CHANGE UP (ref 0.5–1.3)
EOSINOPHIL # BLD AUTO: 0 K/UL — SIGNIFICANT CHANGE UP (ref 0–0.5)
EOSINOPHIL NFR BLD AUTO: 0.4 % — SIGNIFICANT CHANGE UP (ref 0–6)
GLUCOSE SERPL-MCNC: 106 MG/DL — HIGH (ref 70–99)
HCT VFR BLD CALC: 33.8 % — LOW (ref 39–50)
HGB BLD-MCNC: 11.1 G/DL — LOW (ref 13–17)
LYMPHOCYTES # BLD AUTO: 1.9 K/UL — SIGNIFICANT CHANGE UP (ref 1–3.3)
LYMPHOCYTES # BLD AUTO: 20 % — SIGNIFICANT CHANGE UP (ref 13–44)
MAGNESIUM SERPL-MCNC: 2 MG/DL — SIGNIFICANT CHANGE UP (ref 1.6–2.6)
MCHC RBC-ENTMCNC: 29.4 PG — SIGNIFICANT CHANGE UP (ref 27–34)
MCHC RBC-ENTMCNC: 32.8 GM/DL — SIGNIFICANT CHANGE UP (ref 32–36)
MCV RBC AUTO: 89.6 FL — SIGNIFICANT CHANGE UP (ref 80–100)
MONOCYTES # BLD AUTO: 0.5 K/UL — SIGNIFICANT CHANGE UP (ref 0–0.9)
MONOCYTES NFR BLD AUTO: 5.5 % — SIGNIFICANT CHANGE UP (ref 2–14)
NEUTROPHILS # BLD AUTO: 6.9 K/UL — SIGNIFICANT CHANGE UP (ref 1.8–7.4)
NEUTROPHILS NFR BLD AUTO: 74 % — SIGNIFICANT CHANGE UP (ref 43–77)
PHOSPHATE SERPL-MCNC: 3.5 MG/DL — SIGNIFICANT CHANGE UP (ref 2.5–4.5)
PLATELET # BLD AUTO: 285 K/UL — SIGNIFICANT CHANGE UP (ref 150–400)
POTASSIUM SERPL-MCNC: 3.8 MMOL/L — SIGNIFICANT CHANGE UP (ref 3.5–5.3)
POTASSIUM SERPL-SCNC: 3.8 MMOL/L — SIGNIFICANT CHANGE UP (ref 3.5–5.3)
PROT SERPL-MCNC: 6.7 G/DL — SIGNIFICANT CHANGE UP (ref 6–8.3)
RBC # BLD: 3.78 M/UL — LOW (ref 4.2–5.8)
RBC # FLD: 15.7 % — HIGH (ref 10.3–14.5)
RH IG SCN BLD-IMP: POSITIVE — SIGNIFICANT CHANGE UP
SODIUM SERPL-SCNC: 139 MMOL/L — SIGNIFICANT CHANGE UP (ref 135–145)
WBC # BLD: 9.3 K/UL — SIGNIFICANT CHANGE UP (ref 3.8–10.5)
WBC # FLD AUTO: 9.3 K/UL — SIGNIFICANT CHANGE UP (ref 3.8–10.5)

## 2018-04-27 PROCEDURE — 99232 SBSQ HOSP IP/OBS MODERATE 35: CPT | Mod: GC

## 2018-04-27 RX ORDER — FOSAPREPITANT DIMEGLUMINE 150 MG/5ML
150 INJECTION, POWDER, LYOPHILIZED, FOR SOLUTION INTRAVENOUS ONCE
Qty: 0 | Refills: 0 | Status: COMPLETED | OUTPATIENT
Start: 2018-04-27 | End: 2018-04-27

## 2018-04-27 RX ORDER — METOCLOPRAMIDE HCL 10 MG
10 TABLET ORAL EVERY 6 HOURS
Qty: 0 | Refills: 0 | Status: DISCONTINUED | OUTPATIENT
Start: 2018-04-27 | End: 2018-05-01

## 2018-04-27 RX ORDER — DOXORUBICIN HYDROCHLORIDE 2 MG/ML
34 INJECTION, SOLUTION INTRAVENOUS DAILY
Qty: 0 | Refills: 0 | Status: DISCONTINUED | OUTPATIENT
Start: 2018-04-27 | End: 2018-05-01

## 2018-04-27 RX ORDER — ETOPOSIDE 20 MG/ML
172 VIAL (ML) INTRAVENOUS DAILY
Qty: 0 | Refills: 0 | Status: DISCONTINUED | OUTPATIENT
Start: 2018-04-27 | End: 2018-05-01

## 2018-04-27 RX ORDER — ENOXAPARIN SODIUM 100 MG/ML
40 INJECTION SUBCUTANEOUS EVERY 24 HOURS
Qty: 0 | Refills: 0 | Status: COMPLETED | OUTPATIENT
Start: 2018-04-27 | End: 2018-04-28

## 2018-04-27 RX ORDER — ONDANSETRON 8 MG/1
8 TABLET, FILM COATED ORAL EVERY 8 HOURS
Qty: 0 | Refills: 0 | Status: DISCONTINUED | OUTPATIENT
Start: 2018-04-27 | End: 2018-05-01

## 2018-04-27 RX ADMIN — ONDANSETRON 8 MILLIGRAM(S): 8 TABLET, FILM COATED ORAL at 20:07

## 2018-04-27 RX ADMIN — Medication 400 MILLIGRAM(S): at 06:48

## 2018-04-27 RX ADMIN — Medication 400 MILLIGRAM(S): at 13:28

## 2018-04-27 RX ADMIN — FOSAPREPITANT DIMEGLUMINE 300 MILLIGRAM(S): 150 INJECTION, POWDER, LYOPHILIZED, FOR SOLUTION INTRAVENOUS at 15:40

## 2018-04-27 RX ADMIN — ONDANSETRON 8 MILLIGRAM(S): 8 TABLET, FILM COATED ORAL at 11:12

## 2018-04-27 RX ADMIN — ENOXAPARIN SODIUM 40 MILLIGRAM(S): 100 INJECTION SUBCUTANEOUS at 17:07

## 2018-04-27 RX ADMIN — SODIUM CHLORIDE 75 MILLILITER(S): 9 INJECTION INTRAMUSCULAR; INTRAVENOUS; SUBCUTANEOUS at 06:48

## 2018-04-27 RX ADMIN — SODIUM CHLORIDE 75 MILLILITER(S): 9 INJECTION INTRAMUSCULAR; INTRAVENOUS; SUBCUTANEOUS at 17:07

## 2018-04-27 RX ADMIN — Medication 300 MILLIGRAM(S): at 11:12

## 2018-04-27 RX ADMIN — Medication 120 MILLIGRAM(S): at 11:12

## 2018-04-27 RX ADMIN — Medication 400 MILLIGRAM(S): at 22:07

## 2018-04-27 RX ADMIN — SODIUM CHLORIDE 75 MILLILITER(S): 9 INJECTION INTRAMUSCULAR; INTRAVENOUS; SUBCUTANEOUS at 22:07

## 2018-04-27 NOTE — PROGRESS NOTE ADULT - PROBLEM SELECTOR PLAN 1
Admit to [facility] for Cycle 4 of DA-R-EPOCH with 20% dose escalation  Rituxan 375mg/m2 on Day 1  Doxorubicin 17.3mg/m2 continuous IV infusion Day 2-5  Etoposide 86.4 mg/m2 continuous IV infusion Day 2-5  Vincristine 0.4 mg/m2 continuous IV infusion Day 2-5  Prednisone 60 mg/m2 PO BID Day 2-6  Cyclophosphamide 1296 mg/m2 IV x 1 on Day 6  LP with IT MTX completed 4/26, follow up flow. Next LP scheduled for 4/30  Monitor CBC/Lytes and transfuse/replete PRN  Strict Is and Os/Daily weights/Mouth Care  Antiemetics  IVF

## 2018-04-27 NOTE — PROGRESS NOTE ADULT - PROBLEM SELECTOR PLAN 2
The patient is not neutropenic  If febrile Pan Cx and CXR  HIV (+) continue antiretrovirals  Continue Acyclovir and Bactrim for ppx

## 2018-04-27 NOTE — PROGRESS NOTE ADULT - SUBJECTIVE AND OBJECTIVE BOX
Diagnosis: HIV associated DLBCL with CNS involvement     Protocol/Chemo Regimen: DA-R-EPOCH Cycle 4 with 20% dose escalation (Rituxan given inpatient on Day 0)    Day: 1    Pt endorsed: No acute complaints     Review of Systems: Patient denies headache, dizziness, visual changes, chest pain, palpitations, SOB, abdominal pain, vomiting, diarrhea or dysuria.     Pain scale: 0    Diet: Regular     Allergies: No Known Allergies      ANTIMICROBIALS  abacavir 600 mG/dolutegravir 50 mG/lamiVUDine 300 mG 1 Tablet(s) Oral daily  acyclovir   Tablet 400 milliGRAM(s) Oral three times a day      STANDING MEDICATIONS  acetaminophen   Tablet 650 milliGRAM(s) Oral once  allopurinol 300 milliGRAM(s) Oral daily  diphenhydrAMINE   Injectable 50 milliGRAM(s) IV Push once  sodium chloride 0.9%. 1000 milliLiter(s) IV Continuous <Continuous>      PRN MEDICATIONS  acetaminophen   Tablet 650 milliGRAM(s) Oral every 6 hours PRN  acetaminophen   Tablet. 650 milliGRAM(s) Oral every 6 hours PRN  hydrocortisone sodium succinate Injectable 100 milliGRAM(s) IV Push once PRN      Vital Signs Last 24 Hrs  T(C): 36.4 (27 Apr 2018 05:43), Max: 37 (26 Apr 2018 09:56)  T(F): 97.5 (27 Apr 2018 05:43), Max: 98.6 (26 Apr 2018 09:56)  HR: 82 (27 Apr 2018 05:43) (82 - 112)  BP: 112/69 (27 Apr 2018 05:43) (112/69 - 149/89)  BP(mean): --  RR: 18 (27 Apr 2018 05:43) (18 - 20)  SpO2: 96% (27 Apr 2018 05:43) (95% - 99%)      PHYSICAL EXAM  General: NAD  HEENT: PERRLA, EOMI, clear oropharynx, anicteric sclera, pink conjunctiva  Neck: supple  CV: (+) S1/S2 RRR  Lungs: clear to auscultation, no wheezes or rales  Abdomen: soft, non-tender, non-distended (+) BS  Ext: no clubbing, cyanosis or edema  Skin: no rashes and no petechiae  Neuro: alert and oriented X 3, no focal deficits  Central Line: R ACW Mediport C/D/I        LABS: Diagnosis: HIV associated DLBCL with CNS involvement     Protocol/Chemo Regimen: DA-R-EPOCH Cycle 4 with 20% dose escalation (Rituxan given inpatient on Day 0)    Day: 1    Pt endorsed: No acute complaints     Review of Systems: Patient denies headache, dizziness, visual changes, chest pain, palpitations, SOB, abdominal pain, vomiting, diarrhea or dysuria.     Pain scale: 0    Diet: Regular     Allergies: No Known Allergies      ANTIMICROBIALS  abacavir 600 mG/dolutegravir 50 mG/lamiVUDine 300 mG 1 Tablet(s) Oral daily  acyclovir   Tablet 400 milliGRAM(s) Oral three times a day      STANDING MEDICATIONS  acetaminophen   Tablet 650 milliGRAM(s) Oral once  allopurinol 300 milliGRAM(s) Oral daily  diphenhydrAMINE   Injectable 50 milliGRAM(s) IV Push once  sodium chloride 0.9%. 1000 milliLiter(s) IV Continuous <Continuous>      PRN MEDICATIONS  acetaminophen   Tablet 650 milliGRAM(s) Oral every 6 hours PRN  acetaminophen   Tablet. 650 milliGRAM(s) Oral every 6 hours PRN  hydrocortisone sodium succinate Injectable 100 milliGRAM(s) IV Push once PRN      Vital Signs Last 24 Hrs  T(C): 36.4 (27 Apr 2018 05:43), Max: 37 (26 Apr 2018 09:56)  T(F): 97.5 (27 Apr 2018 05:43), Max: 98.6 (26 Apr 2018 09:56)  HR: 82 (27 Apr 2018 05:43) (82 - 112)  BP: 112/69 (27 Apr 2018 05:43) (112/69 - 149/89)  BP(mean): --  RR: 18 (27 Apr 2018 05:43) (18 - 20)  SpO2: 96% (27 Apr 2018 05:43) (95% - 99%)      PHYSICAL EXAM  General: NAD  HEENT: PERRLA, EOMI, clear oropharynx, anicteric sclera, pink conjunctiva  Neck: supple  CV: (+) S1/S2 RRR  Lungs: clear to auscultation, no wheezes or rales  Abdomen: soft, non-tender, non-distended (+) BS  Ext: no clubbing, cyanosis or edema  Skin: no rashes and no petechiae  Neuro: alert and oriented X 3, no focal deficits  Central Line: R ACW Mediport C/D/I        LABS:                        11.1   9.3   )-----------( 285      ( 27 Apr 2018 07:14 )             33.8         Mean Cell Volume : 89.6 fl  Mean Cell Hemoglobin : 29.4 pg  Mean Cell Hemoglobin Concentration : 32.8 gm/dL  Auto Neutrophil # : 6.9 K/uL  Auto Lymphocyte # : 1.9 K/uL  Auto Monocyte # : 0.5 K/uL  Auto Eosinophil # : 0.0 K/uL  Auto Basophil # : 0.0 K/uL  Auto Neutrophil % : 74.0 %  Auto Lymphocyte % : 20.0 %  Auto Monocyte % : 5.5 %  Auto Eosinophil % : 0.4 %  Auto Basophil % : 0.2 %      04-27    139  |  102  |  10  ----------------------------<  106<H>  3.8   |  26  |  1.09    Ca    9.3      27 Apr 2018 07:14  Phos  3.5     04-27  Mg     2.0     04-27    TPro  6.7  /  Alb  3.6  /  TBili  0.2  /  DBili  x   /  AST  14  /  ALT  24  /  AlkPhos  81  04-27      Mg 2.0  Phos 3.5      PT/INR - ( 26 Apr 2018 10:55 )   PT: 11.4 sec;   INR: 1.04 ratio         PTT - ( 26 Apr 2018 10:55 )  PTT:32.9 sec

## 2018-04-28 LAB
ALBUMIN SERPL ELPH-MCNC: 3.8 G/DL — SIGNIFICANT CHANGE UP (ref 3.3–5)
ALP SERPL-CCNC: 74 U/L — SIGNIFICANT CHANGE UP (ref 40–120)
ALT FLD-CCNC: 26 U/L — SIGNIFICANT CHANGE UP (ref 10–45)
ANION GAP SERPL CALC-SCNC: 12 MMOL/L — SIGNIFICANT CHANGE UP (ref 5–17)
AST SERPL-CCNC: 17 U/L — SIGNIFICANT CHANGE UP (ref 10–40)
BASOPHILS # BLD AUTO: 0 K/UL — SIGNIFICANT CHANGE UP (ref 0–0.2)
BASOPHILS NFR BLD AUTO: 0 % — SIGNIFICANT CHANGE UP (ref 0–2)
BILIRUB SERPL-MCNC: 0.2 MG/DL — SIGNIFICANT CHANGE UP (ref 0.2–1.2)
BUN SERPL-MCNC: 7 MG/DL — SIGNIFICANT CHANGE UP (ref 7–23)
CALCIUM SERPL-MCNC: 9.2 MG/DL — SIGNIFICANT CHANGE UP (ref 8.4–10.5)
CHLORIDE SERPL-SCNC: 99 MMOL/L — SIGNIFICANT CHANGE UP (ref 96–108)
CO2 SERPL-SCNC: 25 MMOL/L — SIGNIFICANT CHANGE UP (ref 22–31)
CREAT SERPL-MCNC: 0.94 MG/DL — SIGNIFICANT CHANGE UP (ref 0.5–1.3)
EOSINOPHIL # BLD AUTO: 0 K/UL — SIGNIFICANT CHANGE UP (ref 0–0.5)
EOSINOPHIL NFR BLD AUTO: 0.1 % — SIGNIFICANT CHANGE UP (ref 0–6)
GLUCOSE SERPL-MCNC: 134 MG/DL — HIGH (ref 70–99)
HCT VFR BLD CALC: 34.3 % — LOW (ref 39–50)
HGB BLD-MCNC: 11 G/DL — LOW (ref 13–17)
LYMPHOCYTES # BLD AUTO: 0.9 K/UL — LOW (ref 1–3.3)
LYMPHOCYTES # BLD AUTO: 8.2 % — LOW (ref 13–44)
MAGNESIUM SERPL-MCNC: 2 MG/DL — SIGNIFICANT CHANGE UP (ref 1.6–2.6)
MCHC RBC-ENTMCNC: 28.8 PG — SIGNIFICANT CHANGE UP (ref 27–34)
MCHC RBC-ENTMCNC: 32.1 GM/DL — SIGNIFICANT CHANGE UP (ref 32–36)
MCV RBC AUTO: 89.6 FL — SIGNIFICANT CHANGE UP (ref 80–100)
MONOCYTES # BLD AUTO: 0.1 K/UL — SIGNIFICANT CHANGE UP (ref 0–0.9)
MONOCYTES NFR BLD AUTO: 0.9 % — LOW (ref 2–14)
NEUTROPHILS # BLD AUTO: 9.9 K/UL — HIGH (ref 1.8–7.4)
NEUTROPHILS NFR BLD AUTO: 90.8 % — HIGH (ref 43–77)
PHOSPHATE SERPL-MCNC: 2.9 MG/DL — SIGNIFICANT CHANGE UP (ref 2.5–4.5)
PLATELET # BLD AUTO: 330 K/UL — SIGNIFICANT CHANGE UP (ref 150–400)
POTASSIUM SERPL-MCNC: 4 MMOL/L — SIGNIFICANT CHANGE UP (ref 3.5–5.3)
POTASSIUM SERPL-SCNC: 4 MMOL/L — SIGNIFICANT CHANGE UP (ref 3.5–5.3)
PROT SERPL-MCNC: 6.9 G/DL — SIGNIFICANT CHANGE UP (ref 6–8.3)
RBC # BLD: 3.83 M/UL — LOW (ref 4.2–5.8)
RBC # FLD: 15.8 % — HIGH (ref 10.3–14.5)
SODIUM SERPL-SCNC: 136 MMOL/L — SIGNIFICANT CHANGE UP (ref 135–145)
WBC # BLD: 10.9 K/UL — HIGH (ref 3.8–10.5)
WBC # FLD AUTO: 10.9 K/UL — HIGH (ref 3.8–10.5)

## 2018-04-28 PROCEDURE — 99232 SBSQ HOSP IP/OBS MODERATE 35: CPT

## 2018-04-28 RX ADMIN — ONDANSETRON 8 MILLIGRAM(S): 8 TABLET, FILM COATED ORAL at 05:40

## 2018-04-28 RX ADMIN — SODIUM CHLORIDE 75 MILLILITER(S): 9 INJECTION INTRAMUSCULAR; INTRAVENOUS; SUBCUTANEOUS at 06:40

## 2018-04-28 RX ADMIN — Medication 300 MILLIGRAM(S): at 11:51

## 2018-04-28 RX ADMIN — ENOXAPARIN SODIUM 40 MILLIGRAM(S): 100 INJECTION SUBCUTANEOUS at 17:39

## 2018-04-28 RX ADMIN — ONDANSETRON 8 MILLIGRAM(S): 8 TABLET, FILM COATED ORAL at 11:51

## 2018-04-28 RX ADMIN — Medication 120 MILLIGRAM(S): at 23:49

## 2018-04-28 RX ADMIN — Medication 120 MILLIGRAM(S): at 00:16

## 2018-04-28 RX ADMIN — ONDANSETRON 8 MILLIGRAM(S): 8 TABLET, FILM COATED ORAL at 20:30

## 2018-04-28 RX ADMIN — SODIUM CHLORIDE 75 MILLILITER(S): 9 INJECTION INTRAMUSCULAR; INTRAVENOUS; SUBCUTANEOUS at 21:56

## 2018-04-28 RX ADMIN — Medication 400 MILLIGRAM(S): at 15:56

## 2018-04-28 RX ADMIN — Medication 120 MILLIGRAM(S): at 11:51

## 2018-04-28 RX ADMIN — Medication 400 MILLIGRAM(S): at 21:56

## 2018-04-28 RX ADMIN — Medication 400 MILLIGRAM(S): at 06:40

## 2018-04-28 RX ADMIN — SODIUM CHLORIDE 75 MILLILITER(S): 9 INJECTION INTRAMUSCULAR; INTRAVENOUS; SUBCUTANEOUS at 15:56

## 2018-04-28 NOTE — PROGRESS NOTE ADULT - PROBLEM SELECTOR PLAN 1
Admit to [facility] for Cycle 4 of DA-R-EPOCH with 20% dose escalation  Rituxan 375mg/m2 on Day 1  Doxorubicin 17.3mg/m2 continuous IV infusion Day 2-5  Etoposide 86.4 mg/m2 continuous IV infusion Day 2-5  Vincristine 0.4 mg/m2 continuous IV infusion Day 2-5  Prednisone 60 mg/m2 PO BID Day 2-6  Cyclophosphamide 1296 mg/m2 IV x 1 on Day 6  LP with IT MTX completed 4/26, follow up flow. Next LP scheduled for 4/30  Monitor CBC/Lytes and transfuse/replete PRN  Strict Is and Os/Daily weights/Mouth Care  Antiemetics  IVF Admit to [facility] for Cycle 4 of DA-R-EPOCH with 20% dose escalation  Rituxan 375mg/m2 on Day 1  Doxorubicin 17.3mg/m2 continuous IV infusion Day 2-5  Etoposide 86.4 mg/m2 continuous IV infusion Day 2-5  Vincristine 0.4 mg/m2 continuous IV infusion Day 2-5  Prednisone 60 mg/m2 PO BID Day 2-6  Cyclophosphamide 1296 mg/m2 IV x 1 on Day 6  LP with IT MTX completed 4/26, follow up flow. Next LP scheduled for 4/30  Monitor CBC/Lytes and transfuse/replete PRN  Strict Is and Os/Daily weights/Mouth Care  Antiemetics  IVF  Continue on Allopurinol  Continue on Prednisone Admit to [facility] for Cycle 4 of DA-R-EPOCH with 20% dose escalation  Rituxan 375mg/m2 on Day 1  Doxorubicin 17.3mg/m2 continuous IV infusion Day 2-5  Etoposide 86.4 mg/m2 continuous IV infusion Day 2-5  Vincristine 0.4 mg/m2 continuous IV infusion Day 2-5  Prednisone 60 mg/m2 PO BID Day 2-6  Cyclophosphamide 1296 mg/m2 IV x 1 on Day 6  LP with IT MTX completed 4/26, follow up flow. Next LP scheduled for 4/30  Monitor CBC/Lytes and transfuse/replete PRN  Strict Is and Os/Daily weights/Mouth Care  Antiemetics  IVF  Continue on Allopurinol  Continue on Prednisone  Steroid induced hyperglycemia: Monitor sugar  leveldaily

## 2018-04-28 NOTE — PROGRESS NOTE ADULT - PROBLEM SELECTOR PLAN 2
The patient is not neutropenic  If febrile Pan Cx and CXR  HIV (+) continue antiretrovirals  Continue Acyclovir and Bactrim for ppx The patient is not neutropenic, afebrile  If febrile Pan Cx and CXR  HIV (+) continue antiretrovirals  Continue Acyclovir and Bactrim for ppx

## 2018-04-28 NOTE — PROGRESS NOTE ADULT - ATTENDING COMMENTS
41 yo M with HIV DLBCL admitted for C4 dose-adjusted R-EPOCH day 1 (s/p Rituxan as inpt on 4/26), with IT Mtx 2x/wk  -cont chemo, plan on discharge after end of chemo  -LP with chemo on 4/26 -f/u flow cytometry. Cell count-total nucleated cells 16. Next LP scheduled for 4/30  -monitor counts, transfuse PRN  -monitor for fevers 41 yo M with HIV DLBCL admitted for C4 dose-adjusted R-EPOCH day 3 (s/p Rituxan as inpt on 4/26), with IT Mtx 2x/wk  -cont chemo, plan on discharge after end of chemo  -LP with chemo on 4/26 -f/u flow cytometry. Cell count-total nucleated cells 16. Next LP scheduled for 4/30  -monitor counts  -monitor for fevers  - OOB/ambulate

## 2018-04-28 NOTE — PROGRESS NOTE ADULT - SUBJECTIVE AND OBJECTIVE BOX
Diagnosis: HIV associated DLBCL with CNS involvement     Protocol/Chemo Regimen: DA-R-EPOCH Cycle 4 with 20% dose escalation (Rituxan given inpatient on Day 0)    Day: 2    Pt endorsed: No acute complaints     Review of Systems: Patient denies headache, dizziness, visual changes, chest pain, palpitations, SOB, abdominal pain, vomiting, diarrhea or dysuria.     Pain scale: 0    Diet: Regular     Allergies: No Known Allergies      ANTIMICROBIALS  abacavir 600 mG/dolutegravir 50 mG/lamiVUDine 300 mG 1 Tablet(s) Oral daily  acyclovir   Tablet 400 milliGRAM(s) Oral three times a day      STANDING MEDICATIONS  acetaminophen   Tablet 650 milliGRAM(s) Oral once  allopurinol 300 milliGRAM(s) Oral daily  diphenhydrAMINE   Injectable 50 milliGRAM(s) IV Push once  sodium chloride 0.9%. 1000 milliLiter(s) IV Continuous <Continuous>      PRN MEDICATIONS  acetaminophen   Tablet 650 milliGRAM(s) Oral every 6 hours PRN  acetaminophen   Tablet. 650 milliGRAM(s) Oral every 6 hours PRN  hydrocortisone sodium succinate Injectable 100 milliGRAM(s) IV Push once PRN      Vital Signs Last 24 Hrs  T(C): 36.7 (28 Apr 2018 05:20), Max: 37.3 (27 Apr 2018 17:15)  T(F): 98.1 (28 Apr 2018 05:20), Max: 99.1 (27 Apr 2018 17:15)  HR: 98 (28 Apr 2018 05:20) (80 - 125)  BP: 122/75 (28 Apr 2018 05:20) (115/70 - 135/84)  BP(mean): --  RR: 18 (28 Apr 2018 05:20) (18 - 20)  SpO2: 99% (28 Apr 2018 05:20) (95% - 99%)      PHYSICAL EXAM  General: NAD  HEENT: PERRLA, EOMI, clear oropharynx, anicteric sclera, pink conjunctiva  Neck: supple  CV: (+) S1/S2 RRR  Lungs: clear to auscultation, no wheezes or rales  Abdomen: soft, non-tender, non-distended (+) BS  Ext: no clubbing, cyanosis or edema  Skin: no rashes and no petechiae  Neuro: alert and oriented X 3, no focal deficits  Central Line: R ACW Mediport C/D/I    LABS:    Blood Cultures:                           11.0   10.9  )-----------( 330      ( 28 Apr 2018 07:12 )             34.3         Mean Cell Volume : 89.6 fl  Mean Cell Hemoglobin : 28.8 pg  Mean Cell Hemoglobin Concentration : 32.1 gm/dL  Auto Neutrophil # : 9.9 K/uL  Auto Lymphocyte # : 0.9 K/uL  Auto Monocyte # : 0.1 K/uL  Auto Eosinophil # : 0.0 K/uL  Auto Basophil # : 0.0 K/uL  Auto Neutrophil % : 90.8 %  Auto Lymphocyte % : 8.2 %  Auto Monocyte % : 0.9 %  Auto Eosinophil % : 0.1 %  Auto Basophil % : 0.0 %      04-28    136  |  99  |  7   ----------------------------<  134<H>  4.0   |  25  |  0.94    Ca    9.2      28 Apr 2018 07:11  Phos  2.9     04-28  Mg     2.0     04-28    TPro  6.9  /  Alb  3.8  /  TBili  0.2  /  DBili  x   /  AST  17  /  ALT  26  /  AlkPhos  74  04-28      Mg 2.0  Phos 2.9      PT/INR - ( 26 Apr 2018 10:55 )   PT: 11.4 sec;   INR: 1.04 ratio         PTT - ( 26 Apr 2018 10:55 )  PTT:32.9 sec Diagnosis: HIV associated DLBCL with CNS involvement     Protocol/Chemo Regimen: DA-R-EPOCH Cycle 4 with 20% dose escalation (Rituxan given inpatient on Day 0)    Day: 2    Pt endorsed: No acute complaints     Review of Systems: Patient denies headache,  palpitations, SOB, abdominal pain, vomiting, diarrhea or dysuria.     Pain scale: 0    Diet: Regular     Allergies: No Known Allergies      ANTIMICROBIALS  abacavir 600 mG/dolutegravir 50 mG/lamiVUDine 300 mG 1 Tablet(s) Oral daily  acyclovir   Tablet 400 milliGRAM(s) Oral three times a day      STANDING MEDICATIONS  acetaminophen   Tablet 650 milliGRAM(s) Oral once  allopurinol 300 milliGRAM(s) Oral daily  diphenhydrAMINE   Injectable 50 milliGRAM(s) IV Push once  sodium chloride 0.9%. 1000 milliLiter(s) IV Continuous <Continuous>      PRN MEDICATIONS  acetaminophen   Tablet 650 milliGRAM(s) Oral every 6 hours PRN  acetaminophen   Tablet. 650 milliGRAM(s) Oral every 6 hours PRN  hydrocortisone sodium succinate Injectable 100 milliGRAM(s) IV Push once PRN      Vital Signs Last 24 Hrs  T(C): 36.7 (28 Apr 2018 05:20), Max: 37.3 (27 Apr 2018 17:15)  T(F): 98.1 (28 Apr 2018 05:20), Max: 99.1 (27 Apr 2018 17:15)  HR: 98 (28 Apr 2018 05:20) (80 - 125)  BP: 122/75 (28 Apr 2018 05:20) (115/70 - 135/84)  BP(mean): --  RR: 18 (28 Apr 2018 05:20) (18 - 20)  SpO2: 99% (28 Apr 2018 05:20) (95% - 99%)      PHYSICAL EXAM  General: NAD  HEENT: PERRLA  Neck: supple  CV: (+) S1/S2 RRR  Lungs: clear to auscultation, no wheezes or rales  Abdomen: soft, non-tender, non-distended (+) BS x 4Q  Ext: no  edema  Skin: no rashes and no petechiae  Neuro: alert and oriented X 3  Central Line: R ACW Mediport C/D/I    LABS:                              11.0   10.9  )-----------( 330      ( 28 Apr 2018 07:12 )             34.3         Mean Cell Volume : 89.6 fl  Mean Cell Hemoglobin : 28.8 pg  Mean Cell Hemoglobin Concentration : 32.1 gm/dL  Auto Neutrophil # : 9.9 K/uL  Auto Lymphocyte # : 0.9 K/uL  Auto Monocyte # : 0.1 K/uL  Auto Eosinophil # : 0.0 K/uL  Auto Basophil # : 0.0 K/uL  Auto Neutrophil % : 90.8 %  Auto Lymphocyte % : 8.2 %  Auto Monocyte % : 0.9 %  Auto Eosinophil % : 0.1 %  Auto Basophil % : 0.0 %      04-28    136  |  99  |  7   ----------------------------<  134<H>  4.0   |  25  |  0.94    Ca    9.2      28 Apr 2018 07:11  Phos  2.9     04-28  Mg     2.0     04-28    TPro  6.9  /  Alb  3.8  /  TBili  0.2  /  DBili  x   /  AST  17  /  ALT  26  /  AlkPhos  74  04-28      Mg 2.0  Phos 2.9      PT/INR - ( 26 Apr 2018 10:55 )   PT: 11.4 sec;   INR: 1.04 ratio         PTT - ( 26 Apr 2018 10:55 )  PTT:32.9 sec Diagnosis: HIV associated DLBCL with CNS involvement     Protocol/Chemo Regimen: DA-R-EPOCH Cycle 4 with 20% dose escalation (Rituxan given inpatient on Day 0)    Day: 2    Pt endorsed: No acute complaints     Review of Systems: Patient denies mouth pain, nausea  palpitations, SOB, abdominal pain, vomiting, diarrhea     Pain scale: 0    Diet: Regular     Allergies: No Known Allergies      ANTIMICROBIALS  abacavir 600 mG/dolutegravir 50 mG/lamiVUDine 300 mG 1 Tablet(s) Oral daily  acyclovir   Tablet 400 milliGRAM(s) Oral three times a day      STANDING MEDICATIONS  acetaminophen   Tablet 650 milliGRAM(s) Oral once  allopurinol 300 milliGRAM(s) Oral daily  diphenhydrAMINE   Injectable 50 milliGRAM(s) IV Push once  sodium chloride 0.9%. 1000 milliLiter(s) IV Continuous <Continuous>      PRN MEDICATIONS  acetaminophen   Tablet 650 milliGRAM(s) Oral every 6 hours PRN  acetaminophen   Tablet. 650 milliGRAM(s) Oral every 6 hours PRN  hydrocortisone sodium succinate Injectable 100 milliGRAM(s) IV Push once PRN      Vital Signs Last 24 Hrs  T(C): 36.7 (28 Apr 2018 05:20), Max: 37.3 (27 Apr 2018 17:15)  T(F): 98.1 (28 Apr 2018 05:20), Max: 99.1 (27 Apr 2018 17:15)  HR: 98 (28 Apr 2018 05:20) (80 - 125)  BP: 122/75 (28 Apr 2018 05:20) (115/70 - 135/84)  BP(mean): --  RR: 18 (28 Apr 2018 05:20) (18 - 20)  SpO2: 99% (28 Apr 2018 05:20) (95% - 99%)      PHYSICAL EXAM  General: NAD  HEENT: PERRLA  Neck: supple  CV: (+) S1/S2 RRR  Lungs: clear to auscultation, no wheezes or rales  Abdomen: soft, non-tender, non-distended (+) BS x 4Q  Ext: no  edema  Skin: no rashes and no petechiae  Neuro: alert and oriented X 3  Central Line: R ACW Mediport C/D/I    LABS:                              11.0   10.9  )-----------( 330      ( 28 Apr 2018 07:12 )             34.3         Mean Cell Volume : 89.6 fl  Mean Cell Hemoglobin : 28.8 pg  Mean Cell Hemoglobin Concentration : 32.1 gm/dL  Auto Neutrophil # : 9.9 K/uL  Auto Lymphocyte # : 0.9 K/uL  Auto Monocyte # : 0.1 K/uL  Auto Eosinophil # : 0.0 K/uL  Auto Basophil # : 0.0 K/uL  Auto Neutrophil % : 90.8 %  Auto Lymphocyte % : 8.2 %  Auto Monocyte % : 0.9 %  Auto Eosinophil % : 0.1 %  Auto Basophil % : 0.0 %      04-28    136  |  99  |  7   ----------------------------<  134<H>  4.0   |  25  |  0.94    Ca    9.2      28 Apr 2018 07:11  Phos  2.9     04-28  Mg     2.0     04-28    TPro  6.9  /  Alb  3.8  /  TBili  0.2  /  DBili  x   /  AST  17  /  ALT  26  /  AlkPhos  74  04-28      Mg 2.0  Phos 2.9      PT/INR - ( 26 Apr 2018 10:55 )   PT: 11.4 sec;   INR: 1.04 ratio         PTT - ( 26 Apr 2018 10:55 )  PTT:32.9 sec Diagnosis: HIV associated DLBCL with CNS involvement     Protocol/Chemo Regimen: DA-R-EPOCH Cycle 4 with 20% dose escalation (Rituxan given inpatient on Day 0)    Day: 3    Pt endorsed: No acute complaints     Review of Systems: Patient denies mouth pain, nausea  palpitations, SOB, abdominal pain, vomiting, diarrhea     Pain scale: 0    Diet: Regular     Allergies: No Known Allergies      ANTIMICROBIALS  abacavir 600 mG/dolutegravir 50 mG/lamiVUDine 300 mG 1 Tablet(s) Oral daily  acyclovir   Tablet 400 milliGRAM(s) Oral three times a day      STANDING MEDICATIONS  acetaminophen   Tablet 650 milliGRAM(s) Oral once  allopurinol 300 milliGRAM(s) Oral daily  diphenhydrAMINE   Injectable 50 milliGRAM(s) IV Push once  sodium chloride 0.9%. 1000 milliLiter(s) IV Continuous <Continuous>      PRN MEDICATIONS  acetaminophen   Tablet 650 milliGRAM(s) Oral every 6 hours PRN  acetaminophen   Tablet. 650 milliGRAM(s) Oral every 6 hours PRN  hydrocortisone sodium succinate Injectable 100 milliGRAM(s) IV Push once PRN      Vital Signs Last 24 Hrs  T(C): 36.7 (28 Apr 2018 05:20), Max: 37.3 (27 Apr 2018 17:15)  T(F): 98.1 (28 Apr 2018 05:20), Max: 99.1 (27 Apr 2018 17:15)  HR: 98 (28 Apr 2018 05:20) (80 - 125)  BP: 122/75 (28 Apr 2018 05:20) (115/70 - 135/84)  BP(mean): --  RR: 18 (28 Apr 2018 05:20) (18 - 20)  SpO2: 99% (28 Apr 2018 05:20) (95% - 99%)      PHYSICAL EXAM  General: NAD  HEENT: sclera- anicteric; no ulcers/erythema  Neck: supple  CV: (+) S1/S2 RRR  Lungs: clear to auscultation, no wheezes or rales  Abdomen: soft, non-tender, non-distended (+) BS x 4Q  Ext: no  edema  Skin: no rash  Neuro: alert and oriented X 3  Central Line: R ACW Mediport C/D/I    LABS:                              11.0   10.9  )-----------( 330      ( 28 Apr 2018 07:12 )             34.3         Mean Cell Volume : 89.6 fl  Mean Cell Hemoglobin : 28.8 pg  Mean Cell Hemoglobin Concentration : 32.1 gm/dL  Auto Neutrophil # : 9.9 K/uL  Auto Lymphocyte # : 0.9 K/uL  Auto Monocyte # : 0.1 K/uL  Auto Eosinophil # : 0.0 K/uL  Auto Basophil # : 0.0 K/uL  Auto Neutrophil % : 90.8 %  Auto Lymphocyte % : 8.2 %  Auto Monocyte % : 0.9 %  Auto Eosinophil % : 0.1 %  Auto Basophil % : 0.0 %      04-28    136  |  99  |  7   ----------------------------<  134<H>  4.0   |  25  |  0.94    Ca    9.2      28 Apr 2018 07:11  Phos  2.9     04-28  Mg     2.0     04-28    TPro  6.9  /  Alb  3.8  /  TBili  0.2  /  DBili  x   /  AST  17  /  ALT  26  /  AlkPhos  74  04-28      Mg 2.0  Phos 2.9      PT/INR - ( 26 Apr 2018 10:55 )   PT: 11.4 sec;   INR: 1.04 ratio         PTT - ( 26 Apr 2018 10:55 )  PTT:32.9 sec

## 2018-04-29 LAB
ALBUMIN SERPL ELPH-MCNC: 3.7 G/DL — SIGNIFICANT CHANGE UP (ref 3.3–5)
ALP SERPL-CCNC: 68 U/L — SIGNIFICANT CHANGE UP (ref 40–120)
ALT FLD-CCNC: 27 U/L — SIGNIFICANT CHANGE UP (ref 10–45)
ANION GAP SERPL CALC-SCNC: 11 MMOL/L — SIGNIFICANT CHANGE UP (ref 5–17)
AST SERPL-CCNC: 17 U/L — SIGNIFICANT CHANGE UP (ref 10–40)
BASOPHILS # BLD AUTO: 0.1 K/UL — SIGNIFICANT CHANGE UP (ref 0–0.2)
BASOPHILS NFR BLD AUTO: 1.3 % — SIGNIFICANT CHANGE UP (ref 0–2)
BILIRUB SERPL-MCNC: 0.3 MG/DL — SIGNIFICANT CHANGE UP (ref 0.2–1.2)
BUN SERPL-MCNC: 12 MG/DL — SIGNIFICANT CHANGE UP (ref 7–23)
CALCIUM SERPL-MCNC: 9.3 MG/DL — SIGNIFICANT CHANGE UP (ref 8.4–10.5)
CHLORIDE SERPL-SCNC: 101 MMOL/L — SIGNIFICANT CHANGE UP (ref 96–108)
CO2 SERPL-SCNC: 26 MMOL/L — SIGNIFICANT CHANGE UP (ref 22–31)
CREAT SERPL-MCNC: 1.01 MG/DL — SIGNIFICANT CHANGE UP (ref 0.5–1.3)
EOSINOPHIL # BLD AUTO: 0 K/UL — SIGNIFICANT CHANGE UP (ref 0–0.5)
EOSINOPHIL NFR BLD AUTO: 0.2 % — SIGNIFICANT CHANGE UP (ref 0–6)
GLUCOSE SERPL-MCNC: 139 MG/DL — HIGH (ref 70–99)
HCT VFR BLD CALC: 33.5 % — LOW (ref 39–50)
HGB BLD-MCNC: 10.9 G/DL — LOW (ref 13–17)
LYMPHOCYTES # BLD AUTO: 0.7 K/UL — LOW (ref 1–3.3)
LYMPHOCYTES # BLD AUTO: 7.3 % — LOW (ref 13–44)
MAGNESIUM SERPL-MCNC: 2.1 MG/DL — SIGNIFICANT CHANGE UP (ref 1.6–2.6)
MCHC RBC-ENTMCNC: 29.1 PG — SIGNIFICANT CHANGE UP (ref 27–34)
MCHC RBC-ENTMCNC: 32.4 GM/DL — SIGNIFICANT CHANGE UP (ref 32–36)
MCV RBC AUTO: 89.8 FL — SIGNIFICANT CHANGE UP (ref 80–100)
MONOCYTES # BLD AUTO: 0.1 K/UL — SIGNIFICANT CHANGE UP (ref 0–0.9)
MONOCYTES NFR BLD AUTO: 1.3 % — LOW (ref 2–14)
NEUTROPHILS # BLD AUTO: 8.6 K/UL — HIGH (ref 1.8–7.4)
NEUTROPHILS NFR BLD AUTO: 89.9 % — HIGH (ref 43–77)
PHOSPHATE SERPL-MCNC: 3.4 MG/DL — SIGNIFICANT CHANGE UP (ref 2.5–4.5)
PLATELET # BLD AUTO: 346 K/UL — SIGNIFICANT CHANGE UP (ref 150–400)
POTASSIUM SERPL-MCNC: 3.8 MMOL/L — SIGNIFICANT CHANGE UP (ref 3.5–5.3)
POTASSIUM SERPL-SCNC: 3.8 MMOL/L — SIGNIFICANT CHANGE UP (ref 3.5–5.3)
PROT SERPL-MCNC: 6.5 G/DL — SIGNIFICANT CHANGE UP (ref 6–8.3)
RBC # BLD: 3.73 M/UL — LOW (ref 4.2–5.8)
RBC # FLD: 15.7 % — HIGH (ref 10.3–14.5)
SODIUM SERPL-SCNC: 138 MMOL/L — SIGNIFICANT CHANGE UP (ref 135–145)
WBC # BLD: 9.6 K/UL — SIGNIFICANT CHANGE UP (ref 3.8–10.5)
WBC # FLD AUTO: 9.6 K/UL — SIGNIFICANT CHANGE UP (ref 3.8–10.5)

## 2018-04-29 PROCEDURE — 99232 SBSQ HOSP IP/OBS MODERATE 35: CPT

## 2018-04-29 RX ADMIN — Medication 300 MILLIGRAM(S): at 12:54

## 2018-04-29 RX ADMIN — Medication 120 MILLIGRAM(S): at 22:05

## 2018-04-29 RX ADMIN — SODIUM CHLORIDE 75 MILLILITER(S): 9 INJECTION INTRAMUSCULAR; INTRAVENOUS; SUBCUTANEOUS at 05:29

## 2018-04-29 RX ADMIN — ONDANSETRON 8 MILLIGRAM(S): 8 TABLET, FILM COATED ORAL at 12:54

## 2018-04-29 RX ADMIN — Medication 400 MILLIGRAM(S): at 21:08

## 2018-04-29 RX ADMIN — ONDANSETRON 8 MILLIGRAM(S): 8 TABLET, FILM COATED ORAL at 04:28

## 2018-04-29 RX ADMIN — Medication 400 MILLIGRAM(S): at 14:38

## 2018-04-29 RX ADMIN — Medication 120 MILLIGRAM(S): at 12:54

## 2018-04-29 RX ADMIN — SODIUM CHLORIDE 75 MILLILITER(S): 9 INJECTION INTRAMUSCULAR; INTRAVENOUS; SUBCUTANEOUS at 12:54

## 2018-04-29 RX ADMIN — Medication 400 MILLIGRAM(S): at 05:29

## 2018-04-29 RX ADMIN — ONDANSETRON 8 MILLIGRAM(S): 8 TABLET, FILM COATED ORAL at 19:54

## 2018-04-29 NOTE — PROGRESS NOTE ADULT - PROBLEM SELECTOR PLAN 2
The patient is not neutropenic, afebrile  If febrile Pan Cx and CXR  HIV (+) continue antiretrovirals  Continue Acyclovir and Bactrim for ppx

## 2018-04-29 NOTE — PROGRESS NOTE ADULT - PROBLEM SELECTOR PLAN 1
Admit to [facility] for Cycle 4 of DA-R-EPOCH with 20% dose escalation  Rituxan 375mg/m2 on Day 1  Doxorubicin 17.3mg/m2 continuous IV infusion Day 2-5  Etoposide 86.4 mg/m2 continuous IV infusion Day 2-5  Vincristine 0.4 mg/m2 continuous IV infusion Day 2-5  Prednisone 60 mg/m2 PO BID Day 2-6  Cyclophosphamide 1296 mg/m2 IV x 1 on Day 6  LP with IT MTX completed 4/26, follow up flow. Next LP scheduled for 4/30  Monitor CBC/Lytes and transfuse/replete PRN  Strict Is and Os/Daily weights/Mouth Care  Antiemetics  IVF  Continue on Allopurinol  Continue on Prednisone  Steroid induced hyperglycemia: Monitor sugar  leveldaily Admit to [facility] for Cycle 4 of DA-R-EPOCH with 20% dose escalation  Rituxan 375mg/m2 on Day 1  Doxorubicin 17.3mg/m2 continuous IV infusion Day 2-5  Etoposide 86.4 mg/m2 continuous IV infusion Day 2-5  Vincristine 0.4 mg/m2 continuous IV infusion Day 2-5  Prednisone 60 mg/m2 PO BID Day 2-6  Cyclophosphamide 1296 mg/m2 IV x 1 on Day 6  LP with IT MTX completed 4/26, follow up flow. Next LP scheduled for 4/30  Monitor CBC/Lytes and transfuse/replete PRN  Strict Is and Os/Daily weights/Mouth Care  Antiemetics  IVF  Continue on Allopurinol  Continue on Prednisone  Steroid induced hyperglycemia: Monitor sugar level daily

## 2018-04-29 NOTE — PROGRESS NOTE ADULT - SUBJECTIVE AND OBJECTIVE BOX
Diagnosis: HIV associated DLBCL with CNS involvement     Protocol/Chemo Regimen: DA-R-EPOCH Cycle 4 with 20% dose escalation (Rituxan given inpatient on Day 0)    Day: 4    Pt endorsed: No acute complaints     Review of Systems: Patient denies mouth pain, nausea  palpitations, SOB, abdominal pain, vomiting, diarrhea     Pain scale: 0    Diet: Regular     Allergies: No Known Allergies      ANTIMICROBIALS  abacavir 600 mG/dolutegravir 50 mG/lamiVUDine 300 mG 1 Tablet(s) Oral daily  acyclovir   Tablet 400 milliGRAM(s) Oral three times a day      STANDING MEDICATIONS  acetaminophen   Tablet 650 milliGRAM(s) Oral once  allopurinol 300 milliGRAM(s) Oral daily  diphenhydrAMINE   Injectable 50 milliGRAM(s) IV Push once  sodium chloride 0.9%. 1000 milliLiter(s) IV Continuous <Continuous>      PRN MEDICATIONS  acetaminophen   Tablet 650 milliGRAM(s) Oral every 6 hours PRN  acetaminophen   Tablet. 650 milliGRAM(s) Oral every 6 hours PRN  hydrocortisone sodium succinate Injectable 100 milliGRAM(s) IV Push once PRN    Vital Signs Last 24 Hrs  T(C): 36.8 (29 Apr 2018 10:18), Max: 37.3 (28 Apr 2018 17:58)  T(F): 98.3 (29 Apr 2018 10:18), Max: 99.1 (28 Apr 2018 17:58)  HR: 80 (29 Apr 2018 10:18) (79 - 112)  BP: 142/78 (29 Apr 2018 10:18) (128/77 - 148/83)  BP(mean): --  RR: 20 (29 Apr 2018 10:18) (18 - 20)  SpO2: 98% (29 Apr 2018 10:18) (97% - 99%)      PHYSICAL EXAM  General: NAD  HEENT: sclera- anicteric; no ulcers/erythema  Neck: supple  CV: (+) S1/S2 RRR  Lungs: clear to auscultation, no wheezes or rales  Abdomen: soft, non-tender, non-distended (+) BS x 4Q  Ext: no  edema  Skin: no rash  Neuro: alert and oriented X 3  Central Line: R ACW Mediport C/D/I    LABS:    Blood Cultures:                           10.9   9.6   )-----------( 346      ( 29 Apr 2018 07:10 )             33.5         Mean Cell Volume : 89.8 fl  Mean Cell Hemoglobin : 29.1 pg  Mean Cell Hemoglobin Concentration : 32.4 gm/dL  Auto Neutrophil # : 8.6 K/uL  Auto Lymphocyte # : 0.7 K/uL  Auto Monocyte # : 0.1 K/uL  Auto Eosinophil # : 0.0 K/uL  Auto Basophil # : 0.1 K/uL  Auto Neutrophil % : 89.9 %  Auto Lymphocyte % : 7.3 %  Auto Monocyte % : 1.3 %  Auto Eosinophil % : 0.2 %  Auto Basophil % : 1.3 %      04-29    138  |  101  |  12  ----------------------------<  139<H>  3.8   |  26  |  1.01    Ca    9.3      29 Apr 2018 07:11  Phos  3.4     04-29  Mg     2.1     04-29    TPro  6.5  /  Alb  3.7  /  TBili  0.3  /  DBili  x   /  AST  17  /  ALT  27  /  AlkPhos  68  04-29      Mg 2.1  Phos 3.4 Diagnosis: HIV associated DLBCL with CNS involvement     Protocol/Chemo Regimen: DA-R-EPOCH Cycle 4 with 20% dose escalation (Rituxan given inpatient on Day 0)    Day: 4    Pt endorsed: No acute complaints     Review of Systems: Patient denies mouth pain, nausea  palpitations, SOB, abdominal pain, vomiting, diarrhea     Pain scale: 0    Diet: Regular     Allergies: No Known Allergies      ANTIMICROBIALS  abacavir 600 mG/dolutegravir 50 mG/lamiVUDine 300 mG 1 Tablet(s) Oral daily  acyclovir   Tablet 400 milliGRAM(s) Oral three times a day      STANDING MEDICATIONS  acetaminophen   Tablet 650 milliGRAM(s) Oral once  allopurinol 300 milliGRAM(s) Oral daily  diphenhydrAMINE   Injectable 50 milliGRAM(s) IV Push once  sodium chloride 0.9%. 1000 milliLiter(s) IV Continuous <Continuous>      PRN MEDICATIONS  acetaminophen   Tablet 650 milliGRAM(s) Oral every 6 hours PRN  acetaminophen   Tablet. 650 milliGRAM(s) Oral every 6 hours PRN  hydrocortisone sodium succinate Injectable 100 milliGRAM(s) IV Push once PRN    Vital Signs Last 24 Hrs  T(C): 36.8 (29 Apr 2018 10:18), Max: 37.3 (28 Apr 2018 17:58)  T(F): 98.3 (29 Apr 2018 10:18), Max: 99.1 (28 Apr 2018 17:58)  HR: 80 (29 Apr 2018 10:18) (79 - 112)  BP: 142/78 (29 Apr 2018 10:18) (128/77 - 148/83)  BP(mean): --  RR: 20 (29 Apr 2018 10:18) (18 - 20)  SpO2: 98% (29 Apr 2018 10:18) (97% - 99%)      PHYSICAL EXAM  General: NAD  HEENT: sclera- anicteric; no ulcers/erythema  Neck: supple  CV: (+) S1/S2 RRR  Lungs: clear to auscultation, no wheezes or rales  Abdomen: soft, non-tender, non-distended (+) BS x 4Q  Ext: no  edema  Skin: no rash  Neuro: alert and oriented X 3  Central Line: R ACW Mediport C/D/I    LABS:                            10.9   9.6   )-----------( 346      ( 29 Apr 2018 07:10 )             33.5         Mean Cell Volume : 89.8 fl  Mean Cell Hemoglobin : 29.1 pg  Mean Cell Hemoglobin Concentration : 32.4 gm/dL  Auto Neutrophil # : 8.6 K/uL  Auto Lymphocyte # : 0.7 K/uL  Auto Monocyte # : 0.1 K/uL  Auto Eosinophil # : 0.0 K/uL  Auto Basophil # : 0.1 K/uL  Auto Neutrophil % : 89.9 %  Auto Lymphocyte % : 7.3 %  Auto Monocyte % : 1.3 %  Auto Eosinophil % : 0.2 %  Auto Basophil % : 1.3 %      04-29    138  |  101  |  12  ----------------------------<  139<H>  3.8   |  26  |  1.01    Ca    9.3      29 Apr 2018 07:11  Phos  3.4     04-29  Mg     2.1     04-29    TPro  6.5  /  Alb  3.7  /  TBili  0.3  /  DBili  x   /  AST  17  /  ALT  27  /  AlkPhos  68  04-29      Mg 2.1  Phos 3.4

## 2018-04-29 NOTE — PROGRESS NOTE ADULT - ATTENDING COMMENTS
41 yo M with HIV DLBCL admitted for C4 dose-adjusted R-EPOCH day 3 (s/p Rituxan as inpt on 4/26), with IT Mtx 2x/wk  -cont chemo, plan on discharge after end of chemo  -LP with chemo on 4/26 -f/u flow cytometry. Cell count-total nucleated cells 16. Next LP scheduled for 4/30  -monitor counts  -monitor for fevers  - OOB/ambulate 43 yo M with HIV DLBCL admitted for C4 dose-adjusted R-EPOCH day 4 (s/p Rituxan as inpt on 4/26), with IT Mtx 2x/wk  -cont chemo, plan on discharge after end of chemo  -LP with chemo on 4/26 -f/u flow cytometry. Cell count-total nucleated cells 16. Next LP scheduled for 4/30  -monitor counts  -monitor for fevers  - OOB/ambulate

## 2018-04-30 LAB
ALBUMIN SERPL ELPH-MCNC: 3.7 G/DL — SIGNIFICANT CHANGE UP (ref 3.3–5)
ALP SERPL-CCNC: 68 U/L — SIGNIFICANT CHANGE UP (ref 40–120)
ALT FLD-CCNC: 30 U/L — SIGNIFICANT CHANGE UP (ref 10–45)
ANION GAP SERPL CALC-SCNC: 12 MMOL/L — SIGNIFICANT CHANGE UP (ref 5–17)
APPEARANCE CSF: CLEAR — SIGNIFICANT CHANGE UP
APPEARANCE SPUN FLD: COLORLESS — SIGNIFICANT CHANGE UP
AST SERPL-CCNC: 18 U/L — SIGNIFICANT CHANGE UP (ref 10–40)
BASOPHILS # BLD AUTO: 0 K/UL — SIGNIFICANT CHANGE UP (ref 0–0.2)
BASOPHILS NFR BLD AUTO: 0 % — SIGNIFICANT CHANGE UP (ref 0–2)
BILIRUB SERPL-MCNC: 0.3 MG/DL — SIGNIFICANT CHANGE UP (ref 0.2–1.2)
BUN SERPL-MCNC: 13 MG/DL — SIGNIFICANT CHANGE UP (ref 7–23)
CALCIUM SERPL-MCNC: 9.4 MG/DL — SIGNIFICANT CHANGE UP (ref 8.4–10.5)
CHLORIDE SERPL-SCNC: 100 MMOL/L — SIGNIFICANT CHANGE UP (ref 96–108)
CO2 SERPL-SCNC: 25 MMOL/L — SIGNIFICANT CHANGE UP (ref 22–31)
COLOR CSF: SIGNIFICANT CHANGE UP
CREAT SERPL-MCNC: 0.95 MG/DL — SIGNIFICANT CHANGE UP (ref 0.5–1.3)
EOSINOPHIL # BLD AUTO: 0 K/UL — SIGNIFICANT CHANGE UP (ref 0–0.5)
EOSINOPHIL NFR BLD AUTO: 0.2 % — SIGNIFICANT CHANGE UP (ref 0–6)
GLUCOSE CSF-MCNC: 93 MG/DL — HIGH (ref 40–70)
GLUCOSE SERPL-MCNC: 155 MG/DL — HIGH (ref 70–99)
HCT VFR BLD CALC: 33.4 % — LOW (ref 39–50)
HGB BLD-MCNC: 10.9 G/DL — LOW (ref 13–17)
LDH CSF L TO P-CCNC: 29 U/L — SIGNIFICANT CHANGE UP
LDH FLD-CCNC: 29 U/L — SIGNIFICANT CHANGE UP
LYMPHOCYTES # BLD AUTO: 0.6 K/UL — LOW (ref 1–3.3)
LYMPHOCYTES # BLD AUTO: 9.7 % — LOW (ref 13–44)
LYMPHOCYTES # CSF: 94 % — HIGH (ref 40–80)
MAGNESIUM SERPL-MCNC: 2.3 MG/DL — SIGNIFICANT CHANGE UP (ref 1.6–2.6)
MCHC RBC-ENTMCNC: 29 PG — SIGNIFICANT CHANGE UP (ref 27–34)
MCHC RBC-ENTMCNC: 32.6 GM/DL — SIGNIFICANT CHANGE UP (ref 32–36)
MCV RBC AUTO: 89 FL — SIGNIFICANT CHANGE UP (ref 80–100)
MONOCYTES # BLD AUTO: 0.1 K/UL — SIGNIFICANT CHANGE UP (ref 0–0.9)
MONOCYTES NFR BLD AUTO: 0.9 % — LOW (ref 2–14)
MONOS+MACROS NFR CSF: 4 % — LOW (ref 15–45)
NEUTROPHILS # BLD AUTO: 5.8 K/UL — SIGNIFICANT CHANGE UP (ref 1.8–7.4)
NEUTROPHILS # CSF: 2 % — SIGNIFICANT CHANGE UP (ref 0–6)
NEUTROPHILS NFR BLD AUTO: 89.2 % — HIGH (ref 43–77)
NRBC NFR CSF: 10 /UL — HIGH (ref 0–5)
PHOSPHATE SERPL-MCNC: 2.8 MG/DL — SIGNIFICANT CHANGE UP (ref 2.5–4.5)
PLATELET # BLD AUTO: 333 K/UL — SIGNIFICANT CHANGE UP (ref 150–400)
POTASSIUM SERPL-MCNC: 3.6 MMOL/L — SIGNIFICANT CHANGE UP (ref 3.5–5.3)
POTASSIUM SERPL-SCNC: 3.6 MMOL/L — SIGNIFICANT CHANGE UP (ref 3.5–5.3)
PROT CSF-MCNC: 30 MG/DL — SIGNIFICANT CHANGE UP (ref 15–45)
PROT SERPL-MCNC: 6.7 G/DL — SIGNIFICANT CHANGE UP (ref 6–8.3)
RBC # BLD: 3.76 M/UL — LOW (ref 4.2–5.8)
RBC # CSF: 27 /UL — HIGH (ref 0–0)
RBC # FLD: 15.4 % — HIGH (ref 10.3–14.5)
SODIUM SERPL-SCNC: 137 MMOL/L — SIGNIFICANT CHANGE UP (ref 135–145)
TM INTERPRETATION: SIGNIFICANT CHANGE UP
TUBE TYPE: SIGNIFICANT CHANGE UP
WBC # BLD: 6.5 K/UL — SIGNIFICANT CHANGE UP (ref 3.8–10.5)
WBC # FLD AUTO: 6.5 K/UL — SIGNIFICANT CHANGE UP (ref 3.8–10.5)

## 2018-04-30 PROCEDURE — 99232 SBSQ HOSP IP/OBS MODERATE 35: CPT | Mod: GC

## 2018-04-30 PROCEDURE — 62272 THER SPI PNXR DRG CSF: CPT

## 2018-04-30 PROCEDURE — 77003 FLUOROGUIDE FOR SPINE INJECT: CPT | Mod: 26

## 2018-04-30 PROCEDURE — 88188 FLOWCYTOMETRY/READ 9-15: CPT

## 2018-04-30 RX ORDER — METHOTREXATE 2.5 MG/1
12 TABLET ORAL ONCE
Qty: 0 | Refills: 0 | Status: COMPLETED | OUTPATIENT
Start: 2018-04-30 | End: 2018-05-01

## 2018-04-30 RX ADMIN — ONDANSETRON 8 MILLIGRAM(S): 8 TABLET, FILM COATED ORAL at 12:09

## 2018-04-30 RX ADMIN — SODIUM CHLORIDE 75 MILLILITER(S): 9 INJECTION INTRAMUSCULAR; INTRAVENOUS; SUBCUTANEOUS at 05:23

## 2018-04-30 RX ADMIN — ONDANSETRON 8 MILLIGRAM(S): 8 TABLET, FILM COATED ORAL at 19:48

## 2018-04-30 RX ADMIN — Medication 400 MILLIGRAM(S): at 05:22

## 2018-04-30 RX ADMIN — SODIUM CHLORIDE 75 MILLILITER(S): 9 INJECTION INTRAMUSCULAR; INTRAVENOUS; SUBCUTANEOUS at 07:19

## 2018-04-30 RX ADMIN — Medication 120 MILLIGRAM(S): at 11:05

## 2018-04-30 RX ADMIN — Medication 400 MILLIGRAM(S): at 17:30

## 2018-04-30 RX ADMIN — Medication 300 MILLIGRAM(S): at 12:09

## 2018-04-30 RX ADMIN — Medication 400 MILLIGRAM(S): at 21:50

## 2018-04-30 RX ADMIN — ONDANSETRON 8 MILLIGRAM(S): 8 TABLET, FILM COATED ORAL at 04:55

## 2018-04-30 RX ADMIN — Medication 120 MILLIGRAM(S): at 22:22

## 2018-04-30 NOTE — PROGRESS NOTE ADULT - SUBJECTIVE AND OBJECTIVE BOX
Diagnosis: HIV associated DLBCL with CNS involvement     Protocol/Chemo Regimen: DA-R-EPOCH Cycle 4 with 20% dose escalation (Rituxan given inpatient on Day 0)    Day: 5    Pt endorsed: No acute complaints     Review of Systems: Patient denies headache, dizziness, visual changes, chest pain, palpitations, SOB, abdominal pain, vomiting, diarrhea or dysuria.     Pain scale: 0    Diet: Regular     Allergies: No Known Allergies      ANTIMICROBIALS  abacavir 600 mG/dolutegravir 50 mG/lamiVUDine 300 mG 1 Tablet(s) Oral daily  acyclovir   Tablet 400 milliGRAM(s) Oral three times a day      HEME/ONC MEDICATIONS  DOXOrubicin IVPB w/vinCRIStine 34 milliGRAM(s) IV Intermittent daily  etoposide IVPB 172 milliGRAM(s) IV Intermittent daily      STANDING MEDICATIONS  acetaminophen   Tablet 650 milliGRAM(s) Oral once  allopurinol 300 milliGRAM(s) Oral daily  diphenhydrAMINE   Injectable 50 milliGRAM(s) IV Push once  ondansetron Injectable 8 milliGRAM(s) IV Push every 8 hours  predniSONE   Tablet 120 milliGRAM(s) Oral two times a day  sodium chloride 0.9%. 1000 milliLiter(s) IV Continuous <Continuous>      PRN MEDICATIONS  acetaminophen   Tablet 650 milliGRAM(s) Oral every 6 hours PRN  acetaminophen   Tablet. 650 milliGRAM(s) Oral every 6 hours PRN  hydrocortisone sodium succinate Injectable 100 milliGRAM(s) IV Push once PRN  metoclopramide Injectable 10 milliGRAM(s) IV Push every 6 hours PRN      Vital Signs Last 24 Hrs  T(C): 36.5 (30 Apr 2018 05:48), Max: 37 (29 Apr 2018 21:19)  T(F): 97.7 (30 Apr 2018 05:48), Max: 98.6 (29 Apr 2018 21:19)  HR: 87 (30 Apr 2018 05:48) (80 - 105)  BP: 125/76 (30 Apr 2018 05:48) (125/76 - 142/78)  BP(mean): --  RR: 18 (30 Apr 2018 05:48) (18 - 20)  SpO2: 96% (30 Apr 2018 05:48) (96% - 98%)      PHYSICAL EXAM  General: NAD  HEENT: PERRLA, EOMI, clear oropharynx, anicteric sclera, pink conjunctiva  Neck: supple  CV: (+) S1/S2 RRR  Lungs: clear to auscultation, no wheezes or rales  Abdomen: soft, non-tender, non-distended (+) BS  Ext: no clubbing, cyanosis or edema  Skin: no rashes and no petechiae  Neuro: alert and oriented X 3, no focal deficits  Central Line: R ACW Mediport C/D/I        LABS:                        10.9   6.5   )-----------( 333      ( 30 Apr 2018 06:59 )             33.4         Mean Cell Volume : 89.0 fl  Mean Cell Hemoglobin : 29.0 pg  Mean Cell Hemoglobin Concentration : 32.6 gm/dL  Auto Neutrophil # : 5.8 K/uL  Auto Lymphocyte # : 0.6 K/uL  Auto Monocyte # : 0.1 K/uL  Auto Eosinophil # : 0.0 K/uL  Auto Basophil # : 0.0 K/uL  Auto Neutrophil % : 89.2 %  Auto Lymphocyte % : 9.7 %  Auto Monocyte % : 0.9 %  Auto Eosinophil % : 0.2 %  Auto Basophil % : 0.0 %      04-30    137  |  100  |  13  ----------------------------<  155<H>  3.6   |  25  |  0.95    Ca    9.4      30 Apr 2018 06:59  Phos  2.8     04-30  Mg     2.3     04-30    TPro  6.7  /  Alb  3.7  /  TBili  0.3  /  DBili  x   /  AST  18  /  ALT  30  /  AlkPhos  68  04-30      Mg 2.3  Phos 2.8

## 2018-04-30 NOTE — PROGRESS NOTE ADULT - PROBLEM SELECTOR PLAN 1
Admit to [facility] for Cycle 4 of DA-R-EPOCH with 20% dose escalation  Rituxan 375mg/m2 on Day 1  Doxorubicin 17.3mg/m2 continuous IV infusion Day 2-5  Etoposide 86.4 mg/m2 continuous IV infusion Day 2-5  Vincristine 0.4 mg/m2 continuous IV infusion Day 2-5  Prednisone 60 mg/m2 PO BID Day 2-6  Cyclophosphamide 1296 mg/m2 IV x 1 on Day 6  LP with IT MTX completed 4/26, follow up flow. Next LP scheduled for today  4/25 PET/CT with interval improvement  Monitor CBC/Lytes and transfuse/replete PRN  Strict Is and Os/Daily weights/Mouth Care  Antiemetics  IVF  Continue Allopurinol  Steroid induced hyperglycemia, monitor glucose daily on CMP

## 2018-04-30 NOTE — PROGRESS NOTE ADULT - ATTENDING COMMENTS
43 yo M with HIV DLBCL admitted for C4 dose-adjusted R-EPOCH day 4 (s/p Rituxan as inpt on 4/26), with IT Mtx 2x/wk  -cont chemo, plan on discharge after end of chemo  -LP with chemo on 4/26 -f/u flow cytometry. Cell count-total nucleated cells 16. Next LP scheduled for 4/30  -monitor counts  -monitor for fevers  - OOB/ambulate 41 yo M with HIV DLBCL admitted for C4 dose-adjusted R-EPOCH day 5 (s/p Rituxan as inpt on 4/26), with IT Mtx 2x/wk  -cont chemo, plan on discharge tomorrow  -LP with chemo on 4/26 -f/u flow cytometry. Cell count-total nucleated cells 16. LP scheduled today, on 4/30  -monitor counts  -monitor for fevers  - OOB/ambulate

## 2018-05-01 ENCOUNTER — OUTPATIENT (OUTPATIENT)
Dept: OUTPATIENT SERVICES | Facility: HOSPITAL | Age: 43
LOS: 1 days | Discharge: HOME | End: 2018-05-01

## 2018-05-01 VITALS
TEMPERATURE: 99 F | OXYGEN SATURATION: 96 % | RESPIRATION RATE: 18 BRPM | SYSTOLIC BLOOD PRESSURE: 146 MMHG | HEART RATE: 99 BPM | DIASTOLIC BLOOD PRESSURE: 81 MMHG

## 2018-05-01 DIAGNOSIS — C85.88 OTHER SPECIFIED TYPES OF NON-HODGKIN LYMPHOMA, LYMPH NODES OF MULTIPLE SITES: ICD-10-CM

## 2018-05-01 LAB
ALBUMIN SERPL ELPH-MCNC: 3.9 G/DL — SIGNIFICANT CHANGE UP (ref 3.3–5)
ALP SERPL-CCNC: 62 U/L — SIGNIFICANT CHANGE UP (ref 40–120)
ALT FLD-CCNC: 26 U/L — SIGNIFICANT CHANGE UP (ref 10–45)
ANION GAP SERPL CALC-SCNC: 13 MMOL/L — SIGNIFICANT CHANGE UP (ref 5–17)
AST SERPL-CCNC: 16 U/L — SIGNIFICANT CHANGE UP (ref 10–40)
BASOPHILS # BLD AUTO: 0 K/UL — SIGNIFICANT CHANGE UP (ref 0–0.2)
BASOPHILS NFR BLD AUTO: 0 % — SIGNIFICANT CHANGE UP (ref 0–2)
BILIRUB SERPL-MCNC: 0.5 MG/DL — SIGNIFICANT CHANGE UP (ref 0.2–1.2)
BUN SERPL-MCNC: 14 MG/DL — SIGNIFICANT CHANGE UP (ref 7–23)
CALCIUM SERPL-MCNC: 9.4 MG/DL — SIGNIFICANT CHANGE UP (ref 8.4–10.5)
CHLORIDE SERPL-SCNC: 97 MMOL/L — SIGNIFICANT CHANGE UP (ref 96–108)
CO2 SERPL-SCNC: 26 MMOL/L — SIGNIFICANT CHANGE UP (ref 22–31)
CREAT SERPL-MCNC: 0.88 MG/DL — SIGNIFICANT CHANGE UP (ref 0.5–1.3)
EOSINOPHIL # BLD AUTO: 0 K/UL — SIGNIFICANT CHANGE UP (ref 0–0.5)
EOSINOPHIL NFR BLD AUTO: 0.1 % — SIGNIFICANT CHANGE UP (ref 0–6)
GLUCOSE SERPL-MCNC: 145 MG/DL — HIGH (ref 70–99)
HCT VFR BLD CALC: 32.8 % — LOW (ref 39–50)
HGB BLD-MCNC: 11.1 G/DL — LOW (ref 13–17)
LYMPHOCYTES # BLD AUTO: 0.6 K/UL — LOW (ref 1–3.3)
LYMPHOCYTES # BLD AUTO: 12.3 % — LOW (ref 13–44)
MAGNESIUM SERPL-MCNC: 2.3 MG/DL — SIGNIFICANT CHANGE UP (ref 1.6–2.6)
MCHC RBC-ENTMCNC: 30.3 PG — SIGNIFICANT CHANGE UP (ref 27–34)
MCHC RBC-ENTMCNC: 34 GM/DL — SIGNIFICANT CHANGE UP (ref 32–36)
MCV RBC AUTO: 89.1 FL — SIGNIFICANT CHANGE UP (ref 80–100)
MONOCYTES # BLD AUTO: 0 K/UL — SIGNIFICANT CHANGE UP (ref 0–0.9)
MONOCYTES NFR BLD AUTO: 0.2 % — LOW (ref 2–14)
NEUTROPHILS # BLD AUTO: 4.5 K/UL — SIGNIFICANT CHANGE UP (ref 1.8–7.4)
NEUTROPHILS NFR BLD AUTO: 87.4 % — HIGH (ref 43–77)
PHOSPHATE SERPL-MCNC: 3.1 MG/DL — SIGNIFICANT CHANGE UP (ref 2.5–4.5)
PLATELET # BLD AUTO: 319 K/UL — SIGNIFICANT CHANGE UP (ref 150–400)
POTASSIUM SERPL-MCNC: 3.5 MMOL/L — SIGNIFICANT CHANGE UP (ref 3.5–5.3)
POTASSIUM SERPL-SCNC: 3.5 MMOL/L — SIGNIFICANT CHANGE UP (ref 3.5–5.3)
PROT SERPL-MCNC: 6.7 G/DL — SIGNIFICANT CHANGE UP (ref 6–8.3)
RBC # BLD: 3.68 M/UL — LOW (ref 4.2–5.8)
RBC # FLD: 15.1 % — HIGH (ref 10.3–14.5)
SODIUM SERPL-SCNC: 136 MMOL/L — SIGNIFICANT CHANGE UP (ref 135–145)
WBC # BLD: 5.2 K/UL — SIGNIFICANT CHANGE UP (ref 3.8–10.5)
WBC # FLD AUTO: 5.2 K/UL — SIGNIFICANT CHANGE UP (ref 3.8–10.5)

## 2018-05-01 PROCEDURE — 99232 SBSQ HOSP IP/OBS MODERATE 35: CPT | Mod: GC

## 2018-05-01 RX ORDER — CYCLOPHOSPHAMIDE 100 MG
2579 VIAL (EA) INTRAVENOUS ONCE
Qty: 0 | Refills: 0 | Status: DISCONTINUED | OUTPATIENT
Start: 2018-05-01 | End: 2018-05-01

## 2018-05-01 RX ADMIN — ONDANSETRON 8 MILLIGRAM(S): 8 TABLET, FILM COATED ORAL at 04:58

## 2018-05-01 RX ADMIN — Medication 120 MILLIGRAM(S): at 10:38

## 2018-05-01 RX ADMIN — Medication 400 MILLIGRAM(S): at 13:21

## 2018-05-01 RX ADMIN — ONDANSETRON 8 MILLIGRAM(S): 8 TABLET, FILM COATED ORAL at 11:07

## 2018-05-01 RX ADMIN — Medication 400 MILLIGRAM(S): at 05:00

## 2018-05-01 RX ADMIN — Medication 300 MILLIGRAM(S): at 11:05

## 2018-05-01 RX ADMIN — SODIUM CHLORIDE 75 MILLILITER(S): 9 INJECTION INTRAMUSCULAR; INTRAVENOUS; SUBCUTANEOUS at 11:05

## 2018-05-01 RX ADMIN — SODIUM CHLORIDE 75 MILLILITER(S): 9 INJECTION INTRAMUSCULAR; INTRAVENOUS; SUBCUTANEOUS at 04:58

## 2018-05-01 NOTE — PROGRESS NOTE ADULT - PROBLEM SELECTOR PROBLEM 1
High grade B-cell lymphoma

## 2018-05-01 NOTE — PROGRESS NOTE ADULT - PROBLEM SELECTOR PLAN 1
Admit to [facility] for Cycle 4 of DA-R-EPOCH with 20% dose escalation  Rituxan 375mg/m2 on Day 1  Doxorubicin 17.3mg/m2 continuous IV infusion Day 2-5  Etoposide 86.4 mg/m2 continuous IV infusion Day 2-5  Vincristine 0.4 mg/m2 continuous IV infusion Day 2-5  Prednisone 60 mg/m2 PO BID Day 2-6  Cyclophosphamide 1296 mg/m2 IV x 1 on Day 6  LP with IT MTX completed 4/26 and 4/30 follow up flow  4/25 PET/CT with interval improvement  Monitor CBC/Lytes and transfuse/replete PRN  Strict Is and Os/Daily weights/Mouth Care  Antiemetics  IVF  Continue Allopurinol  Steroid induced hyperglycemia, monitor glucose daily on CMP

## 2018-05-01 NOTE — ADVANCED PRACTICE NURSE CONSULT - ASSESSMENT
Labs today WBC 10.9  HGB 11  HCT 34.3  PLTS 330 CR. 0.94  TBILI. 0.2  Pt found in bed, alert and oriented x4, v/s stable afebrile, n/c offered.  Right chest wall mediport in place. Site without redness or swelling. Lumen previously accessed with + blood return flushes well with NS. Pt premedicated with  Zofran 8 mg IVSS prior to chemotherapy. Chemotherapy verified by 2 RNs prior to administration. At 1350 treated with doxorubicin 17.3mg/m2= 34mg,etoposide 86.4mg/m2= 172mg,vincristine 0.4mg/m2= 0.8mg civi over 24 hoursPt remains in bed with n/c offered. Primary RN aware of treatment plan.
Lab results as follows:wbc 6.5 hgb 10.9 hct 33.4 plt ct 333. Creatinine level 0.95 Total bili 0.3 patient s/p LP today,came back at 1730 from Xray department. LP precautions maintained. Denies headache at 1830. Tolerated dinner. Right chestwall single lumen mediport patent with y-tubing of NSS. Reinforced teachings to patient about his chemo regimen well understood. On zofran 8mg IV ATC as antiemetic. Doxorubicin 34mg/Vincristine 0.8mg in 500ml NSS started at 1830 as civ at 22ml/hr x 24hours infusion via y-line tubing through NSS line into right chestwall mediport. Patent. Etoposide 172mg in 500ml NSS started at 1830 as civ x 24hours infusion at 22ml/hr attached to the lowest port of Doxorubicin/Vincristine line through y-line tubing into right chestwall single lumen mediport. Patent. primary RN aware of plan of care. Safety maintained.
Labs today WBC 9.6  HGB 10.9  HCT 33.5 PLTS 346 CR. 1.01  TBILI. 0.3  Pt found in bed, alert and oriented x4, v/s stable afebrile, n/c offered.  Right chest wall mediport in place. Site without redness or swelling. Lumen previously accessed with + blood return flushes well with NS. Pt premedicated with  Zofran 8 mg IVSS prior to chemotherapy. Chemotherapy verified by 2 RNs prior to administration. At 1630 treated with doxorubicin 17.3mg/m2= 34mg,etoposide 86.4mg/m2= 172mg,vincristine 0.4mg/m2= 0.8mg civi over 24 hoursPt remains in bed with n/c offered. Primary RN aware of treatment plan.
Pt admitted for chemotherapy oriented to unit routine alert and oriented times four. Pt ambulate to bathroom with steady gait no distress noted. Mediport to right chest wall access by primary nurse, remain  with + blood return and flush easily. Chemotherapy teaching done with help of  pt verbalized understanding, also given drug card which outline side effect. Pt lab result was reviewed by Dr Cortes. Pt was pre-medicated with Tylenol 650 mg PO, and Benadryl 50 mg ivss. Drug verification done with primary nurse. At 1630 start Rituxan 375 mg/m2= 746 mg iv to run over 90 minutes. Pt was monitor during the initial half hour vital sign taken and charted no reaction noted.  Report given to primary nurse. Left pt in bed sleeping.
Pt admitted for chemotherapy oriented to unit routine alert and oriented times four. Pt ambulate to bathroom with steady gait no distress noted. Mediport to right chest wall access by primary nurse, remain  with + blood return and flush easily. Chemotherapy teaching done with help of  pt verbalized understanding, also given drug card which outline side effect. Pt lab result was reviewed by Dr Marie during rounds. Pt was pre-medicated with Emend 150 mg ivss . Pt s/p MUGA scan result on chart. Pt was started on Doxorubicin 17.3 mg/m2= 34 mg, Vincristine 0.4 mg/m2= 0.8 mg and Etoposide 86.4 mg/m2= 172 mg iv all to run over 24 hours in separate 500 cc normal saline at 1200 noon.  Report given to primary nurse. Left pt in bed with visitors at bedside. Report given to primary nurse.
Pt. seen in bed a/ox4,denies any discomfort at this time.Pt. verbalized understanding of chemotherapy regimine.Pt. has right chest wall mediport.Dsg dry and intact.Site with no s/s of redness,swelling or bleeding.With positive blood return noted and flushing easily with 10 ml NS.Lab values reviewed by Dr. Saenz on rounds.Drug verified by 2 RN.'s.At 1555 Started Cytoxan 1296/k1=6871 mg IV to mediport via alaris pump.Pt. tolerating well.Left pt. comfortable in bed.

## 2018-05-01 NOTE — PROGRESS NOTE ADULT - ATTENDING COMMENTS
41 yo M with HIV DLBCL admitted for C4 dose-adjusted R-EPOCH day 5 (s/p Rituxan as inpt on 4/26), with IT Mtx 2x/wk  -cont chemo, plan on discharge tomorrow  -LP with chemo on 4/26 -f/u flow cytometry. Cell count-total nucleated cells 16. LP scheduled today, on 4/30  -monitor counts  -monitor for fevers  - OOB/ambulate 43 yo M with HIV DLBCL admitted for C4 dose-adjusted R-EPOCH day 6 (s/p Rituxan as inpt on 4/26), with IT Mtx 2x/wk    -LP with chemo on 4/26 -flow was negative for malignant cells, absent B cells. LP done on 4/30 with Mtx -negative flow  -monitor counts  -monitor for fevers  -d/c home today

## 2018-05-01 NOTE — ADVANCED PRACTICE NURSE CONSULT - REASON FOR CONSULT
Chemotherapy note day 1/6 of DA-R-EPOCH cycle 4
Chemotherapy note day 2/6 of DA-R-EPOCH cycle 4
Day 4 of 4 Etoposide. Day 4 of 4 Doxorubicin/Vincristine as civ
Day 6 Cytoxan
Doxorubicin,vincristine, cytoxan day 4
Doxorubicin,vincristine, cytoxan day 3

## 2018-05-01 NOTE — PROGRESS NOTE ADULT - ASSESSMENT
This is a 42 yo M with PMHx of HIV and recent diagnosis of high-grade B-cell, CD10+ lymphoma with CNS involvement admitted for Cycle 4 DA-R-EPOCH with 20% dose escalation. Patient status post LP with IT MTX 4/26, next LP scheduled for Monday 4/30.
This is a 42 yo M with PMHx of HIV and recent diagnosis of high-grade B-cell, CD10+ lymphoma with CNS involvement admitted for Cycle 4 DA-R-EPOCH with 20% dose escalation. Patient status post LP with IT MTX 4/26, next LP scheduled for today.
This is a 40 yo M with PMHx of HIV and recent diagnosis of high-grade B-cell, CD10+ lymphoma with CNS involvement admitted for Cycle 4 DA-R-EPOCH with 20% dose escalation. Patient status post LP with IT MTX 4/26, next LP scheduled for Monday 4/30.
This is a 40 yo M with PMHx of HIV and recent diagnosis of high-grade B-cell, CD10+ lymphoma with CNS involvement admitted for Cycle 4 DA-R-EPOCH with 20% dose escalation. Patient status post LP with IT MTX 4/26, next LP scheduled for Monday 4/30.
This is a 40 yo M with PMHx of HIV and recent diagnosis of high-grade B-cell, CD10+ lymphoma with CNS involvement admitted for Cycle 4 DA-R-EPOCH with 20% dose escalation. Patient status post LP with IT MTX 4/26 and 4/30.

## 2018-05-01 NOTE — PROGRESS NOTE ADULT - PROBLEM SELECTOR PLAN 3
Lovenox 40mg SQ daily   D/C if PLT < 50K
LP on hold for 24 hours after LP  Encourage ambulation
Lovenox 40mg SQ daily   D/C if PLT < 50K
Lovenox on HOLD for LP  Encourage ambulation
Lovenox on hold for LP today  Encourage ambulation

## 2018-05-02 LAB — TM INTERPRETATION: SIGNIFICANT CHANGE UP

## 2018-05-03 ENCOUNTER — APPOINTMENT (OUTPATIENT)
Dept: INFUSION THERAPY | Facility: HOSPITAL | Age: 43
End: 2018-05-03

## 2018-05-04 DIAGNOSIS — Z51.89 ENCOUNTER FOR OTHER SPECIFIED AFTERCARE: ICD-10-CM

## 2018-05-07 ENCOUNTER — RESULT REVIEW (OUTPATIENT)
Age: 43
End: 2018-05-07

## 2018-05-07 ENCOUNTER — APPOINTMENT (OUTPATIENT)
Dept: HEMATOLOGY ONCOLOGY | Facility: CLINIC | Age: 43
End: 2018-05-07
Payer: MEDICAID

## 2018-05-07 VITALS
SYSTOLIC BLOOD PRESSURE: 133 MMHG | HEART RATE: 125 BPM | TEMPERATURE: 100.1 F | OXYGEN SATURATION: 99 % | DIASTOLIC BLOOD PRESSURE: 84 MMHG | RESPIRATION RATE: 16 BRPM | WEIGHT: 189 LBS | BODY MASS INDEX: 27.93 KG/M2

## 2018-05-07 LAB
BASOPHILS NFR BLD AUTO: 6 % — HIGH (ref 0–2)
DOHLE BOD BLD QL SMEAR: PRESENT — SIGNIFICANT CHANGE UP
EOSINOPHIL NFR BLD AUTO: 2 % — SIGNIFICANT CHANGE UP (ref 0–6)
HCT VFR BLD CALC: 27.8 % — LOW (ref 39–50)
HGB BLD-MCNC: 9.5 G/DL — LOW (ref 13–17)
LYMPHOCYTES # BLD AUTO: 54 % — HIGH (ref 13–44)
MCHC RBC-ENTMCNC: 30.2 PG — SIGNIFICANT CHANGE UP (ref 27–34)
MCHC RBC-ENTMCNC: 34.1 G/DL — SIGNIFICANT CHANGE UP (ref 32–36)
MCV RBC AUTO: 88.4 FL — SIGNIFICANT CHANGE UP (ref 80–100)
MONOCYTES NFR BLD AUTO: 6 % — SIGNIFICANT CHANGE UP (ref 2–14)
NEUTROPHILS # BLD AUTO: SIGNIFICANT CHANGE UP (ref 1.8–7.4)
NEUTROPHILS NFR BLD AUTO: 32 % — LOW (ref 43–77)
PLAT MORPH BLD: NORMAL — SIGNIFICANT CHANGE UP
PLATELET # BLD AUTO: 31 K/UL — LOW (ref 150–400)
RBC # BLD: 3.15 M/UL — LOW (ref 4.2–5.8)
RBC # FLD: 15.3 % — HIGH (ref 10.3–14.5)
RBC BLD AUTO: SIGNIFICANT CHANGE UP
WBC # BLD: 1.4 K/UL — LOW (ref 3.8–10.5)
WBC # FLD AUTO: 1.4 K/UL — LOW (ref 3.8–10.5)

## 2018-05-07 PROCEDURE — 99214 OFFICE O/P EST MOD 30 MIN: CPT

## 2018-05-07 RX ORDER — ACYCLOVIR 400 MG/1
400 TABLET ORAL 3 TIMES DAILY
Qty: 21 | Refills: 0 | Status: DISCONTINUED | COMMUNITY
End: 2018-05-07

## 2018-05-07 RX ORDER — NYSTATIN 100000 [USP'U]/ML
100000 SUSPENSION ORAL
Qty: 1 | Refills: 0 | Status: DISCONTINUED | COMMUNITY
Start: 2018-05-07 | End: 2018-05-07

## 2018-05-07 RX ORDER — ABACAVIR SULFATE, DOLUTEGRAVIR SODIUM, LAMIVUDINE 600; 50; 300 MG/1; MG/1; MG/1
TABLET, FILM COATED ORAL DAILY
Refills: 0 | Status: DISCONTINUED | COMMUNITY
End: 2018-05-07

## 2018-05-07 RX ORDER — ACYCLOVIR 200 MG/5ML
200 SUSPENSION ORAL
Qty: 120 | Refills: 3 | Status: COMPLETED | COMMUNITY
Start: 2018-05-07 | End: 2018-05-07

## 2018-05-07 RX ORDER — LEVOFLOXACIN 500 MG/1
500 TABLET, FILM COATED ORAL
Qty: 14 | Refills: 0 | Status: COMPLETED | COMMUNITY
Start: 2018-04-17 | End: 2018-05-07

## 2018-05-10 ENCOUNTER — APPOINTMENT (OUTPATIENT)
Dept: HEMATOLOGY ONCOLOGY | Facility: CLINIC | Age: 43
End: 2018-05-10
Payer: MEDICAID

## 2018-05-10 ENCOUNTER — RESULT REVIEW (OUTPATIENT)
Age: 43
End: 2018-05-10

## 2018-05-10 VITALS
WEIGHT: 187.71 LBS | DIASTOLIC BLOOD PRESSURE: 74 MMHG | OXYGEN SATURATION: 99 % | BODY MASS INDEX: 27.74 KG/M2 | SYSTOLIC BLOOD PRESSURE: 123 MMHG | HEART RATE: 107 BPM | TEMPERATURE: 98.7 F | RESPIRATION RATE: 16 BRPM

## 2018-05-10 LAB
BASOPHILS # BLD AUTO: 0.1 K/UL — SIGNIFICANT CHANGE UP (ref 0–0.2)
BASOPHILS NFR BLD AUTO: 1 % — SIGNIFICANT CHANGE UP (ref 0–2)
EOSINOPHIL # BLD AUTO: 0 K/UL — SIGNIFICANT CHANGE UP (ref 0–0.5)
EOSINOPHIL NFR BLD AUTO: 0.8 % — SIGNIFICANT CHANGE UP (ref 0–6)
HCT VFR BLD CALC: 26 % — LOW (ref 39–50)
HGB BLD-MCNC: 8.9 G/DL — LOW (ref 13–17)
LYMPHOCYTES # BLD AUTO: 1.1 K/UL — SIGNIFICANT CHANGE UP (ref 1–3.3)
LYMPHOCYTES # BLD AUTO: 19.7 % — SIGNIFICANT CHANGE UP (ref 13–44)
MCHC RBC-ENTMCNC: 30.4 PG — SIGNIFICANT CHANGE UP (ref 27–34)
MCHC RBC-ENTMCNC: 34.3 G/DL — SIGNIFICANT CHANGE UP (ref 32–36)
MCV RBC AUTO: 88.5 FL — SIGNIFICANT CHANGE UP (ref 80–100)
MONOCYTES # BLD AUTO: 0.7 K/UL — SIGNIFICANT CHANGE UP (ref 0–0.9)
MONOCYTES NFR BLD AUTO: 12.3 % — SIGNIFICANT CHANGE UP (ref 2–14)
NEUTROPHILS # BLD AUTO: 3.7 K/UL — SIGNIFICANT CHANGE UP (ref 1.8–7.4)
NEUTROPHILS NFR BLD AUTO: 66.2 % — SIGNIFICANT CHANGE UP (ref 43–77)
PLATELET # BLD AUTO: 35 K/UL — LOW (ref 150–400)
RBC # BLD: 2.94 M/UL — LOW (ref 4.2–5.8)
RBC # FLD: 15.6 % — HIGH (ref 10.3–14.5)
WBC # BLD: 5.6 K/UL — SIGNIFICANT CHANGE UP (ref 3.8–10.5)
WBC # FLD AUTO: 5.6 K/UL — SIGNIFICANT CHANGE UP (ref 3.8–10.5)

## 2018-05-10 PROCEDURE — 99215 OFFICE O/P EST HI 40 MIN: CPT

## 2018-05-10 RX ORDER — ATOVAQUONE 750 MG/5ML
SUSPENSION ORAL TWICE DAILY
Refills: 0 | Status: DISCONTINUED | COMMUNITY
End: 2018-05-10

## 2018-05-15 ENCOUNTER — RESULT REVIEW (OUTPATIENT)
Age: 43
End: 2018-05-15

## 2018-05-15 ENCOUNTER — APPOINTMENT (OUTPATIENT)
Dept: HEMATOLOGY ONCOLOGY | Facility: CLINIC | Age: 43
End: 2018-05-15
Payer: MEDICAID

## 2018-05-15 VITALS
RESPIRATION RATE: 16 BRPM | OXYGEN SATURATION: 98 % | TEMPERATURE: 99.3 F | WEIGHT: 191.8 LBS | DIASTOLIC BLOOD PRESSURE: 84 MMHG | HEART RATE: 103 BPM | BODY MASS INDEX: 28.34 KG/M2 | SYSTOLIC BLOOD PRESSURE: 120 MMHG

## 2018-05-15 LAB
APTT BLD: 31.7 SEC
EOSINOPHIL # BLD AUTO: 0.1 K/UL — SIGNIFICANT CHANGE UP (ref 0–0.5)
HCT VFR BLD CALC: 28 % — LOW (ref 39–50)
HGB BLD-MCNC: 9.5 G/DL — LOW (ref 13–17)
INR PPP: 1.02 RATIO
LYMPHOCYTES # BLD AUTO: 12 % — LOW (ref 13–44)
LYMPHOCYTES # BLD AUTO: 2.5 K/UL — SIGNIFICANT CHANGE UP (ref 1–3.3)
MCHC RBC-ENTMCNC: 30.3 PG — SIGNIFICANT CHANGE UP (ref 27–34)
MCHC RBC-ENTMCNC: 34 G/DL — SIGNIFICANT CHANGE UP (ref 32–36)
MCV RBC AUTO: 89.2 FL — SIGNIFICANT CHANGE UP (ref 80–100)
METAMYELOCYTES # FLD: 1 % — HIGH (ref 0–0)
MONOCYTES # BLD AUTO: 1.8 K/UL — HIGH (ref 0–0.9)
MONOCYTES NFR BLD AUTO: 14 % — SIGNIFICANT CHANGE UP (ref 2–14)
NEUTROPHILS # BLD AUTO: 13.8 K/UL — HIGH (ref 1.8–7.4)
NEUTROPHILS NFR BLD AUTO: 63 % — SIGNIFICANT CHANGE UP (ref 43–77)
NEUTS BAND # BLD: 10 % — HIGH (ref 0–8)
NRBC # BLD: 3 /100 — HIGH (ref 0–0)
PLAT MORPH BLD: NORMAL — SIGNIFICANT CHANGE UP
PLATELET # BLD AUTO: 196 K/UL — SIGNIFICANT CHANGE UP (ref 150–400)
PT BLD: 11.5 SEC
RBC # BLD: 3.14 M/UL — LOW (ref 4.2–5.8)
RBC # FLD: 17.2 % — HIGH (ref 10.3–14.5)
RBC BLD AUTO: SIGNIFICANT CHANGE UP
WBC # BLD: 18.2 K/UL — HIGH (ref 3.8–10.5)
WBC # FLD AUTO: 18.2 K/UL — HIGH (ref 3.8–10.5)

## 2018-05-15 PROCEDURE — 99214 OFFICE O/P EST MOD 30 MIN: CPT

## 2018-05-15 RX ORDER — LEVOFLOXACIN 500 MG/1
500 TABLET, FILM COATED ORAL
Qty: 7 | Refills: 1 | Status: COMPLETED | COMMUNITY
Start: 2018-05-07 | End: 2018-05-15

## 2018-05-15 RX ORDER — FLUCONAZOLE 200 MG/1
200 TABLET ORAL
Qty: 5 | Refills: 0 | Status: DISCONTINUED | COMMUNITY
Start: 2018-05-07 | End: 2018-05-15

## 2018-05-15 RX ORDER — DIPHENHYDRAMINE HYDROCHLORIDE AND LIDOCAINE HYDROCHLORIDE AND ALUMINUM HYDROXIDE AND MAGNESIUM HYDRO
KIT
Qty: 1 | Refills: 0 | Status: COMPLETED | COMMUNITY
Start: 2018-05-07 | End: 2018-05-15

## 2018-05-16 LAB
ALBUMIN SERPL ELPH-MCNC: 4.3 G/DL
ALP BLD-CCNC: 103 U/L
ALT SERPL-CCNC: 24 U/L
ANION GAP SERPL CALC-SCNC: 15 MMOL/L
AST SERPL-CCNC: 22 U/L
BILIRUB SERPL-MCNC: <0.2 MG/DL
BUN SERPL-MCNC: 10 MG/DL
CALCIUM SERPL-MCNC: 9.2 MG/DL
CHLORIDE SERPL-SCNC: 102 MMOL/L
CO2 SERPL-SCNC: 25 MMOL/L
CREAT SERPL-MCNC: 1.37 MG/DL
GLUCOSE SERPL-MCNC: 119 MG/DL
HIV1 RNA # SERPL NAA+PROBE: ABNORMAL
HIV1 RNA # SERPL NAA+PROBE: ABNORMAL COPIES/ML
LDH SERPL-CCNC: 341 U/L
POTASSIUM SERPL-SCNC: 4.3 MMOL/L
PROT SERPL-MCNC: 7.1 G/DL
SODIUM SERPL-SCNC: 142 MMOL/L
VIRAL LOAD INTERP: NORMAL
VIRAL LOAD LOG: ABNORMAL LG COP/ML

## 2018-05-17 ENCOUNTER — TRANSCRIPTION ENCOUNTER (OUTPATIENT)
Age: 43
End: 2018-05-17

## 2018-05-17 ENCOUNTER — INPATIENT (INPATIENT)
Facility: HOSPITAL | Age: 43
LOS: 4 days | Discharge: HOME | DRG: 847 | End: 2018-05-22
Attending: INTERNAL MEDICINE | Admitting: INTERNAL MEDICINE
Payer: MEDICAID

## 2018-05-17 VITALS
TEMPERATURE: 98 F | RESPIRATION RATE: 18 BRPM | WEIGHT: 186.29 LBS | HEIGHT: 68.5 IN | HEART RATE: 103 BPM | DIASTOLIC BLOOD PRESSURE: 88 MMHG | SYSTOLIC BLOOD PRESSURE: 159 MMHG

## 2018-05-17 DIAGNOSIS — C85.10 UNSPECIFIED B-CELL LYMPHOMA, UNSPECIFIED SITE: ICD-10-CM

## 2018-05-17 LAB
ALBUMIN SERPL ELPH-MCNC: 4.6 G/DL — SIGNIFICANT CHANGE UP (ref 3.3–5)
ALP SERPL-CCNC: 98 U/L — SIGNIFICANT CHANGE UP (ref 40–120)
ALT FLD-CCNC: 27 U/L — SIGNIFICANT CHANGE UP (ref 10–45)
ANION GAP SERPL CALC-SCNC: 12 MMOL/L — SIGNIFICANT CHANGE UP (ref 5–17)
APPEARANCE CSF: CLEAR — SIGNIFICANT CHANGE UP
APPEARANCE CSF: CLEAR — SIGNIFICANT CHANGE UP
APPEARANCE SPUN FLD: COLORLESS — SIGNIFICANT CHANGE UP
APPEARANCE SPUN FLD: COLORLESS — SIGNIFICANT CHANGE UP
APTT BLD: 32 SEC — SIGNIFICANT CHANGE UP (ref 27.5–37.4)
AST SERPL-CCNC: 22 U/L — SIGNIFICANT CHANGE UP (ref 10–40)
BASOPHILS # BLD AUTO: 0.1 K/UL — SIGNIFICANT CHANGE UP (ref 0–0.2)
BILIRUB SERPL-MCNC: 0.2 MG/DL — SIGNIFICANT CHANGE UP (ref 0.2–1.2)
BUN SERPL-MCNC: 9 MG/DL — SIGNIFICANT CHANGE UP (ref 7–23)
CALCIUM SERPL-MCNC: 9.6 MG/DL — SIGNIFICANT CHANGE UP (ref 8.4–10.5)
CHLORIDE SERPL-SCNC: 102 MMOL/L — SIGNIFICANT CHANGE UP (ref 96–108)
CO2 SERPL-SCNC: 25 MMOL/L — SIGNIFICANT CHANGE UP (ref 22–31)
COLOR CSF: SIGNIFICANT CHANGE UP
COLOR CSF: SIGNIFICANT CHANGE UP
CREAT SERPL-MCNC: 1.32 MG/DL — HIGH (ref 0.5–1.3)
EOSINOPHIL # BLD AUTO: 0.1 K/UL — SIGNIFICANT CHANGE UP (ref 0–0.5)
GLUCOSE CSF-MCNC: 63 MG/DL — SIGNIFICANT CHANGE UP (ref 40–70)
GLUCOSE SERPL-MCNC: 95 MG/DL — SIGNIFICANT CHANGE UP (ref 70–99)
HCT VFR BLD CALC: 30.3 % — LOW (ref 39–50)
HGB BLD-MCNC: 10.1 G/DL — LOW (ref 13–17)
INR BLD: 1.11 RATIO — SIGNIFICANT CHANGE UP (ref 0.88–1.16)
LDH CSF L TO P-CCNC: 26 U/L — SIGNIFICANT CHANGE UP
LDH FLD-CCNC: 26 U/L — SIGNIFICANT CHANGE UP
LYMPHOCYTES # BLD AUTO: 19 % — SIGNIFICANT CHANGE UP (ref 13–44)
LYMPHOCYTES # BLD AUTO: 2.6 K/UL — SIGNIFICANT CHANGE UP (ref 1–3.3)
LYMPHOCYTES # CSF: 91 % — HIGH (ref 40–80)
MAGNESIUM SERPL-MCNC: 2.1 MG/DL — SIGNIFICANT CHANGE UP (ref 1.6–2.6)
MCHC RBC-ENTMCNC: 30.6 PG — SIGNIFICANT CHANGE UP (ref 27–34)
MCHC RBC-ENTMCNC: 33.4 GM/DL — SIGNIFICANT CHANGE UP (ref 32–36)
MCV RBC AUTO: 91.7 FL — SIGNIFICANT CHANGE UP (ref 80–100)
METAMYELOCYTES # FLD: 3 % — HIGH (ref 0–0)
MONOCYTES # BLD AUTO: 1.2 K/UL — HIGH (ref 0–0.9)
MONOCYTES NFR BLD AUTO: 8 % — SIGNIFICANT CHANGE UP (ref 2–14)
MONOS+MACROS NFR CSF: 9 % — LOW (ref 15–45)
NEUTROPHILS # BLD AUTO: 11.3 K/UL — HIGH (ref 1.8–7.4)
NEUTROPHILS # CSF: 0 % — SIGNIFICANT CHANGE UP (ref 0–6)
NEUTROPHILS # CSF: SIGNIFICANT CHANGE UP (ref 0–6)
NEUTROPHILS NFR BLD AUTO: 56 % — SIGNIFICANT CHANGE UP (ref 43–77)
NEUTS BAND # BLD: 14 % — HIGH (ref 0–8)
NRBC # BLD: 1 /100 — HIGH (ref 0–0)
NRBC NFR CSF: 6 /UL — HIGH (ref 0–5)
NRBC NFR CSF: 6 /UL — HIGH (ref 0–5)
PHOSPHATE SERPL-MCNC: 3.8 MG/DL — SIGNIFICANT CHANGE UP (ref 2.5–4.5)
PLAT MORPH BLD: NORMAL — SIGNIFICANT CHANGE UP
PLATELET # BLD AUTO: 246 K/UL — SIGNIFICANT CHANGE UP (ref 150–400)
POTASSIUM SERPL-MCNC: 3.9 MMOL/L — SIGNIFICANT CHANGE UP (ref 3.5–5.3)
POTASSIUM SERPL-SCNC: 3.9 MMOL/L — SIGNIFICANT CHANGE UP (ref 3.5–5.3)
PROT CSF-MCNC: 40 MG/DL — SIGNIFICANT CHANGE UP (ref 15–45)
PROT SERPL-MCNC: 7.5 G/DL — SIGNIFICANT CHANGE UP (ref 6–8.3)
PROTHROM AB SERPL-ACNC: 12 SEC — SIGNIFICANT CHANGE UP (ref 9.8–12.7)
RBC # BLD: 3.3 M/UL — LOW (ref 4.2–5.8)
RBC # CSF: 6 /UL — HIGH (ref 0–0)
RBC # CSF: 6 /UL — HIGH (ref 0–0)
RBC # FLD: 17.1 % — HIGH (ref 10.3–14.5)
RBC BLD AUTO: SIGNIFICANT CHANGE UP
SODIUM SERPL-SCNC: 139 MMOL/L — SIGNIFICANT CHANGE UP (ref 135–145)
TUBE TYPE: SIGNIFICANT CHANGE UP
TUBE TYPE: SIGNIFICANT CHANGE UP
WBC # BLD: 15.4 K/UL — HIGH (ref 3.8–10.5)
WBC # FLD AUTO: 15.4 K/UL — HIGH (ref 3.8–10.5)

## 2018-05-17 PROCEDURE — 88189 FLOWCYTOMETRY/READ 16 & >: CPT

## 2018-05-17 PROCEDURE — 77003 FLUOROGUIDE FOR SPINE INJECT: CPT | Mod: 26

## 2018-05-17 PROCEDURE — 62272 THER SPI PNXR DRG CSF: CPT

## 2018-05-17 RX ORDER — ACETAMINOPHEN 500 MG
650 TABLET ORAL EVERY 6 HOURS
Qty: 0 | Refills: 0 | Status: DISCONTINUED | OUTPATIENT
Start: 2018-05-17 | End: 2018-05-22

## 2018-05-17 RX ORDER — ONDANSETRON 8 MG/1
1 TABLET, FILM COATED ORAL
Qty: 0 | Refills: 0 | COMMUNITY

## 2018-05-17 RX ORDER — RITUXIMAB 10 MG/ML
746 INJECTION, SOLUTION INTRAVENOUS ONCE
Qty: 0 | Refills: 0 | Status: COMPLETED | OUTPATIENT
Start: 2018-05-17 | End: 2018-05-18

## 2018-05-17 RX ORDER — SODIUM CHLORIDE 9 MG/ML
1000 INJECTION INTRAMUSCULAR; INTRAVENOUS; SUBCUTANEOUS
Qty: 0 | Refills: 0 | Status: DISCONTINUED | OUTPATIENT
Start: 2018-05-17 | End: 2018-05-22

## 2018-05-17 RX ORDER — METHOTREXATE 2.5 MG/1
12 TABLET ORAL ONCE
Qty: 0 | Refills: 0 | Status: COMPLETED | OUTPATIENT
Start: 2018-05-17 | End: 2018-05-18

## 2018-05-17 RX ORDER — ATOVAQUONE 750 MG/5ML
750 SUSPENSION ORAL
Qty: 0 | Refills: 0 | Status: DISCONTINUED | OUTPATIENT
Start: 2018-05-17 | End: 2018-05-22

## 2018-05-17 RX ORDER — HYDROCORTISONE 20 MG
100 TABLET ORAL ONCE
Qty: 0 | Refills: 0 | Status: DISCONTINUED | OUTPATIENT
Start: 2018-05-17 | End: 2018-05-22

## 2018-05-17 RX ORDER — DIPHENHYDRAMINE HCL 50 MG
50 CAPSULE ORAL ONCE
Qty: 0 | Refills: 0 | Status: DISCONTINUED | OUTPATIENT
Start: 2018-05-17 | End: 2018-05-22

## 2018-05-17 RX ORDER — ACETAMINOPHEN 500 MG
650 TABLET ORAL ONCE
Qty: 0 | Refills: 0 | Status: DISCONTINUED | OUTPATIENT
Start: 2018-05-17 | End: 2018-05-22

## 2018-05-17 RX ORDER — ACYCLOVIR SODIUM 500 MG
400 VIAL (EA) INTRAVENOUS THREE TIMES A DAY
Qty: 0 | Refills: 0 | Status: DISCONTINUED | OUTPATIENT
Start: 2018-05-17 | End: 2018-05-22

## 2018-05-17 RX ADMIN — ATOVAQUONE 750 MILLIGRAM(S): 750 SUSPENSION ORAL at 17:31

## 2018-05-17 RX ADMIN — Medication 400 MILLIGRAM(S): at 15:06

## 2018-05-17 RX ADMIN — Medication 400 MILLIGRAM(S): at 21:58

## 2018-05-17 RX ADMIN — SODIUM CHLORIDE 75 MILLILITER(S): 9 INJECTION INTRAMUSCULAR; INTRAVENOUS; SUBCUTANEOUS at 21:59

## 2018-05-17 NOTE — DISCHARGE NOTE ADULT - ADDITIONAL INSTRUCTIONS
To [facility] on Thursday 5/24/18 at 2pm for neulasta injection.  To [facility] on     at     to see Amira Crawford, Nurse Practitioner. To [facility] on Thursday 5/24/18 at 2pm for neulasta injection.  To [facility] on Tuesday 5/29 at 1:15P to see Amira Crawford, Nurse Practitioner.

## 2018-05-17 NOTE — PROGRESS NOTE ADULT - SUBJECTIVE AND OBJECTIVE BOX
Status post lumbar puncture at the L2/L3  level utilizing a 22g spinal needle.      5_cc of clear cerebral spinal fluid was obtained.    12mg of methotrexate was intrathecally injected.      No immediate complications.

## 2018-05-17 NOTE — H&P ADULT - PROBLEM SELECTOR PLAN 3
Lovenox 40mg SQ daily on hold secondary to invasive procedure.  To be started 24 hours after LP and then held prior to second LP  D/C if PLT < 50K

## 2018-05-17 NOTE — DISCHARGE NOTE ADULT - MEDICATION SUMMARY - MEDICATIONS TO TAKE
I will START or STAY ON the medications listed below when I get home from the hospital:    Reglan 10 mg oral tablet  -- orally every 6 hours, As Needed  -- Indication: For nausea     Triumeq oral tablet  -- 1 tab(s) by mouth once a day  -- Indication: For HIV (human immunodeficiency virus infection)    acyclovir 400 mg oral tablet  -- 1 tab(s) by mouth 3 times a day  -- Indication: For antiviral     Mepron 750 mg/5 mL oral suspension  -- 5 milliliter(s) by mouth 2 times a day  -- Indication: For antibiotic

## 2018-05-17 NOTE — DISCHARGE NOTE ADULT - CARE PLAN
Principal Discharge DX:	High grade B-cell lymphoma  Goal:	Maintain counts, remain free of infections  Assessment and plan of treatment:	Notify MD or report to ER for fever greater or equal to 100.4, persistent nausea, vomiting, diarrhea, bleeding.  To [facility] at designated appointment for neulasta injection.  Secondary Diagnosis:	HIV (human immunodeficiency virus infection)  Goal:	stable  Assessment and plan of treatment:	Continue HAART medications

## 2018-05-17 NOTE — H&P ADULT - PROBLEM SELECTOR PLAN 1
Admit to [facility]  Chemotherapy Cycle 5 of DA-R-EPOCH with 20% dose reduction  Rituxan 375mg/m2 on Day 1  Doxorubicin mg/m2 continuous IV infusion Day 2-5  Etoposide mg/m2 continuous IV infusion Day 2-5  Vincristine 0.4 mg/m2 continuous IV infusion Day 2-5  Prednisone 60 mg/m2 PO BID Day 2-6  Cyclophosphamide 1296 mg/m2 IV x 1 on Day 6  Will require LP with IT MTX x 2 during this admission. check coags prior, Need plt >40  Monitor CBC/Lytes and transfuse/replete PRN  Strict Is and Os/Daily weights/Mouth Care  Antiemetics  IV hydration  neulasta post chemotherapy DISCHARGE Admit to [facility]  Chemotherapy Cycle 5 of DA-R-EPOCH with 20% dose reduction (same dosing as cycle 3)  Rituxan 375mg/m2 on Day 1  Doxorubicin mg/m2 continuous IV infusion Day 2-5  Etoposide mg/m2 continuous IV infusion Day 2-5  Vincristine 0.4 mg/m2 continuous IV infusion Day 2-5  Prednisone 60 mg/m2 PO BID Day 2-6  Cyclophosphamide 1296 mg/m2 IV x 1 on Day 6  Will require LP with IT MTX x 2 during this admission. check coags prior, Need plt >40  Monitor CBC/Lytes and transfuse/replete PRN  Strict Is and Os/Daily weights/Mouth Care  Antiemetics  IV hydration  neulasta post chemotherapy Admit to [facility]  Chemotherapy Cycle 5 of DA-R-EPOCH with 20% dose reduction (same dosing as cycle 3)  Rituxan 375mg/m2 on Day 1  Doxorubicin 14.4 mg/m2 continuous IV infusion Day 2-5  Etoposide 72 mg/m2 continuous IV infusion Day 2-5  Vincristine 0.4 mg/m2 continuous IV infusion Day 2-5  Prednisone 60 mg/m2 PO BID Day 2-6  Cyclophosphamide 1080 mg/m2 IV x 1 on Day 6  Will require LP with IT MTX x 2 during this admission. check coags prior, Need plt >40  Monitor CBC/Lytes and transfuse/replete PRN  Strict Is and Os/Daily weights/Mouth Care  Antiemetics  IV hydration  neulasta post chemotherapy

## 2018-05-17 NOTE — H&P ADULT - PROBLEM SELECTOR PLAN 2
The patient is not neutropenic  If febrile Pan Cx and CXR  HIV (+) continue antiretrovirals  Continue Acyclovir and Bactrim ppx

## 2018-05-17 NOTE — H&P ADULT - ASSESSMENT
This is a 40 yo M with PMHx of HIV and recent diagnosis of high-grade B-cell, CD10+ lymphoma with CNS involvement admitted for Cycle 5 DA-R-EPOCH with 20% dose redictopm/.

## 2018-05-17 NOTE — DISCHARGE NOTE ADULT - PLAN OF CARE
Maintain counts, remain free of infections Notify MD or report to ER for fever greater or equal to 100.4, persistent nausea, vomiting, diarrhea, bleeding.  To [facility] at designated appointment for neulasta injection. stable Continue HAART medications

## 2018-05-17 NOTE — DISCHARGE NOTE ADULT - CARE PROVIDER_API CALL
Niall Cortes), Hematology; Internal Medicine; Medical Oncology  [address] [place] [address]  Phone: [phone]  Fax: [phone]

## 2018-05-17 NOTE — DISCHARGE NOTE ADULT - PATIENT PORTAL LINK FT
You can access the [facility] Patient Portal, offered by [facility], by registering with the following website: [email or web address]

## 2018-05-17 NOTE — DISCHARGE NOTE ADULT - HOSPITAL COURSE
Patient is a 41-year-old male who is HIV-positive and has been diagnosed with a high-grade B-cell, CD10+ lymphoma admitted for cycle 5 DA-R-EPOCH with dose reduction by 20% secondary to mucositis. Patient received rituxan on 5/17 and started EPOCH on 5/18. PAtient received IV hydration, strict I/O, antiemetics, monitoring of CBC and electrolytes. LP with IT MTX on 5/17 and 5/21. Patient tolerated chemotherapy without adverse effects. Patient scheduled to receive neulasta as an outpatient. Patient is a 41-year-old male who is HIV-positive and has been diagnosed with a high-grade B-cell, CD10+ lymphoma admitted for cycle 5 DA-R-EPOCH with dose reduction by 20% secondary to mucositis. Patient received rituxan on 5/17 and started EPOCH on 5/18. Patient received IV hydration, strict I/O, antiemetics, monitoring of CBC and electrolytes. LP with IT MTX on 5/17 and 5/21. Patient tolerated chemotherapy without adverse effects. Patient scheduled to receive neulasta as an outpatient.

## 2018-05-17 NOTE — PROGRESS NOTE ADULT - SUBJECTIVE AND OBJECTIVE BOX
CLINICAL INDICATION:     Patient presents for fluoroscopically guided lumbar puncture and intrathecal chemotherapy administration.      ]Risks and benefits were discussed with the patient and/or patient health care proxy.  Risks discussed included bleeding, infection, nerve damage, and headache.

## 2018-05-17 NOTE — H&P ADULT - HISTORY OF PRESENT ILLNESS
This is a 40 yo M with PMHx of HIV and recent diagnosis of high-grade B-cell, CD10+ lymphoma with CNS involvement admitted for Cycle 5 DA-R-EPOCH with 20% dose reduction secondary to severe mucositis after cycle 4.  Patient was initially seen at [facility] with fever. He was found to have left axillary lymphadenopathy. He had During the hospital stay Pt’s CBCs chemistries were monitored very closely and supplemented as needed. Vitals signs were monitored every 4 hrs and strict I/O was maintained.ot been fully compliant with his antiretroviral therapy until December of 2017 when he restarted his medication. His CD4 count is actually normal.  CT of chest showed left axillary lymphadenopathy with a node measuring up to 5 cm. Minimal bibasilar and right middle lobe atelectasis was seen with a tiny left pleural effusion. Mild cardiomegaly was noted. CT of abdomen and pelvis showed no hepatosplenomegaly or mass and no significant adenopathy.. Gastric wall thickening was seen of unclear etiology.  Left axillary lymph node biopsy showed a high-grade CD10 positive B cell lymphoma expressing CD20 and BCL 6. 65% of nuclei showed a myc rearrangement. BCL-2 and BCL 6 were not rearranged. Ki-67 was about 90%.    After Cycle 3 of chemotherapy the patient presented to the ER on 4/15/18  for fever and chills, RVP was positive for Influenza A and Entero/Rhinovirus. The patient completed a course of Tamiflu and upon admission today denies any nasal congestion, sore throat, fever, headache, dizziness, visual changes, chest pain, palpitations, SOB, abdominal pain, nausea, vomiting, diarrhea or dysuria.  Patient developed severe mucositis after cycle 4 of chemotherapy.

## 2018-05-17 NOTE — H&P ADULT - NSHPLABSRESULTS_GEN_ALL_CORE
CBC for Oncology (05.15.18 @ 15:06)    WBC Count: 18.2: Test repeated. K/uL    RBC Count: 3.14 M/uL    Hemoglobin: 9.5 g/dL    Hematocrit: 28.0 %    Mean Cell Volume: 89.2 fl    Mean Cell Hemoglobin: 30.3 pg    Mean Cell Hemoglobin Conc: 34.0 g/dL    Red Cell Distrib Width: 17.2 %    Platelet Count - Automated: 196: Results verified by smear review. K/uL

## 2018-05-17 NOTE — ADVANCED PRACTICE NURSE CONSULT - ASSESSMENT
Pt admitted for chemotherapy oriented to unit routine alert and oriented times four. Pt ambulate to bathroom with steady gait no distress noted. Mediport to right chest wall access by primary nurse, remain  with + blood return and flush easily. Chemotherapy teaching done with help of  pt verbalized understanding, also given drug card which outline side effect. Pt lab result was reviewed by Dr Crotes. Pt was pre-medicated with Tylenol 650 mg PO, and Benadryl 50 mg ivss. Drug verification done with primary nurse. At 1800 start Rituxan 375 mg/m2= 746 mg iv to run over 90 minutes. Pt was monitor during the initial half hour vital sign taken and charted no reaction noted.  Report given to primary nurse. Left pt in bed sleeping.

## 2018-05-17 NOTE — H&P ADULT - NSHPSOCIALHISTORY_GEN_ALL_CORE
Single,lives alone, quit smoking about 25 years ago.   marijuana in the past  Prior occupation was welding

## 2018-05-18 LAB
ALBUMIN SERPL ELPH-MCNC: 4 G/DL — SIGNIFICANT CHANGE UP (ref 3.3–5)
ALP SERPL-CCNC: 86 U/L — SIGNIFICANT CHANGE UP (ref 40–120)
ALT FLD-CCNC: 24 U/L — SIGNIFICANT CHANGE UP (ref 10–45)
ANION GAP SERPL CALC-SCNC: 12 MMOL/L — SIGNIFICANT CHANGE UP (ref 5–17)
AST SERPL-CCNC: 16 U/L — SIGNIFICANT CHANGE UP (ref 10–40)
BASOPHILS # BLD AUTO: 0.1 K/UL — SIGNIFICANT CHANGE UP (ref 0–0.2)
BASOPHILS NFR BLD AUTO: 0 % — SIGNIFICANT CHANGE UP (ref 0–2)
BILIRUB SERPL-MCNC: 0.1 MG/DL — LOW (ref 0.2–1.2)
BLD GP AB SCN SERPL QL: NEGATIVE — SIGNIFICANT CHANGE UP
BUN SERPL-MCNC: 11 MG/DL — SIGNIFICANT CHANGE UP (ref 7–23)
CALCIUM SERPL-MCNC: 9.4 MG/DL — SIGNIFICANT CHANGE UP (ref 8.4–10.5)
CHLORIDE SERPL-SCNC: 104 MMOL/L — SIGNIFICANT CHANGE UP (ref 96–108)
CO2 SERPL-SCNC: 25 MMOL/L — SIGNIFICANT CHANGE UP (ref 22–31)
CREAT SERPL-MCNC: 1.18 MG/DL — SIGNIFICANT CHANGE UP (ref 0.5–1.3)
EOSINOPHIL # BLD AUTO: 0.1 K/UL — SIGNIFICANT CHANGE UP (ref 0–0.5)
EOSINOPHIL NFR BLD AUTO: 0 % — SIGNIFICANT CHANGE UP (ref 0–6)
GLUCOSE SERPL-MCNC: 93 MG/DL — SIGNIFICANT CHANGE UP (ref 70–99)
HCT VFR BLD CALC: 29.6 % — LOW (ref 39–50)
HGB BLD-MCNC: 9.4 G/DL — LOW (ref 13–17)
LYMPHOCYTES # BLD AUTO: 14 % — SIGNIFICANT CHANGE UP (ref 13–44)
LYMPHOCYTES # BLD AUTO: 2.3 K/UL — SIGNIFICANT CHANGE UP (ref 1–3.3)
MAGNESIUM SERPL-MCNC: 2.1 MG/DL — SIGNIFICANT CHANGE UP (ref 1.6–2.6)
MCHC RBC-ENTMCNC: 29.2 PG — SIGNIFICANT CHANGE UP (ref 27–34)
MCHC RBC-ENTMCNC: 31.9 GM/DL — LOW (ref 32–36)
MCV RBC AUTO: 91.5 FL — SIGNIFICANT CHANGE UP (ref 80–100)
METAMYELOCYTES # FLD: 2 % — HIGH (ref 0–0)
MONOCYTES # BLD AUTO: 1 K/UL — HIGH (ref 0–0.9)
MONOCYTES NFR BLD AUTO: 9 % — SIGNIFICANT CHANGE UP (ref 2–14)
MYELOCYTES NFR BLD: 1 % — HIGH (ref 0–0)
NEUTROPHILS # BLD AUTO: 12.5 K/UL — HIGH (ref 1.8–7.4)
NEUTROPHILS NFR BLD AUTO: 71 % — SIGNIFICANT CHANGE UP (ref 43–77)
NEUTS BAND # BLD: 3 % — SIGNIFICANT CHANGE UP (ref 0–8)
PHOSPHATE SERPL-MCNC: 3.6 MG/DL — SIGNIFICANT CHANGE UP (ref 2.5–4.5)
PLAT MORPH BLD: NORMAL — SIGNIFICANT CHANGE UP
PLATELET # BLD AUTO: 244 K/UL — SIGNIFICANT CHANGE UP (ref 150–400)
POTASSIUM SERPL-MCNC: 3.5 MMOL/L — SIGNIFICANT CHANGE UP (ref 3.5–5.3)
POTASSIUM SERPL-SCNC: 3.5 MMOL/L — SIGNIFICANT CHANGE UP (ref 3.5–5.3)
PROT SERPL-MCNC: 6.7 G/DL — SIGNIFICANT CHANGE UP (ref 6–8.3)
RBC # BLD: 3.23 M/UL — LOW (ref 4.2–5.8)
RBC # FLD: 17.4 % — HIGH (ref 10.3–14.5)
RBC BLD AUTO: SIGNIFICANT CHANGE UP
RH IG SCN BLD-IMP: POSITIVE — SIGNIFICANT CHANGE UP
SODIUM SERPL-SCNC: 141 MMOL/L — SIGNIFICANT CHANGE UP (ref 135–145)
WBC # BLD: 16 K/UL — HIGH (ref 3.8–10.5)
WBC # FLD AUTO: 16 K/UL — HIGH (ref 3.8–10.5)

## 2018-05-18 PROCEDURE — 99232 SBSQ HOSP IP/OBS MODERATE 35: CPT

## 2018-05-18 RX ORDER — METOCLOPRAMIDE HCL 10 MG
10 TABLET ORAL EVERY 6 HOURS
Qty: 0 | Refills: 0 | Status: DISCONTINUED | OUTPATIENT
Start: 2018-05-18 | End: 2018-05-22

## 2018-05-18 RX ORDER — ETOPOSIDE 20 MG/ML
143 VIAL (ML) INTRAVENOUS DAILY
Qty: 0 | Refills: 0 | Status: DISCONTINUED | OUTPATIENT
Start: 2018-05-18 | End: 2018-05-22

## 2018-05-18 RX ORDER — FOSAPREPITANT DIMEGLUMINE 150 MG/5ML
150 INJECTION, POWDER, LYOPHILIZED, FOR SOLUTION INTRAVENOUS ONCE
Qty: 0 | Refills: 0 | Status: COMPLETED | OUTPATIENT
Start: 2018-05-18 | End: 2018-05-18

## 2018-05-18 RX ORDER — DOXORUBICIN HYDROCHLORIDE 2 MG/ML
29 INJECTION, SOLUTION INTRAVENOUS DAILY
Qty: 0 | Refills: 0 | Status: DISCONTINUED | OUTPATIENT
Start: 2018-05-18 | End: 2018-05-22

## 2018-05-18 RX ORDER — ENOXAPARIN SODIUM 100 MG/ML
40 INJECTION SUBCUTANEOUS
Qty: 0 | Refills: 0 | Status: COMPLETED | OUTPATIENT
Start: 2018-05-18 | End: 2018-05-19

## 2018-05-18 RX ORDER — ONDANSETRON 8 MG/1
8 TABLET, FILM COATED ORAL EVERY 8 HOURS
Qty: 0 | Refills: 0 | Status: DISCONTINUED | OUTPATIENT
Start: 2018-05-18 | End: 2018-05-22

## 2018-05-18 RX ADMIN — ATOVAQUONE 750 MILLIGRAM(S): 750 SUSPENSION ORAL at 06:25

## 2018-05-18 RX ADMIN — ATOVAQUONE 750 MILLIGRAM(S): 750 SUSPENSION ORAL at 21:45

## 2018-05-18 RX ADMIN — Medication 120 MILLIGRAM(S): at 09:28

## 2018-05-18 RX ADMIN — SODIUM CHLORIDE 75 MILLILITER(S): 9 INJECTION INTRAMUSCULAR; INTRAVENOUS; SUBCUTANEOUS at 21:47

## 2018-05-18 RX ADMIN — SODIUM CHLORIDE 75 MILLILITER(S): 9 INJECTION INTRAMUSCULAR; INTRAVENOUS; SUBCUTANEOUS at 15:16

## 2018-05-18 RX ADMIN — Medication 400 MILLIGRAM(S): at 21:46

## 2018-05-18 RX ADMIN — Medication 400 MILLIGRAM(S): at 15:16

## 2018-05-18 RX ADMIN — Medication 120 MILLIGRAM(S): at 18:18

## 2018-05-18 RX ADMIN — Medication 400 MILLIGRAM(S): at 06:25

## 2018-05-18 RX ADMIN — SODIUM CHLORIDE 75 MILLILITER(S): 9 INJECTION INTRAMUSCULAR; INTRAVENOUS; SUBCUTANEOUS at 06:25

## 2018-05-18 RX ADMIN — ENOXAPARIN SODIUM 40 MILLIGRAM(S): 100 INJECTION SUBCUTANEOUS at 18:17

## 2018-05-18 RX ADMIN — FOSAPREPITANT DIMEGLUMINE 300 MILLIGRAM(S): 150 INJECTION, POWDER, LYOPHILIZED, FOR SOLUTION INTRAVENOUS at 11:45

## 2018-05-18 RX ADMIN — ONDANSETRON 8 MILLIGRAM(S): 8 TABLET, FILM COATED ORAL at 21:46

## 2018-05-18 RX ADMIN — ONDANSETRON 8 MILLIGRAM(S): 8 TABLET, FILM COATED ORAL at 15:15

## 2018-05-18 NOTE — PROGRESS NOTE ADULT - ATTENDING COMMENTS
42 yo M with PMHx of HIV, with newly dx high-grade B-cell, CD10+ lymphoma with CNS involvement admitted for Cycle 5 DA-R-EPOCH with 20% dose reduction due to oral mucositis.    - d2 chemo, tolerating well  - cont oral care, acyclovir px  - lovenox px until plt ct <50K, hold 24hrs prior to LP  - transfusion support  - monitor I/Os and daily wts  - OOB as tolerated  - cont ARVs and start neutropenic px with levaquin/posaconazole for ANC below 500 (if afebrile)  - OPD f/u with Dr Cortes on dc

## 2018-05-18 NOTE — ADVANCED PRACTICE NURSE CONSULT - ASSESSMENT
Pt admitted for chemotherapy oriented to unit routine alert and oriented times four. Pt ambulate to bathroom with steady gait no distress noted. Mediport to right chest wall access by primary nurse, remain  with + blood return and flush easily. Chemotherapy teaching done with help of  pt verbalized understanding, also given drug card which outline side effect. Pt lab result was reviewed by Dr Marie during rounds. Pt was pre-medicated with Emend 150 mg ivss . Pt s/p MUGA scan result on chart. Pt was started on Doxorubicin 14.4  mg/m2= 29 mg, Vincristine 0.4 mg/m2= 0.8 mg and Etoposide 72 mg/m2= 143 mg iv all to run over 24 hours in separate 500 cc normal saline at 1200 noon.  Report given to primary nurse. Left pt in bed with visitors at bedside. Report given to primary nurse.

## 2018-05-18 NOTE — PROGRESS NOTE ADULT - ASSESSMENT
Chief complaint:   Chief Complaint   Patient presents with   • Follow-up     Follow up for ADHD-needs refills--   • UTI     questions why she gets recurrent UTIs.   • Pain     left knee & shoulder pain--things to help it   • Referral     to new PCP       Vitals:  Visit Vitals  /80   Pulse 72   Ht 5' 3\" (1.6 m)   Wt 54.7 kg Comment: 120.6lb   LMP 09/08/2017 (Approximate)   BMI 21.36 kg/m²       HISTORY OF PRESENT ILLNESS     Here for a few issues.  Follow-up Follow up for ADHD-needs refills. She is doing well on her current meds.  Good focus, mood is good, no massive side effects.  She has not even needed any adjustments with her menses.      UTI questions why she gets recurrent UTIs.  She is prone to urinating frequently.  Had a UTI last time and the antibiotics did help.  No pain or burning.  She is not good at drinking water.  She is using soap on her genitalia and is wiping back-to-front so I suggested changing those.    Pain left knee & shoulder pain--things to help it.  Has been having these issues for several months.  She has been seeing a chiropractor. She feels like her IT band in tight     Referral to new PCP          Pain   Pertinent negatives include no chest pain, coughing or headaches.       Other significant problems:  Patient Active Problem List    Diagnosis Date Noted   • Serotonin syndrome 08/07/2015     Priority: Low   • Attention deficit hyperactivity disorder (ADHD), combined type 03/27/2014     Priority: Low   • Anxiety and depression 03/27/2014     Priority: Low       PAST MEDICAL, FAMILY AND SOCIAL HISTORY     Medications:  Current Outpatient Prescriptions   Medication   • Lisdexamfetamine Dimesylate 10 MG Cap   • sertraline (ZOLOFT) 50 MG tablet   • lamotrigine (LAMICTAL) 200 MG tablet   • ALPRAZolam (XANAX) 0.5 MG tablet   • Famotidine (PEPCID PO)   • Probiotic Product (PROBIOTIC PO)   • Biotin 37406 MCG TABLET DISPERSIBLE     No current facility-administered medications for this  visit.        Allergies:  ALLERGIES:  No Known Allergies    Past Medical  History/Surgeries:  Past Medical History:   Diagnosis Date   • Anxiety    • Depressive disorder    • Pain in joint, forearm 06/03/2008    Left wrist       Past Surgical History:   Procedure Laterality Date   • APPENDECTOMY     • TONSILLECTOMY AND ADENOIDECTOMY         Family History:  Family History   Problem Relation Age of Onset   • High blood pressure Mother    • Heart disease Maternal Grandfather    • Dementia Paternal Grandmother    • Dementia Paternal Grandfather    • Cancer Paternal Grandfather      renal       Social History:  Social History   Substance Use Topics   • Smoking status: Former Smoker     Types: Cigarettes     Quit date: 5/15/2016   • Smokeless tobacco: Never Used      Comment: Works for [employer], doing patient follow up   • Alcohol use 0.0 oz/week      Comment: 1/2 beer or 1/2 glass wine a day       REVIEW OF SYSTEMS     Review of Systems   Respiratory: Negative for cough, chest tightness and shortness of breath.    Cardiovascular: Negative for chest pain and palpitations.   Neurological: Negative for dizziness, light-headedness and headaches.   All other systems reviewed and are negative.      PHYSICAL EXAM     Physical Exam   Constitutional: She is oriented to person, place, and time. She appears well-developed and well-nourished.   HENT:   Head: Normocephalic and atraumatic.   Eyes: Conjunctivae and EOM are normal. Pupils are equal, round, and reactive to light.   Neck: Normal range of motion. Neck supple. No thyromegaly present.   Cardiovascular: Normal rate, regular rhythm and normal heart sounds.  Exam reveals no gallop and no friction rub.    No murmur heard.  Pulmonary/Chest: Effort normal and breath sounds normal. No respiratory distress. She has no wheezes. She has no rales.   Musculoskeletal: She exhibits tenderness.   Left shoulder vaguely uncomfortable over the superior and outer shoulder   Lymphadenopathy:      She has no cervical adenopathy.   Neurological: She is alert and oriented to person, place, and time.   Skin: Skin is warm and dry.   Psychiatric: She has a normal mood and affect. Her behavior is normal. Judgment and thought content normal.       ASSESSMENT/PLAN     Clarita was seen today for follow-up, uti, pain and referral.    Diagnoses and all orders for this visit:    Chronic left shoulder pain  -     diclofenac (VOLTAREN) 1 % gel; Apply 4 g topically 4 times daily as needed (pain). Apply 4 grams to the left hip/knee and 2 grams to the left shoulder    Anxiety and depression  -     lamotrigine (LAMICTAL) 200 MG tablet; Take 1.5 tablets by mouth daily. Disregard the prior doses of 200 and 25's  -     sertraline (ZOLOFT) 50 MG tablet; Take 1 tablet by mouth daily. Do not fill until patient calls  -     SERVICE TO BEHAVIORAL HEALTH    Attention deficit hyperactivity disorder (ADHD), combined type  -     Lisdexamfetamine Dimesylate 10 MG Cap; Take 6 in am and 1 in early afternoon daily  -     SERVICE TO BEHAVIORAL HEALTH    IT band syndrome, left  -     diclofenac (VOLTAREN) 1 % gel; Apply 4 g topically 4 times daily as needed (pain). Apply 4 grams to the left hip/knee and 2 grams to the left shoulder    Urine frequency  -     URINALYSIS WITH MICRO & CULTURE IF INDICATED; Future    Other orders  -     Cancel: ALPRAZolam (XANAX) 0.5 MG tablet; Take 1/2 to 1 tablet daily as needed      Will have her use voltaren gel on the shoulder and knees, continue same other medications, refer to behavioral helath for the mood follow up, and do stretches for the hip.  UA shows no infection   This is a 42 yo M with PMHx of HIV and recent diagnosis of high-grade B-cell, CD10+ lymphoma with CNS involvement admitted for Cycle 5 DA-R-EPOCH with 20% dose redictopm/. This is a 40 yo M with PMHx of HIV and recent diagnosis of high-grade B-cell, CD10+ lymphoma with CNS involvement admitted for Cycle 5 DA-R-EPOCH with 20% dose reduction due to oral mucositis.

## 2018-05-18 NOTE — PROGRESS NOTE ADULT - PROBLEM SELECTOR PLAN 1
Chemotherapy Cycle 5 of DA-R-EPOCH with 20% dose reduction (same dosing as cycle 3)  Rituxan 375mg/m2 on Day 1  Doxorubicin mg/m2 continuous IV infusion Day 2-5  Etoposide mg/m2 continuous IV infusion Day 2-5  Vincristine 0.4 mg/m2 continuous IV infusion Day 2-5  Prednisone 60 mg/m2 PO BID Day 2-6  Cyclophosphamide 1296 mg/m2 IV x 1 on Day 6  Will require LP with IT MTX x 2 during this admission. check coags prior, Need plt >40  Monitor CBC/Lytes and transfuse/replete PRN  Strict Is and Os/Daily weights/Mouth Care  Antiemetics  IV hydration  neulasta post chemotherapy

## 2018-05-18 NOTE — PROGRESS NOTE ADULT - SUBJECTIVE AND OBJECTIVE BOX
Diagnosis: HIV associated DLBCL    Protocol/Chemo Regimen: cycle 5 DA-EPOCH (20% dose reduction due to oral mucositis)  Day: 2     Pt endorsed:    Review of Systems:    Pain scale:   0                                       Diet: regular    Allergies: No Known Allergies    ANTIMICROBIALS  abacavir 600 mG/dolutegravir 50 mG/lamiVUDine 300 mG 1 Tablet(s) Oral daily  acyclovir   Tablet 400 milliGRAM(s) Oral three times a day  atovaquone Suspension 750 milliGRAM(s) Oral two times a day      HEME/ONC MEDICATIONS  methotrexate PF IntraThecal 12 milliGRAM(s) IntraThecal once      STANDING MEDICATIONS  acetaminophen   Tablet. 650 milliGRAM(s) Oral once  diphenhydrAMINE   Injectable 50 milliGRAM(s) IV Push once  sodium chloride 0.9%. 1000 milliLiter(s) IV Continuous <Continuous>      PRN MEDICATIONS  acetaminophen   Tablet 650 milliGRAM(s) Oral every 6 hours PRN  acetaminophen   Tablet. 650 milliGRAM(s) Oral every 6 hours PRN  hydrocortisone sodium succinate Injectable 100 milliGRAM(s) IV Push once PRN      Vital Signs Last 24 Hrs  T(C): 36.4 (18 May 2018 05:22), Max: 37.1 (17 May 2018 14:23)  T(F): 97.5 (18 May 2018 05:22), Max: 98.7 (17 May 2018 14:23)  HR: 73 (18 May 2018 05:22) (73 - 103)  BP: 127/69 (18 May 2018 05:22) (123/72 - 159/88)  RR: 18 (18 May 2018 05:22) (16 - 18)  SpO2: 98% (18 May 2018 05:22) (98% - 99%)    PHYSICAL EXAM  General: adult in NAD  HEENT: clear oropharynx, anicteric sclera, pink conjunctiva  Neck: supple  CV: normal S1/S2 RRR  Lungs: positive air movement b/l ant lungs,clear to auscultation, no wheezes, no rales  Abdomen: soft non-tender non-distended, no hepatosplenomegaly  Ext: no clubbing cyanosis or edema  Skin: no rashes and no petechiae  Neuro: alert and oriented X 4, no focal deficits  Central Line: normal    LABS:                RADIOLOGY & ADDITIONAL STUDIES:    from: Xray Spinal Tap, Therapeutic (05.17.18 @ 16:38)     FINDINGS:   Lumbar puncture with aspiration of CSF and intrathecal injection of 12 mg   of methotrexate. Diagnosis: HIV associated DLBCL    Protocol/Chemo Regimen: cycle 5 DA-EPOCH (20% dose reduction due to oral mucositis)  Day: 2     Pt endorsed: no complaints    Review of Systems: denies nausea, vomiting, diarrhea, chest pain, SOB.    Pain scale:   0                                       Diet: regular    Allergies: No Known Allergies    ANTIMICROBIALS  abacavir 600 mG/dolutegravir 50 mG/lamiVUDine 300 mG 1 Tablet(s) Oral daily  acyclovir   Tablet 400 milliGRAM(s) Oral three times a day  atovaquone Suspension 750 milliGRAM(s) Oral two times a day      STANDING MEDICATIONS  acetaminophen   Tablet. 650 milliGRAM(s) Oral once  diphenhydrAMINE   Injectable 50 milliGRAM(s) IV Push once  sodium chloride 0.9%. 1000 milliLiter(s) IV Continuous <Continuous>      PRN MEDICATIONS  acetaminophen   Tablet 650 milliGRAM(s) Oral every 6 hours PRN  acetaminophen   Tablet. 650 milliGRAM(s) Oral every 6 hours PRN  hydrocortisone sodium succinate Injectable 100 milliGRAM(s) IV Push once PRN      Vital Signs Last 24 Hrs  T(C): 36.4 (18 May 2018 05:22), Max: 37.1 (17 May 2018 14:23)  T(F): 97.5 (18 May 2018 05:22), Max: 98.7 (17 May 2018 14:23)  HR: 73 (18 May 2018 05:22) (73 - 103)  BP: 127/69 (18 May 2018 05:22) (123/72 - 159/88)  RR: 18 (18 May 2018 05:22) (16 - 18)  SpO2: 98% (18 May 2018 05:22) (98% - 99%)    PHYSICAL EXAM  General: adult in NAD  HEENT: clear oropharynx, anicteric sclera, pink conjunctiva  Neck: supple  CV: normal S1/S2 RRR  Lungs: positive air movement b/l ant lungs,clear to auscultation, no wheezes, no rales  Abdomen: soft non-tender non-distended, no hepatosplenomegaly  Ext: no clubbing cyanosis or edema  Skin: no rashes and no petechiae  Neuro: alert and oriented X 4, no focal deficits  Central Line: MEdiport    LABS:                        9.4    16.0  )-----------( 244      ( 18 May 2018 07:04 )             29.6     18 May 2018 07:04    141    |  104    |  11     ----------------------------<  93     3.5     |  25     |  1.18     Ca    9.4        18 May 2018 07:04  Phos  3.6       18 May 2018 07:04  Mg     2.1       18 May 2018 07:04    TPro  6.7    /  Alb  4.0    /  TBili  0.1    /  DBili  x      /  AST  16     /  ALT  24     /  AlkPhos  86     18 May 2018 07:04    PT/INR - ( 17 May 2018 13:23 )   PT: 12.0 sec;   INR: 1.11 ratio    PTT - ( 17 May 2018 13:23 )  PTT:32.0 sec    LIVER FUNCTIONS - ( 18 May 2018 07:04 )  Alb: 4.0 g/dL / Pro: 6.7 g/dL / ALK PHOS: 86 U/L / ALT: 24 U/L / AST: 16 U/L / GGT: x             RADIOLOGY & ADDITIONAL STUDIES:    from: Xray [place], Therapeutic (05.17.18 @ 16:38)     FINDINGS:   Lumbar puncture with aspiration of CSF and intrathecal injection of 12 mg   of methotrexate.

## 2018-05-19 LAB
ALBUMIN SERPL ELPH-MCNC: 4.1 G/DL — SIGNIFICANT CHANGE UP (ref 3.3–5)
ALP SERPL-CCNC: 85 U/L — SIGNIFICANT CHANGE UP (ref 40–120)
ALT FLD-CCNC: 26 U/L — SIGNIFICANT CHANGE UP (ref 10–45)
ANION GAP SERPL CALC-SCNC: 12 MMOL/L — SIGNIFICANT CHANGE UP (ref 5–17)
AST SERPL-CCNC: 16 U/L — SIGNIFICANT CHANGE UP (ref 10–40)
BASOPHILS # BLD AUTO: 0 K/UL — SIGNIFICANT CHANGE UP (ref 0–0.2)
BILIRUB SERPL-MCNC: 0.2 MG/DL — SIGNIFICANT CHANGE UP (ref 0.2–1.2)
BUN SERPL-MCNC: 11 MG/DL — SIGNIFICANT CHANGE UP (ref 7–23)
CALCIUM SERPL-MCNC: 9.2 MG/DL — SIGNIFICANT CHANGE UP (ref 8.4–10.5)
CHLORIDE SERPL-SCNC: 102 MMOL/L — SIGNIFICANT CHANGE UP (ref 96–108)
CO2 SERPL-SCNC: 23 MMOL/L — SIGNIFICANT CHANGE UP (ref 22–31)
CREAT SERPL-MCNC: 1.04 MG/DL — SIGNIFICANT CHANGE UP (ref 0.5–1.3)
EOSINOPHIL # BLD AUTO: 0.1 K/UL — SIGNIFICANT CHANGE UP (ref 0–0.5)
GLUCOSE SERPL-MCNC: 135 MG/DL — HIGH (ref 70–99)
HCT VFR BLD CALC: 31 % — LOW (ref 39–50)
HGB BLD-MCNC: 10 G/DL — LOW (ref 13–17)
LYMPHOCYTES # BLD AUTO: 1.6 K/UL — SIGNIFICANT CHANGE UP (ref 1–3.3)
LYMPHOCYTES # BLD AUTO: 7 % — LOW (ref 13–44)
MAGNESIUM SERPL-MCNC: 1.9 MG/DL — SIGNIFICANT CHANGE UP (ref 1.6–2.6)
MCHC RBC-ENTMCNC: 29.6 PG — SIGNIFICANT CHANGE UP (ref 27–34)
MCHC RBC-ENTMCNC: 32.3 GM/DL — SIGNIFICANT CHANGE UP (ref 32–36)
MCV RBC AUTO: 91.5 FL — SIGNIFICANT CHANGE UP (ref 80–100)
MONOCYTES # BLD AUTO: 0.3 K/UL — SIGNIFICANT CHANGE UP (ref 0–0.9)
MONOCYTES NFR BLD AUTO: 2 % — SIGNIFICANT CHANGE UP (ref 2–14)
MYELOCYTES NFR BLD: 1 % — HIGH (ref 0–0)
NEUTROPHILS # BLD AUTO: 22.3 K/UL — HIGH (ref 1.8–7.4)
NEUTROPHILS NFR BLD AUTO: 80 % — HIGH (ref 43–77)
NEUTS BAND # BLD: 10 % — HIGH (ref 0–8)
PHOSPHATE SERPL-MCNC: 3.9 MG/DL — SIGNIFICANT CHANGE UP (ref 2.5–4.5)
PLAT MORPH BLD: NORMAL — SIGNIFICANT CHANGE UP
PLATELET # BLD AUTO: 295 K/UL — SIGNIFICANT CHANGE UP (ref 150–400)
POTASSIUM SERPL-MCNC: 3.7 MMOL/L — SIGNIFICANT CHANGE UP (ref 3.5–5.3)
POTASSIUM SERPL-SCNC: 3.7 MMOL/L — SIGNIFICANT CHANGE UP (ref 3.5–5.3)
PROT SERPL-MCNC: 7.3 G/DL — SIGNIFICANT CHANGE UP (ref 6–8.3)
RBC # BLD: 3.39 M/UL — LOW (ref 4.2–5.8)
RBC # FLD: 17.8 % — HIGH (ref 10.3–14.5)
RBC BLD AUTO: SIGNIFICANT CHANGE UP
SODIUM SERPL-SCNC: 137 MMOL/L — SIGNIFICANT CHANGE UP (ref 135–145)
WBC # BLD: 24.3 K/UL — HIGH (ref 3.8–10.5)
WBC # FLD AUTO: 24.3 K/UL — HIGH (ref 3.8–10.5)

## 2018-05-19 PROCEDURE — 99232 SBSQ HOSP IP/OBS MODERATE 35: CPT

## 2018-05-19 RX ADMIN — ONDANSETRON 8 MILLIGRAM(S): 8 TABLET, FILM COATED ORAL at 21:46

## 2018-05-19 RX ADMIN — SODIUM CHLORIDE 75 MILLILITER(S): 9 INJECTION INTRAMUSCULAR; INTRAVENOUS; SUBCUTANEOUS at 13:58

## 2018-05-19 RX ADMIN — Medication 400 MILLIGRAM(S): at 21:47

## 2018-05-19 RX ADMIN — ATOVAQUONE 750 MILLIGRAM(S): 750 SUSPENSION ORAL at 17:28

## 2018-05-19 RX ADMIN — ATOVAQUONE 750 MILLIGRAM(S): 750 SUSPENSION ORAL at 05:50

## 2018-05-19 RX ADMIN — Medication 400 MILLIGRAM(S): at 05:50

## 2018-05-19 RX ADMIN — Medication 120 MILLIGRAM(S): at 17:27

## 2018-05-19 RX ADMIN — Medication 400 MILLIGRAM(S): at 13:57

## 2018-05-19 RX ADMIN — ONDANSETRON 8 MILLIGRAM(S): 8 TABLET, FILM COATED ORAL at 05:50

## 2018-05-19 RX ADMIN — Medication 120 MILLIGRAM(S): at 08:21

## 2018-05-19 RX ADMIN — ONDANSETRON 8 MILLIGRAM(S): 8 TABLET, FILM COATED ORAL at 13:58

## 2018-05-19 RX ADMIN — SODIUM CHLORIDE 75 MILLILITER(S): 9 INJECTION INTRAMUSCULAR; INTRAVENOUS; SUBCUTANEOUS at 21:47

## 2018-05-19 RX ADMIN — SODIUM CHLORIDE 75 MILLILITER(S): 9 INJECTION INTRAMUSCULAR; INTRAVENOUS; SUBCUTANEOUS at 05:50

## 2018-05-19 RX ADMIN — ENOXAPARIN SODIUM 40 MILLIGRAM(S): 100 INJECTION SUBCUTANEOUS at 17:27

## 2018-05-19 NOTE — PROGRESS NOTE ADULT - ATTENDING COMMENTS
42 yo M with PMHx of HIV, with newly dx high-grade B-cell, CD10+ lymphoma with CNS involvement admitted for Cycle 5 DA-R-EPOCH with 20% dose reduction due to oral mucositis.    - d2 chemo, tolerating well  - cont oral care, acyclovir px  - lovenox px until plt ct <50K, hold 24hrs prior to LP  - transfusion support  - monitor I/Os and daily wts  - OOB as tolerated  - cont ARVs and start neutropenic px with levaquin/posaconazole for ANC below 500 (if afebrile)  - OPD f/u with Dr Cortes on dc 40 yo M with PMHx of HIV, with newly dx high-grade B-cell, CD10+ lymphoma with CNS involvement admitted for Cycle 5 DA-R-EPOCH with 20% dose reduction due to oral mucositis.    - d3 chemo, tolerating well  - cont oral care, acyclovir px  - lovenox px until plt ct <50K, hold 24hrs prior to LP  - transfusion support  - monitor I/Os and daily wts  - OOB as tolerated  - cont ARVs and start neutropenic px with levaquin/posaconazole for ANC below 500 (if afebrile)  - OPD f/u with Dr Cortes on dc

## 2018-05-19 NOTE — PROGRESS NOTE ADULT - SUBJECTIVE AND OBJECTIVE BOX
Diagnosis: HIV associated DLBCL    Protocol/Chemo Regimen: cycle 5 DA-EPOCH (20% dose reduction due to oral mucositis)  Day: 3     Pt endorsed: no complaints    Review of Systems: denies nausea, vomiting, diarrhea, chest pain, SOB.    Pain scale:   0                                       Diet: regular    Allergies: No Known Allergies      ANTIMICROBIALS  abacavir 600 mG/dolutegravir 50 mG/lamiVUDine 300 mG 1 Tablet(s) Oral daily  acyclovir   Tablet 400 milliGRAM(s) Oral three times a day  atovaquone Suspension 750 milliGRAM(s) Oral two times a day      HEME/ONC MEDICATIONS  DOXOrubicin IVPB w/vinCRIStine 29 milliGRAM(s) IV Intermittent daily  enoxaparin Injectable 40 milliGRAM(s) SubCutaneous <User Schedule>  etoposide IVPB 143 milliGRAM(s) IV Intermittent daily      STANDING MEDICATIONS  acetaminophen   Tablet. 650 milliGRAM(s) Oral once  diphenhydrAMINE   Injectable 50 milliGRAM(s) IV Push once  ondansetron Injectable 8 milliGRAM(s) IV Push every 8 hours  predniSONE   Tablet 120 milliGRAM(s) Oral two times a day  sodium chloride 0.9%. 1000 milliLiter(s) IV Continuous <Continuous>      PRN MEDICATIONS  acetaminophen   Tablet 650 milliGRAM(s) Oral every 6 hours PRN  acetaminophen   Tablet. 650 milliGRAM(s) Oral every 6 hours PRN  hydrocortisone sodium succinate Injectable 100 milliGRAM(s) IV Push once PRN  metoclopramide Injectable 10 milliGRAM(s) IV Push every 6 hours PRN        Vital Signs Last 24 Hrs  T(C): 36.6 (19 May 2018 06:03), Max: 37.2 (18 May 2018 21:42)  T(F): 97.8 (19 May 2018 06:03), Max: 98.9 (18 May 2018 21:42)  HR: 88 (19 May 2018 06:03) (82 - 101)  BP: 133/64 (19 May 2018 06:03) (133/64 - 152/82)  RR: 16 (19 May 2018 06:03) (16 - 18)  SpO2: 100% (19 May 2018 06:03) (98% - 100%)    PHYSICAL EXAM  General: adult in NAD  HEENT: clear oropharynx, anicteric sclera, pink conjunctiva  Neck: supple  CV: normal S1/S2 RRR  Lungs: positive air movement b/l ant lungs,clear to auscultation, no wheezes, no rales  Abdomen: soft non-tender non-distended, no hepatosplenomegaly  Ext: no clubbing cyanosis or edema  Skin: no rashes and no petechiae  Neuro: alert and oriented X 4, no focal deficits  Central Line: [facility]    LABS:                        10.0   24.3  )-----------( 295      ( 19 May 2018 07:15 )             31.0       Mean Cell Volume : 91.5 fl  Mean Cell Hemoglobin : 29.6 pg  Mean Cell Hemoglobin Concentration : 32.3 gm/dL  Auto Neutrophil # : 22.3 K/uL  Auto Lymphocyte # : 1.6 K/uL  Auto Monocyte # : 0.3 K/uL  Auto Eosinophil # : 0.1 K/uL  Auto Basophil # : 0.0 K/uL  Auto Neutrophil % : 80.0 %  Auto Lymphocyte % : 7.0 %  Auto Monocyte % : 2.0 %  Auto Eosinophil % : x  Auto Basophil % : x      05-19    137  |  102  |  11  ----------------------------<  135<H>  3.7   |  23  |  1.04    Ca    9.2      19 May 2018 07:15  Phos  3.9     05-19  Mg     1.9     05-19    TPro  7.3  /  Alb  4.1  /  TBili  0.2  /  DBili  x   /  AST  16  /  ALT  26  /  AlkPhos  85  05-19    Mg 1.9  Phos 3.9    PT/INR - ( 17 May 2018 13:23 )   PT: 12.0 sec;   INR: 1.11 ratio    PTT - ( 17 May 2018 13:23 )  PTT:32.0 sec        RADIOLOGY & ADDITIONAL STUDIES: Diagnosis: HIV associated DLBCL    Protocol/Chemo Regimen: cycle 5 DA-EPOCH (20% dose reduction due to oral mucositis)  Day: 3     Pt endorsed: no complaints    Review of Systems: denies nausea, vomiting, diarrhea, chest pain, SOB.    Pain scale:   0                                       Diet: regular    Allergies: No Known Allergies      ANTIMICROBIALS  abacavir 600 mG/dolutegravir 50 mG/lamiVUDine 300 mG 1 Tablet(s) Oral daily  acyclovir   Tablet 400 milliGRAM(s) Oral three times a day  atovaquone Suspension 750 milliGRAM(s) Oral two times a day      HEME/ONC MEDICATIONS  DOXOrubicin IVPB w/vinCRIStine 29 milliGRAM(s) IV Intermittent daily  enoxaparin Injectable 40 milliGRAM(s) SubCutaneous <User Schedule>  etoposide IVPB 143 milliGRAM(s) IV Intermittent daily      STANDING MEDICATIONS  acetaminophen   Tablet. 650 milliGRAM(s) Oral once  diphenhydrAMINE   Injectable 50 milliGRAM(s) IV Push once  ondansetron Injectable 8 milliGRAM(s) IV Push every 8 hours  predniSONE   Tablet 120 milliGRAM(s) Oral two times a day  sodium chloride 0.9%. 1000 milliLiter(s) IV Continuous <Continuous>      PRN MEDICATIONS  acetaminophen   Tablet 650 milliGRAM(s) Oral every 6 hours PRN  acetaminophen   Tablet. 650 milliGRAM(s) Oral every 6 hours PRN  hydrocortisone sodium succinate Injectable 100 milliGRAM(s) IV Push once PRN  metoclopramide Injectable 10 milliGRAM(s) IV Push every 6 hours PRN        Vital Signs Last 24 Hrs  T(C): 36.6 (19 May 2018 06:03), Max: 37.2 (18 May 2018 21:42)  T(F): 97.8 (19 May 2018 06:03), Max: 98.9 (18 May 2018 21:42)  HR: 88 (19 May 2018 06:03) (82 - 101)  BP: 133/64 (19 May 2018 06:03) (133/64 - 152/82)  RR: 16 (19 May 2018 06:03) (16 - 18)  SpO2: 100% (19 May 2018 06:03) (98% - 100%)    PHYSICAL EXAM  General: adult in NAD  HEENT: clear oropharynx, anicteric sclera, pink conjunctiva  Neck: supple  CV: normal S1/S2 RRR  Lungs: positive air movement b/l ant lungs,clear to auscultation, no wheezes, no rales  Abdomen: soft non-tender non-distended, no hepatosplenomegaly  Ext: no clubbing cyanosis or edema  Skin: no rashes and no petechiae  Neuro: alert and oriented X 4, no focal deficits  Central Line: [facility]    LABS:                        10.0   24.3  )-----------( 295      ( 19 May 2018 07:15 )             31.0       Mean Cell Volume : 91.5 fl  Mean Cell Hemoglobin : 29.6 pg  Mean Cell Hemoglobin Concentration : 32.3 gm/dL  Auto Neutrophil # : 22.3 K/uL  Auto Lymphocyte # : 1.6 K/uL  Auto Monocyte # : 0.3 K/uL  Auto Eosinophil # : 0.1 K/uL  Auto Basophil # : 0.0 K/uL  Auto Neutrophil % : 80.0 %  Auto Lymphocyte % : 7.0 %  Auto Monocyte % : 2.0 %  Auto Eosinophil % : x  Auto Basophil % : x      05-19    137  |  102  |  11  ----------------------------<  135<H>  3.7   |  23  |  1.04    Ca    9.2      19 May 2018 07:15  Phos  3.9     05-19  Mg     1.9     05-19    TPro  7.3  /  Alb  4.1  /  TBili  0.2  /  DBili  x   /  AST  16  /  ALT  26  /  AlkPhos  85  05-19    Mg 1.9  Phos 3.9    PT/INR - ( 17 May 2018 13:23 )   PT: 12.0 sec;   INR: 1.11 ratio    PTT - ( 17 May 2018 13:23 )  PTT:32.0 sec

## 2018-05-19 NOTE — PROGRESS NOTE ADULT - ASSESSMENT
This is a 42 yo M with PMHx of HIV and recent diagnosis of high-grade B-cell, CD10+ lymphoma with CNS involvement admitted for Cycle 5 DA-R-EPOCH with 20% dose reduction due to oral mucositis.

## 2018-05-19 NOTE — ADVANCED PRACTICE NURSE CONSULT - ASSESSMENT
Lab results as follows: wbc 24.3 hgb 10 hct 31 plt ct 295 creatinine 1.04 total bili 0.2. Reinforced teachings to patient re: his chemo regimen well understood. Patient with single lumen mediport right chestwall in used patent. S/P shower today by self. On antiemetic med ATC. Denies nausea at the moment. Doxorubicin 29mg/Vincristine 0.8mg in one bag/500ml NSS started at 1300 as civ at 23ml/hr via NSS line into right chestwall single mediport infusing via Alaris IV pump. Patent. Etoposide 143mg in 500ml NSS started at 1300 via lowest port of Doxorubicin/Vincristine IV line through NSS line into right chestwall mediport. Safety maintained. Voiding well. Primary RN aware of plan of care.

## 2018-05-20 LAB
ALBUMIN SERPL ELPH-MCNC: 3.7 G/DL — SIGNIFICANT CHANGE UP (ref 3.3–5)
ALP SERPL-CCNC: 72 U/L — SIGNIFICANT CHANGE UP (ref 40–120)
ALT FLD-CCNC: 28 U/L — SIGNIFICANT CHANGE UP (ref 10–45)
ANION GAP SERPL CALC-SCNC: 13 MMOL/L — SIGNIFICANT CHANGE UP (ref 5–17)
AST SERPL-CCNC: 19 U/L — SIGNIFICANT CHANGE UP (ref 10–40)
BASOPHILS # BLD AUTO: 0 K/UL — SIGNIFICANT CHANGE UP (ref 0–0.2)
BASOPHILS NFR BLD AUTO: 0.1 % — SIGNIFICANT CHANGE UP (ref 0–2)
BILIRUB SERPL-MCNC: 0.2 MG/DL — SIGNIFICANT CHANGE UP (ref 0.2–1.2)
BUN SERPL-MCNC: 15 MG/DL — SIGNIFICANT CHANGE UP (ref 7–23)
CALCIUM SERPL-MCNC: 9 MG/DL — SIGNIFICANT CHANGE UP (ref 8.4–10.5)
CHLORIDE SERPL-SCNC: 102 MMOL/L — SIGNIFICANT CHANGE UP (ref 96–108)
CO2 SERPL-SCNC: 23 MMOL/L — SIGNIFICANT CHANGE UP (ref 22–31)
CREAT SERPL-MCNC: 1 MG/DL — SIGNIFICANT CHANGE UP (ref 0.5–1.3)
EOSINOPHIL # BLD AUTO: 0 K/UL — SIGNIFICANT CHANGE UP (ref 0–0.5)
EOSINOPHIL NFR BLD AUTO: 0.2 % — SIGNIFICANT CHANGE UP (ref 0–6)
GLUCOSE SERPL-MCNC: 108 MG/DL — HIGH (ref 70–99)
HCT VFR BLD CALC: 28.8 % — LOW (ref 39–50)
HGB BLD-MCNC: 9.5 G/DL — LOW (ref 13–17)
LYMPHOCYTES # BLD AUTO: 1.3 K/UL — SIGNIFICANT CHANGE UP (ref 1–3.3)
LYMPHOCYTES # BLD AUTO: 7.8 % — LOW (ref 13–44)
MAGNESIUM SERPL-MCNC: 1.9 MG/DL — SIGNIFICANT CHANGE UP (ref 1.6–2.6)
MCHC RBC-ENTMCNC: 30 PG — SIGNIFICANT CHANGE UP (ref 27–34)
MCHC RBC-ENTMCNC: 33 GM/DL — SIGNIFICANT CHANGE UP (ref 32–36)
MCV RBC AUTO: 91.1 FL — SIGNIFICANT CHANGE UP (ref 80–100)
MONOCYTES # BLD AUTO: 0.6 K/UL — SIGNIFICANT CHANGE UP (ref 0–0.9)
MONOCYTES NFR BLD AUTO: 3.8 % — SIGNIFICANT CHANGE UP (ref 2–14)
NEUTROPHILS # BLD AUTO: 14.9 K/UL — HIGH (ref 1.8–7.4)
NEUTROPHILS NFR BLD AUTO: 88 % — HIGH (ref 43–77)
PHOSPHATE SERPL-MCNC: 4.1 MG/DL — SIGNIFICANT CHANGE UP (ref 2.5–4.5)
PLAT MORPH BLD: ABNORMAL
PLATELET # BLD AUTO: 277 K/UL — SIGNIFICANT CHANGE UP (ref 150–400)
POTASSIUM SERPL-MCNC: 3.4 MMOL/L — LOW (ref 3.5–5.3)
POTASSIUM SERPL-SCNC: 3.4 MMOL/L — LOW (ref 3.5–5.3)
PROT SERPL-MCNC: 6.3 G/DL — SIGNIFICANT CHANGE UP (ref 6–8.3)
RBC # BLD: 3.16 M/UL — LOW (ref 4.2–5.8)
RBC # FLD: 17.3 % — HIGH (ref 10.3–14.5)
RBC BLD AUTO: SIGNIFICANT CHANGE UP
SODIUM SERPL-SCNC: 139 MMOL/L — SIGNIFICANT CHANGE UP (ref 135–145)
WBC # BLD: 16.9 K/UL — HIGH (ref 3.8–10.5)
WBC # FLD AUTO: 16.9 K/UL — HIGH (ref 3.8–10.5)

## 2018-05-20 PROCEDURE — 99232 SBSQ HOSP IP/OBS MODERATE 35: CPT

## 2018-05-20 RX ORDER — POTASSIUM CHLORIDE 20 MEQ
20 PACKET (EA) ORAL
Qty: 0 | Refills: 0 | Status: COMPLETED | OUTPATIENT
Start: 2018-05-20 | End: 2018-05-20

## 2018-05-20 RX ADMIN — Medication 20 MILLIEQUIVALENT(S): at 13:45

## 2018-05-20 RX ADMIN — Medication 20 MILLIEQUIVALENT(S): at 11:20

## 2018-05-20 RX ADMIN — ONDANSETRON 8 MILLIGRAM(S): 8 TABLET, FILM COATED ORAL at 21:01

## 2018-05-20 RX ADMIN — ONDANSETRON 8 MILLIGRAM(S): 8 TABLET, FILM COATED ORAL at 06:08

## 2018-05-20 RX ADMIN — SODIUM CHLORIDE 75 MILLILITER(S): 9 INJECTION INTRAMUSCULAR; INTRAVENOUS; SUBCUTANEOUS at 17:27

## 2018-05-20 RX ADMIN — SODIUM CHLORIDE 75 MILLILITER(S): 9 INJECTION INTRAMUSCULAR; INTRAVENOUS; SUBCUTANEOUS at 06:07

## 2018-05-20 RX ADMIN — Medication 400 MILLIGRAM(S): at 21:01

## 2018-05-20 RX ADMIN — ATOVAQUONE 750 MILLIGRAM(S): 750 SUSPENSION ORAL at 06:09

## 2018-05-20 RX ADMIN — ONDANSETRON 8 MILLIGRAM(S): 8 TABLET, FILM COATED ORAL at 13:46

## 2018-05-20 RX ADMIN — SODIUM CHLORIDE 75 MILLILITER(S): 9 INJECTION INTRAMUSCULAR; INTRAVENOUS; SUBCUTANEOUS at 20:52

## 2018-05-20 RX ADMIN — Medication 120 MILLIGRAM(S): at 17:26

## 2018-05-20 RX ADMIN — Medication 400 MILLIGRAM(S): at 06:08

## 2018-05-20 RX ADMIN — Medication 400 MILLIGRAM(S): at 13:46

## 2018-05-20 RX ADMIN — Medication 120 MILLIGRAM(S): at 08:45

## 2018-05-20 RX ADMIN — ATOVAQUONE 750 MILLIGRAM(S): 750 SUSPENSION ORAL at 17:26

## 2018-05-20 NOTE — PROGRESS NOTE ADULT - ATTENDING COMMENTS
42 yo M with PMHx of HIV, with newly dx high-grade B-cell, CD10+ lymphoma with CNS involvement admitted for Cycle 5 DA-R-EPOCH with 20% dose reduction due to oral mucositis.    - d3 chemo, tolerating well  - cont oral care, acyclovir px  - lovenox px until plt ct <50K, hold 24hrs prior to LP  - transfusion support  - monitor I/Os and daily wts  - OOB as tolerated  - cont ARVs and start neutropenic px with levaquin/posaconazole for ANC below 500 (if afebrile)  - OPD f/u with Dr Cortes on dc 42 yo M with PMHx of HIV, with newly dx high-grade B-cell, CD10+ lymphoma with CNS involvement admitted for Cycle 5 DA-R-EPOCH with 20% dose reduction due to oral mucositis.    - d4 chemo, tolerating well  - cont oral care, acyclovir px  - lovenox px until plt ct <50K, hold 24hrs prior to LP  - transfusion support  - monitor I/Os and daily wts  - OOB as tolerated  - cont ARVs and start neutropenic px with levaquin/posaconazole for ANC below 500 (if afebrile)  - OPD f/u with Dr Cortes on dc

## 2018-05-20 NOTE — PROGRESS NOTE ADULT - SUBJECTIVE AND OBJECTIVE BOX
Diagnosis:    Protocol/Chemo Regimen:  Day:     Pt endorsed:    Review of Systems:    Pain scale:                                Location:    Diet:     Allergies    No Known Allergies    Intolerances        ANTIMICROBIALS  abacavir 600 mG/dolutegravir 50 mG/lamiVUDine 300 mG 1 Tablet(s) Oral daily  acyclovir   Tablet 400 milliGRAM(s) Oral three times a day  atovaquone Suspension 750 milliGRAM(s) Oral two times a day      HEME/ONC MEDICATIONS  DOXOrubicin IVPB w/vinCRIStine 29 milliGRAM(s) IV Intermittent daily  etoposide IVPB 143 milliGRAM(s) IV Intermittent daily      STANDING MEDICATIONS  acetaminophen   Tablet. 650 milliGRAM(s) Oral once  diphenhydrAMINE   Injectable 50 milliGRAM(s) IV Push once  ondansetron Injectable 8 milliGRAM(s) IV Push every 8 hours  predniSONE   Tablet 120 milliGRAM(s) Oral two times a day  sodium chloride 0.9%. 1000 milliLiter(s) IV Continuous <Continuous>      PRN MEDICATIONS  acetaminophen   Tablet 650 milliGRAM(s) Oral every 6 hours PRN  acetaminophen   Tablet. 650 milliGRAM(s) Oral every 6 hours PRN  hydrocortisone sodium succinate Injectable 100 milliGRAM(s) IV Push once PRN  metoclopramide Injectable 10 milliGRAM(s) IV Push every 6 hours PRN        Vital Signs Last 24 Hrs  T(C): 36.5 (20 May 2018 06:54), Max: 37 (19 May 2018 17:54)  T(F): 97.7 (20 May 2018 06:54), Max: 98.6 (19 May 2018 17:54)  HR: 80 (20 May 2018 06:54) (68 - 96)  BP: 132/82 (20 May 2018 06:54) (132/82 - 157/81)  BP(mean): --  RR: 18 (20 May 2018 06:54) (18 - 18)  SpO2: 98% (20 May 2018 06:54) (95% - 100%)    PHYSICAL EXAM  General: adult in NAD  HEENT: clear oropharynx, anicteric sclera, pink conjunctiva  Neck: supple  CV: normal S1/S2 with no murmur rubs or gallops  Lungs: positive air movement b/l ant lungs,clear to auscultation, no wheezes, no rales  Abdomen: soft non-tender non-distended, no hepatosplenomegaly  Ext: no clubbing cyanosis or edema  Skin: no rashes and no petechiae  Neuro: alert and oriented X 4, no focal deficits  Central Line: normal    LABS:    Blood Cultures:                           10.0   24.3  )-----------( 295      ( 19 May 2018 07:15 )             31.0         Mean Cell Volume : 91.5 fl  Mean Cell Hemoglobin : 29.6 pg  Mean Cell Hemoglobin Concentration : 32.3 gm/dL  Auto Neutrophil # : 22.3 K/uL  Auto Lymphocyte # : 1.6 K/uL  Auto Monocyte # : 0.3 K/uL  Auto Eosinophil # : 0.1 K/uL  Auto Basophil # : 0.0 K/uL  Auto Neutrophil % : 80.0 %  Auto Lymphocyte % : 7.0 %  Auto Monocyte % : 2.0 %  Auto Eosinophil % : x  Auto Basophil % : x      05-19    137  |  102  |  11  ----------------------------<  135<H>  3.7   |  23  |  1.04    Ca    9.2      19 May 2018 07:15  Phos  3.9     05-19  Mg     1.9     05-19    TPro  7.3  /  Alb  4.1  /  TBili  0.2  /  DBili  x   /  AST  16  /  ALT  26  /  AlkPhos  85  05-19                  Culture:  RECENT CULTURES:      MICROBIOLOGY:  abacavir 600 mG/dolutegravir 50 mG/lamiVUDine 300 mG 1 Tablet(s) Oral daily  acyclovir   Tablet 400 milliGRAM(s) Oral three times a day  atovaquone Suspension 750 milliGRAM(s) Oral two times a day    DOXOrubicin IVPB w/vinCRIStine 29 milliGRAM(s) IV Intermittent daily  etoposide IVPB 143 milliGRAM(s) IV Intermittent daily    acetaminophen   Tablet. 650 milliGRAM(s) Oral once  diphenhydrAMINE   Injectable 50 milliGRAM(s) IV Push once  ondansetron Injectable 8 milliGRAM(s) IV Push every 8 hours  predniSONE   Tablet 120 milliGRAM(s) Oral two times a day  sodium chloride 0.9%. 1000 milliLiter(s) IV Continuous <Continuous>      RADIOLOGY & ADDITIONAL STUDIES: Diagnosis: HIV associated DLBCL    Protocol/Chemo Regimen: cycle 5 DA-EPOCH (20% dose reduction due to oral mucositis)  Day: 4     Pt endorsed: Feel well, no complaints offered.    Review of Systems: denies nausea, vomiting, diarrhea, chest pain, SOB.    Pain scale:   0                                       Diet: regular    Allergies: No Known Allergies        ANTIMICROBIALS  abacavir 600 mG/dolutegravir 50 mG/lamiVUDine 300 mG 1 Tablet(s) Oral daily  acyclovir   Tablet 400 milliGRAM(s) Oral three times a day  atovaquone Suspension 750 milliGRAM(s) Oral two times a day      HEME/ONC MEDICATIONS  DOXOrubicin IVPB w/vinCRIStine 29 milliGRAM(s) IV Intermittent daily  etoposide IVPB 143 milliGRAM(s) IV Intermittent daily      STANDING MEDICATIONS  acetaminophen   Tablet. 650 milliGRAM(s) Oral once  diphenhydrAMINE   Injectable 50 milliGRAM(s) IV Push once  ondansetron Injectable 8 milliGRAM(s) IV Push every 8 hours  predniSONE   Tablet 120 milliGRAM(s) Oral two times a day  sodium chloride 0.9%. 1000 milliLiter(s) IV Continuous <Continuous>      PRN MEDICATIONS  acetaminophen   Tablet 650 milliGRAM(s) Oral every 6 hours PRN  acetaminophen   Tablet. 650 milliGRAM(s) Oral every 6 hours PRN  hydrocortisone sodium succinate Injectable 100 milliGRAM(s) IV Push once PRN  metoclopramide Injectable 10 milliGRAM(s) IV Push every 6 hours PRN        Vital Signs Last 24 Hrs  T(C): 36.5 (20 May 2018 06:54), Max: 37 (19 May 2018 17:54)  T(F): 97.7 (20 May 2018 06:54), Max: 98.6 (19 May 2018 17:54)  HR: 80 (20 May 2018 06:54) (68 - 96)  BP: 132/82 (20 May 2018 06:54) (132/82 - 157/81)  BP(mean): --  RR: 18 (20 May 2018 06:54) (18 - 18)  SpO2: 98% (20 May 2018 06:54) (95% - 100%)    PHYSICAL EXAM  General: adult in NAD  HEENT: clear oropharynx  CV: normal S1/S2 RRR  Lungs: positive air movement b/l ant lungs,clear to auscultation, no wheezes, no rales  Abdomen: soft non-tender non-distended,+BS x4QS  Ext: no clubbing cyanosis or edema  Skin: no rashes and no petechiae  Neuro: alert and oriented X 4, no focal deficits  Central Line: [facility] Mediport    LABS:                        9.5    16.9  )-----------( 277      ( 20 May 2018 07:21 )             28.8         Mean Cell Volume : 91.1 fl  Mean Cell Hemoglobin : 30.0 pg  Mean Cell Hemoglobin Concentration : 33.0 gm/dL  Auto Neutrophil # : 14.9 K/uL  Auto Lymphocyte # : 1.3 K/uL  Auto Monocyte # : 0.6 K/uL  Auto Eosinophil # : 0.0 K/uL  Auto Basophil # : 0.0 K/uL  Auto Neutrophil % : 88.0 %  Auto Lymphocyte % : 7.8 %  Auto Monocyte % : 3.8 %  Auto Eosinophil % : 0.2 %  Auto Basophil % : 0.1 %      05-20    139  |  102  |  15  ----------------------------<  108<H>  3.4<L>   |  23  |  1.00    Ca    9.0      20 May 2018 07:21  Phos  4.1     05-20  Mg     1.9     05-20    TPro  6.3  /  Alb  3.7  /  TBili  0.2  /  DBili  x   /  AST  19  /  ALT  28  /  AlkPhos  72  05-20      Mg 1.9  Phos 4.1    Culture:  RECENT CULTURES:  None    MICROBIOLOGY:  abacavir 600 mG/dolutegravir 50 mG/lamiVUDine 300 mG 1 Tablet(s) Oral daily  acyclovir   Tablet 400 milliGRAM(s) Oral three times a day  atovaquone Suspension 750 milliGRAM(s) Oral two times a day    DOXOrubicin IVPB w/vinCRIStine 29 milliGRAM(s) IV Intermittent daily  etoposide IVPB 143 milliGRAM(s) IV Intermittent daily    acetaminophen   Tablet. 650 milliGRAM(s) Oral once  diphenhydrAMINE   Injectable 50 milliGRAM(s) IV Push once  ondansetron Injectable 8 milliGRAM(s) IV Push every 8 hours  predniSONE   Tablet 120 milliGRAM(s) Oral two times a day  sodium chloride 0.9%. 1000 milliLiter(s) IV Continuous <Continuous>      RADIOLOGY & ADDITIONAL STUDIES:

## 2018-05-20 NOTE — ADVANCED PRACTICE NURSE CONSULT - ASSESSMENT
Lab results as follows: wbc 16.9 hgb 9.5 hct 28.8 Plt ct 277. Creatinine level 1.0 Patient with right chestwall single lumen mediport in used patent and intact. Reinforced teachings to patient re: his chemo regimen well understood. Up and about by self,voidingwell eating well. Denies nausea. On ATC every 8 hours antiemetic zofran 8mg IV. Doxorubicin 29mg/Vincristine 0.8mg IV in 500ml NSS started at 12n00n as civ at 23ml/hr via NSS line into right chestwall single lumen mediport through Alaris IV pump. Patent. Etoposide 143mg in 500ml NSS started at 12noon as civ at 23ml/hr x 24hours attached to the lowest port of Doxorubicin/Vincristine IV line through NSS line into right chestwall single lumen mediport through Alaris IV pump. Patent. Primary RN aware of plan of care. Safety maintained.

## 2018-05-20 NOTE — PROGRESS NOTE ADULT - ASSESSMENT
This is a 40 yo M with PMHx of HIV and recent diagnosis of high-grade B-cell, CD10+ lymphoma with CNS involvement admitted for Cycle 5 DA-R-EPOCH with 20% dose reduction due to oral mucositis.

## 2018-05-20 NOTE — PROGRESS NOTE ADULT - PROBLEM SELECTOR PLAN 1
Chemotherapy Cycle 5 of DA-R-EPOCH with 20% dose reduction (same dosing as cycle 3)  Rituxan 375mg/m2 on Day 1  Doxorubicin mg/m2 continuous IV infusion Day 2-5  Etoposide mg/m2 continuous IV infusion Day 2-5  Vincristine 0.4 mg/m2 continuous IV infusion Day 2-5  Prednisone 60 mg/m2 PO BID Day 2-6  Cyclophosphamide 1296 mg/m2 IV x 1 on Day 6  Will require LP with IT MTX x 2 during this admission. check coags prior, Need plt >40  Monitor CBC/Lytes and transfuse/replete PRN  Strict Is and Os/Daily weights/Mouth Care  Antiemetics  IV hydration  neulasta post chemotherapy Chemotherapy Cycle 5 of DA-R-EPOCH with 20% dose reduction (same dosing as cycle 3)  Rituxan 375mg/m2 on Day 1  Doxorubicin mg/m2 continuous IV infusion Day 2-5  Etoposide mg/m2 continuous IV infusion Day 2-5  Vincristine 0.4 mg/m2 continuous IV infusion Day 2-5  Prednisone 60 mg/m2 PO BID Day 2-6  Cyclophosphamide 1296 mg/m2 IV x 1 on Day 6  Will require LP with IT MTX x 2 during this admission. check coags prior, Need plt >40  LP on 5/21.  Monitor CBC/Lytes and transfuse/replete PRN  Strict Is and Os/Daily weights/Mouth Care  Antiemetics  IV hydration  neulasta post chemotherapy Chemotherapy Cycle 5 of DA-R-EPOCH with 20% dose reduction (same dosing as cycle 3)  Rituxan 375mg/m2 on Day 1  Doxorubicin 14.4  mg/m2 continuous IV infusion Day 2-5  Etoposide 72 mg/m2 continuous IV infusion Day 2-5  Vincristine 0.4 mg/m2 continuous IV infusion Day 2-5  Prednisone 60 mg/m2 PO BID Day 2-6  Cyclophosphamide 1080 mg/m2 IV x 1 on Day 6  Will require LP with IT MTX x 2 during this admission. check coags prior, Need plt >40  LP on 5/21.  Monitor CBC/Lytes and transfuse/replete PRN  Strict Is and Os/Daily weights/Mouth Care  Antiemetics  IV hydration  neulasta post chemotherapy

## 2018-05-21 LAB
ALBUMIN SERPL ELPH-MCNC: 4.1 G/DL — SIGNIFICANT CHANGE UP (ref 3.3–5)
ALP SERPL-CCNC: 66 U/L — SIGNIFICANT CHANGE UP (ref 40–120)
ALT FLD-CCNC: 31 U/L — SIGNIFICANT CHANGE UP (ref 10–45)
ANION GAP SERPL CALC-SCNC: 11 MMOL/L — SIGNIFICANT CHANGE UP (ref 5–17)
APPEARANCE CSF: CLEAR — SIGNIFICANT CHANGE UP
APPEARANCE SPUN FLD: COLORLESS — SIGNIFICANT CHANGE UP
AST SERPL-CCNC: 19 U/L — SIGNIFICANT CHANGE UP (ref 10–40)
BASOPHILS # BLD AUTO: 0 K/UL — SIGNIFICANT CHANGE UP (ref 0–0.2)
BASOPHILS NFR BLD AUTO: 0.1 % — SIGNIFICANT CHANGE UP (ref 0–2)
BILIRUB SERPL-MCNC: 0.3 MG/DL — SIGNIFICANT CHANGE UP (ref 0.2–1.2)
BUN SERPL-MCNC: 15 MG/DL — SIGNIFICANT CHANGE UP (ref 7–23)
CALCIUM SERPL-MCNC: 9 MG/DL — SIGNIFICANT CHANGE UP (ref 8.4–10.5)
CHLORIDE SERPL-SCNC: 101 MMOL/L — SIGNIFICANT CHANGE UP (ref 96–108)
CO2 SERPL-SCNC: 26 MMOL/L — SIGNIFICANT CHANGE UP (ref 22–31)
COLOR CSF: SIGNIFICANT CHANGE UP
CREAT SERPL-MCNC: 0.97 MG/DL — SIGNIFICANT CHANGE UP (ref 0.5–1.3)
EOSINOPHIL # BLD AUTO: 0 K/UL — SIGNIFICANT CHANGE UP (ref 0–0.5)
EOSINOPHIL NFR BLD AUTO: 0.1 % — SIGNIFICANT CHANGE UP (ref 0–6)
GLUCOSE CSF-MCNC: 73 MG/DL — HIGH (ref 40–70)
GLUCOSE SERPL-MCNC: 117 MG/DL — HIGH (ref 70–99)
HCT VFR BLD CALC: 29.9 % — LOW (ref 39–50)
HGB BLD-MCNC: 9.9 G/DL — LOW (ref 13–17)
INR BLD: 1.08 RATIO — SIGNIFICANT CHANGE UP (ref 0.88–1.16)
LDH CSF L TO P-CCNC: 26 U/L — SIGNIFICANT CHANGE UP
LDH FLD-CCNC: 26 U/L — SIGNIFICANT CHANGE UP
LYMPHOCYTES # BLD AUTO: 1.1 K/UL — SIGNIFICANT CHANGE UP (ref 1–3.3)
LYMPHOCYTES # BLD AUTO: 11.4 % — LOW (ref 13–44)
LYMPHOCYTES # CSF: 93 % — HIGH (ref 40–80)
MAGNESIUM SERPL-MCNC: 2.3 MG/DL — SIGNIFICANT CHANGE UP (ref 1.6–2.6)
MCHC RBC-ENTMCNC: 29.9 PG — SIGNIFICANT CHANGE UP (ref 27–34)
MCHC RBC-ENTMCNC: 33 GM/DL — SIGNIFICANT CHANGE UP (ref 32–36)
MCV RBC AUTO: 90.5 FL — SIGNIFICANT CHANGE UP (ref 80–100)
MONOCYTES # BLD AUTO: 0.1 K/UL — SIGNIFICANT CHANGE UP (ref 0–0.9)
MONOCYTES NFR BLD AUTO: 1.3 % — LOW (ref 2–14)
MONOS+MACROS NFR CSF: 5 % — LOW (ref 15–45)
NEUTROPHILS # BLD AUTO: 8.3 K/UL — HIGH (ref 1.8–7.4)
NEUTROPHILS # CSF: 2 % — SIGNIFICANT CHANGE UP (ref 0–6)
NEUTROPHILS NFR BLD AUTO: 87.1 % — HIGH (ref 43–77)
NRBC NFR CSF: 4 /UL — SIGNIFICANT CHANGE UP (ref 0–5)
PHOSPHATE SERPL-MCNC: 4 MG/DL — SIGNIFICANT CHANGE UP (ref 2.5–4.5)
PLATELET # BLD AUTO: 287 K/UL — SIGNIFICANT CHANGE UP (ref 150–400)
POTASSIUM SERPL-MCNC: 3.7 MMOL/L — SIGNIFICANT CHANGE UP (ref 3.5–5.3)
POTASSIUM SERPL-SCNC: 3.7 MMOL/L — SIGNIFICANT CHANGE UP (ref 3.5–5.3)
PROT CSF-MCNC: 32 MG/DL — SIGNIFICANT CHANGE UP (ref 15–45)
PROT SERPL-MCNC: 6.7 G/DL — SIGNIFICANT CHANGE UP (ref 6–8.3)
PROTHROM AB SERPL-ACNC: 11.8 SEC — SIGNIFICANT CHANGE UP (ref 9.8–12.7)
RBC # BLD: 3.31 M/UL — LOW (ref 4.2–5.8)
RBC # CSF: 29 /UL — HIGH (ref 0–0)
RBC # FLD: 17.2 % — HIGH (ref 10.3–14.5)
SODIUM SERPL-SCNC: 138 MMOL/L — SIGNIFICANT CHANGE UP (ref 135–145)
TUBE TYPE: SIGNIFICANT CHANGE UP
WBC # BLD: 9.5 K/UL — SIGNIFICANT CHANGE UP (ref 3.8–10.5)
WBC # FLD AUTO: 9.5 K/UL — SIGNIFICANT CHANGE UP (ref 3.8–10.5)

## 2018-05-21 PROCEDURE — 99232 SBSQ HOSP IP/OBS MODERATE 35: CPT

## 2018-05-21 PROCEDURE — 62272 THER SPI PNXR DRG CSF: CPT

## 2018-05-21 PROCEDURE — 77003 FLUOROGUIDE FOR SPINE INJECT: CPT | Mod: 26

## 2018-05-21 RX ORDER — METHOTREXATE 2.5 MG/1
12 TABLET ORAL ONCE
Qty: 0 | Refills: 0 | Status: DISCONTINUED | OUTPATIENT
Start: 2018-05-21 | End: 2018-05-22

## 2018-05-21 RX ADMIN — Medication 400 MILLIGRAM(S): at 05:07

## 2018-05-21 RX ADMIN — SODIUM CHLORIDE 75 MILLILITER(S): 9 INJECTION INTRAMUSCULAR; INTRAVENOUS; SUBCUTANEOUS at 19:32

## 2018-05-21 RX ADMIN — ONDANSETRON 8 MILLIGRAM(S): 8 TABLET, FILM COATED ORAL at 05:07

## 2018-05-21 RX ADMIN — ATOVAQUONE 750 MILLIGRAM(S): 750 SUSPENSION ORAL at 19:10

## 2018-05-21 RX ADMIN — SODIUM CHLORIDE 75 MILLILITER(S): 9 INJECTION INTRAMUSCULAR; INTRAVENOUS; SUBCUTANEOUS at 21:08

## 2018-05-21 RX ADMIN — Medication 400 MILLIGRAM(S): at 21:07

## 2018-05-21 RX ADMIN — ONDANSETRON 8 MILLIGRAM(S): 8 TABLET, FILM COATED ORAL at 21:07

## 2018-05-21 RX ADMIN — Medication 120 MILLIGRAM(S): at 19:10

## 2018-05-21 RX ADMIN — ATOVAQUONE 750 MILLIGRAM(S): 750 SUSPENSION ORAL at 05:07

## 2018-05-21 RX ADMIN — ONDANSETRON 8 MILLIGRAM(S): 8 TABLET, FILM COATED ORAL at 16:10

## 2018-05-21 RX ADMIN — Medication 120 MILLIGRAM(S): at 09:29

## 2018-05-21 NOTE — PROGRESS NOTE ADULT - ATTENDING COMMENTS
40 yo M with PMHx of HIV, with newly dx high-grade B-cell, CD10+ lymphoma with CNS involvement admitted for Cycle 5 DA-R-EPOCH with 20% dose reduction due to oral mucositis.    - d4 chemo, tolerating well  - cont oral care, acyclovir px  - lovenox px until plt ct <50K, hold 24hrs prior to LP  - transfusion support  - monitor I/Os and daily wts  - OOB as tolerated  - cont ARVs and start neutropenic px with levaquin/posaconazole for ANC below 500 (if afebrile)  - OPD f/u with Dr Cortes on dc 42 yo M with PMHx of HIV, with newly dx high-grade B-cell, CD10+ lymphoma with CNS involvement admitted for Cycle 5 DA-R-EPOCH with 20% dose reduction due to oral mucositis.    - d5 chemo, due for LP IT chemo today  - cont oral care, acyclovir px  - lovenox px until plt ct <50K, hold 24hrs prior to LP  - transfusion support  - monitor I/Os and daily wts  - OOB as tolerated  - cont ARVs and start neutropenic px with levaquin/posaconazole for ANC below 500 (if afebrile)  - OPD f/u with Dr Cortes on dc

## 2018-05-21 NOTE — PROGRESS NOTE ADULT - SUBJECTIVE AND OBJECTIVE BOX
Diagnosis: HIV associated DLBCL    Protocol/Chemo Regimen: cycle 5 DA-EPOCH (20% dose reduction due to oral mucositis)  Day: 5     Pt endorsed: Feel well, no complaints offered.    Review of Systems: denies nausea, vomiting, diarrhea, chest pain, SOB.    Pain scale:   0                                       Diet: regular    Allergies: No Known Allergies      ANTIMICROBIALS  abacavir 600 mG/dolutegravir 50 mG/lamiVUDine 300 mG 1 Tablet(s) Oral daily  acyclovir   Tablet 400 milliGRAM(s) Oral three times a day  atovaquone Suspension 750 milliGRAM(s) Oral two times a day      HEME/ONC MEDICATIONS  DOXOrubicin IVPB w/vinCRIStine 29 milliGRAM(s) IV Intermittent daily  etoposide IVPB 143 milliGRAM(s) IV Intermittent daily      STANDING MEDICATIONS  acetaminophen   Tablet. 650 milliGRAM(s) Oral once  diphenhydrAMINE   Injectable 50 milliGRAM(s) IV Push once  ondansetron Injectable 8 milliGRAM(s) IV Push every 8 hours  predniSONE   Tablet 120 milliGRAM(s) Oral two times a day  sodium chloride 0.9%. 1000 milliLiter(s) IV Continuous <Continuous>      PRN MEDICATIONS  acetaminophen   Tablet 650 milliGRAM(s) Oral every 6 hours PRN  acetaminophen   Tablet. 650 milliGRAM(s) Oral every 6 hours PRN  hydrocortisone sodium succinate Injectable 100 milliGRAM(s) IV Push once PRN  metoclopramide Injectable 10 milliGRAM(s) IV Push every 6 hours PRN        Vital Signs Last 24 Hrs  T(C): 36.7 (21 May 2018 06:31), Max: 37 (20 May 2018 17:10)  T(F): 98 (21 May 2018 06:31), Max: 98.6 (20 May 2018 17:10)  HR: 84 (21 May 2018 06:31) (77 - 93)  BP: 137/85 (21 May 2018 06:31) (132/82 - 153/88)  BP(mean): --  RR: 18 (21 May 2018 06:31) (18 - 20)  SpO2: 98% (21 May 2018 06:31) (97% - 98%)    PHYSICAL EXAM  General: adult in NAD  HEENT: clear oropharynx, anicteric sclera, pink conjunctiva  Neck: supple  CV: normal S1/S2 RRR  Lungs: positive air movement b/l ant lungs,clear to auscultation, no wheezes, no rales  Abdomen: soft non-tender non-distended, no hepatosplenomegaly  Ext: no clubbing cyanosis or edema  Skin: no rashes and no petechiae  Neuro: alert and oriented X 3, no focal deficits  Central Line: normal    LABS:    Blood Cultures:                           9.9    9.5   )-----------( 287      ( 21 May 2018 06:58 )             29.9         Mean Cell Volume : 90.5 fl  Mean Cell Hemoglobin : 29.9 pg  Mean Cell Hemoglobin Concentration : 33.0 gm/dL  Auto Neutrophil # : 8.3 K/uL  Auto Lymphocyte # : 1.1 K/uL  Auto Monocyte # : 0.1 K/uL  Auto Eosinophil # : 0.0 K/uL  Auto Basophil # : 0.0 K/uL  Auto Neutrophil % : 87.1 %  Auto Lymphocyte % : 11.4 %  Auto Monocyte % : 1.3 %  Auto Eosinophil % : 0.1 %  Auto Basophil % : 0.1 %      05-21    138  |  101  |  15  ----------------------------<  117<H>  3.7   |  26  |  0.97    Ca    9.0      21 May 2018 06:57  Phos  4.0     05-21  Mg     2.3     05-21    TPro  6.7  /  Alb  4.1  /  TBili  0.3  /  DBili  x   /  AST  19  /  ALT  31  /  AlkPhos  66  05-21      Mg 2.3  Phos 4.0      PT/INR - ( 21 May 2018 06:57 )   PT: 11.8 sec;   INR: 1.08 ratio                 RADIOLOGY & ADDITIONAL STUDIES: Diagnosis: HIV associated DLBCL    Protocol/Chemo Regimen: cycle 5 DA-EPOCH (20% dose reduction due to oral mucositis)  Day: 5     Pt endorsed: Feel well, no complaints offered.    Review of Systems: denies nausea, vomiting, diarrhea, chest pain, SOB.    Pain scale:   0                                       Diet: regular    Allergies: No Known Allergies      ANTIMICROBIALS  abacavir 600 mG/dolutegravir 50 mG/lamiVUDine 300 mG 1 Tablet(s) Oral daily  acyclovir   Tablet 400 milliGRAM(s) Oral three times a day  atovaquone Suspension 750 milliGRAM(s) Oral two times a day      HEME/ONC MEDICATIONS  DOXOrubicin IVPB w/vinCRIStine 29 milliGRAM(s) IV Intermittent daily  etoposide IVPB 143 milliGRAM(s) IV Intermittent daily      STANDING MEDICATIONS  acetaminophen   Tablet. 650 milliGRAM(s) Oral once  diphenhydrAMINE   Injectable 50 milliGRAM(s) IV Push once  ondansetron Injectable 8 milliGRAM(s) IV Push every 8 hours  predniSONE   Tablet 120 milliGRAM(s) Oral two times a day  sodium chloride 0.9%. 1000 milliLiter(s) IV Continuous <Continuous>      PRN MEDICATIONS  acetaminophen   Tablet 650 milliGRAM(s) Oral every 6 hours PRN  acetaminophen   Tablet. 650 milliGRAM(s) Oral every 6 hours PRN  hydrocortisone sodium succinate Injectable 100 milliGRAM(s) IV Push once PRN  metoclopramide Injectable 10 milliGRAM(s) IV Push every 6 hours PRN        Vital Signs Last 24 Hrs  T(C): 36.7 (21 May 2018 06:31), Max: 37 (20 May 2018 17:10)  T(F): 98 (21 May 2018 06:31), Max: 98.6 (20 May 2018 17:10)  HR: 84 (21 May 2018 06:31) (77 - 93)  BP: 137/85 (21 May 2018 06:31) (132/82 - 153/88)  BP(mean): --  RR: 18 (21 May 2018 06:31) (18 - 20)  SpO2: 98% (21 May 2018 06:31) (97% - 98%)    PHYSICAL EXAM  General: adult in NAD  HEENT: clear oropharynx, anicteric sclera, pink conjunctiva  Neck: supple  CV: normal S1/S2 RRR  Lungs:  lungs clear to auscultation bilaterally, no wheezes, no rales  Abdomen: soft non-tender non-distended, no hepatosplenomegaly  Ext: no clubbing cyanosis or edema  Skin: no rashes and no petechiae  Neuro: alert and oriented X 3, no focal deficits  Central Line: [facility]     LABS:                          9.9    9.5   )-----------( 287      ( 21 May 2018 06:58 )             29.9       Mean Cell Volume : 90.5 fl  Mean Cell Hemoglobin : 29.9 pg  Mean Cell Hemoglobin Concentration : 33.0 gm/dL  Auto Neutrophil # : 8.3 K/uL  Auto Lymphocyte # : 1.1 K/uL  Auto Monocyte # : 0.1 K/uL  Auto Eosinophil # : 0.0 K/uL  Auto Basophil # : 0.0 K/uL  Auto Neutrophil % : 87.1 %  Auto Lymphocyte % : 11.4 %  Auto Monocyte % : 1.3 %  Auto Eosinophil % : 0.1 %  Auto Basophil % : 0.1 %      05-21    138  |  101  |  15  ----------------------------<  117<H>  3.7   |  26  |  0.97    Ca    9.0      21 May 2018 06:57  Phos  4.0     05-21  Mg     2.3     05-21    TPro  6.7  /  Alb  4.1  /  TBili  0.3  /  DBili  x   /  AST  19  /  ALT  31  /  AlkPhos  66  05-21      Mg 2.3  Phos 4.0  PT/INR - ( 21 May 2018 06:57 )   PT: 11.8 sec;   INR: 1.08 ratio

## 2018-05-21 NOTE — PROGRESS NOTE ADULT - ASSESSMENT
This is a 40 yo M with PMHx of HIV and recent diagnosis of high-grade B-cell, CD10+ lymphoma with CNS involvement admitted for Cycle 5 DA-R-EPOCH with 20% dose reduction due to oral mucositis. This is a 42 yo M with PMHx of HIV and recent diagnosis of high-grade B-cell, CD10+ lymphoma with CNS involvement admitted for Cycle 5 DA-R-EPOCH with 20% dose reduction due to oral mucositis.

## 2018-05-21 NOTE — ADVANCED PRACTICE NURSE CONSULT - ASSESSMENT
Pt admitted for chemotherapy oriented to unit routine alert and oriented times four. Pt ambulate to bathroom with steady gait no distress noted. Mediport to right chest wall access by primary nurse, remain  with + blood return and flush easily. Chemotherapy teaching done with help of  pt verbalized understanding, also given drug card which outline side effect. Pt lab result was reviewed by Dr Marie during rounds. Pt was pre-medicated with zofran 8 mg ivss by primary nurse. Pt s/p MUGA scan result on chart. Pt was started on Doxorubicin 14.4  mg/m2= 29 mg, Vincristine 0.4 mg/m2= 0.8 mg and Etoposide 72 mg/m2= 143 mg iv all to run over 24 hours in separate 500 cc normal saline at 1200 noon.  Report given to primary nurse. Left pt in bed with visitors at bedside. Report given to primary nurse.

## 2018-05-21 NOTE — PROGRESS NOTE ADULT - PROBLEM SELECTOR PLAN 1
Chemotherapy Cycle 5 of DA-R-EPOCH with 20% dose reduction (same dosing as cycle 3)  Rituxan 375mg/m2 on Day 1  Doxorubicin mg/m2 continuous IV infusion Day 2-5  Etoposide mg/m2 continuous IV infusion Day 2-5  Vincristine 0.4 mg/m2 continuous IV infusion Day 2-5  Prednisone 60 mg/m2 PO BID Day 2-6  Cyclophosphamide 1296 mg/m2 IV x 1 on Day 6  Will require LP with IT MTX x 2 during this admission. check coags prior, Need plt >40  LP on 5/21.  Monitor CBC/Lytes and transfuse/replete PRN  Strict Is and Os/Daily weights/Mouth Care  Antiemetics  IV hydration  neulasta post chemotherapy Chemotherapy Cycle 5 of DA-R-EPOCH with 20% dose reduction (same dosing as cycle 3)  Rituxan 375mg/m2 on Day 1  Doxorubicin 14.4 mg/m2 continuous IV infusion Day 2-5  Etoposide 72  mg/m2 continuous IV infusion Day 2-5  Vincristine 0.4 mg/m2 continuous IV infusion Day 2-5  Prednisone 60 mg/m2 PO BID Day 2-6  Cyclophosphamide 1080 mg/m2 IV x 1 on Day 6  Will require LP with IT MTX x 2 during this admission. check coags prior, Need plt >40  LP on 5/21.  Monitor CBC/Lytes and transfuse/replete PRN  Strict Is and Os/Daily weights/Mouth Care  Antiemetics  IV hydration  neulasta post chemotherapy

## 2018-05-21 NOTE — PROGRESS NOTE ADULT - SUBJECTIVE AND OBJECTIVE BOX
Clinical Indication: Lymphoma for intrathecal chemotherapy    PREPROCEDURE:    Patient presents for diagnostic lumbar puncture.  Risks and benefits were discussed with patient and/or health care proxy.  Risks include but are not limited to headache, bleeding, infection and nerve damage.    Patient and/or health care proxy understands and consents to procedure.    POSTPROCEDURE:    Lumbar puncture was performed at the L2-3  level using a 22 gauge needle using fluoroscopic guidance. 4 cc of CSF  was collected and hand delivered to the lab. 12 mg of Methotrexate were intrathecally injected without incident.    Patient tolerated the procedure well and left the department in stable condition.

## 2018-05-22 VITALS
OXYGEN SATURATION: 96 % | RESPIRATION RATE: 18 BRPM | HEART RATE: 99 BPM | SYSTOLIC BLOOD PRESSURE: 122 MMHG | DIASTOLIC BLOOD PRESSURE: 87 MMHG | TEMPERATURE: 98 F

## 2018-05-22 LAB
ALBUMIN SERPL ELPH-MCNC: 4 G/DL — SIGNIFICANT CHANGE UP (ref 3.3–5)
ALP SERPL-CCNC: 71 U/L — SIGNIFICANT CHANGE UP (ref 40–120)
ALT FLD-CCNC: 38 U/L — SIGNIFICANT CHANGE UP (ref 10–45)
ANION GAP SERPL CALC-SCNC: 14 MMOL/L — SIGNIFICANT CHANGE UP (ref 5–17)
AST SERPL-CCNC: 13 U/L — SIGNIFICANT CHANGE UP (ref 10–40)
BASOPHILS # BLD AUTO: 0 K/UL — SIGNIFICANT CHANGE UP (ref 0–0.2)
BASOPHILS NFR BLD AUTO: 0.2 % — SIGNIFICANT CHANGE UP (ref 0–2)
BILIRUB SERPL-MCNC: 0.4 MG/DL — SIGNIFICANT CHANGE UP (ref 0.2–1.2)
BUN SERPL-MCNC: 14 MG/DL — SIGNIFICANT CHANGE UP (ref 7–23)
CALCIUM SERPL-MCNC: 9.1 MG/DL — SIGNIFICANT CHANGE UP (ref 8.4–10.5)
CHLORIDE SERPL-SCNC: 100 MMOL/L — SIGNIFICANT CHANGE UP (ref 96–108)
CO2 SERPL-SCNC: 25 MMOL/L — SIGNIFICANT CHANGE UP (ref 22–31)
CREAT SERPL-MCNC: 0.97 MG/DL — SIGNIFICANT CHANGE UP (ref 0.5–1.3)
EOSINOPHIL # BLD AUTO: 0 K/UL — SIGNIFICANT CHANGE UP (ref 0–0.5)
EOSINOPHIL NFR BLD AUTO: 0.1 % — SIGNIFICANT CHANGE UP (ref 0–6)
GLUCOSE SERPL-MCNC: 107 MG/DL — HIGH (ref 70–99)
HCT VFR BLD CALC: 28.9 % — LOW (ref 39–50)
HGB BLD-MCNC: 9.6 G/DL — LOW (ref 13–17)
LYMPHOCYTES # BLD AUTO: 0.8 K/UL — LOW (ref 1–3.3)
LYMPHOCYTES # BLD AUTO: 13.5 % — SIGNIFICANT CHANGE UP (ref 13–44)
MAGNESIUM SERPL-MCNC: 2.4 MG/DL — SIGNIFICANT CHANGE UP (ref 1.6–2.6)
MCHC RBC-ENTMCNC: 29.8 PG — SIGNIFICANT CHANGE UP (ref 27–34)
MCHC RBC-ENTMCNC: 33.2 GM/DL — SIGNIFICANT CHANGE UP (ref 32–36)
MCV RBC AUTO: 89.7 FL — SIGNIFICANT CHANGE UP (ref 80–100)
MONOCYTES # BLD AUTO: 0 K/UL — SIGNIFICANT CHANGE UP (ref 0–0.9)
MONOCYTES NFR BLD AUTO: 0.5 % — LOW (ref 2–14)
NEUTROPHILS # BLD AUTO: 5.4 K/UL — SIGNIFICANT CHANGE UP (ref 1.8–7.4)
NEUTROPHILS NFR BLD AUTO: 85.7 % — HIGH (ref 43–77)
PHOSPHATE SERPL-MCNC: 2.8 MG/DL — SIGNIFICANT CHANGE UP (ref 2.5–4.5)
PLATELET # BLD AUTO: 280 K/UL — SIGNIFICANT CHANGE UP (ref 150–400)
POTASSIUM SERPL-MCNC: 3.4 MMOL/L — LOW (ref 3.5–5.3)
POTASSIUM SERPL-SCNC: 3.4 MMOL/L — LOW (ref 3.5–5.3)
PROT SERPL-MCNC: 6.4 G/DL — SIGNIFICANT CHANGE UP (ref 6–8.3)
RBC # BLD: 3.22 M/UL — LOW (ref 4.2–5.8)
RBC # FLD: 16.5 % — HIGH (ref 10.3–14.5)
SODIUM SERPL-SCNC: 139 MMOL/L — SIGNIFICANT CHANGE UP (ref 135–145)
TM INTERPRETATION: SIGNIFICANT CHANGE UP
WBC # BLD: 6.3 K/UL — SIGNIFICANT CHANGE UP (ref 3.8–10.5)
WBC # FLD AUTO: 6.3 K/UL — SIGNIFICANT CHANGE UP (ref 3.8–10.5)

## 2018-05-22 PROCEDURE — 99238 HOSP IP/OBS DSCHRG MGMT 30/<: CPT

## 2018-05-22 RX ORDER — POTASSIUM CHLORIDE 20 MEQ
20 PACKET (EA) ORAL ONCE
Qty: 0 | Refills: 0 | Status: COMPLETED | OUTPATIENT
Start: 2018-05-22 | End: 2018-05-22

## 2018-05-22 RX ORDER — CYCLOPHOSPHAMIDE 100 MG
2149 VIAL (EA) INTRAVENOUS ONCE
Qty: 0 | Refills: 0 | Status: DISCONTINUED | OUTPATIENT
Start: 2018-05-22 | End: 2018-05-22

## 2018-05-22 RX ADMIN — ONDANSETRON 8 MILLIGRAM(S): 8 TABLET, FILM COATED ORAL at 14:48

## 2018-05-22 RX ADMIN — Medication 120 MILLIGRAM(S): at 10:19

## 2018-05-22 RX ADMIN — Medication 100 MILLIEQUIVALENT(S): at 10:19

## 2018-05-22 RX ADMIN — ONDANSETRON 8 MILLIGRAM(S): 8 TABLET, FILM COATED ORAL at 05:35

## 2018-05-22 RX ADMIN — SODIUM CHLORIDE 75 MILLILITER(S): 9 INJECTION INTRAMUSCULAR; INTRAVENOUS; SUBCUTANEOUS at 10:19

## 2018-05-22 RX ADMIN — Medication 400 MILLIGRAM(S): at 05:35

## 2018-05-22 RX ADMIN — Medication 400 MILLIGRAM(S): at 14:48

## 2018-05-22 RX ADMIN — ATOVAQUONE 750 MILLIGRAM(S): 750 SUSPENSION ORAL at 05:35

## 2018-05-22 NOTE — ADVANCED PRACTICE NURSE CONSULT - ASSESSMENT
Pt. seen in bed a/ox4,denies any discomfort at this time.Chemotherapy teachings done.Pt. verbalized understanding of cytoxan infusion.Pt. has right chest wall .Ddsg dry and intact.Site with no s/s of bleeding,redness or swelling.With positive blood return noted and flushing easily with 10 ml NS.Lab values seen by Dr. Stout on rounds.Drug verification done by 2 RN.'s.At 1145 Cyclophosphamide 1080mg/c4=4771 mg IV started and infusing well over one hour to mediport via alaris pump.Report given to primary RN.Left pt. comfortable in bed.

## 2018-05-22 NOTE — PROGRESS NOTE ADULT - ATTENDING COMMENTS
42 yo M with PMHx of HIV, with newly dx high-grade B-cell, CD10+ lymphoma with CNS involvement admitted for Cycle 5 DA-R-EPOCH with 20% dose reduction due to oral mucositis.    - d5 chemo, due for LP IT chemo today  - cont oral care, acyclovir px  - lovenox px until plt ct <50K, hold 24hrs prior to LP  - transfusion support  - monitor I/Os and daily wts  - OOB as tolerated  - cont ARVs and start neutropenic px with levaquin/posaconazole for ANC below 500 (if afebrile)  - OPD f/u with Dr Cortes on dc 42 yo M with PMHx of HIV, with newly dx high-grade B-cell, CD10+ lymphoma with CNS involvement admitted for Cycle 5 DA-R-EPOCH with 20% dose reduction due to oral mucositis. Doing well. No c/o. D/C home. Neulasts to be arranged.  - OPD f/u with Dr Cortes.

## 2018-05-22 NOTE — PROGRESS NOTE ADULT - PROBLEM SELECTOR PLAN 1
Chemotherapy Cycle 5 of DA-R-EPOCH with 20% dose reduction (same dosing as cycle 3)  Rituxan 375mg/m2 on Day 1  Doxorubicin 14.4 mg/m2 continuous IV infusion Day 2-5  Etoposide 72  mg/m2 continuous IV infusion Day 2-5  Vincristine 0.4 mg/m2 continuous IV infusion Day 2-5  Prednisone 60 mg/m2 PO BID Day 2-6  Cyclophosphamide 1080 mg/m2 IV x 1 on Day 6  Will require LP with IT MTX x 2 during this admission. check coags prior, Need plt >40  LP on 5/21.  Monitor CBC/Lytes and transfuse/replete PRN  Strict Is and Os/Daily weights/Mouth Care  Antiemetics  IV hydration  neulasta post chemotherapy Chemotherapy Cycle 5 of DA-R-EPOCH with 20% dose reduction (same dosing as cycle 3)  Rituxan 375mg/m2 on Day 1  Doxorubicin 14.4 mg/m2 continuous IV infusion Day 2-5  Etoposide 72  mg/m2 continuous IV infusion Day 2-5  Vincristine 0.4 mg/m2 continuous IV infusion Day 2-5  Prednisone 60 mg/m2 PO BID Day 2-6  Cyclophosphamide 1080 mg/m2 IV x 1 on Day 6  s/p LP with IT MTX x 2, on 5/17 and 5/21   Monitor CBC/Lytes and transfuse/replete PRN  Strict Is and Os/Daily weights/Mouth Care  Antiemetics  IV hydration  neulasta post chemotherapy Chemotherapy Cycle 5 of DA-R-EPOCH with 20% dose reduction (same dosing as cycle 3)  Rituxan 375mg/m2 on Day 1  Doxorubicin 14.4 mg/m2 continuous IV infusion Day 2-5  Etoposide 72  mg/m2 continuous IV infusion Day 2-5  Vincristine 0.4 mg/m2 continuous IV infusion Day 2-5  Prednisone 60 mg/m2 PO BID Day 2-6  Cyclophosphamide 1080 mg/m2 IV x 1 on Day 6  s/p LP with IT MTX x 2, on 5/17 and 5/21   Monitor CBC/Lytes and transfuse/replete PRN  hypokalemia, 20meq KCL IV x1   Strict Is and Os/Daily weights/Mouth Care  Antiemetics  IV hydration  neulasta post chemotherapy

## 2018-05-22 NOTE — PROGRESS NOTE ADULT - SUBJECTIVE AND OBJECTIVE BOX
Diagnosis: HIV associated DLBCL    Protocol/Chemo Regimen: cycle 5 DA-EPOCH (20% dose reduction due to oral mucositis)  Day: 6      Pt endorsed: Feel well, no complaints offered.    Review of Systems: denies nausea, vomiting, diarrhea, chest pain, SOB.    Pain scale:   0                                       Diet: regular    Allergies: No Known Allergies        ANTIMICROBIALS  abacavir 600 mG/dolutegravir 50 mG/lamiVUDine 300 mG 1 Tablet(s) Oral daily  acyclovir   Tablet 400 milliGRAM(s) Oral three times a day  atovaquone Suspension 750 milliGRAM(s) Oral two times a day      HEME/ONC MEDICATIONS  DOXOrubicin IVPB w/vinCRIStine 29 milliGRAM(s) IV Intermittent daily  etoposide IVPB 143 milliGRAM(s) IV Intermittent daily  methotrexate PF IntraThecal 12 milliGRAM(s) IntraThecal once      STANDING MEDICATIONS  acetaminophen   Tablet. 650 milliGRAM(s) Oral once  diphenhydrAMINE   Injectable 50 milliGRAM(s) IV Push once  ondansetron Injectable 8 milliGRAM(s) IV Push every 8 hours  predniSONE   Tablet 120 milliGRAM(s) Oral two times a day  sodium chloride 0.9%. 1000 milliLiter(s) IV Continuous <Continuous>      PRN MEDICATIONS  acetaminophen   Tablet 650 milliGRAM(s) Oral every 6 hours PRN  acetaminophen   Tablet. 650 milliGRAM(s) Oral every 6 hours PRN  hydrocortisone sodium succinate Injectable 100 milliGRAM(s) IV Push once PRN  metoclopramide Injectable 10 milliGRAM(s) IV Push every 6 hours PRN        Vital Signs Last 24 Hrs  T(C): 36.3 (22 May 2018 05:56), Max: 36.9 (21 May 2018 21:51)  T(F): 97.3 (22 May 2018 05:56), Max: 98.4 (21 May 2018 21:51)  HR: 80 (22 May 2018 05:56) (80 - 846)  BP: 157/83 (22 May 2018 05:56) (126/68 - 157/83)  BP(mean): --  RR: 18 (22 May 2018 05:56) (17 - 18)  SpO2: 99% (22 May 2018 05:56) (97% - 99%)    PHYSICAL EXAM  General: adult in NAD  HEENT: clear oropharynx, anicteric sclera, pink conjunctiva  Neck: supple  CV: normal S1/S2 RRR  Lungs: positive air movement b/l ant lungs,clear to auscultation, no wheezes, no rales  Abdomen: soft non-tender non-distended, no hepatosplenomegaly  Ext: no clubbing cyanosis or edema  Skin: no rashes and no petechiae  Neuro: alert and oriented X 3, no focal deficits  Central Line: RCW mediport     LABS:    Blood Cultures:                           9.6    6.3   )-----------( 280      ( 22 May 2018 06:57 )             28.9         Mean Cell Volume : 89.7 fl  Mean Cell Hemoglobin : 29.8 pg  Mean Cell Hemoglobin Concentration : 33.2 gm/dL  Auto Neutrophil # : 5.4 K/uL  Auto Lymphocyte # : 0.8 K/uL  Auto Monocyte # : 0.0 K/uL  Auto Eosinophil # : 0.0 K/uL  Auto Basophil # : 0.0 K/uL  Auto Neutrophil % : 85.7 %  Auto Lymphocyte % : 13.5 %  Auto Monocyte % : 0.5 %  Auto Eosinophil % : 0.1 %  Auto Basophil % : 0.2 %      05-21    138  |  101  |  15  ----------------------------<  117<H>  3.7   |  26  |  0.97    Ca    9.0      21 May 2018 06:57  Phos  4.0     05-21  Mg     2.3     05-21    TPro  6.7  /  Alb  4.1  /  TBili  0.3  /  DBili  x   /  AST  19  /  ALT  31  /  AlkPhos  66  05-21          PT/INR - ( 21 May 2018 06:57 )   PT: 11.8 sec;   INR: 1.08 ratio                 RADIOLOGY & ADDITIONAL STUDIES: Diagnosis: HIV associated DLBCL    Protocol/Chemo Regimen: cycle 5 DA-EPOCH (20% dose reduction due to oral mucositis)  Day: 6      Pt endorsed: Feel well, no complaints offered.    Review of Systems: denies nausea, vomiting, diarrhea, chest pain, SOB.    Pain scale:   0                                       Diet: regular    Allergies: No Known Allergies        ANTIMICROBIALS  abacavir 600 mG/dolutegravir 50 mG/lamiVUDine 300 mG 1 Tablet(s) Oral daily  acyclovir   Tablet 400 milliGRAM(s) Oral three times a day  atovaquone Suspension 750 milliGRAM(s) Oral two times a day      HEME/ONC MEDICATIONS  DOXOrubicin IVPB w/vinCRIStine 29 milliGRAM(s) IV Intermittent daily  etoposide IVPB 143 milliGRAM(s) IV Intermittent daily  methotrexate PF IntraThecal 12 milliGRAM(s) IntraThecal once      STANDING MEDICATIONS  acetaminophen   Tablet. 650 milliGRAM(s) Oral once  diphenhydrAMINE   Injectable 50 milliGRAM(s) IV Push once  ondansetron Injectable 8 milliGRAM(s) IV Push every 8 hours  predniSONE   Tablet 120 milliGRAM(s) Oral two times a day  sodium chloride 0.9%. 1000 milliLiter(s) IV Continuous <Continuous>      PRN MEDICATIONS  acetaminophen   Tablet 650 milliGRAM(s) Oral every 6 hours PRN  acetaminophen   Tablet. 650 milliGRAM(s) Oral every 6 hours PRN  hydrocortisone sodium succinate Injectable 100 milliGRAM(s) IV Push once PRN  metoclopramide Injectable 10 milliGRAM(s) IV Push every 6 hours PRN        Vital Signs Last 24 Hrs  T(C): 36.3 (22 May 2018 05:56), Max: 36.9 (21 May 2018 21:51)  T(F): 97.3 (22 May 2018 05:56), Max: 98.4 (21 May 2018 21:51)  HR: 80 (22 May 2018 05:56) (80 - 846)  BP: 157/83 (22 May 2018 05:56) (126/68 - 157/83)  BP(mean): --  RR: 18 (22 May 2018 05:56) (17 - 18)  SpO2: 99% (22 May 2018 05:56) (97% - 99%)    PHYSICAL EXAM  General: adult in NAD  HEENT: clear oropharynx, anicteric sclera, pink conjunctiva  Neck: supple  CV: normal S1/S2 RRR  Lungs:  lungs clear to auscultation bilaterally, no wheezes, no rales  Abdomen: soft non-tender non-distended, no hepatosplenomegaly  Ext: no clubbing cyanosis or edema  Skin: no rashes and no petechiae  Neuro: alert and oriented X 3, no focal deficits  Central Line: [facility]        LABS:                        9.6    6.3   )-----------( 280      ( 22 May 2018 06:57 )             28.9         Mean Cell Volume : 89.7 fl  Mean Cell Hemoglobin : 29.8 pg  Mean Cell Hemoglobin Concentration : 33.2 gm/dL  Auto Neutrophil # : 5.4 K/uL  Auto Lymphocyte # : 0.8 K/uL  Auto Monocyte # : 0.0 K/uL  Auto Eosinophil # : 0.0 K/uL  Auto Basophil # : 0.0 K/uL  Auto Neutrophil % : 85.7 %  Auto Lymphocyte % : 13.5 %  Auto Monocyte % : 0.5 %  Auto Eosinophil % : 0.1 %  Auto Basophil % : 0.2 %      05-22    139  |  100  |  14  ----------------------------<  107<H>  3.4<L>   |  25  |  0.97    Ca    9.1      22 May 2018 06:57  Phos  2.8     05-22  Mg     2.4     05-22    TPro  6.4  /  Alb  4.0  /  TBili  0.4  /  DBili  x   /  AST  13  /  ALT  38  /  AlkPhos  71  05-22      Mg 2.4  Phos 2.8      PT/INR - ( 21 May 2018 06:57 )   PT: 11.8 sec;   INR: 1.08 ratio

## 2018-05-23 PROCEDURE — 85730 THROMBOPLASTIN TIME PARTIAL: CPT

## 2018-05-23 PROCEDURE — 84100 ASSAY OF PHOSPHORUS: CPT

## 2018-05-23 PROCEDURE — 80053 COMPREHEN METABOLIC PANEL: CPT

## 2018-05-23 PROCEDURE — 84157 ASSAY OF PROTEIN OTHER: CPT

## 2018-05-23 PROCEDURE — 77003 FLUOROGUIDE FOR SPINE INJECT: CPT

## 2018-05-23 PROCEDURE — 86901 BLOOD TYPING SEROLOGIC RH(D): CPT

## 2018-05-23 PROCEDURE — 85610 PROTHROMBIN TIME: CPT

## 2018-05-23 PROCEDURE — 82945 GLUCOSE OTHER FLUID: CPT

## 2018-05-23 PROCEDURE — 83735 ASSAY OF MAGNESIUM: CPT

## 2018-05-23 PROCEDURE — 86850 RBC ANTIBODY SCREEN: CPT

## 2018-05-23 PROCEDURE — 88184 FLOWCYTOMETRY/ TC 1 MARKER: CPT

## 2018-05-23 PROCEDURE — 85027 COMPLETE CBC AUTOMATED: CPT

## 2018-05-23 PROCEDURE — 62272 THER SPI PNXR DRG CSF: CPT

## 2018-05-23 PROCEDURE — 89051 BODY FLUID CELL COUNT: CPT

## 2018-05-23 PROCEDURE — 83615 LACTATE (LD) (LDH) ENZYME: CPT

## 2018-05-23 PROCEDURE — 86900 BLOOD TYPING SEROLOGIC ABO: CPT

## 2018-05-23 PROCEDURE — 88185 FLOWCYTOMETRY/TC ADD-ON: CPT

## 2018-05-24 ENCOUNTER — APPOINTMENT (OUTPATIENT)
Dept: INFUSION THERAPY | Facility: HOSPITAL | Age: 43
End: 2018-05-24

## 2018-05-24 LAB — TM INTERPRETATION: SIGNIFICANT CHANGE UP

## 2018-05-29 ENCOUNTER — RESULT REVIEW (OUTPATIENT)
Age: 43
End: 2018-05-29

## 2018-05-29 ENCOUNTER — INPATIENT (INPATIENT)
Facility: HOSPITAL | Age: 43
LOS: 1 days | Discharge: HOME | DRG: 872 | End: 2018-05-31
Attending: HOSPITALIST | Admitting: HOSPITALIST
Payer: MEDICAID

## 2018-05-29 ENCOUNTER — APPOINTMENT (OUTPATIENT)
Dept: HEMATOLOGY ONCOLOGY | Facility: CLINIC | Age: 43
End: 2018-05-29
Payer: MEDICAID

## 2018-05-29 VITALS
TEMPERATURE: 99 F | WEIGHT: 183.84 LBS | BODY MASS INDEX: 27.17 KG/M2 | RESPIRATION RATE: 17 BRPM | DIASTOLIC BLOOD PRESSURE: 83 MMHG | HEART RATE: 118 BPM | SYSTOLIC BLOOD PRESSURE: 125 MMHG | OXYGEN SATURATION: 98 %

## 2018-05-29 VITALS
SYSTOLIC BLOOD PRESSURE: 140 MMHG | OXYGEN SATURATION: 99 % | TEMPERATURE: 99 F | DIASTOLIC BLOOD PRESSURE: 76 MMHG | HEART RATE: 125 BPM | RESPIRATION RATE: 18 BRPM

## 2018-05-29 LAB
ALBUMIN SERPL ELPH-MCNC: 4.4 G/DL — SIGNIFICANT CHANGE UP (ref 3.3–5)
ALP SERPL-CCNC: 75 U/L — SIGNIFICANT CHANGE UP (ref 40–120)
ALT FLD-CCNC: 33 U/L — SIGNIFICANT CHANGE UP (ref 10–45)
ANION GAP SERPL CALC-SCNC: 12 MMOL/L — SIGNIFICANT CHANGE UP (ref 5–17)
APPEARANCE UR: CLEAR — SIGNIFICANT CHANGE UP
AST SERPL-CCNC: 20 U/L — SIGNIFICANT CHANGE UP (ref 10–40)
BASOPHILS # BLD AUTO: 0 K/UL — SIGNIFICANT CHANGE UP (ref 0–0.2)
BASOPHILS # BLD AUTO: 0 K/UL — SIGNIFICANT CHANGE UP (ref 0–0.2)
BASOPHILS NFR BLD AUTO: 0.5 % — SIGNIFICANT CHANGE UP (ref 0–2)
BASOPHILS NFR BLD AUTO: 1 % — SIGNIFICANT CHANGE UP (ref 0–2)
BILIRUB SERPL-MCNC: 0.2 MG/DL — SIGNIFICANT CHANGE UP (ref 0.2–1.2)
BILIRUB UR-MCNC: NEGATIVE — SIGNIFICANT CHANGE UP
BUN SERPL-MCNC: <4 MG/DL — LOW (ref 7–23)
CALCIUM SERPL-MCNC: 9.6 MG/DL — SIGNIFICANT CHANGE UP (ref 8.4–10.5)
CHLORIDE SERPL-SCNC: 102 MMOL/L — SIGNIFICANT CHANGE UP (ref 96–108)
CO2 SERPL-SCNC: 25 MMOL/L — SIGNIFICANT CHANGE UP (ref 22–31)
COLOR SPEC: COLORLESS — SIGNIFICANT CHANGE UP
CREAT SERPL-MCNC: 1.16 MG/DL — SIGNIFICANT CHANGE UP (ref 0.5–1.3)
DIFF PNL FLD: NEGATIVE — SIGNIFICANT CHANGE UP
DOHLE BOD BLD QL SMEAR: PRESENT — SIGNIFICANT CHANGE UP
EOSINOPHIL # BLD AUTO: 0 K/UL — SIGNIFICANT CHANGE UP (ref 0–0.5)
EOSINOPHIL # BLD AUTO: 0 K/UL — SIGNIFICANT CHANGE UP (ref 0–0.5)
EOSINOPHIL NFR BLD AUTO: 1.3 % — SIGNIFICANT CHANGE UP (ref 0–6)
EOSINOPHIL NFR BLD AUTO: 3 % — SIGNIFICANT CHANGE UP (ref 0–6)
GAS PNL BLDV: SIGNIFICANT CHANGE UP
GLUCOSE SERPL-MCNC: 107 MG/DL — HIGH (ref 70–99)
GLUCOSE UR QL: NEGATIVE — SIGNIFICANT CHANGE UP
HCT VFR BLD CALC: 25.4 % — LOW (ref 39–50)
HCT VFR BLD CALC: 26.5 % — LOW (ref 39–50)
HGB BLD-MCNC: 9.1 G/DL — LOW (ref 13–17)
HGB BLD-MCNC: 9.1 G/DL — LOW (ref 13–17)
HMPV RNA SPEC QL NAA+PROBE: DETECTED
KETONES UR-MCNC: NEGATIVE — SIGNIFICANT CHANGE UP
LEUKOCYTE ESTERASE UR-ACNC: NEGATIVE — SIGNIFICANT CHANGE UP
LYMPHOCYTES # BLD AUTO: 0.9 K/UL — LOW (ref 1–3.3)
LYMPHOCYTES # BLD AUTO: 1.1 K/UL — SIGNIFICANT CHANGE UP (ref 1–3.3)
LYMPHOCYTES # BLD AUTO: 27 % — SIGNIFICANT CHANGE UP (ref 13–44)
LYMPHOCYTES # BLD AUTO: 35.7 % — SIGNIFICANT CHANGE UP (ref 13–44)
MCHC RBC-ENTMCNC: 30.4 PG — SIGNIFICANT CHANGE UP (ref 27–34)
MCHC RBC-ENTMCNC: 32.6 PG — SIGNIFICANT CHANGE UP (ref 27–34)
MCHC RBC-ENTMCNC: 34.5 G/DL — SIGNIFICANT CHANGE UP (ref 32–36)
MCHC RBC-ENTMCNC: 35.7 GM/DL — SIGNIFICANT CHANGE UP (ref 32–36)
MCV RBC AUTO: 88.1 FL — SIGNIFICANT CHANGE UP (ref 80–100)
MCV RBC AUTO: 91.5 FL — SIGNIFICANT CHANGE UP (ref 80–100)
METAMYELOCYTES # FLD: 1 % — HIGH (ref 0–0)
MONOCYTES # BLD AUTO: 0.3 K/UL — SIGNIFICANT CHANGE UP (ref 0–0.9)
MONOCYTES # BLD AUTO: 0.4 K/UL — SIGNIFICANT CHANGE UP (ref 0–0.9)
MONOCYTES NFR BLD AUTO: 13.1 % — SIGNIFICANT CHANGE UP (ref 2–14)
MONOCYTES NFR BLD AUTO: 14 % — SIGNIFICANT CHANGE UP (ref 2–14)
NEUTROPHILS # BLD AUTO: 1.5 K/UL — LOW (ref 1.8–7.4)
NEUTROPHILS # BLD AUTO: 1.6 K/UL — LOW (ref 1.8–7.4)
NEUTROPHILS NFR BLD AUTO: 44 % — SIGNIFICANT CHANGE UP (ref 43–77)
NEUTROPHILS NFR BLD AUTO: 49.4 % — SIGNIFICANT CHANGE UP (ref 43–77)
NEUTS BAND # BLD: 10 % — HIGH (ref 0–8)
NITRITE UR-MCNC: NEGATIVE — SIGNIFICANT CHANGE UP
NRBC # BLD: 3 /100 — HIGH (ref 0–0)
PH UR: 7 — SIGNIFICANT CHANGE UP (ref 5–8)
PLAT MORPH BLD: NORMAL — SIGNIFICANT CHANGE UP
PLATELET # BLD AUTO: 24 K/UL — LOW (ref 150–400)
PLATELET # BLD AUTO: 26 K/UL — LOW (ref 150–400)
POLYCHROMASIA BLD QL SMEAR: SLIGHT — SIGNIFICANT CHANGE UP
POTASSIUM SERPL-MCNC: 3.9 MMOL/L — SIGNIFICANT CHANGE UP (ref 3.5–5.3)
POTASSIUM SERPL-SCNC: 3.9 MMOL/L — SIGNIFICANT CHANGE UP (ref 3.5–5.3)
PROT SERPL-MCNC: 7.3 G/DL — SIGNIFICANT CHANGE UP (ref 6–8.3)
PROT UR-MCNC: NEGATIVE — SIGNIFICANT CHANGE UP
RAPID RVP RESULT: DETECTED
RBC # BLD: 2.77 M/UL — LOW (ref 4.2–5.8)
RBC # BLD: 3 M/UL — LOW (ref 4.2–5.8)
RBC # FLD: 16.6 % — HIGH (ref 10.3–14.5)
RBC # FLD: 16.8 % — HIGH (ref 10.3–14.5)
RBC BLD AUTO: SIGNIFICANT CHANGE UP
RBC CASTS # UR COMP ASSIST: SIGNIFICANT CHANGE UP /HPF (ref 0–2)
RV+EV RNA SPEC QL NAA+PROBE: DETECTED
SODIUM SERPL-SCNC: 139 MMOL/L — SIGNIFICANT CHANGE UP (ref 135–145)
SP GR SPEC: <1.005 — LOW (ref 1.01–1.02)
UROBILINOGEN FLD QL: NEGATIVE — SIGNIFICANT CHANGE UP
WBC # BLD: 2.8 K/UL — LOW (ref 3.8–10.5)
WBC # BLD: 3.2 K/UL — LOW (ref 3.8–10.5)
WBC # FLD AUTO: 2.8 K/UL — LOW (ref 3.8–10.5)
WBC # FLD AUTO: 3.2 K/UL — LOW (ref 3.8–10.5)
WBC UR QL: SIGNIFICANT CHANGE UP /HPF (ref 0–5)

## 2018-05-29 PROCEDURE — 99214 OFFICE O/P EST MOD 30 MIN: CPT

## 2018-05-29 PROCEDURE — 99285 EMERGENCY DEPT VISIT HI MDM: CPT

## 2018-05-29 PROCEDURE — 71046 X-RAY EXAM CHEST 2 VIEWS: CPT | Mod: 26

## 2018-05-29 RX ORDER — CEFEPIME 1 G/1
1000 INJECTION, POWDER, FOR SOLUTION INTRAMUSCULAR; INTRAVENOUS ONCE
Qty: 0 | Refills: 0 | Status: DISCONTINUED | OUTPATIENT
Start: 2018-05-29 | End: 2018-05-29

## 2018-05-29 RX ORDER — CEFEPIME 1 G/1
1000 INJECTION, POWDER, FOR SOLUTION INTRAMUSCULAR; INTRAVENOUS ONCE
Qty: 0 | Refills: 0 | Status: COMPLETED | OUTPATIENT
Start: 2018-05-29 | End: 2018-05-29

## 2018-05-29 RX ORDER — ACETAMINOPHEN 500 MG
975 TABLET ORAL ONCE
Qty: 0 | Refills: 0 | Status: COMPLETED | OUTPATIENT
Start: 2018-05-29 | End: 2018-05-29

## 2018-05-29 RX ORDER — SODIUM CHLORIDE 9 MG/ML
1000 INJECTION INTRAMUSCULAR; INTRAVENOUS; SUBCUTANEOUS ONCE
Qty: 0 | Refills: 0 | Status: COMPLETED | OUTPATIENT
Start: 2018-05-29 | End: 2018-05-29

## 2018-05-29 RX ADMIN — SODIUM CHLORIDE 1000 MILLILITER(S): 9 INJECTION INTRAMUSCULAR; INTRAVENOUS; SUBCUTANEOUS at 21:13

## 2018-05-29 RX ADMIN — Medication 975 MILLIGRAM(S): at 22:13

## 2018-05-29 RX ADMIN — CEFEPIME 100 MILLIGRAM(S): 1 INJECTION, POWDER, FOR SOLUTION INTRAMUSCULAR; INTRAVENOUS at 22:13

## 2018-05-29 NOTE — ED PROVIDER NOTE - ATTENDING CONTRIBUTION TO CARE
Private Physician Sebastian   43 yo M with PMHx of HIV and recent diagnosis of high-grade B-cell, CD10+ lymphoma with CNS, DC few days ago for inpatient chemo. Now comes to ed complains of fever tmax 100.4 onset this am, with mild cough, scant sputum white without blood, No dysuria, no nvdc, ha. Spoke to pmd and referred to ed. PE WDWN male nad normocephalic atraumatic chest clear anterior & posterior abd soft +bs no mass guarding rebound, cv no rubs, gallops or murmurs, Neuro no focal defects  Ramírez Freeman MD, Facep

## 2018-05-29 NOTE — ED ADULT NURSE NOTE - OBJECTIVE STATEMENT
43 YO male with PMH HIV, B-cell lymphoma, via walk in presenting with complaints of fever. Pt speaks [place] creole and is requesting family at bedside to translate. Family reports that patient experienced elevated temperature of 100.4 F at home over the last three days, no tylenol or motrin given at home, accompanied with cough and mild white sputum. Family states patient was instructed to come to ED by PMD. Pt denies chest pain, shortness of breath, visual disturbances, numbness/tingling, fever, chills, diaphoresis, headache, nausea, vomiting, constipation, diarrhea, or urinary symptoms.   Pt Axox4, gross neuro intact, PERRL 3 mm. Lungs clear throughout bilateral. S1S2 heard. Abdomen soft, non-tender, non-distended. Chemotherapy port visualized to the right upper chest, pt and family unsure of what type of port is implanted. Skin warm, dry, and intact. Safety and comfort measures maintained. family present at bedside.

## 2018-05-29 NOTE — ED ADULT NURSE NOTE - CAS EDN DISCHARGE ASSESSMENT
Alert and oriented to person, place and time Patient baseline mental status/Awake/Alert and oriented to person, place and time

## 2018-05-29 NOTE — ED PROVIDER NOTE - OBJECTIVE STATEMENT
42yoM hx of HIV and B cell lymphoma on chemo, last chemo on 24th pw fever of 100.4 today and generalized weakness for 1 day. mild cough but otherwise no other symptoms. follows with Abhishek

## 2018-05-29 NOTE — ED PROVIDER NOTE - MEDICAL DECISION MAKING DETAILS
42yof pw neutropenic fever in setting of chemo. will send labs,cultures, rvp, give fluids, tylenol, cefipime, call heme onc and admit.

## 2018-05-30 ENCOUNTER — OUTPATIENT (OUTPATIENT)
Dept: OUTPATIENT SERVICES | Facility: HOSPITAL | Age: 43
LOS: 1 days | Discharge: HOME | End: 2018-05-30

## 2018-05-30 ENCOUNTER — OUTPATIENT (OUTPATIENT)
Dept: OUTPATIENT SERVICES | Facility: HOSPITAL | Age: 43
LOS: 1 days | End: 2018-05-30

## 2018-05-30 DIAGNOSIS — B20 HUMAN IMMUNODEFICIENCY VIRUS [HIV] DISEASE: ICD-10-CM

## 2018-05-30 DIAGNOSIS — D64.9 ANEMIA, UNSPECIFIED: ICD-10-CM

## 2018-05-30 DIAGNOSIS — C85.88 OTHER SPECIFIED TYPES OF NON-HODGKIN LYMPHOMA, LYMPH NODES OF MULTIPLE SITES: ICD-10-CM

## 2018-05-30 DIAGNOSIS — D69.6 THROMBOCYTOPENIA, UNSPECIFIED: ICD-10-CM

## 2018-05-30 DIAGNOSIS — A41.89 OTHER SPECIFIED SEPSIS: ICD-10-CM

## 2018-05-30 DIAGNOSIS — R79.89 OTHER SPECIFIED ABNORMAL FINDINGS OF BLOOD CHEMISTRY: ICD-10-CM

## 2018-05-30 DIAGNOSIS — K12.30 ORAL MUCOSITIS (ULCERATIVE), UNSPECIFIED: ICD-10-CM

## 2018-05-30 DIAGNOSIS — R50.9 FEVER, UNSPECIFIED: ICD-10-CM

## 2018-05-30 DIAGNOSIS — Z29.9 ENCOUNTER FOR PROPHYLACTIC MEASURES, UNSPECIFIED: ICD-10-CM

## 2018-05-30 DIAGNOSIS — C85.10 UNSPECIFIED B-CELL LYMPHOMA, UNSPECIFIED SITE: ICD-10-CM

## 2018-05-30 PROCEDURE — 99222 1ST HOSP IP/OBS MODERATE 55: CPT | Mod: GC

## 2018-05-30 PROCEDURE — 99223 1ST HOSP IP/OBS HIGH 75: CPT | Mod: GC

## 2018-05-30 RX ORDER — ACYCLOVIR SODIUM 500 MG
800 VIAL (EA) INTRAVENOUS EVERY 12 HOURS
Qty: 0 | Refills: 0 | Status: DISCONTINUED | OUTPATIENT
Start: 2018-05-30 | End: 2018-05-31

## 2018-05-30 RX ORDER — POLYETHYLENE GLYCOL 3350 17 G/17G
17 POWDER, FOR SOLUTION ORAL ONCE
Qty: 0 | Refills: 0 | Status: COMPLETED | OUTPATIENT
Start: 2018-05-30 | End: 2018-05-30

## 2018-05-30 RX ORDER — IPRATROPIUM/ALBUTEROL SULFATE 18-103MCG
3 AEROSOL WITH ADAPTER (GRAM) INHALATION EVERY 6 HOURS
Qty: 0 | Refills: 0 | Status: DISCONTINUED | OUTPATIENT
Start: 2018-05-30 | End: 2018-05-30

## 2018-05-30 RX ORDER — ACETAMINOPHEN 500 MG
650 TABLET ORAL EVERY 6 HOURS
Qty: 0 | Refills: 0 | Status: DISCONTINUED | OUTPATIENT
Start: 2018-05-30 | End: 2018-05-31

## 2018-05-30 RX ORDER — SODIUM CHLORIDE 9 MG/ML
1000 INJECTION INTRAMUSCULAR; INTRAVENOUS; SUBCUTANEOUS ONCE
Qty: 0 | Refills: 0 | Status: COMPLETED | OUTPATIENT
Start: 2018-05-30 | End: 2018-05-30

## 2018-05-30 RX ORDER — METOCLOPRAMIDE HCL 10 MG
0 TABLET ORAL
Qty: 0 | Refills: 0 | COMMUNITY

## 2018-05-30 RX ORDER — DIPHENHYDRAMINE HYDROCHLORIDE AND LIDOCAINE HYDROCHLORIDE AND ALUMINUM HYDROXIDE AND MAGNESIUM HYDRO
5 KIT EVERY 6 HOURS
Qty: 0 | Refills: 0 | Status: DISCONTINUED | OUTPATIENT
Start: 2018-05-30 | End: 2018-05-31

## 2018-05-30 RX ORDER — IPRATROPIUM/ALBUTEROL SULFATE 18-103MCG
3 AEROSOL WITH ADAPTER (GRAM) INHALATION EVERY 6 HOURS
Qty: 0 | Refills: 0 | Status: DISCONTINUED | OUTPATIENT
Start: 2018-05-30 | End: 2018-05-31

## 2018-05-30 RX ORDER — ATOVAQUONE 750 MG/5ML
750 SUSPENSION ORAL EVERY 12 HOURS
Qty: 0 | Refills: 0 | Status: DISCONTINUED | OUTPATIENT
Start: 2018-05-30 | End: 2018-05-31

## 2018-05-30 RX ORDER — SENNA PLUS 8.6 MG/1
2 TABLET ORAL AT BEDTIME
Qty: 0 | Refills: 0 | Status: DISCONTINUED | OUTPATIENT
Start: 2018-05-30 | End: 2018-05-31

## 2018-05-30 RX ORDER — DOCUSATE SODIUM 100 MG
100 CAPSULE ORAL THREE TIMES A DAY
Qty: 0 | Refills: 0 | Status: DISCONTINUED | OUTPATIENT
Start: 2018-05-30 | End: 2018-05-31

## 2018-05-30 RX ADMIN — Medication 100 MILLIGRAM(S): at 13:56

## 2018-05-30 RX ADMIN — POLYETHYLENE GLYCOL 3350 17 GRAM(S): 17 POWDER, FOR SOLUTION ORAL at 13:56

## 2018-05-30 RX ADMIN — ATOVAQUONE 750 MILLIGRAM(S): 750 SUSPENSION ORAL at 18:38

## 2018-05-30 RX ADMIN — Medication 800 MILLIGRAM(S): at 18:38

## 2018-05-30 RX ADMIN — SODIUM CHLORIDE 500 MILLILITER(S): 9 INJECTION INTRAMUSCULAR; INTRAVENOUS; SUBCUTANEOUS at 19:52

## 2018-05-30 NOTE — H&P ADULT - ASSESSMENT
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

## 2018-05-30 NOTE — H&P ADULT - PROBLEM SELECTOR PLAN 7
Likely in the setting of chemotherapy.   - Consider topical anesthetics. Diagnosed 2002 from an unknown source. Last CD count was 762 in February 2018.   - ID doctor- Marko Craft.  - Will touch base w/ ID  - c/w home meds Triumeq, acyclor, atovaquone

## 2018-05-30 NOTE — H&P ADULT - NSHPLABSRESULTS_GEN_ALL_CORE
LABS:                        9.1    3.2   )-----------( 24       ( 29 May 2018 22:09 )             25.4     05-29    139  |  102  |  <4<L>  ----------------------------<  107<H>  3.9   |  25  |  1.16    Ca    9.6      29 May 2018 22:09    TPro  7.3  /  Alb  4.4  /  TBili  0.2  /  DBili  x   /  AST  20  /  ALT  33  /  AlkPhos  75  05-29    Rapid Respiratory Viral Panel (05.29.18 @ 22:21)    Rapid RVP Result: Detected: The Film[employer] RVP Rapid uses polymerase chain reaction (PCR) and melt  curve analysis to screen for adenovirus; coronavirus HKU1, NL63, 229E,  OC43; human metapneumovirus (hMPV); human enterovirus/rhinovirus  (Entero/RV); influenza A; influenza A/H1;influenza A/H3; influenza  A/H1-2009; influenza B; parainfluenza viruses 1, 2, 3, 4; respiratory  syncytial virus; Bordetella pertussis; Mycoplasma pneumoniae; and  Chlamydophila pneumoniae.    Entero/Rhinovirus (RapRVP): Detected    hMPV (RapRVP): Detected LABS:                        9.1    3.2   )-----------( 24       ( 29 May 2018 22:09 )             25.4     05-29    139  |  102  |  <4<L>  ----------------------------<  107<H>  3.9   |  25  |  1.16    Ca    9.6      29 May 2018 22:09    TPro  7.3  /  Alb  4.4  /  TBili  0.2  /  DBili  x   /  AST  20  /  ALT  33  /  AlkPhos  75  05-29    Rapid Respiratory Viral Panel (05.29.18 @ 22:21)    Rapid RVP Result: Detected: The [employer] RVP Rapid uses polymerase chain reaction (PCR) and melt  curve analysis to screen for adenovirus; coronavirus HKU1, NL63, 229E,  OC43; human metapneumovirus (hMPV); human enterovirus/rhinovirus  (Entero/RV); influenza A; influenza A/H1;influenza A/H3; influenza  A/H1-2009; influenza B; parainfluenza viruses 1, 2, 3, 4; respiratory  syncytial virus; Bordetella pertussis; Mycoplasma pneumoniae; and  Chlamydophila pneumoniae.    Entero/Rhinovirus (RapRVP): Detected    hMPV (RapRVP): Detected    RADIOLOGY:   Xray Chest 2 Views PA/Lat (05.29.18 @ 22:35) >  IMPRESSION: Clear lungs. LABS:                        9.1    3.2   )-----------( 24       ( 29 May 2018 22:09 )             25.4     05-29    139  |  102  |  <4<L>  ----------------------------<  107<H>  3.9   |  25  |  1.16    Ca    9.6      29 May 2018 22:09    TPro  7.3  /  Alb  4.4  /  TBili  0.2  /  DBili  x   /  AST  20  /  ALT  33  /  AlkPhos  75  05-29    Rapid Respiratory Viral Panel (05.29.18 @ 22:21)    Rapid RVP Result: Detected: The [employer] RVP Rapid uses polymerase chain reaction (PCR) and melt  curve analysis to screen for adenovirus; coronavirus HKU1, NL63, 229E,  OC43; human metapneumovirus (hMPV); human enterovirus/rhinovirus  (Entero/RV); influenza A; influenza A/H1;influenza A/H3; influenza  A/H1-2009; influenza B; parainfluenza viruses 1, 2, 3, 4; respiratory  syncytial virus; Bordetella pertussis; Mycoplasma pneumoniae; and  Chlamydophila pneumoniae.    Entero/Rhinovirus (RapRVP): Detected    hMPV (RapRVP): Detected    RADIOLOGY:   Xray Chest 2 Views PA/Lat (05.29.18 @ 22:35) >  IMPRESSION: Clear lungs    Personally reviewed CXR.  Personally reviewed EKG NSR qt 454, no STED

## 2018-05-30 NOTE — H&P ADULT - NEGATIVE CARDIOVASCULAR SYMPTOMS
no dyspnea on exertion/no paroxysmal nocturnal dyspnea/no palpitations/no orthopnea/no chest pain/no peripheral edema

## 2018-05-30 NOTE — H&P ADULT - HISTORY OF PRESENT ILLNESS
Last CD4 count 764 in 2/2018.     ED VS T99.3, , /76, RR18 O2 sat99% on RA. Hb 9 (baseline 9-10), Cr 1.16. UA negat. RVP + enterorhinovirus, HMPV. Received cefepime 1g IV x1, NS 1L, acetaminophen 975 mg po x1. 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     Patient saw his oncologist 5/29/18.     Last CD4 count 764 in 2/2018. Left axillary lymph node biopsy showed a high-grade CD10 positive B cell lymphoma expressing CD20 and BCL 6. 65% of nuclei showed a myc rearrangement. BCL-2 and BCL 6 were not rearranged. Ki-67 was about 90%.    ED VS T99.3, , /76, RR18 O2 sat99% on RA. Hb 9 (baseline 9-10), Cr 1.16. UA negat. RVP + enterorhinovirus, HMPV. Received cefepime 1g IV x1, NS 1L, acetaminophen 975 mg po x1. 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 Patient saw his oncologist 5/29/18. He said that he monitors his temperature daily, particularly when he is "out and about". His temperature yesterday was 100.2. He took a Tylenol and was told by his oncologist to come in. Patient denies any fevers, chills, nausea, vomiting, diarrhea, abdominal pain, cough, nasal congestion, post nasal drip, rhinorrhea. He denies any recent travel, sick contacts, dental procedures. He was concerned about the fever because he was here early this month with a "cold" for which he took tamiflu and antibiotics.     Last CD4 count 764 in 2/2018. Left axillary lymph node biopsy showed a high-grade CD10 positive B cell lymphoma expressing CD20 and BCL 6. 65% of nuclei showed a myc rearrangement. BCL-2 and BCL 6 were not rearranged. Ki-67 was about 90%. Admitted for cycle 5 DA-R-EPOCH with dose reduction by 20% secondary to mucositis. Patient received rituxan on 5/17 and started EPOCH on 5/18. Patient received IV hydration, strict I/O, antiemetics, monitoring of CBC and electrolytes. IT MTX on 5/17 and 5/21. Reported to have tolerated it well. Was scheduled to receive neulasta as an outpatient. Patient states that his last chemo was last Tuesday. He has persistent mucositis although it has improved from last week with swish and spit.     ED VS T99.3, , /76, RR18 O2 sat99% on RA. Hb 9 (baseline 9-10), Cr 1.16. UA negat. RVP + enterorhinovirus, HMPV. Received cefepime 1g IV x1, NS 1L, acetaminophen 975 mg po x1. 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 Patient saw his oncologist 5/29/18. He said that he monitors his temperature daily, particularly when he is "out and about". His temperature yesterday was 100.2. He took a Tylenol and was told by his oncologist to come in. Patient denies any fevers, chills, nausea, vomiting, diarrhea, abdominal pain, cough, nasal congestion, post nasal drip, rhinorrhea. He denies any recent travel, sick contacts, dental procedures. He was concerned about the fever because he was here early this month with a "cold" for which he took tamiflu and antibiotics.     Left axillary lymph node biopsy showed a high-grade CD10 positive B cell lymphoma expressing CD20 and BCL 6. 65% of nuclei showed a myc rearrangement. BCL-2 and BCL 6 were not rearranged. Ki-67 was about 90%. Admitted for cycle 5 DA-R-EPOCH with dose reduction by 20% secondary to mucositis. Patient received rituxan on 5/17 and started EPOCH on 5/18. Patient received IV hydration, strict I/O, antiemetics, monitoring of CBC and electrolytes. IT MTX on 5/17 and 5/21. Reported to have tolerated it well. Was scheduled to receive neulasta as an outpatient. Patient states that his last chemo was last Tuesday. He has persistent mucositis although it has improved from last week with swish and spit.     ED VS T99.3, , /76, RR18 O2 sat99% on RA. Hb 9 (baseline 9-10), Cr 1.16. UA negat. RVP + enterorhinovirus, HMPV. Received cefepime 1g IV x1, NS 1L, acetaminophen 975 mg po x1. 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 Patient saw his oncologist 5/29/18. He said that he monitors his temperature daily, particularly when he is "out and about". His temperature yesterday was 100.2. Took a Tylenol and was told by his oncologist to come in. Patient denies any chills, nausea, vomiting, diarrhea, abdominal pain, cough, nasal congestion, post nasal drip, rhinorrhea. He denies any recent travel, sick contacts, dental procedures. He was concerned about the fever because he was here early this month with a "cold" for which he took tamiflu and antibiotics.     Left axillary lymph node biopsy showed a high-grade CD10 positive B cell lymphoma expressing CD20 and BCL 6. 65% of nuclei showed a myc rearrangement. BCL-2 and BCL 6 were not rearranged. Ki-67 was about 90%. Admitted for cycle 5 DA-R-EPOCH with dose reduction by 20% secondary to mucositis. Patient received rituxan on 5/17 and started EPOCH on 5/18. Patient received IV hydration, strict I/O, antiemetics, monitoring of CBC and electrolytes. IT MTX on 5/17 and 5/21. Reported to have tolerated it well. Was scheduled to receive neulasta as an outpatient. Patient states that his last chemo was last Tuesday. He has persistent mucositis although it has improved from last week with swish and spit.     ED VS T99.3, , /76, RR18 O2 sat99% on RA. Hb 9 (baseline 9-10), Cr 1.16. UA negat. RVP + enterorhinovirus, HMPV. Received cefepime 1g IV x1, NS 1L, acetaminophen 975 mg po x1. 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 Patient saw his oncologist 5/29/18. He said that he monitors his temperature daily, particularly when he is "out and about". His temperature yesterday was 100.2. Took a Tylenol and was told by his oncologist to come in. Patient denies any chills, nausea, vomiting, diarrhea, abdominal pain, cough, nasal congestion, post nasal drip, rhinorrhea. He denies any recent travel, sick contacts, dental procedures. He was concerned about the fever because he was here early this month with a "cold" for which he took tamiflu and antibiotics.     Left axillary lymph node biopsy showed a high-grade CD10 positive B cell lymphoma expressing CD20 and BCL 6. 65% of nuclei showed a myc rearrangement. BCL-2 and BCL 6 were not rearranged. Ki-67 was about 90%. Admitted for cycle 5 DA-R-EPOCH with dose reduction by 20% secondary to mucositis. Patient received rituxan on 5/17 and started EPOCH on 5/18. Patient received IV hydration, strict I/O, antiemetics, monitoring of CBC and electrolytes. IT MTX on 5/17 and 5/21. Reported to have tolerated it well. Was scheduled to receive neulasta as an outpatient. Patient states that his last chemo was last Tuesday. He has persistent mucositis although it has improved from last week with swish and spit.     ED VS T99.3, , /76, RR18 O2 sat99% on RA. Hb 9 (baseline 9-10), Cr 1.16. UA negat. RVP + enterorhinovirus, HMPV. Received cefepime 1g IV x1, NS 1L, acetaminophen 975 mg po x1.

## 2018-05-30 NOTE — H&P ADULT - PMH
High grade B-cell lymphoma    HIV (human immunodeficiency virus infection) High grade B-cell lymphoma    HIV (human immunodeficiency virus infection)    Mucositis

## 2018-05-30 NOTE — H&P ADULT - RS GEN PE MLT RESP DETAILS PC
normal/good air movement/no wheezes/no chest wall tenderness/respirations non-labored/no rales/no rhonchi/clear to auscultation bilaterally

## 2018-05-30 NOTE — ED ADULT NURSE REASSESSMENT NOTE - NS ED NURSE REASSESS COMMENT FT1
Report received from Alfred SEPULVEDA RN. Pt AAOx4, NAD, resp nonlabored, skin warm/dry, resting comfortably in bed. Pt c/o intermittent dry cough. Pt denies headache, dizziness, chest pain, palpitations, SOB, abd pain, n/v/d, fevers, chills at this time. Pt awaiting bed. Safety maintained. Report received from Alfred SEPULVEDA RN. Pt AAOx4, NAD, resp nonlabored, skin warm/dry, resting comfortably in bed. Pt c/o intermittent dry cough. Pt denies headache, dizziness, chest pain, palpitations, SOB, abd pain, n/v/d, fevers, chills at this time. Pt awaiting bed. Safety maintained.    0820: Report given to Holding RN Leonel Almanza. Pt aware of RN re-assignment and that they are still awaiting bed upstairs. Pt AAOx4, NAD, resp nonlabored, resting comfortably in bed. No changes observed at this time. Safety maintained. Pt transported to [facility].

## 2018-05-30 NOTE — H&P ADULT - NSHPPHYSICALEXAM_GEN_ALL_CORE
ICU Vital Signs Last 24 Hrs  T(C): 37.2 (30 May 2018 07:14), Max: 37.7 (29 May 2018 21:15)  T(F): 99 (30 May 2018 07:14), Max: 99.9 (29 May 2018 21:15)  HR: 98 (30 May 2018 07:14) (98 - 125)  BP: 122/79 (30 May 2018 07:14) (116/73 - 140/76)  BP(mean): --  ABP: --  ABP(mean): --  RR: 18 (30 May 2018 07:14) (16 - 20)  SpO2: 100% (30 May 2018 07:14) (99% - 100%) Last 24 Hrs  T(C): 37.2 (30 May 2018 07:14), Max: 37.7 (29 May 2018 21:15)  T(F): 99 (30 May 2018 07:14), Max: 99.9 (29 May 2018 21:15)  HR: 98 (30 May 2018 07:14) (98 - 125)  BP: 122/79 (30 May 2018 07:14) (116/73 - 140/76)  BP(mean): --  ABP: --  ABP(mean): --  RR: 18 (30 May 2018 07:14) (16 - 20)  SpO2: 100% (30 May 2018 07:14) (99% - 100%)

## 2018-05-30 NOTE — CONSULT NOTE ADULT - PROBLEM SELECTOR RECOMMENDATION 2
Currently cycle 5 day 13 of DA-R-EPOCH.   Good response seen after cycle 3.   Monitor CBC with diff daily while inpatient.   Follow up with Dr. Cortes on discharge.

## 2018-05-30 NOTE — H&P ADULT - PROBLEM SELECTOR PLAN 2
Patient is on cycle 5 of Rituxan/DEPOCH. Last treatment, per patient, was 5/24. Last treatment will be 6/11 per patient.   - Appears that patient received neulasta given increased monocytes.   - Will contact house [facility] onc- Dr. Migel Crawford. On discharge 5/22, platelets 280--> 26--> 24. Likely in the context of chemotherapy.   - Will check peripheral smear.   - Will review medications.   - continue to monitor.   - Transfuse for platelets <10 without bleeding, <50 platelets with bleed/procedure. On discharge 5/22, platelets 280--> 26--> 24. Likely in the context of chemotherapy.   - Will check peripheral smear.   - continue to monitor  - Transfuse for platelets <10 without bleeding, <50 platelets with bleed/procedure, <15k if febrile

## 2018-05-30 NOTE — H&P ADULT - PROBLEM SELECTOR PLAN 3
Diagnosed 2002 from an unknown source. Last CD count was 762 in February 2018.   - ID doctor- Marko Craft.  - Will touch base.   - Home medications- Triumeq, acyclor, atovaquone. Normocytic anemia at 9. Normocytic.   - iron studies  - retics.   - type and screen   - consider FOBT. Normocytic anemia at 9. Normocytic. Stable, near baseline  no signs of active bleed  - iron studies  - retics.   - type and screen

## 2018-05-30 NOTE — H&P ADULT - PROBLEM SELECTOR PLAN 4
Likely in the setting of chemotherapy.   - Consider topical anesthetics. Cr 1.16 on admission uptrended from 0.97.  - will continue to monitor,   - if does not improve with NS. will obtain ulytes. Likely in the setting of chemotherapy.   - magic mouthwash  - monitor

## 2018-05-30 NOTE — H&P ADULT - PROBLEM SELECTOR PLAN 1
Patient came from home with "fever" of 100.2 in the setting of immunosuppression without any other symptoms. Patient afebrile and HD stable thusfar. However, pt meets SIRS criteria (, WBC 3.2, bands 10%). Not currently neutropenic (ANC 1512). RVP positive for enterorhinovirus/HMPV. CXR clear.   - s/p cefepime, NS 1L.   - no other identifiable sources of infection - UA negative  - will f/u UCx, BCx.   - would monitor off abx for now. Patient came from home with fever of 100.2 in the setting of immunosuppression without any other symptoms. Patient afebrile and HD stable thus far. However, pt meets SIRS criteria (, WBC 3.2, bands 10%). Not currently neutropenic (ANC 1512). RVP positive for enterorhinovirus/HMPV. CXR clear.   s/p cefepime, NS 1L in the ED  - no other identifiable sources of infection - UA negative  - will f/u BCx - at risk for bacteremia given mucositis on exam  - would monitor off abx for now  - symptomatic support - duonebs prn, tylenol prn fever

## 2018-05-30 NOTE — CONSULT NOTE ADULT - SUBJECTIVE AND OBJECTIVE BOX
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 initially fond on L axillary lymph node excisional biopsy, with Ki-67 90% and no bone marrow involvement but suspicion of tonsillar involvement on PET scan, s/p 5 cycles of DA - R - EPOCH (today is C5D13) with interval PET scan performed 4/25 after cycle 3 showing Deauville 2 with resolved cervical, mediastinal, R axillary and pelvic inguinal adenopathy with decreased in size and intensity of the L axillary nodes, recently admitted for Cycle 5 of therapy with 20% dose reduction secondary to severe mucositis after cycle 4 and s/p IT MTX 5/17 and 5/21 with negative CSF studies, presents from home with fever. He said that he monitors his temperature daily. His temperature yesterday was 100.2. Took a Tylenol and was told by Dr. Cortes to come in. Patient denies any chills, nausea, vomiting, diarrhea, abdominal pain. He does report rhinorrhea and mild cough. He denies any recent travel, sick contacts, dental procedures. In the ED RVP was (+) for enterorhinovirus and HMPV. He received cefepime in the ER.      PAST MEDICAL & SURGICAL HISTORY:  HIV (human immunodeficiency virus infection)  High grade B-cell lymphoma  No significant past surgical history      Allergies    No Known Allergies    Intolerances        MEDICATIONS  (STANDING):  docusate sodium 100 milliGRAM(s) Oral three times a day  polyethylene glycol 3350 17 Gram(s) Oral once  senna 2 Tablet(s) Oral at bedtime    MEDICATIONS  (PRN):  acetaminophen   Tablet 650 milliGRAM(s) Oral every 6 hours PRN For Temp greater than 38 C (100.4 F)  ALBUTerol/ipratropium for Nebulization 3 milliLiter(s) Nebulizer every 6 hours PRN Shortness of Breath and/or Wheezing      FAMILY HISTORY:  No pertinent family history in first degree relatives      SOCIAL HISTORY: No EtOH, no tobacco    REVIEW OF SYSTEMS:    CONSTITUTIONAL: No weakness or chills, + fever  EYES/ENT: No visual changes;  No vertigo, mild odynophagia although improving.  +rhinorrhea  NECK: No pain or stiffness  RESPIRATORY: No cough, wheezing, hemoptysis; No shortness of breath  CARDIOVASCULAR: No chest pain or palpitations  GASTROINTESTINAL: No abdominal or epigastric pain. No nausea, vomiting, or hematemesis; No diarrhea or constipation. No melena or hematochezia.  GENITOURINARY: No dysuria, frequency or hematuria  NEUROLOGICAL: No numbness or weakness  SKIN: No itching, burning, rashes, or lesions   All other review of systems is negative unless indicated above.        T(F): 99 (05-30-18 @ 08:55), Max: 99.9 (05-29-18 @ 21:15)  HR: 106 (05-30-18 @ 08:55)  BP: 129/77 (05-30-18 @ 08:55)  RR: 18 (05-30-18 @ 08:55)  SpO2: 100% (05-30-18 @ 08:55)  Wt(kg): --    GENERAL: NAD, well-developed  HEAD:  Atraumatic, Normocephalic  EYES: EOMI, PERRLA, conjunctiva and sclera clear  NECK: Supple, No JVD  ORAL: shallow ulcer on side of tongue, no thrush.   CHEST/LUNG: Clear to auscultation bilaterally; No wheeze  HEART: Regular rate and rhythm; No murmurs, rubs, or gallops  ABDOMEN: Soft, Nontender, Nondistended; Bowel sounds present  EXTREMITIES:  2+ Peripheral Pulses, No clubbing, cyanosis, or edema  NEUROLOGY: non-focal  SKIN: No rashes or lesions  HEME: No axillary, cervical, supraclavicular or inguinal lymphadenopathy.                           9.1    3.2   )-----------( 24       ( 29 May 2018 22:09 )             25.4       05-29    139  |  102  |  <4<L>  ----------------------------<  107<H>  3.9   |  25  |  1.16    Ca    9.6      29 May 2018 22:09    TPro  7.3  /  Alb  4.4  /  TBili  0.2  /  DBili  x   /  AST  20  /  ALT  33  /  AlkPhos  75  05-29      Rapid Respiratory Viral Panel (05.29.18 @ 22:21)    Rapid RVP Result: Detected: The Film[employer] RVP Rapid uses polymerase chain reaction (PCR) and melt  curve analysis to screen for adenovirus; coronavirus HKU1, NL63, 229E,  OC43; human metapneumovirus (hMPV); human enterovirus/rhinovirus  (Entero/RV); influenza A; influenza A/H1;influenza A/H3; influenza  A/H1-2009; influenza B; parainfluenza viruses 1, 2, 3, 4; respiratory  syncytial virus; Bordetella pertussis; Mycoplasma pneumoniae; and  Chlamydophila pneumoniae.    Entero/Rhinovirus (RapRVP): Detected    hMPV (RapRVP): Detected

## 2018-05-30 NOTE — CONSULT NOTE ADULT - ASSESSMENT
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

## 2018-05-30 NOTE — H&P ADULT - PROBLEM SELECTOR PLAN 8
DVT ppx: HSQ  Diet: regular  Dispo: likely home. Patient ambulates well at baseline. DVT ppx: HSQ  Diet: regular  Dispo: likely home tomorrow if afebrile, bld cx neg tomorrow. Patient ambulates well at baseline.

## 2018-05-30 NOTE — H&P ADULT - PROBLEM SELECTOR PLAN 5
DVT ppx: HSQ  Diet: regular  Dispo: likely home. Patient ambulates well at baseline. Patient is on cycle 5 of Rituxan/DEPOCH. Last treatment, per patient, was 5/24. Last treatment will be 6/11 per patient.   - Appears that patient received neulasta given increased monocytes.   - Will contact house [facility] onc- Dr. Migel Crawford. Cr 1.16 on admission uptrended from 0.97.  - will continue to monitor,   - give another 1L of NS given sepsis, tachycardia and poor po intake 2/2 mucositis

## 2018-05-30 NOTE — H&P ADULT - NSHPSOCIALHISTORY_GEN_ALL_CORE
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

## 2018-05-30 NOTE — H&P ADULT - PROBLEM SELECTOR PLAN 6
Diagnosed 2002 from an unknown source. Last CD count was 762 in February 2018.   - ID doctor- Marko Craft.  - Will touch base.   - Home medications- Triumeq, acyclor, atovaquone- will consider holding in the setting of active infection. Patient is on cycle 5 of Rituxan/DEPOCH. Last treatment, per patient, was 5/24. Last treatment will be 6/11 per patient.   - Appears that patient received neulasta given increased monocytes.   - Will contact house [facility] onc- Dr. Migel Crawford.

## 2018-05-31 ENCOUNTER — TRANSCRIPTION ENCOUNTER (OUTPATIENT)
Age: 43
End: 2018-05-31

## 2018-05-31 VITALS
SYSTOLIC BLOOD PRESSURE: 143 MMHG | TEMPERATURE: 100 F | DIASTOLIC BLOOD PRESSURE: 89 MMHG | OXYGEN SATURATION: 99 % | HEART RATE: 116 BPM | RESPIRATION RATE: 18 BRPM

## 2018-05-31 DIAGNOSIS — N17.9 ACUTE KIDNEY FAILURE, UNSPECIFIED: ICD-10-CM

## 2018-05-31 LAB
ALBUMIN SERPL ELPH-MCNC: 4.1 G/DL — SIGNIFICANT CHANGE UP (ref 3.3–5)
ALP SERPL-CCNC: 74 U/L — SIGNIFICANT CHANGE UP (ref 40–120)
ALT FLD-CCNC: 21 U/L — SIGNIFICANT CHANGE UP (ref 10–45)
ANION GAP SERPL CALC-SCNC: 11 MMOL/L — SIGNIFICANT CHANGE UP (ref 5–17)
AST SERPL-CCNC: 16 U/L — SIGNIFICANT CHANGE UP (ref 10–40)
BILIRUB SERPL-MCNC: 0.2 MG/DL — SIGNIFICANT CHANGE UP (ref 0.2–1.2)
BUN SERPL-MCNC: 5 MG/DL — LOW (ref 7–23)
CALCIUM SERPL-MCNC: 9 MG/DL — SIGNIFICANT CHANGE UP (ref 8.4–10.5)
CHLORIDE SERPL-SCNC: 100 MMOL/L — SIGNIFICANT CHANGE UP (ref 96–108)
CO2 SERPL-SCNC: 26 MMOL/L — SIGNIFICANT CHANGE UP (ref 22–31)
CREAT SERPL-MCNC: 1.28 MG/DL — SIGNIFICANT CHANGE UP (ref 0.5–1.3)
CULTURE RESULTS: NO GROWTH — SIGNIFICANT CHANGE UP
FERRITIN SERPL-MCNC: 402 NG/ML — HIGH (ref 30–400)
GLUCOSE SERPL-MCNC: 89 MG/DL — SIGNIFICANT CHANGE UP (ref 70–99)
HCT VFR BLD CALC: 27.2 % — LOW (ref 39–50)
HGB BLD-MCNC: 8.8 G/DL — LOW (ref 13–17)
HIV-1 VIRAL LOAD RESULT: ABNORMAL
HIV1 RNA # SERPL NAA+PROBE: SIGNIFICANT CHANGE UP
HIV1 RNA SER-IMP: SIGNIFICANT CHANGE UP
HIV1 RNA SERPL NAA+PROBE-ACNC: ABNORMAL
HIV1 RNA SERPL NAA+PROBE-LOG#: ABNORMAL LG COP/ML
INR BLD: 1.13 RATIO — SIGNIFICANT CHANGE UP (ref 0.88–1.16)
IRON SATN MFR SERPL: 17 % — SIGNIFICANT CHANGE UP (ref 16–55)
IRON SATN MFR SERPL: 35 UG/DL — LOW (ref 45–165)
MCHC RBC-ENTMCNC: 29.5 PG — SIGNIFICANT CHANGE UP (ref 27–34)
MCHC RBC-ENTMCNC: 32.2 GM/DL — SIGNIFICANT CHANGE UP (ref 32–36)
MCV RBC AUTO: 91.6 FL — SIGNIFICANT CHANGE UP (ref 80–100)
PLATELET # BLD AUTO: 44 K/UL — LOW (ref 150–400)
POTASSIUM SERPL-MCNC: 3.6 MMOL/L — SIGNIFICANT CHANGE UP (ref 3.5–5.3)
POTASSIUM SERPL-SCNC: 3.6 MMOL/L — SIGNIFICANT CHANGE UP (ref 3.5–5.3)
PROT SERPL-MCNC: 6.7 G/DL — SIGNIFICANT CHANGE UP (ref 6–8.3)
PROTHROM AB SERPL-ACNC: 12.3 SEC — SIGNIFICANT CHANGE UP (ref 9.8–12.7)
RBC # BLD: 2.98 M/UL — LOW (ref 4.2–5.8)
RBC # BLD: 2.98 M/UL — LOW (ref 4.2–5.8)
RBC # FLD: 17.6 % — HIGH (ref 10.3–14.5)
RETICS #: 152 K/UL — HIGH (ref 25–125)
RETICS/RBC NFR: 5 % — HIGH (ref 0.5–2.5)
SODIUM SERPL-SCNC: 137 MMOL/L — SIGNIFICANT CHANGE UP (ref 135–145)
SPECIMEN SOURCE: SIGNIFICANT CHANGE UP
TIBC SERPL-MCNC: 212 UG/DL — LOW (ref 220–430)
UIBC SERPL-MCNC: 177 UG/DL — SIGNIFICANT CHANGE UP (ref 110–370)
WBC # BLD: 7.3 K/UL — SIGNIFICANT CHANGE UP (ref 3.8–10.5)
WBC # FLD AUTO: 7.3 K/UL — SIGNIFICANT CHANGE UP (ref 3.8–10.5)

## 2018-05-31 PROCEDURE — 85610 PROTHROMBIN TIME: CPT

## 2018-05-31 PROCEDURE — 71046 X-RAY EXAM CHEST 2 VIEWS: CPT

## 2018-05-31 PROCEDURE — 83605 ASSAY OF LACTIC ACID: CPT

## 2018-05-31 PROCEDURE — 84295 ASSAY OF SERUM SODIUM: CPT

## 2018-05-31 PROCEDURE — 99285 EMERGENCY DEPT VISIT HI MDM: CPT | Mod: 25

## 2018-05-31 PROCEDURE — 85045 AUTOMATED RETICULOCYTE COUNT: CPT

## 2018-05-31 PROCEDURE — 87633 RESP VIRUS 12-25 TARGETS: CPT

## 2018-05-31 PROCEDURE — 87486 CHLMYD PNEUM DNA AMP PROBE: CPT

## 2018-05-31 PROCEDURE — 85027 COMPLETE CBC AUTOMATED: CPT

## 2018-05-31 PROCEDURE — 99239 HOSP IP/OBS DSCHRG MGMT >30: CPT

## 2018-05-31 PROCEDURE — 84132 ASSAY OF SERUM POTASSIUM: CPT

## 2018-05-31 PROCEDURE — 87040 BLOOD CULTURE FOR BACTERIA: CPT

## 2018-05-31 PROCEDURE — 87581 M.PNEUMON DNA AMP PROBE: CPT

## 2018-05-31 PROCEDURE — 87086 URINE CULTURE/COLONY COUNT: CPT

## 2018-05-31 PROCEDURE — 85014 HEMATOCRIT: CPT

## 2018-05-31 PROCEDURE — 80053 COMPREHEN METABOLIC PANEL: CPT

## 2018-05-31 PROCEDURE — 81001 URINALYSIS AUTO W/SCOPE: CPT

## 2018-05-31 PROCEDURE — 87536 HIV-1 QUANT&REVRSE TRNSCRPJ: CPT

## 2018-05-31 PROCEDURE — 82728 ASSAY OF FERRITIN: CPT

## 2018-05-31 PROCEDURE — 82803 BLOOD GASES ANY COMBINATION: CPT

## 2018-05-31 PROCEDURE — 82330 ASSAY OF CALCIUM: CPT

## 2018-05-31 PROCEDURE — 82947 ASSAY GLUCOSE BLOOD QUANT: CPT

## 2018-05-31 PROCEDURE — 83550 IRON BINDING TEST: CPT

## 2018-05-31 PROCEDURE — 86360 T CELL ABSOLUTE COUNT/RATIO: CPT

## 2018-05-31 PROCEDURE — 82435 ASSAY OF BLOOD CHLORIDE: CPT

## 2018-05-31 PROCEDURE — 96374 THER/PROPH/DIAG INJ IV PUSH: CPT

## 2018-05-31 PROCEDURE — 87798 DETECT AGENT NOS DNA AMP: CPT

## 2018-05-31 RX ORDER — SODIUM CHLORIDE 9 MG/ML
1000 INJECTION INTRAMUSCULAR; INTRAVENOUS; SUBCUTANEOUS ONCE
Qty: 0 | Refills: 0 | Status: COMPLETED | OUTPATIENT
Start: 2018-05-31 | End: 2018-05-31

## 2018-05-31 RX ORDER — SODIUM CHLORIDE 9 MG/ML
1 INJECTION INTRAMUSCULAR; INTRAVENOUS; SUBCUTANEOUS ONCE
Qty: 0 | Refills: 0 | Status: DISCONTINUED | OUTPATIENT
Start: 2018-05-31 | End: 2018-05-31

## 2018-05-31 RX ADMIN — DIPHENHYDRAMINE HYDROCHLORIDE AND LIDOCAINE HYDROCHLORIDE AND ALUMINUM HYDROXIDE AND MAGNESIUM HYDRO 5 MILLILITER(S): KIT at 05:21

## 2018-05-31 RX ADMIN — SODIUM CHLORIDE 1000 MILLILITER(S): 9 INJECTION INTRAMUSCULAR; INTRAVENOUS; SUBCUTANEOUS at 12:29

## 2018-05-31 RX ADMIN — Medication 800 MILLIGRAM(S): at 05:21

## 2018-05-31 RX ADMIN — Medication 200 MILLIGRAM(S): at 12:19

## 2018-05-31 RX ADMIN — ATOVAQUONE 750 MILLIGRAM(S): 750 SUSPENSION ORAL at 05:21

## 2018-05-31 NOTE — DISCHARGE NOTE ADULT - ADDITIONAL INSTRUCTIONS
Please follow up with your primary medical doctor (Dr. Craft) within 1-2 weeks.  Please follow up with your infectious disease doctor (Dr. Berry) at your next scheduled appointment on 6/1/18 at 12:00 pm.  Please follow up with your hematologist (Dr. Cortes) within 1 week. Please follow up with your primary medical doctor (Dr. Craft) within 1-2 weeks.  Please follow up with your infectious disease doctor (Dr. Berry) at your next scheduled appointment on 6/1/18 at 12:00 pm.  Please follow up with your hematologist (Dr. Cortes) within 1 week or at your next appointment.

## 2018-05-31 NOTE — DISCHARGE NOTE ADULT - PLAN OF CARE
To follow up with your doctors You were in the hospital because you were febrile and sick due to viral infection with human metapneumovirus and rhino/enterovirus. Blood cultures were negative. Please follow up with your primary medical doctor within 1-2 weeks. Please seek medical attention if you experience worsening or recurrent symptoms. You are sick due to infection with human metapneumovirus. Please follow up with your primary medical doctor within 1-2 weeks. You were seen by the hematology team in the hospital. Take your medications as prescribed. Please follow up with your hematologist (Dr. Cortes) within 1-2 weeks. Take your medications as prescribed. Please follow up with your infectious disease doctor (Dr. Berry) at your next scheduled appointment on 6/1/18 at 12:00 pm. Please follow up with your hematologist (Dr. Cortes) within 1 week.

## 2018-05-31 NOTE — PROGRESS NOTE ADULT - PROBLEM SELECTOR PLAN 2
On discharge 5/22, platelets 280--> 26--> 24. Likely in the context of chemotherapy.   - Will check peripheral smear.   - continue to monitor  - will transfuse for platelets <10 without bleeding, <50 platelets with bleed/procedure, <15k if febrile Now improving.   - continue to monitor  - will transfuse for platelets <10 without bleeding, <50 platelets with bleed/procedure, <15k if febrile Now improving.   - continue to monitor  - will transfuse for platelets <10 without bleeding, <50 platelets with bleed/procedure, <15k if febrile  - f/u with heme at [facility]. Gets platelet transfusions at [facility].

## 2018-05-31 NOTE — PROGRESS NOTE ADULT - PROBLEM SELECTOR PLAN 1
Febrile at home. Patient afebrile and HD stable thus far. On admission, , WBC 3.2, bands 10%. RVP positive for enterorhinovirus/HMPV. CXR clear.   - s/p cefepime, NS 1L x1 in the ED  - no other identifiable sources of infection - UA negative  - f/u BCx - at risk for bacteremia given mucositis on exam  - would monitor off abx for now  - symptomatic support - duonebs prn, tylenol prn fever Met sepsis criteria on admission, , WBC 3.2, bands 10%. Now improved.   RVP positive for enterorhinovirus/HMPV. CXR clear.   - s/p cefepime, NS 1L x1 in the ED  - blood and urine cultures no growth after 24 hours  - symptomatic support - duonebs prn, tylenol prn fever  - 1L NS bolus

## 2018-05-31 NOTE — PROGRESS NOTE ADULT - SUBJECTIVE AND OBJECTIVE BOX
Brandon Zuniga MD  PGY-1 | Internal Medicine  [phone] / [phone]  Page [phone] after 7 pm      Patient is a 42y old  Male who presents with a chief complaint of fever (30 May 2018 13:55)      SUBJECTIVE / OVERNIGHT EVENTS:    MEDICATIONS  (STANDING):  abacavir 600 mG/dolutegravir 50 mG/lamiVUDine 300 mG 1 Tablet(s) Oral daily  acyclovir   Tablet 800 milliGRAM(s) Oral every 12 hours  atovaquone Suspension 750 milliGRAM(s) Oral every 12 hours  docusate sodium 100 milliGRAM(s) Oral three times a day  senna 2 Tablet(s) Oral at bedtime    MEDICATIONS  (PRN):  acetaminophen   Tablet 650 milliGRAM(s) Oral every 6 hours PRN For Temp greater than 38 C (100.4 F)  ALBUTerol/ipratropium for Nebulization 3 milliLiter(s) Nebulizer every 6 hours PRN Shortness of Breath and/or Wheezing  FIRST- Mouthwash  BLM 5 milliLiter(s) Swish and Spit every 6 hours PRN Mouth Care      Vital Signs Last 24 Hrs  T(C): 37.3 (31 May 2018 04:54), Max: 37.3 (30 May 2018 21:19)  T(F): 99.1 (31 May 2018 04:54), Max: 99.2 (30 May 2018 21:19)  HR: 104 (31 May 2018 04:54) (104 - 118)  BP: 125/80 (31 May 2018 04:54) (125/75 - 133/87)  BP(mean): --  RR: 16 (31 May 2018 04:54) (16 - 20)  SpO2: 99% (31 May 2018 04:54) (98% - 100%)    CAPILLARY BLOOD GLUCOSE          I&O's Summary    30 May 2018 07:01  -  31 May 2018 07:00  --------------------------------------------------------  IN: 1240 mL / OUT: 800 mL / NET: 440 mL          PHYSICAL EXAM:  GENERAL: NAD, well-developed  HEAD:  NC, AT  EYES: EOMI, PERRL, conjunctiva and sclera clear  ENMT: Airway patent. MMM.  NECK: Supple, No JVD  HEART: RRR; Normal S1, S2. No murmurs, rubs, or gallops  CHEST/LUNG: CTABL; No wheezing, rhonchi, or rales  ABDOMEN: +BS; Soft, nondistended, nontender  EXTREMITIES:  2+ Peripheral Pulses, No clubbing, cyanosis, or edema  PSYCH: AAOx3  NEUROLOGY: non-focal  SKIN: Warm, dry, intact; No rashes or lesions      LABS:                        8.8    7.3   )-----------( 44       ( 31 May 2018 07:14 )             27.2     05-31    137  |  100  |  5<L>  ----------------------------<  89  3.6   |  26  |  1.28    Ca    9.0      31 May 2018 07:07    TPro  6.7  /  Alb  4.1  /  TBili  0.2  /  DBili  x   /  AST  16  /  ALT  21  /  AlkPhos  74  05-31    LIVER FUNCTIONS - ( 31 May 2018 07:07 )  Alb: 4.1 g/dL / Pro: 6.7 g/dL / ALK PHOS: 74 U/L / ALT: 21 U/L / AST: 16 U/L / GGT: x           PT/INR - ( 31 May 2018 07:14 )   PT: 12.3 sec;   INR: 1.13 ratio               Urinalysis Basic - ( 29 May 2018 22:22 )    Color: Colorless / Appearance: Clear / SG: <1.005 / pH: x  Gluc: x / Ketone: Negative  / Bili: Negative / Urobili: Negative   Blood: x / Protein: Negative / Nitrite: Negative   Leuk Esterase: Negative / RBC: 0-2 /HPF / WBC 0-2 /HPF   Sq Epi: x / Non Sq Epi: x / Bacteria: x          RADIOLOGY & ADDITIONAL TESTS:    Imaging Personally Reviewed:     Consultant(s) Notes Reviewed:     Care Discussed with Consultants/Other Providers: Brandon Zuniga MD  PGY-1 | Internal Medicine  [phone] / [phone]  Page 1440 after 7 pm      Patient is a 42y old  Male who presents with a chief complaint of fever (30 May 2018 13:55)      SUBJECTIVE / OVERNIGHT EVENTS: Pt admitted overnight for viral sepsis. Pt feeling better this am. Pt still with cough with white sputum. Pt denies fevers, chills, n/v, CP, SOB, abd pain, diarrhea, dysuria.     MEDICATIONS  (STANDING):  abacavir 600 mG/dolutegravir 50 mG/lamiVUDine 300 mG 1 Tablet(s) Oral daily  acyclovir   Tablet 800 milliGRAM(s) Oral every 12 hours  atovaquone Suspension 750 milliGRAM(s) Oral every 12 hours  docusate sodium 100 milliGRAM(s) Oral three times a day  senna 2 Tablet(s) Oral at bedtime    MEDICATIONS  (PRN):  acetaminophen   Tablet 650 milliGRAM(s) Oral every 6 hours PRN For Temp greater than 38 C (100.4 F)  ALBUTerol/ipratropium for Nebulization 3 milliLiter(s) Nebulizer every 6 hours PRN Shortness of Breath and/or Wheezing  FIRST- Mouthwash  BLM 5 milliLiter(s) Swish and Spit every 6 hours PRN Mouth Care      Vital Signs Last 24 Hrs  T(C): 37.3 (31 May 2018 04:54), Max: 37.3 (30 May 2018 21:19)  T(F): 99.1 (31 May 2018 04:54), Max: 99.2 (30 May 2018 21:19)  HR: 104 (31 May 2018 04:54) (104 - 118)  BP: 125/80 (31 May 2018 04:54) (125/75 - 133/87)  BP(mean): --  RR: 16 (31 May 2018 04:54) (16 - 20)  SpO2: 99% (31 May 2018 04:54) (98% - 100%)    CAPILLARY BLOOD GLUCOSE          I&O's Summary    30 May 2018 07:01  -  31 May 2018 07:00  --------------------------------------------------------  IN: 1240 mL / OUT: 800 mL / NET: 440 mL          PHYSICAL EXAM:  GENERAL: NAD, well-developed man lying comfortably in bed  ENMT: Airway patent. MMM.  HEART: tachycardic, regular; Normal S1, S2. No murmurs, rubs, or gallops  CHEST/LUNG: CTABL; No wheezing, rhonchi, or rales  ABDOMEN: +BS; Soft, nondistended, nontender  EXTREMITIES:  2+ Peripheral Pulses, No clubbing, cyanosis, or edema  PSYCH: AAOx3, normal mood and affect  NEUROLOGY: non-focal, 5/5 strength in b/l UE, LE.   SKIN: Warm, dry, intact; No rashes or lesions      LABS:                        8.8    7.3   )-----------( 44       ( 31 May 2018 07:14 )             27.2     05-31    137  |  100  |  5<L>  ----------------------------<  89  3.6   |  26  |  1.28    Ca    9.0      31 May 2018 07:07    TPro  6.7  /  Alb  4.1  /  TBili  0.2  /  DBili  x   /  AST  16  /  ALT  21  /  AlkPhos  74  05-31    LIVER FUNCTIONS - ( 31 May 2018 07:07 )  Alb: 4.1 g/dL / Pro: 6.7 g/dL / ALK PHOS: 74 U/L / ALT: 21 U/L / AST: 16 U/L / GGT: x           PT/INR - ( 31 May 2018 07:14 )   PT: 12.3 sec;   INR: 1.13 ratio               Urinalysis Basic - ( 29 May 2018 22:22 )    Color: Colorless / Appearance: Clear / SG: <1.005 / pH: x  Gluc: x / Ketone: Negative  / Bili: Negative / Urobili: Negative   Blood: x / Protein: Negative / Nitrite: Negative   Leuk Esterase: Negative / RBC: 0-2 /HPF / WBC 0-2 /HPF   Sq Epi: x / Non Sq Epi: x / Bacteria: x          RADIOLOGY & ADDITIONAL TESTS:    Imaging Personally Reviewed:     Consultant(s) Notes Reviewed:     Care Discussed with Consultants/Other Providers: Brandon Zuniga MD  PGY-1 | Internal Medicine  [phone] / [phone]  Page 1448 after 7 pm      Patient is a 42y old  Male who presents with a chief complaint of fever (30 May 2018 13:55)      SUBJECTIVE / OVERNIGHT EVENTS: Pt admitted overnight for viral sepsis. Pt feeling better this am. Pt still with cough with white sputum. Pt denies fevers, chills, n/v, CP, SOB, abd pain, diarrhea, dysuria.     MEDICATIONS  (STANDING):  abacavir 600 mG/dolutegravir 50 mG/lamiVUDine 300 mG 1 Tablet(s) Oral daily  acyclovir   Tablet 800 milliGRAM(s) Oral every 12 hours  atovaquone Suspension 750 milliGRAM(s) Oral every 12 hours  docusate sodium 100 milliGRAM(s) Oral three times a day  senna 2 Tablet(s) Oral at bedtime    MEDICATIONS  (PRN):  acetaminophen   Tablet 650 milliGRAM(s) Oral every 6 hours PRN For Temp greater than 38 C (100.4 F)  ALBUTerol/ipratropium for Nebulization 3 milliLiter(s) Nebulizer every 6 hours PRN Shortness of Breath and/or Wheezing  FIRST- Mouthwash  BLM 5 milliLiter(s) Swish and Spit every 6 hours PRN Mouth Care      Vital Signs Last 24 Hrs  T(C): 37.3 (31 May 2018 04:54), Max: 37.3 (30 May 2018 21:19)  T(F): 99.1 (31 May 2018 04:54), Max: 99.2 (30 May 2018 21:19)  HR: 104 (31 May 2018 04:54) (104 - 118)  BP: 125/80 (31 May 2018 04:54) (125/75 - 133/87)  BP(mean): --  RR: 16 (31 May 2018 04:54) (16 - 20)  SpO2: 99% (31 May 2018 04:54) (98% - 100%)    CAPILLARY BLOOD GLUCOSE          I&O's Summary    30 May 2018 07:01  -  31 May 2018 07:00  --------------------------------------------------------  IN: 1240 mL / OUT: 800 mL / NET: 440 mL      PHYSICAL EXAM:  GENERAL: NAD, well-developed man lying comfortably in bed  ENMT: Airway patent. MMM.  HEART: tachycardic, regular; Normal S1, S2. No murmurs, rubs, or gallops  CHEST/LUNG: CTABL; No wheezing, rhonchi, or rales  ABDOMEN: +BS; Soft, nondistended, nontender  EXTREMITIES:  2+ Peripheral Pulses, No clubbing, cyanosis, or edema  PSYCH: AAOx3, normal mood and affect  NEUROLOGY: non-focal, 5/5 strength in b/l UE, LE.   SKIN: Warm, dry, intact; No rashes or lesions      LABS:                        8.8    7.3   )-----------( 44       ( 31 May 2018 07:14 )             27.2     05-31    137  |  100  |  5<L>  ----------------------------<  89  3.6   |  26  |  1.28    Ca    9.0      31 May 2018 07:07    TPro  6.7  /  Alb  4.1  /  TBili  0.2  /  DBili  x   /  AST  16  /  ALT  21  /  AlkPhos  74  05-31    LIVER FUNCTIONS - ( 31 May 2018 07:07 )  Alb: 4.1 g/dL / Pro: 6.7 g/dL / ALK PHOS: 74 U/L / ALT: 21 U/L / AST: 16 U/L / GGT: x           PT/INR - ( 31 May 2018 07:14 )   PT: 12.3 sec;   INR: 1.13 ratio          Urinalysis Basic - ( 29 May 2018 22:22 )    Color: Colorless / Appearance: Clear / SG: <1.005 / pH: x  Gluc: x / Ketone: Negative  / Bili: Negative / Urobili: Negative   Blood: x / Protein: Negative / Nitrite: Negative   Leuk Esterase: Negative / RBC: 0-2 /HPF / WBC 0-2 /HPF   Sq Epi: x / Non Sq Epi: x / Bacteria: x          RADIOLOGY & ADDITIONAL TESTS:    Imaging Personally Reviewed:     Consultant(s) Notes Reviewed:     Care Discussed with Consultants/Other Providers:

## 2018-05-31 NOTE — DISCHARGE NOTE ADULT - MEDICATION SUMMARY - MEDICATIONS TO TAKE
I will START or STAY ON the medications listed below when I get home from the hospital:    Triumeq oral tablet  -- 1 tab(s) by mouth once a day  -- Indication: For HIV (human immunodeficiency virus infection)    acyclovir 800 mg oral tablet  -- 1 tab(s) by mouth every 12 hours  -- Indication: For Preventive measure    Mepron 750 mg/5 mL oral suspension  -- 5 milliliter(s) by mouth 2 times a day  -- Indication: For Preventive measure I will START or STAY ON the medications listed below when I get home from the hospital:    Triumeq oral tablet  -- 1 tab(s) by mouth once a day  -- Indication: For HIV (human immunodeficiency virus infection)    acyclovir 800 mg oral tablet  -- 1 tab(s) by mouth every 12 hours  -- Indication: For Preventive measure    guaiFENesin 100 mg/5 mL oral liquid  -- 10 milliliter(s) by mouth every 6 hours, As Needed -for cough   -- Indication: For Cough    Mepron 750 mg/5 mL oral suspension  -- 5 milliliter(s) by mouth 2 times a day  -- Indication: For Preventive measure

## 2018-05-31 NOTE — DISCHARGE NOTE ADULT - OTHER SIGNIFICANT FINDINGS
< from: Xray Chest 2 Views PA/Lat (05.29.18 @ 22:35) >  FINDINGS:     Right IJ Mediport distal tip in the SVC.    No focal consolidations, pleural effusions, or pneumothorax.  The cardiomediastinal silhouette is unremarkable.  The visualized osseous structures demonstrate no acute pathology.    IMPRESSION:  Clear lungs.    < end of copied text >

## 2018-05-31 NOTE — PROGRESS NOTE ADULT - PROBLEM SELECTOR PLAN 5
Cr 1.16 on admission uptrended from 0.97.  - will continue to monitor,   - give another 1L of NS given sepsis, tachycardia and poor po intake 2/2 mucositis Cr 1.16 on admission up from 0.97 (5/22)  - will continue to monitor,   - s/p 2 L bolus. will give more IVF Likely in the setting of chemotherapy.   - c/w magic mouthwash  - monitor

## 2018-05-31 NOTE — DISCHARGE NOTE ADULT - CARE PROVIDER_API CALL
Niall Cortes), Hematology; Internal Medicine; Medical Oncology  [address] [place] [address]  Phone: [phone]  Fax: [phone]    Esther Berry), Infectious Disease; Internal Medicine  [address] [place] [address]  Phone: [phone]  Fax: [phone]    Marko Craft), Infectious Disease; Internal Medicine  [address] [place] [address]  Phone: [phone]  Fax: [phone]

## 2018-05-31 NOTE — DISCHARGE NOTE ADULT - HOSPITAL COURSE
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 Pt received 1L NS bolus x3. Pt tachycardic on admission which improved with IVF. HIV and prophylactic antibiotics continued. Blood cultures and urine cultures negative for 24 hours. Pt improved clinically and was medically stable to be discharged with ID and heme follow-up.

## 2018-05-31 NOTE — DISCHARGE NOTE ADULT - SECONDARY DIAGNOSIS.
Human metapneumovirus (hMPV) as cause of disease classified elsewhere High grade B-cell lymphoma HIV (human immunodeficiency virus infection) Anemia Thrombocytopenia

## 2018-05-31 NOTE — PROGRESS NOTE ADULT - PROBLEM SELECTOR PLAN 3
Normocytic anemia at 9. Normocytic. Stable, near baseline  no signs of active bleed  - iron studies  - retics.   - type and screen Normocytic anemia at 9. Normocytic. Stable, near baseline  no signs of active bleed  - iron studies show anemia of chronic disease with some iron deficiency  - retic count elevated   - type and screen Normocytic anemia at 9. Normocytic. Stable, near baseline  no signs of active bleed  - iron studies show anemia of chronic disease   - retic count elevated   - type and screen

## 2018-05-31 NOTE — PROGRESS NOTE ADULT - PROBLEM SELECTOR PLAN 7
Diagnosed 2002 from an unknown source. Last CD count was 762 in February 2018.   - ID doctor- Marko Craft.  - Will touch base w/ ID  - c/w home meds Triumeq, acyclor, atovaquone Diagnosed 2002 from an unknown source. Last CD count was 762 in February 2018.   - ID doctor- Marko Craft and Dr. Berry.   - c/w home meds Triumeq, acyclor, atovaquone  - has ID appointment with Dr. Berry on 6/1

## 2018-05-31 NOTE — PROGRESS NOTE ADULT - PROBLEM SELECTOR PLAN 8
DVT ppx: HSQ  Diet: regular  Dispo: likely home tomorrow if afebrile, bld cx neg. Patient ambulates well at baseline. DVT ppx: pt ambulatory, no pharmacologic DVT ppx.   Diet: regular  Dispo: likely home today. Patient ambulates well at baseline.

## 2018-05-31 NOTE — PROGRESS NOTE ADULT - PROBLEM SELECTOR PLAN 6
Patient is on cycle 5 of Rituxan/DEPOCH. Last treatment, per patient, was 5/24. Last treatment will be 6/11 per patient.   - Appears that patient received neulasta given increased monocytes.   - Will contact house [facility] onc- Dr. Migel Crawford.

## 2018-05-31 NOTE — PROGRESS NOTE ADULT - ASSESSMENT
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 Pt also with anemia and pancytopenia.

## 2018-05-31 NOTE — DISCHARGE NOTE ADULT - CARE PROVIDERS DIRECT ADDRESSES
,[email or web address]

## 2018-05-31 NOTE — DISCHARGE NOTE ADULT - CARE PLAN
Principal Discharge DX:	Viral sepsis  Goal:	To follow up with your doctors  Assessment and plan of treatment:	You were in the hospital because you were febrile and sick due to viral infection with human metapneumovirus and rhino/enterovirus. Blood cultures were negative. Please follow up with your primary medical doctor within 1-2 weeks. Please seek medical attention if you experience worsening or recurrent symptoms.  Secondary Diagnosis:	Human metapneumovirus (hMPV) as cause of disease classified elsewhere  Assessment and plan of treatment:	You are sick due to infection with human metapneumovirus. Please follow up with your primary medical doctor within 1-2 weeks.  Secondary Diagnosis:	High grade B-cell lymphoma  Assessment and plan of treatment:	You were seen by the hematology team in the hospital. Take your medications as prescribed. Please follow up with your hematologist (Dr. Cortes) within 1-2 weeks.  Secondary Diagnosis:	HIV (human immunodeficiency virus infection)  Assessment and plan of treatment:	Take your medications as prescribed. Please follow up with your infectious disease doctor (Dr. Berry) at your next scheduled appointment on 6/1/18 at 12:00 pm.  Secondary Diagnosis:	Anemia  Assessment and plan of treatment:	Please follow up with your hematologist (Dr. Cortes) within 1 week.  Secondary Diagnosis:	Thrombocytopenia  Assessment and plan of treatment:	Please follow up with your hematologist (Dr. Cortes) within 1 week.

## 2018-05-31 NOTE — DISCHARGE NOTE ADULT - PATIENT PORTAL LINK FT
You can access the [employer] Patient Portal, offered by [facility] [place], by registering with the following website: [email or web address]

## 2018-05-31 NOTE — PROGRESS NOTE ADULT - PROBLEM SELECTOR PLAN 4
Likely in the setting of chemotherapy.   - magic mouthwash  - monitor Cr 1.16 on admission up from 0.97 (5/22)  - monitor BMP  - s/p 2 L bolus. will additional bolus 1L NS

## 2018-06-01 ENCOUNTER — LABORATORY RESULT (OUTPATIENT)
Age: 43
End: 2018-06-01

## 2018-06-01 ENCOUNTER — APPOINTMENT (OUTPATIENT)
Dept: HEMATOLOGY ONCOLOGY | Facility: CLINIC | Age: 43
End: 2018-06-01

## 2018-06-01 ENCOUNTER — RESULT REVIEW (OUTPATIENT)
Age: 43
End: 2018-06-01

## 2018-06-01 ENCOUNTER — APPOINTMENT (OUTPATIENT)
Dept: INFECTIOUS DISEASE | Facility: CLINIC | Age: 43
End: 2018-06-01
Payer: MEDICAID

## 2018-06-01 ENCOUNTER — OUTPATIENT (OUTPATIENT)
Dept: OUTPATIENT SERVICES | Facility: HOSPITAL | Age: 43
LOS: 1 days | End: 2018-06-01
Payer: MEDICAID

## 2018-06-01 ENCOUNTER — APPOINTMENT (OUTPATIENT)
Dept: INFUSION THERAPY | Facility: HOSPITAL | Age: 43
End: 2018-06-01

## 2018-06-01 VITALS
SYSTOLIC BLOOD PRESSURE: 157 MMHG | TEMPERATURE: 100.2 F | HEIGHT: 69 IN | RESPIRATION RATE: 17 BRPM | HEART RATE: 128 BPM | WEIGHT: 184 LBS | OXYGEN SATURATION: 96 % | BODY MASS INDEX: 27.25 KG/M2 | DIASTOLIC BLOOD PRESSURE: 94 MMHG

## 2018-06-01 DIAGNOSIS — B20 HUMAN IMMUNODEFICIENCY VIRUS [HIV] DISEASE: ICD-10-CM

## 2018-06-01 LAB
4/8 RATIO: 0.68 RATIO — LOW (ref 0.9–3.6)
ABS CD8: 443 /UL — SIGNIFICANT CHANGE UP (ref 136–757)
BASOPHILS # BLD AUTO: 0 K/UL — SIGNIFICANT CHANGE UP (ref 0–0.2)
BASOPHILS NFR BLD AUTO: 0.2 % — SIGNIFICANT CHANGE UP (ref 0–2)
CD3 BLASTS SPEC-ACNC: 751 /UL — LOW (ref 799–2171)
CD3 BLASTS SPEC-ACNC: 92 % — HIGH (ref 59–85)
CD4 %: 37 % — SIGNIFICANT CHANGE UP (ref 36–65)
CD8 %: 54 % — HIGH (ref 11–36)
EOSINOPHIL # BLD AUTO: 0.1 K/UL — SIGNIFICANT CHANGE UP (ref 0–0.5)
EOSINOPHIL NFR BLD AUTO: 0.4 % — SIGNIFICANT CHANGE UP (ref 0–6)
HCT VFR BLD CALC: 27.6 % — LOW (ref 39–50)
HGB BLD-MCNC: 9.2 G/DL — LOW (ref 13–17)
LYMPHOCYTES # BLD AUTO: 1.8 K/UL — SIGNIFICANT CHANGE UP (ref 1–3.3)
LYMPHOCYTES # BLD AUTO: 12.4 % — LOW (ref 13–44)
MCHC RBC-ENTMCNC: 30.3 PG — SIGNIFICANT CHANGE UP (ref 27–34)
MCHC RBC-ENTMCNC: 33.5 G/DL — SIGNIFICANT CHANGE UP (ref 32–36)
MCV RBC AUTO: 90.5 FL — SIGNIFICANT CHANGE UP (ref 80–100)
MONOCYTES # BLD AUTO: 1.3 K/UL — HIGH (ref 0–0.9)
MONOCYTES NFR BLD AUTO: 8.6 % — SIGNIFICANT CHANGE UP (ref 2–14)
NEUTROPHILS # BLD AUTO: 11.5 K/UL — HIGH (ref 1.8–7.4)
NEUTROPHILS NFR BLD AUTO: 78.4 % — HIGH (ref 43–77)
PLATELET # BLD AUTO: 86 K/UL — LOW (ref 150–400)
RBC # BLD: 3.05 M/UL — LOW (ref 4.2–5.8)
RBC # FLD: 18.2 % — HIGH (ref 10.3–14.5)
RETICS #: 162 K/UL — HIGH (ref 25–125)
RETICS/RBC NFR: 5.3 % — HIGH (ref 0.5–2.5)
T-CELL CD4 SUBSET PNL BLD: 303 /UL — LOW (ref 489–1457)
WBC # BLD: 14.7 K/UL — HIGH (ref 3.8–10.5)
WBC # FLD AUTO: 14.7 K/UL — HIGH (ref 3.8–10.5)

## 2018-06-01 PROCEDURE — G0463: CPT

## 2018-06-01 PROCEDURE — G9005: CPT

## 2018-06-01 PROCEDURE — 99204 OFFICE O/P NEW MOD 45 MIN: CPT

## 2018-06-01 PROCEDURE — 90834 PSYTX W PT 45 MINUTES: CPT

## 2018-06-12 ENCOUNTER — INPATIENT (INPATIENT)
Facility: HOSPITAL | Age: 43
LOS: 4 days | Discharge: HOME | DRG: 846 | End: 2018-06-17
Attending: INTERNAL MEDICINE | Admitting: INTERNAL MEDICINE
Payer: MEDICAID

## 2018-06-12 ENCOUNTER — TRANSCRIPTION ENCOUNTER (OUTPATIENT)
Age: 43
End: 2018-06-12

## 2018-06-12 VITALS
HEIGHT: 68.9 IN | OXYGEN SATURATION: 100 % | WEIGHT: 184.31 LBS | RESPIRATION RATE: 19 BRPM | HEART RATE: 105 BPM | SYSTOLIC BLOOD PRESSURE: 144 MMHG | DIASTOLIC BLOOD PRESSURE: 91 MMHG | TEMPERATURE: 98 F

## 2018-06-12 DIAGNOSIS — B20 HUMAN IMMUNODEFICIENCY VIRUS [HIV] DISEASE: ICD-10-CM

## 2018-06-12 DIAGNOSIS — C85.10 UNSPECIFIED B-CELL LYMPHOMA, UNSPECIFIED SITE: ICD-10-CM

## 2018-06-12 LAB
ALBUMIN SERPL ELPH-MCNC: 4.3 G/DL — SIGNIFICANT CHANGE UP (ref 3.3–5)
ALP SERPL-CCNC: 74 U/L — SIGNIFICANT CHANGE UP (ref 40–120)
ALT FLD-CCNC: 19 U/L — SIGNIFICANT CHANGE UP (ref 10–45)
ANION GAP SERPL CALC-SCNC: 14 MMOL/L — SIGNIFICANT CHANGE UP (ref 5–17)
APPEARANCE CSF: CLEAR — SIGNIFICANT CHANGE UP
APPEARANCE SPUN FLD: COLORLESS — SIGNIFICANT CHANGE UP
APTT BLD: 29.8 SEC — SIGNIFICANT CHANGE UP (ref 27.5–37.4)
AST SERPL-CCNC: 18 U/L — SIGNIFICANT CHANGE UP (ref 10–40)
BASOPHILS # BLD AUTO: 0 K/UL — SIGNIFICANT CHANGE UP (ref 0–0.2)
BASOPHILS NFR BLD AUTO: 0.7 % — SIGNIFICANT CHANGE UP (ref 0–2)
BILIRUB SERPL-MCNC: 0.1 MG/DL — LOW (ref 0.2–1.2)
BUN SERPL-MCNC: 10 MG/DL — SIGNIFICANT CHANGE UP (ref 7–23)
CALCIUM SERPL-MCNC: 9.3 MG/DL — SIGNIFICANT CHANGE UP (ref 8.4–10.5)
CHLORIDE SERPL-SCNC: 101 MMOL/L — SIGNIFICANT CHANGE UP (ref 96–108)
CO2 SERPL-SCNC: 24 MMOL/L — SIGNIFICANT CHANGE UP (ref 22–31)
COLOR CSF: SIGNIFICANT CHANGE UP
CREAT SERPL-MCNC: 1.2 MG/DL — SIGNIFICANT CHANGE UP (ref 0.5–1.3)
EOSINOPHIL # BLD AUTO: 0 K/UL — SIGNIFICANT CHANGE UP (ref 0–0.5)
EOSINOPHIL NFR BLD AUTO: 0.4 % — SIGNIFICANT CHANGE UP (ref 0–6)
GLUCOSE CSF-MCNC: 59 MG/DL — SIGNIFICANT CHANGE UP (ref 40–70)
GLUCOSE SERPL-MCNC: 144 MG/DL — HIGH (ref 70–99)
HCT VFR BLD CALC: 31.6 % — LOW (ref 39–50)
HGB BLD-MCNC: 10.2 G/DL — LOW (ref 13–17)
INR BLD: 1.03 RATIO — SIGNIFICANT CHANGE UP (ref 0.88–1.16)
LDH CSF L TO P-CCNC: 25 U/L — SIGNIFICANT CHANGE UP
LDH FLD-CCNC: 25 U/L — SIGNIFICANT CHANGE UP
LYMPHOCYTES # BLD AUTO: 1.3 K/UL — SIGNIFICANT CHANGE UP (ref 1–3.3)
LYMPHOCYTES # BLD AUTO: 23.5 % — SIGNIFICANT CHANGE UP (ref 13–44)
LYMPHOCYTES # CSF: 93 % — HIGH (ref 40–80)
MAGNESIUM SERPL-MCNC: 1.9 MG/DL — SIGNIFICANT CHANGE UP (ref 1.6–2.6)
MCHC RBC-ENTMCNC: 30.2 PG — SIGNIFICANT CHANGE UP (ref 27–34)
MCHC RBC-ENTMCNC: 32.3 GM/DL — SIGNIFICANT CHANGE UP (ref 32–36)
MCV RBC AUTO: 93.5 FL — SIGNIFICANT CHANGE UP (ref 80–100)
MONOCYTES # BLD AUTO: 0.4 K/UL — SIGNIFICANT CHANGE UP (ref 0–0.9)
MONOCYTES NFR BLD AUTO: 8 % — SIGNIFICANT CHANGE UP (ref 2–14)
MONOS+MACROS NFR CSF: 5 % — LOW (ref 15–45)
NEUTROPHILS # BLD AUTO: 3.6 K/UL — SIGNIFICANT CHANGE UP (ref 1.8–7.4)
NEUTROPHILS # CSF: 2 % — SIGNIFICANT CHANGE UP (ref 0–6)
NEUTROPHILS NFR BLD AUTO: 67.4 % — SIGNIFICANT CHANGE UP (ref 43–77)
NRBC NFR CSF: 6 /UL — HIGH (ref 0–5)
PHOSPHATE SERPL-MCNC: 3.3 MG/DL — SIGNIFICANT CHANGE UP (ref 2.5–4.5)
PLATELET # BLD AUTO: 288 K/UL — SIGNIFICANT CHANGE UP (ref 150–400)
POTASSIUM SERPL-MCNC: 3.9 MMOL/L — SIGNIFICANT CHANGE UP (ref 3.5–5.3)
POTASSIUM SERPL-SCNC: 3.9 MMOL/L — SIGNIFICANT CHANGE UP (ref 3.5–5.3)
PROT CSF-MCNC: 42 MG/DL — SIGNIFICANT CHANGE UP (ref 15–45)
PROT SERPL-MCNC: 7.2 G/DL — SIGNIFICANT CHANGE UP (ref 6–8.3)
PROTHROM AB SERPL-ACNC: 11.2 SEC — SIGNIFICANT CHANGE UP (ref 9.8–12.7)
RBC # BLD: 3.38 M/UL — LOW (ref 4.2–5.8)
RBC # CSF: 3 /UL — HIGH (ref 0–0)
RBC # FLD: 18.9 % — HIGH (ref 10.3–14.5)
SODIUM SERPL-SCNC: 139 MMOL/L — SIGNIFICANT CHANGE UP (ref 135–145)
TUBE TYPE: SIGNIFICANT CHANGE UP
WBC # BLD: 5.4 K/UL — SIGNIFICANT CHANGE UP (ref 3.8–10.5)
WBC # FLD AUTO: 5.4 K/UL — SIGNIFICANT CHANGE UP (ref 3.8–10.5)

## 2018-06-12 PROCEDURE — 77003 FLUOROGUIDE FOR SPINE INJECT: CPT | Mod: 26

## 2018-06-12 PROCEDURE — 99221 1ST HOSP IP/OBS SF/LOW 40: CPT

## 2018-06-12 PROCEDURE — 88189 FLOWCYTOMETRY/READ 16 & >: CPT

## 2018-06-12 PROCEDURE — 62272 THER SPI PNXR DRG CSF: CPT

## 2018-06-12 RX ORDER — ACYCLOVIR SODIUM 500 MG
1 VIAL (EA) INTRAVENOUS
Qty: 0 | Refills: 0 | COMMUNITY

## 2018-06-12 RX ORDER — FLUCONAZOLE 150 MG/1
200 TABLET ORAL DAILY
Qty: 0 | Refills: 0 | Status: DISCONTINUED | OUTPATIENT
Start: 2018-06-12 | End: 2018-06-17

## 2018-06-12 RX ORDER — ACETAMINOPHEN 500 MG
650 TABLET ORAL EVERY 6 HOURS
Qty: 0 | Refills: 0 | Status: DISCONTINUED | OUTPATIENT
Start: 2018-06-12 | End: 2018-06-17

## 2018-06-12 RX ORDER — METHOTREXATE 2.5 MG/1
12 TABLET ORAL ONCE
Qty: 0 | Refills: 0 | Status: COMPLETED | OUTPATIENT
Start: 2018-06-12 | End: 2018-06-13

## 2018-06-12 RX ORDER — HYDROCORTISONE 20 MG
100 TABLET ORAL ONCE
Qty: 0 | Refills: 0 | Status: DISCONTINUED | OUTPATIENT
Start: 2018-06-12 | End: 2018-06-17

## 2018-06-12 RX ORDER — ACYCLOVIR SODIUM 500 MG
800 VIAL (EA) INTRAVENOUS
Qty: 0 | Refills: 0 | Status: DISCONTINUED | OUTPATIENT
Start: 2018-06-12 | End: 2018-06-17

## 2018-06-12 RX ORDER — SODIUM CHLORIDE 9 MG/ML
1000 INJECTION INTRAMUSCULAR; INTRAVENOUS; SUBCUTANEOUS
Qty: 0 | Refills: 0 | Status: DISCONTINUED | OUTPATIENT
Start: 2018-06-12 | End: 2018-06-17

## 2018-06-12 RX ORDER — RITUXIMAB 10 MG/ML
746 INJECTION, SOLUTION INTRAVENOUS ONCE
Qty: 0 | Refills: 0 | Status: COMPLETED | OUTPATIENT
Start: 2018-06-12 | End: 2018-06-13

## 2018-06-12 RX ORDER — DOCUSATE SODIUM 100 MG
100 CAPSULE ORAL
Qty: 0 | Refills: 0 | Status: DISCONTINUED | OUTPATIENT
Start: 2018-06-12 | End: 2018-06-17

## 2018-06-12 RX ORDER — DIPHENHYDRAMINE HCL 50 MG
50 CAPSULE ORAL ONCE
Qty: 0 | Refills: 0 | Status: COMPLETED | OUTPATIENT
Start: 2018-06-12 | End: 2018-06-12

## 2018-06-12 RX ORDER — ACETAMINOPHEN 500 MG
650 TABLET ORAL ONCE
Qty: 0 | Refills: 0 | Status: COMPLETED | OUTPATIENT
Start: 2018-06-12 | End: 2018-06-12

## 2018-06-12 RX ORDER — ATOVAQUONE 750 MG/5ML
750 SUSPENSION ORAL
Qty: 0 | Refills: 0 | Status: DISCONTINUED | OUTPATIENT
Start: 2018-06-12 | End: 2018-06-17

## 2018-06-12 RX ORDER — ATOVAQUONE 750 MG/5ML
5 SUSPENSION ORAL
Qty: 0 | Refills: 0 | COMMUNITY

## 2018-06-12 RX ORDER — FLUCONAZOLE 150 MG/1
5 TABLET ORAL
Qty: 0 | Refills: 0 | COMMUNITY
Start: 2018-06-12

## 2018-06-12 RX ADMIN — SODIUM CHLORIDE 75 MILLILITER(S): 9 INJECTION INTRAMUSCULAR; INTRAVENOUS; SUBCUTANEOUS at 13:51

## 2018-06-12 RX ADMIN — Medication 650 MILLIGRAM(S): at 15:58

## 2018-06-12 RX ADMIN — ATOVAQUONE 750 MILLIGRAM(S): 750 SUSPENSION ORAL at 17:27

## 2018-06-12 RX ADMIN — Medication 800 MILLIGRAM(S): at 17:27

## 2018-06-12 RX ADMIN — Medication 50 MILLIGRAM(S): at 15:58

## 2018-06-12 RX ADMIN — FLUCONAZOLE 200 MILLIGRAM(S): 150 TABLET ORAL at 17:27

## 2018-06-12 NOTE — DISCHARGE NOTE ADULT - CARE PLAN
Principal Discharge DX:	High grade B-cell lymphoma  Goal:	Maintain counts, remain free of infection  Assessment and plan of treatment:	Notify MD or report to ER for fever greater or equal to 100.4, persistent nausea, vomiting, diarrhea, bleeding.  To [facility] at designated appointment for neulasta  Secondary Diagnosis:	HIV (human immunodeficiency virus infection)  Goal:	stable  Assessment and plan of treatment:	Continue medications

## 2018-06-12 NOTE — H&P ADULT - PROBLEM SELECTOR PLAN 2
The patient is not neutropenic  If febrile Pan Cx and CXR  HIV (+) continue antiretrovirals  Continue Acyclovir and Bactrim ppx The patient is not neutropenic  If febrile Pan Cx and CXR  HIV (+) continue antiretrovirals  Continue Acyclovir and mepron The patient is not neutropenic  If febrile Pan Cx and CXR    Continue Acyclovir and mepron

## 2018-06-12 NOTE — H&P ADULT - HISTORY OF PRESENT ILLNESS
This is a 40 yo M with PMHx of HIV and recent diagnosis of high-grade B-cell, CD10+ lymphoma with CNS involvement admitted for Cycle 6  DA-R-EPOCH with 20% dose reduction (same dosing as cycle 2)  Patient was initially seen at [facility] with fever. He was found to have left axillary lymphadenopathy. He had During the hospital stay Pt’s CBCs chemistries were monitored very closely and supplemented as needed. Vitals signs were monitored every 4 hrs and strict I/O was maintained.ot been fully compliant with his antiretroviral therapy until December of 2017 when he restarted his medication. His CD4 count is actually normal.  CT of chest showed left axillary lymphadenopathy with a node measuring up to 5 cm. Minimal bibasilar and right middle lobe atelectasis was seen with a tiny left pleural effusion. Mild cardiomegaly was noted. CT of abdomen and pelvis showed no hepatosplenomegaly or mass and no significant adenopathy.. Gastric wall thickening was seen of unclear etiology.  Left axillary lymph node biopsy showed a high-grade CD10 positive B cell lymphoma expressing CD20 and BCL 6. 65% of nuclei showed a myc rearrangement. BCL-2 and BCL 6 were not rearranged. Ki-67 was about 90%.    After Cycle 3 of chemotherapy the patient presented to the ER on 4/15/18  for fever and chills, RVP was positive for Influenza A and Entero/Rhinovirus. The patient completed a course of Tamiflu and upon admission today denies any nasal congestion, sore throat, fever, headache, dizziness, visual changes, chest pain, palpitations, SOB, abdominal pain, nausea, vomiting, diarrhea or dysuria.  Patient developed severe mucositis after cycle 4 of chemotherapy and received a 20% dose reduction for cycle 5 (cycle 3 dosing)

## 2018-06-12 NOTE — H&P ADULT - PROBLEM SELECTOR PLAN 3
Lovenox 40mg SQ daily on hold secondary to invasive procedure.  To be started 24 hours after LP and then held prior to second LP  D/C if PLT < 50K Lovenox 40mg SQ daily on hold secondary to invasive procedure.  To be started 24 hours after LP and then held prior to second LP

## 2018-06-12 NOTE — DISCHARGE NOTE ADULT - CARE PROVIDER_API CALL
Niall Cortes), Hematology; Internal Medicine; Medical Oncology  [address] [place] [address]  Phone: [phone]  Fax: [phone]

## 2018-06-12 NOTE — DISCHARGE NOTE ADULT - ADDITIONAL INSTRUCTIONS
To [facility] on Monday 6/18/18 at 11:40am for neulasta injection.  To [facility] on    at     to see  To the [facility] on Monday 6/18/18 at 11am to see Amira Crawford. You will then have your Neulasta injection at 11:40am. To the [facility] on Monday 6/18/18 at 11am to see Amira Crawford.   To the [facility] on Tuesday 6/19/18 at 3pm to have your Neulasta injection.

## 2018-06-12 NOTE — H&P ADULT - ASSESSMENT
This is a 42 yo M with PMHx of HIV and recent diagnosis of high-grade B-cell, CD10+ lymphoma with CNS involvement admitted for Cycle 6  DA-R-EPOCH with 20% dose reduction (cycle 2 dosing)

## 2018-06-12 NOTE — DISCHARGE NOTE ADULT - PLAN OF CARE
Maintain counts, remain free of infection Notify MD or report to ER for fever greater or equal to 100.4, persistent nausea, vomiting, diarrhea, bleeding.  To [facility] at designated appointment for neulasta stable Continue medications Notify MD or report to ER for fever greater or equal to 100.4, persistent nausea, vomiting, diarrhea, bleeding.  To [facility] at designated appointment for Neulasta

## 2018-06-12 NOTE — DISCHARGE NOTE ADULT - PATIENT PORTAL LINK FT
You can access the [facility] Patient Portal, offered by [facility], by registering with the following website: [email or web address]

## 2018-06-12 NOTE — H&P ADULT - PROBLEM SELECTOR PLAN 1
Admit to [facility]  Chemotherapy Cycle 6 of DA-R-EPOCH with 20% dose reduction (same dosing as cycle 2)  Rituxan 375mg/m2 on Day 1  Doxorubicin  mg/m2 continuous IV infusion Day 2-5  Etoposide  mg/m2 continuous IV infusion Day 2-5  Vincristine 0.4 mg/m2 continuous IV infusion Day 2-5  Prednisone 60 mg/m2 PO BID Day 2-6  Cyclophosphamide  mg/m2 IV x 1 on Day 6  Will require LP with IT MTX x 2 during this admission. check coags prior, Need plt >40  Monitor CBC/Lytes and transfuse/replete PRN  Strict Is and Os/Daily weights/Mouth Care  Antiemetics  IV hydration  neulasta post chemotherapy Admit to [facility]  Chemotherapy Cycle 6 of DA-R-EPOCH with 20% dose reduction (same dosing as cycle 2)  Rituxan 375mg/m2 on Day 1  Doxorubicin 12 mg/m2 continuous IV infusion Day 2-5  Etoposide 60 mg/m2 continuous IV infusion Day 2-5  Vincristine 0.4 mg/m2 continuous IV infusion Day 2-5  Prednisone 60 mg/m2 PO BID Day 2-6  Cyclophosphamide 900 mg/m2 IV x 1 on Day 6  Will require LP with IT MTX x 2 during this admission. check coags prior, Need plt >40  Monitor CBC/Lytes and transfuse/replete PRN  Strict Is and Os/Daily weights/Mouth Care  Antiemetics  IV hydration  neulasta post chemotherapy

## 2018-06-13 DIAGNOSIS — R70.1 ABNORMAL PLASMA VISCOSITY: ICD-10-CM

## 2018-06-13 LAB
ALBUMIN SERPL ELPH-MCNC: 3.8 G/DL — SIGNIFICANT CHANGE UP (ref 3.3–5)
ALP SERPL-CCNC: 63 U/L — SIGNIFICANT CHANGE UP (ref 40–120)
ALT FLD-CCNC: 18 U/L — SIGNIFICANT CHANGE UP (ref 10–45)
ANION GAP SERPL CALC-SCNC: 12 MMOL/L — SIGNIFICANT CHANGE UP (ref 5–17)
AST SERPL-CCNC: 15 U/L — SIGNIFICANT CHANGE UP (ref 10–40)
BASOPHILS # BLD AUTO: 0 K/UL — SIGNIFICANT CHANGE UP (ref 0–0.2)
BASOPHILS NFR BLD AUTO: 0.4 % — SIGNIFICANT CHANGE UP (ref 0–2)
BILIRUB SERPL-MCNC: 0.2 MG/DL — SIGNIFICANT CHANGE UP (ref 0.2–1.2)
BUN SERPL-MCNC: 9 MG/DL — SIGNIFICANT CHANGE UP (ref 7–23)
CALCIUM SERPL-MCNC: 8.8 MG/DL — SIGNIFICANT CHANGE UP (ref 8.4–10.5)
CHLORIDE SERPL-SCNC: 104 MMOL/L — SIGNIFICANT CHANGE UP (ref 96–108)
CO2 SERPL-SCNC: 24 MMOL/L — SIGNIFICANT CHANGE UP (ref 22–31)
CREAT SERPL-MCNC: 1.14 MG/DL — SIGNIFICANT CHANGE UP (ref 0.5–1.3)
EOSINOPHIL # BLD AUTO: 0 K/UL — SIGNIFICANT CHANGE UP (ref 0–0.5)
EOSINOPHIL NFR BLD AUTO: 0.9 % — SIGNIFICANT CHANGE UP (ref 0–6)
GLUCOSE SERPL-MCNC: 97 MG/DL — SIGNIFICANT CHANGE UP (ref 70–99)
HCT VFR BLD CALC: 30.2 % — LOW (ref 39–50)
HGB BLD-MCNC: 9.8 G/DL — LOW (ref 13–17)
LYMPHOCYTES # BLD AUTO: 1 K/UL — SIGNIFICANT CHANGE UP (ref 1–3.3)
LYMPHOCYTES # BLD AUTO: 28.4 % — SIGNIFICANT CHANGE UP (ref 13–44)
MAGNESIUM SERPL-MCNC: 2 MG/DL — SIGNIFICANT CHANGE UP (ref 1.6–2.6)
MCHC RBC-ENTMCNC: 30.3 PG — SIGNIFICANT CHANGE UP (ref 27–34)
MCHC RBC-ENTMCNC: 32.6 GM/DL — SIGNIFICANT CHANGE UP (ref 32–36)
MCV RBC AUTO: 93 FL — SIGNIFICANT CHANGE UP (ref 80–100)
MONOCYTES # BLD AUTO: 0.4 K/UL — SIGNIFICANT CHANGE UP (ref 0–0.9)
MONOCYTES NFR BLD AUTO: 11.7 % — SIGNIFICANT CHANGE UP (ref 2–14)
NEUTROPHILS # BLD AUTO: 2.1 K/UL — SIGNIFICANT CHANGE UP (ref 1.8–7.4)
NEUTROPHILS NFR BLD AUTO: 58.6 % — SIGNIFICANT CHANGE UP (ref 43–77)
PHOSPHATE SERPL-MCNC: 3.7 MG/DL — SIGNIFICANT CHANGE UP (ref 2.5–4.5)
PLATELET # BLD AUTO: 256 K/UL — SIGNIFICANT CHANGE UP (ref 150–400)
POTASSIUM SERPL-MCNC: 3.9 MMOL/L — SIGNIFICANT CHANGE UP (ref 3.5–5.3)
POTASSIUM SERPL-SCNC: 3.9 MMOL/L — SIGNIFICANT CHANGE UP (ref 3.5–5.3)
PROT SERPL-MCNC: 6.4 G/DL — SIGNIFICANT CHANGE UP (ref 6–8.3)
RBC # BLD: 3.25 M/UL — LOW (ref 4.2–5.8)
RBC # FLD: 18.6 % — HIGH (ref 10.3–14.5)
SODIUM SERPL-SCNC: 140 MMOL/L — SIGNIFICANT CHANGE UP (ref 135–145)
WBC # BLD: 3.6 K/UL — LOW (ref 3.8–10.5)
WBC # FLD AUTO: 3.6 K/UL — LOW (ref 3.8–10.5)

## 2018-06-13 PROCEDURE — 99231 SBSQ HOSP IP/OBS SF/LOW 25: CPT

## 2018-06-13 RX ORDER — ONDANSETRON 8 MG/1
8 TABLET, FILM COATED ORAL EVERY 8 HOURS
Qty: 0 | Refills: 0 | Status: DISCONTINUED | OUTPATIENT
Start: 2018-06-13 | End: 2018-06-17

## 2018-06-13 RX ORDER — DOXORUBICIN HYDROCHLORIDE 2 MG/ML
24 INJECTION, SOLUTION INTRAVENOUS DAILY
Qty: 0 | Refills: 0 | Status: DISCONTINUED | OUTPATIENT
Start: 2018-06-13 | End: 2018-06-17

## 2018-06-13 RX ORDER — FOSAPREPITANT DIMEGLUMINE 150 MG/5ML
150 INJECTION, POWDER, LYOPHILIZED, FOR SOLUTION INTRAVENOUS ONCE
Qty: 0 | Refills: 0 | Status: COMPLETED | OUTPATIENT
Start: 2018-06-13 | End: 2018-06-13

## 2018-06-13 RX ORDER — ENOXAPARIN SODIUM 100 MG/ML
40 INJECTION SUBCUTANEOUS EVERY 24 HOURS
Qty: 0 | Refills: 0 | Status: COMPLETED | OUTPATIENT
Start: 2018-06-13 | End: 2018-06-13

## 2018-06-13 RX ORDER — ETOPOSIDE 20 MG/ML
119 VIAL (ML) INTRAVENOUS DAILY
Qty: 0 | Refills: 0 | Status: DISCONTINUED | OUTPATIENT
Start: 2018-06-13 | End: 2018-06-17

## 2018-06-13 RX ADMIN — ONDANSETRON 8 MILLIGRAM(S): 8 TABLET, FILM COATED ORAL at 14:12

## 2018-06-13 RX ADMIN — Medication 120 MILLIGRAM(S): at 06:00

## 2018-06-13 RX ADMIN — FOSAPREPITANT DIMEGLUMINE 300 MILLIGRAM(S): 150 INJECTION, POWDER, LYOPHILIZED, FOR SOLUTION INTRAVENOUS at 09:51

## 2018-06-13 RX ADMIN — Medication 800 MILLIGRAM(S): at 05:59

## 2018-06-13 RX ADMIN — ATOVAQUONE 750 MILLIGRAM(S): 750 SUSPENSION ORAL at 06:00

## 2018-06-13 RX ADMIN — Medication 800 MILLIGRAM(S): at 18:41

## 2018-06-13 RX ADMIN — Medication 120 MILLIGRAM(S): at 18:27

## 2018-06-13 RX ADMIN — ENOXAPARIN SODIUM 40 MILLIGRAM(S): 100 INJECTION SUBCUTANEOUS at 18:27

## 2018-06-13 RX ADMIN — ONDANSETRON 8 MILLIGRAM(S): 8 TABLET, FILM COATED ORAL at 21:54

## 2018-06-13 RX ADMIN — FLUCONAZOLE 200 MILLIGRAM(S): 150 TABLET ORAL at 11:58

## 2018-06-13 RX ADMIN — ATOVAQUONE 750 MILLIGRAM(S): 750 SUSPENSION ORAL at 18:27

## 2018-06-13 RX ADMIN — SODIUM CHLORIDE 75 MILLILITER(S): 9 INJECTION INTRAMUSCULAR; INTRAVENOUS; SUBCUTANEOUS at 07:23

## 2018-06-13 NOTE — PROGRESS NOTE ADULT - PROBLEM SELECTOR PLAN 3
Continue HAART medications 6/1 5.3% on lab work  Check LDH, Haptoglobin, G6PD and Direct jeb with am labs on 6/14

## 2018-06-13 NOTE — PROGRESS NOTE ADULT - ASSESSMENT
This is a 40 yo M with PMHx of HIV and recent diagnosis of high-grade B-cell, CD10+ lymphoma with CNS involvement admitted for Cycle 6  DA-R-EPOCH with 20% dose reduction (cycle 2 dosing). This is a 42 yo M with PMHx of HIV and recent diagnosis of high-grade B-cell, CD10+ lymphoma with CNS involvement admitted for Cycle 6  DA-R-EPOCH with no dose adjustment from previous cycle.

## 2018-06-13 NOTE — PROGRESS NOTE ADULT - ATTENDING COMMENTS
Tolerating 6th cycle DA R EPOCHv well.  Await final CSF results.  Cont. chemotherapy.  OOB as tolerated.  Noted mild incr in retics: check haptoglobin, repeat retic, check AMANDA, G6PD.

## 2018-06-13 NOTE — PROGRESS NOTE ADULT - SUBJECTIVE AND OBJECTIVE BOX
Diagnosis: HIV associated DLBCL    Protocol/Chemo Regimen: Cycle 6 DA-EPOCH (20% dose reduction due to oral mucositis)    Day: 2      Pt endorsed: Feel well, no complaints offered.    Review of Systems: Patient denies headache, dizziness, visual changes, chest pain, palpitations, SOB, abdominal pain, nausea, vomiting, diarrhea or dysuria.    Pain scale:   0                                       Diet: regular    Allergies: No Known Allergies      ANTIMICROBIALS  abacavir 600 mG/dolutegravir 50 mG/lamiVUDine 300 mG 1 Tablet(s) Oral daily  acyclovir    Suspension 800 milliGRAM(s) Oral two times a day  atovaquone Suspension 750 milliGRAM(s) Oral two times a day  fluconAZOLE   40 mG/mL Suspension 200 milliGRAM(s) Oral daily      STANDING MEDICATIONS  predniSONE   Tablet 120 milliGRAM(s) Oral two times a day  sodium chloride 0.9%. 1000 milliLiter(s) IV Continuous <Continuous>      PRN MEDICATIONS  acetaminophen   Tablet 650 milliGRAM(s) Oral every 6 hours PRN  acetaminophen   Tablet. 650 milliGRAM(s) Oral every 6 hours PRN  docusate sodium 100 milliGRAM(s) Oral two times a day PRN  hydrocortisone sodium succinate Injectable 100 milliGRAM(s) IV Push once PRN      Vital Signs Last 24 Hrs  T(C): 36.3 (13 Jun 2018 04:50), Max: 37.1 (12 Jun 2018 21:00)  T(F): 97.3 (13 Jun 2018 04:50), Max: 98.7 (12 Jun 2018 21:00)  HR: 84 (13 Jun 2018 04:50) (80 - 105)  BP: 114/71 (13 Jun 2018 04:50) (114/71 - 150/93)  BP(mean): --  RR: 16 (13 Jun 2018 04:50) (16 - 19)  SpO2: 98% (13 Jun 2018 04:50) (98% - 100%)      PHYSICAL EXAM  General: NAD  HEENT: PERRLA, EOMI, clear oropharynx, anicteric sclera, pink conjunctiva  Neck: supple  CV: (+) S1/S2 RRR  Lungs: clear to auscultation, no wheezes or rales  Abdomen: soft, non-tender, non-distended (+) BS  Ext: no clubbing, cyanosis or edema  Skin: no rashes and no petechiae  Neuro: alert and oriented X 3, no focal deficits  Central Line: R [facility] C/D/I        LABS: Diagnosis: HIV associated DLBCL    Protocol/Chemo Regimen: Cycle 6 DA-EPOCH (20% dose reduction due to oral mucositis)    Day: 2      Pt endorsed: Feel well, no complaints offered.    Review of Systems: Patient denies headache, dizziness, visual changes, chest pain, palpitations, SOB, abdominal pain, nausea, vomiting, diarrhea or dysuria.    Pain scale:   0                                       Diet: regular    Allergies: No Known Allergies      ANTIMICROBIALS  abacavir 600 mG/dolutegravir 50 mG/lamiVUDine 300 mG 1 Tablet(s) Oral daily  acyclovir    Suspension 800 milliGRAM(s) Oral two times a day  atovaquone Suspension 750 milliGRAM(s) Oral two times a day  fluconAZOLE   40 mG/mL Suspension 200 milliGRAM(s) Oral daily      STANDING MEDICATIONS  predniSONE   Tablet 120 milliGRAM(s) Oral two times a day  sodium chloride 0.9%. 1000 milliLiter(s) IV Continuous <Continuous>      PRN MEDICATIONS  acetaminophen   Tablet 650 milliGRAM(s) Oral every 6 hours PRN  acetaminophen   Tablet. 650 milliGRAM(s) Oral every 6 hours PRN  docusate sodium 100 milliGRAM(s) Oral two times a day PRN  hydrocortisone sodium succinate Injectable 100 milliGRAM(s) IV Push once PRN      Vital Signs Last 24 Hrs  T(C): 36.3 (13 Jun 2018 04:50), Max: 37.1 (12 Jun 2018 21:00)  T(F): 97.3 (13 Jun 2018 04:50), Max: 98.7 (12 Jun 2018 21:00)  HR: 84 (13 Jun 2018 04:50) (80 - 105)  BP: 114/71 (13 Jun 2018 04:50) (114/71 - 150/93)  BP(mean): --  RR: 16 (13 Jun 2018 04:50) (16 - 19)  SpO2: 98% (13 Jun 2018 04:50) (98% - 100%)      PHYSICAL EXAM  General: NAD  HEENT: PERRLA, EOMI, clear oropharynx, anicteric sclera, pink conjunctiva  Neck: supple  CV: (+) S1/S2 RRR  Lungs: clear to auscultation, no wheezes or rales  Abdomen: soft, non-tender, non-distended (+) BS  Ext: no clubbing, cyanosis or edema  Skin: no rashes and no petechiae  Neuro: alert and oriented X 3, no focal deficits  Central Line: R [facility] C/D/I        LABS:                                   9.8    3.6   )-----------( 256      ( 13 Jun 2018 06:54 )             30.2         Mean Cell Volume : 93.0 fl  Mean Cell Hemoglobin : 30.3 pg  Mean Cell Hemoglobin Concentration : 32.6 gm/dL  Auto Neutrophil # : 2.1 K/uL  Auto Lymphocyte # : 1.0 K/uL  Auto Monocyte # : 0.4 K/uL  Auto Eosinophil # : 0.0 K/uL  Auto Basophil # : 0.0 K/uL  Auto Neutrophil % : 58.6 %  Auto Lymphocyte % : 28.4 %  Auto Monocyte % : 11.7 %  Auto Eosinophil % : 0.9 %  Auto Basophil % : 0.4 %      06-13    140  |  104  |  9   ----------------------------<  97  3.9   |  24  |  1.14    Ca    8.8      13 Jun 2018 06:54  Phos  3.7     06-13  Mg     2.0     06-13    TPro  6.4  /  Alb  3.8  /  TBili  0.2  /  DBili  x   /  AST  15  /  ALT  18  /  AlkPhos  63  06-13      Mg 2.0  Phos 3.7      PT/INR - ( 12 Jun 2018 14:28 )   PT: 11.2 sec;   INR: 1.03 ratio         PTT - ( 12 Jun 2018 14:28 )  PTT:29.8 sec Diagnosis: HIV associated DLBCL    Protocol/Chemo Regimen: Cycle 6 DA-EPOCH (no dose change from last cycle)    Day: 2      Pt endorsed: Feel well, no complaints offered.    Review of Systems: Patient denies headache, dizziness, visual changes, chest pain, palpitations, SOB, abdominal pain, nausea, vomiting, diarrhea or dysuria.    Pain scale:   0                                       Diet: regular    Allergies: No Known Allergies      ANTIMICROBIALS  abacavir 600 mG/dolutegravir 50 mG/lamiVUDine 300 mG 1 Tablet(s) Oral daily  acyclovir    Suspension 800 milliGRAM(s) Oral two times a day  atovaquone Suspension 750 milliGRAM(s) Oral two times a day  fluconAZOLE   40 mG/mL Suspension 200 milliGRAM(s) Oral daily      STANDING MEDICATIONS  predniSONE   Tablet 120 milliGRAM(s) Oral two times a day  sodium chloride 0.9%. 1000 milliLiter(s) IV Continuous <Continuous>      PRN MEDICATIONS  acetaminophen   Tablet 650 milliGRAM(s) Oral every 6 hours PRN  acetaminophen   Tablet. 650 milliGRAM(s) Oral every 6 hours PRN  docusate sodium 100 milliGRAM(s) Oral two times a day PRN  hydrocortisone sodium succinate Injectable 100 milliGRAM(s) IV Push once PRN      Vital Signs Last 24 Hrs  T(C): 36.3 (13 Jun 2018 04:50), Max: 37.1 (12 Jun 2018 21:00)  T(F): 97.3 (13 Jun 2018 04:50), Max: 98.7 (12 Jun 2018 21:00)  HR: 84 (13 Jun 2018 04:50) (80 - 105)  BP: 114/71 (13 Jun 2018 04:50) (114/71 - 150/93)  BP(mean): --  RR: 16 (13 Jun 2018 04:50) (16 - 19)  SpO2: 98% (13 Jun 2018 04:50) (98% - 100%)      PHYSICAL EXAM  General: NAD  HEENT: PERRLA, EOMI, clear oropharynx, anicteric sclera, pink conjunctiva  Neck: supple  CV: (+) S1/S2 RRR  Lungs: clear to auscultation, no wheezes or rales  Abdomen: soft, non-tender, non-distended (+) BS  Ext: no clubbing, cyanosis or edema  Skin: no rashes and no petechiae  Neuro: alert and oriented X 3, no focal deficits  Central Line: R [facility] C/D/I        LABS:                                   9.8    3.6   )-----------( 256      ( 13 Jun 2018 06:54 )             30.2         Mean Cell Volume : 93.0 fl  Mean Cell Hemoglobin : 30.3 pg  Mean Cell Hemoglobin Concentration : 32.6 gm/dL  Auto Neutrophil # : 2.1 K/uL  Auto Lymphocyte # : 1.0 K/uL  Auto Monocyte # : 0.4 K/uL  Auto Eosinophil # : 0.0 K/uL  Auto Basophil # : 0.0 K/uL  Auto Neutrophil % : 58.6 %  Auto Lymphocyte % : 28.4 %  Auto Monocyte % : 11.7 %  Auto Eosinophil % : 0.9 %  Auto Basophil % : 0.4 %      06-13    140  |  104  |  9   ----------------------------<  97  3.9   |  24  |  1.14    Ca    8.8      13 Jun 2018 06:54  Phos  3.7     06-13  Mg     2.0     06-13    TPro  6.4  /  Alb  3.8  /  TBili  0.2  /  DBili  x   /  AST  15  /  ALT  18  /  AlkPhos  63  06-13      Mg 2.0  Phos 3.7      PT/INR - ( 12 Jun 2018 14:28 )   PT: 11.2 sec;   INR: 1.03 ratio         PTT - ( 12 Jun 2018 14:28 )  PTT:29.8 sec

## 2018-06-13 NOTE — PROGRESS NOTE ADULT - PROBLEM SELECTOR PLAN 1
Admit to [facility]  Chemotherapy Cycle 6 of DA-R-EPOCH with 20% dose reduction (same dosing as Cycle 2)  Rituxan 375mg/m2 on Day 1  Doxorubicin 12 mg/m2 continuous IV infusion Day 2-5  Etoposide 60 mg/m2 continuous IV infusion Day 2-5  Vincristine 0.4 mg/m2 continuous IV infusion Day 2-5  Prednisone 60 mg/m2 PO BID Day 2-6  Cyclophosphamide 900 mg/m2 IV x 1 on Day 6  Will require LP with IT MTX x 2 during this admission (check coags prior, need plt >40 K)  Monitor CBC/Lytes and transfuse/replete PRN  Strict Is and Os/Daily weights/Mouth Care  Antiemetics  IV hydration  Neulasta post chemotherapy Admit to [facility]  Chemotherapy Cycle 6 of DA-R-EPOCH with no dose adjustment from previous cycle  Rituxan 375mg/m2 on Day 1  Doxorubicin 12 mg/m2 continuous IV infusion Day 2-5  Etoposide 60 mg/m2 continuous IV infusion Day 2-5  Vincristine 0.4 mg/m2 continuous IV infusion Day 2-5  Prednisone 60 mg/m2 PO BID Day 2-6  Cyclophosphamide 900 mg/m2 IV x 1 on Day 6  Will require LP with IT MTX x 2 during this admission (check coags prior, need plt >40 K)  Monitor CBC/Lytes and transfuse/replete PRN  Strict Is and Os/Daily weights/Mouth Care  Antiemetics  IV hydration  Neulasta post chemotherapy

## 2018-06-13 NOTE — PROGRESS NOTE ADULT - PROBLEM SELECTOR PLAN 4
Lovenox 40mg SQ daily on hold secondary to invasive procedure  To be started 24 hours after LP and then held prior to second LP Lovenox 40mg SQ x 1 today then restart 24 hours after next LP (scheduled on 6/15) Continue HAART medications

## 2018-06-14 DIAGNOSIS — B20 HUMAN IMMUNODEFICIENCY VIRUS [HIV] DISEASE: ICD-10-CM

## 2018-06-14 LAB
ALBUMIN SERPL ELPH-MCNC: 4.1 G/DL — SIGNIFICANT CHANGE UP (ref 3.3–5)
ALP SERPL-CCNC: 67 U/L — SIGNIFICANT CHANGE UP (ref 40–120)
ALT FLD-CCNC: 21 U/L — SIGNIFICANT CHANGE UP (ref 10–45)
ANION GAP SERPL CALC-SCNC: 15 MMOL/L — SIGNIFICANT CHANGE UP (ref 5–17)
AST SERPL-CCNC: 15 U/L — SIGNIFICANT CHANGE UP (ref 10–40)
BASOPHILS # BLD AUTO: 0 K/UL — SIGNIFICANT CHANGE UP (ref 0–0.2)
BASOPHILS NFR BLD AUTO: 0 % — SIGNIFICANT CHANGE UP (ref 0–2)
BILIRUB SERPL-MCNC: 0.2 MG/DL — SIGNIFICANT CHANGE UP (ref 0.2–1.2)
BLD GP AB SCN SERPL QL: NEGATIVE — SIGNIFICANT CHANGE UP
BUN SERPL-MCNC: 11 MG/DL — SIGNIFICANT CHANGE UP (ref 7–23)
CALCIUM SERPL-MCNC: 9.5 MG/DL — SIGNIFICANT CHANGE UP (ref 8.4–10.5)
CHLORIDE SERPL-SCNC: 100 MMOL/L — SIGNIFICANT CHANGE UP (ref 96–108)
CO2 SERPL-SCNC: 23 MMOL/L — SIGNIFICANT CHANGE UP (ref 22–31)
CREAT SERPL-MCNC: 0.97 MG/DL — SIGNIFICANT CHANGE UP (ref 0.5–1.3)
DAT POLY-SP REAG RBC QL: NEGATIVE — SIGNIFICANT CHANGE UP
EOSINOPHIL # BLD AUTO: 0 K/UL — SIGNIFICANT CHANGE UP (ref 0–0.5)
EOSINOPHIL NFR BLD AUTO: 0.2 % — SIGNIFICANT CHANGE UP (ref 0–6)
GLUCOSE SERPL-MCNC: 157 MG/DL — HIGH (ref 70–99)
HAPTOGLOB SERPL-MCNC: 258 MG/DL — HIGH (ref 34–200)
HCT VFR BLD CALC: 30.7 % — LOW (ref 39–50)
HGB BLD-MCNC: 10.4 G/DL — LOW (ref 13–17)
LDH SERPL L TO P-CCNC: 185 U/L — SIGNIFICANT CHANGE UP (ref 50–242)
LYMPHOCYTES # BLD AUTO: 0.9 K/UL — LOW (ref 1–3.3)
LYMPHOCYTES # BLD AUTO: 12.8 % — LOW (ref 13–44)
MAGNESIUM SERPL-MCNC: 1.9 MG/DL — SIGNIFICANT CHANGE UP (ref 1.6–2.6)
MCHC RBC-ENTMCNC: 31.4 PG — SIGNIFICANT CHANGE UP (ref 27–34)
MCHC RBC-ENTMCNC: 33.8 GM/DL — SIGNIFICANT CHANGE UP (ref 32–36)
MCV RBC AUTO: 92.9 FL — SIGNIFICANT CHANGE UP (ref 80–100)
MONOCYTES # BLD AUTO: 0.1 K/UL — SIGNIFICANT CHANGE UP (ref 0–0.9)
MONOCYTES NFR BLD AUTO: 1 % — LOW (ref 2–14)
NEUTROPHILS # BLD AUTO: 6.3 K/UL — SIGNIFICANT CHANGE UP (ref 1.8–7.4)
NEUTROPHILS NFR BLD AUTO: 86 % — HIGH (ref 43–77)
PHOSPHATE SERPL-MCNC: 4.1 MG/DL — SIGNIFICANT CHANGE UP (ref 2.5–4.5)
PLATELET # BLD AUTO: 258 K/UL — SIGNIFICANT CHANGE UP (ref 150–400)
POTASSIUM SERPL-MCNC: 3.9 MMOL/L — SIGNIFICANT CHANGE UP (ref 3.5–5.3)
POTASSIUM SERPL-SCNC: 3.9 MMOL/L — SIGNIFICANT CHANGE UP (ref 3.5–5.3)
PROT SERPL-MCNC: 6.9 G/DL — SIGNIFICANT CHANGE UP (ref 6–8.3)
RBC # BLD: 3.3 M/UL — LOW (ref 4.2–5.8)
RBC # FLD: 18.5 % — HIGH (ref 10.3–14.5)
RH IG SCN BLD-IMP: POSITIVE — SIGNIFICANT CHANGE UP
SODIUM SERPL-SCNC: 138 MMOL/L — SIGNIFICANT CHANGE UP (ref 135–145)
TM INTERPRETATION: SIGNIFICANT CHANGE UP
WBC # BLD: 7.3 K/UL — SIGNIFICANT CHANGE UP (ref 3.8–10.5)
WBC # FLD AUTO: 7.3 K/UL — SIGNIFICANT CHANGE UP (ref 3.8–10.5)

## 2018-06-14 PROCEDURE — 99231 SBSQ HOSP IP/OBS SF/LOW 25: CPT

## 2018-06-14 RX ADMIN — FLUCONAZOLE 200 MILLIGRAM(S): 150 TABLET ORAL at 11:10

## 2018-06-14 RX ADMIN — Medication 120 MILLIGRAM(S): at 17:19

## 2018-06-14 RX ADMIN — ATOVAQUONE 750 MILLIGRAM(S): 750 SUSPENSION ORAL at 06:09

## 2018-06-14 RX ADMIN — Medication 800 MILLIGRAM(S): at 06:08

## 2018-06-14 RX ADMIN — ONDANSETRON 8 MILLIGRAM(S): 8 TABLET, FILM COATED ORAL at 06:08

## 2018-06-14 RX ADMIN — Medication 800 MILLIGRAM(S): at 17:19

## 2018-06-14 RX ADMIN — ONDANSETRON 8 MILLIGRAM(S): 8 TABLET, FILM COATED ORAL at 22:08

## 2018-06-14 RX ADMIN — ATOVAQUONE 750 MILLIGRAM(S): 750 SUSPENSION ORAL at 17:19

## 2018-06-14 RX ADMIN — ONDANSETRON 8 MILLIGRAM(S): 8 TABLET, FILM COATED ORAL at 13:01

## 2018-06-14 RX ADMIN — Medication 120 MILLIGRAM(S): at 06:08

## 2018-06-14 RX ADMIN — SODIUM CHLORIDE 75 MILLILITER(S): 9 INJECTION INTRAMUSCULAR; INTRAVENOUS; SUBCUTANEOUS at 20:00

## 2018-06-14 NOTE — PROGRESS NOTE ADULT - ASSESSMENT
This is a 42 yo M with PMHx of HIV and recent diagnosis of high-grade B-cell, CD10+ lymphoma with CNS involvement admitted for Cycle 6  DA-R-EPOCH with no dose adjustment from previous cycle.

## 2018-06-14 NOTE — PROGRESS NOTE ADULT - SUBJECTIVE AND OBJECTIVE BOX
Diagnosis: HIV associated DLBCL    Protocol/Chemo Regimen: Cycle 6 DA-EPOCH (no dose change from last cycle)    Day: 3     Pt endorsed: Feel well, no complaints offered    Review of Systems: Patient denies headache, dizziness, visual changes, chest pain, palpitations, SOB, abdominal pain, nausea, vomiting, diarrhea or dysuria.    Pain scale:   0                                       Diet: regular    Allergies: No Known Allergies      ANTIMICROBIALS  abacavir 600 mG/dolutegravir 50 mG/lamiVUDine 300 mG 1 Tablet(s) Oral daily  acyclovir    Suspension 800 milliGRAM(s) Oral two times a day  atovaquone Suspension 750 milliGRAM(s) Oral two times a day  fluconAZOLE   40 mG/mL Suspension 200 milliGRAM(s) Oral daily      HEME/ONC MEDICATIONS  DOXOrubicin IVPB w/vinCRIStine 24 milliGRAM(s) IV Intermittent daily  etoposide IVPB 119 milliGRAM(s) IV Intermittent daily      STANDING MEDICATIONS  ondansetron Injectable 8 milliGRAM(s) IV Push every 8 hours  predniSONE   Tablet 120 milliGRAM(s) Oral two times a day  sodium chloride 0.9%. 1000 milliLiter(s) IV Continuous <Continuous>      PRN MEDICATIONS  acetaminophen   Tablet 650 milliGRAM(s) Oral every 6 hours PRN  acetaminophen   Tablet. 650 milliGRAM(s) Oral every 6 hours PRN  docusate sodium 100 milliGRAM(s) Oral two times a day PRN  hydrocortisone sodium succinate Injectable 100 milliGRAM(s) IV Push once PRN      Vital Signs Last 24 Hrs  T(C): 36.3 (14 Jun 2018 05:15), Max: 37.1 (13 Jun 2018 20:48)  T(F): 97.3 (14 Jun 2018 05:15), Max: 98.7 (13 Jun 2018 20:48)  HR: 103 (14 Jun 2018 05:15) (83 - 103)  BP: 147/76 (14 Jun 2018 05:15) (129/88 - 147/76)  BP(mean): --  RR: 18 (14 Jun 2018 05:15) (18 - 18)  SpO2: 96% (14 Jun 2018 05:15) (96% - 99%)      PHYSICAL EXAM  General: NAD  HEENT: PERRLA, EOMI, clear oropharynx, anicteric sclera, pink conjunctiva  Neck: supple  CV: (+) S1/S2 RRR  Lungs: clear to auscultation, no wheezes or rales  Abdomen: soft, non-tender, non-distended (+) BS  Ext: no clubbing, cyanosis or edema  Skin: no rashes and no petechiae  Neuro: alert and oriented X 3, no focal deficits  Central Line: R [facility] C/D/I      LABS:                        10.4   7.3   )-----------( 258      ( 14 Jun 2018 06:56 )             30.7         Mean Cell Volume : 92.9 fl  Mean Cell Hemoglobin : 31.4 pg  Mean Cell Hemoglobin Concentration : 33.8 gm/dL  Auto Neutrophil # : 6.3 K/uL  Auto Lymphocyte # : 0.9 K/uL  Auto Monocyte # : 0.1 K/uL  Auto Eosinophil # : 0.0 K/uL  Auto Basophil # : 0.0 K/uL  Auto Neutrophil % : 86.0 %  Auto Lymphocyte % : 12.8 %  Auto Monocyte % : 1.0 %  Auto Eosinophil % : 0.2 %  Auto Basophil % : 0.0 %      06-14    138  |  100  |  11  ----------------------------<  157<H>  3.9   |  23  |  0.97    Ca    9.5      14 Jun 2018 06:56  Phos  4.1     06-14  Mg     1.9     06-14    TPro  6.9  /  Alb  4.1  /  TBili  0.2  /  DBili  x   /  AST  15  /  ALT  21  /  AlkPhos  67  06-14      Mg 1.9  Phos 4.1      PT/INR - ( 12 Jun 2018 14:28 )   PT: 11.2 sec;   INR: 1.03 ratio         PTT - ( 12 Jun 2018 14:28 )  PTT:29.8 sec              RADIOLOGY & ADDITIONAL STUDIES:

## 2018-06-14 NOTE — PROGRESS NOTE ADULT - PROBLEM SELECTOR PLAN 3
6/1 5.3% on lab work  Check LDH, Haptoglobin, G6PD and Direct jeb with am labs on 6/14 6/1 5.3% on lab work  6/14 LDH, Haptoglobin and Direct jeb not indicative of hemolysis may have been attributed to marrow regeneration after chemotherapy  G6PD still pending to be followed outpatient

## 2018-06-14 NOTE — PROGRESS NOTE ADULT - PROBLEM SELECTOR PLAN 1
Admit to [facility]  Chemotherapy Cycle 6 of DA-R-EPOCH with no dose adjustment from previous cycle  Rituxan 375mg/m2 on Day 1  Doxorubicin 12 mg/m2 continuous IV infusion Day 2-5  Etoposide 60 mg/m2 continuous IV infusion Day 2-5  Vincristine 0.4 mg/m2 continuous IV infusion Day 2-5  Prednisone 60 mg/m2 PO BID Day 2-6  Cyclophosphamide 900 mg/m2 IV x 1 on Day 6  Will require LP with IT MTX x 2 during this admission (check coags prior, need plt >40 K)  Monitor CBC/Lytes and transfuse/replete PRN  Strict Is and Os/Daily weights/Mouth Care  Antiemetics  IV hydration  Neulasta post chemotherapy

## 2018-06-14 NOTE — PROGRESS NOTE ADULT - ATTENDING COMMENTS
Tolerating 6th cycle DA R EPOCHv well.  Await final CSF results.  Cont. chemotherapy.  OOB as tolerated.  Noted mild incr in retics: check haptoglobin, repeat retic, check AMANDA, G6PD. Tolerating 6th cycle DA R EPOCH well, d3  Await final CSF results.  Cont. chemotherapy.  OOB as tolerated.  Noted mild incr in retics: check haptoglobin, repeat retic, check AMANDA, G6PD.  Based on results so far retic count likely reflects recovery from chemotherapy.

## 2018-06-15 LAB
ALBUMIN SERPL ELPH-MCNC: 3.9 G/DL — SIGNIFICANT CHANGE UP (ref 3.3–5)
ALP SERPL-CCNC: 56 U/L — SIGNIFICANT CHANGE UP (ref 40–120)
ALT FLD-CCNC: 27 U/L — SIGNIFICANT CHANGE UP (ref 10–45)
ANION GAP SERPL CALC-SCNC: 11 MMOL/L — SIGNIFICANT CHANGE UP (ref 5–17)
APPEARANCE CSF: CLEAR — SIGNIFICANT CHANGE UP
AST SERPL-CCNC: 19 U/L — SIGNIFICANT CHANGE UP (ref 10–40)
BASOPHILS # BLD AUTO: 0 K/UL — SIGNIFICANT CHANGE UP (ref 0–0.2)
BASOPHILS NFR BLD AUTO: 0.4 % — SIGNIFICANT CHANGE UP (ref 0–2)
BILIRUB SERPL-MCNC: 0.2 MG/DL — SIGNIFICANT CHANGE UP (ref 0.2–1.2)
BUN SERPL-MCNC: 14 MG/DL — SIGNIFICANT CHANGE UP (ref 7–23)
CALCIUM SERPL-MCNC: 8.4 MG/DL — SIGNIFICANT CHANGE UP (ref 8.4–10.5)
CHLORIDE SERPL-SCNC: 103 MMOL/L — SIGNIFICANT CHANGE UP (ref 96–108)
CO2 SERPL-SCNC: 25 MMOL/L — SIGNIFICANT CHANGE UP (ref 22–31)
COLOR CSF: SIGNIFICANT CHANGE UP
CREAT SERPL-MCNC: 0.97 MG/DL — SIGNIFICANT CHANGE UP (ref 0.5–1.3)
EOSINOPHIL # BLD AUTO: 0 K/UL — SIGNIFICANT CHANGE UP (ref 0–0.5)
EOSINOPHIL NFR BLD AUTO: 0.2 % — SIGNIFICANT CHANGE UP (ref 0–6)
GLUCOSE CSF-MCNC: 68 MG/DL — SIGNIFICANT CHANGE UP (ref 40–70)
GLUCOSE SERPL-MCNC: 107 MG/DL — HIGH (ref 70–99)
HCT VFR BLD CALC: 29.8 % — LOW (ref 39–50)
HGB BLD-MCNC: 9.8 G/DL — LOW (ref 13–17)
LDH CSF L TO P-CCNC: 54 U/L — SIGNIFICANT CHANGE UP
LDH FLD-CCNC: 54 U/L — SIGNIFICANT CHANGE UP
LYMPHOCYTES # BLD AUTO: 1.1 K/UL — SIGNIFICANT CHANGE UP (ref 1–3.3)
LYMPHOCYTES # BLD AUTO: 29.3 % — SIGNIFICANT CHANGE UP (ref 13–44)
LYMPHOCYTES # CSF: 78 % — SIGNIFICANT CHANGE UP (ref 40–80)
MAGNESIUM SERPL-MCNC: 2.1 MG/DL — SIGNIFICANT CHANGE UP (ref 1.6–2.6)
MCHC RBC-ENTMCNC: 30.6 PG — SIGNIFICANT CHANGE UP (ref 27–34)
MCHC RBC-ENTMCNC: 32.8 GM/DL — SIGNIFICANT CHANGE UP (ref 32–36)
MCV RBC AUTO: 93.1 FL — SIGNIFICANT CHANGE UP (ref 80–100)
MONOCYTES # BLD AUTO: 0.4 K/UL — SIGNIFICANT CHANGE UP (ref 0–0.9)
MONOCYTES NFR BLD AUTO: 11.5 % — SIGNIFICANT CHANGE UP (ref 2–14)
MONOS+MACROS NFR CSF: 22 % — SIGNIFICANT CHANGE UP (ref 15–45)
NEUTROPHILS # BLD AUTO: 2.2 K/UL — SIGNIFICANT CHANGE UP (ref 1.8–7.4)
NEUTROPHILS # CSF: 0 % — SIGNIFICANT CHANGE UP (ref 0–6)
NEUTROPHILS NFR BLD AUTO: 58.6 % — SIGNIFICANT CHANGE UP (ref 43–77)
NRBC NFR CSF: 7 /UL — HIGH (ref 0–5)
PHOSPHATE SERPL-MCNC: 3.1 MG/DL — SIGNIFICANT CHANGE UP (ref 2.5–4.5)
PLATELET # BLD AUTO: 234 K/UL — SIGNIFICANT CHANGE UP (ref 150–400)
POTASSIUM SERPL-MCNC: 3.5 MMOL/L — SIGNIFICANT CHANGE UP (ref 3.5–5.3)
POTASSIUM SERPL-SCNC: 3.5 MMOL/L — SIGNIFICANT CHANGE UP (ref 3.5–5.3)
PROT CSF-MCNC: 63 MG/DL — HIGH (ref 15–45)
PROT SERPL-MCNC: 6.3 G/DL — SIGNIFICANT CHANGE UP (ref 6–8.3)
RBC # BLD: 3.2 M/UL — LOW (ref 4.2–5.8)
RBC # CSF: 13 /UL — HIGH (ref 0–0)
RBC # FLD: 18.3 % — HIGH (ref 10.3–14.5)
SODIUM SERPL-SCNC: 139 MMOL/L — SIGNIFICANT CHANGE UP (ref 135–145)
TUBE TYPE: SIGNIFICANT CHANGE UP
WBC # BLD: 3.8 K/UL — SIGNIFICANT CHANGE UP (ref 3.8–10.5)
WBC # FLD AUTO: 3.8 K/UL — SIGNIFICANT CHANGE UP (ref 3.8–10.5)

## 2018-06-15 PROCEDURE — 99231 SBSQ HOSP IP/OBS SF/LOW 25: CPT

## 2018-06-15 PROCEDURE — 62272 THER SPI PNXR DRG CSF: CPT

## 2018-06-15 PROCEDURE — 88188 FLOWCYTOMETRY/READ 9-15: CPT

## 2018-06-15 PROCEDURE — 77003 FLUOROGUIDE FOR SPINE INJECT: CPT | Mod: 26

## 2018-06-15 RX ORDER — METHOTREXATE 2.5 MG/1
12 TABLET ORAL ONCE
Qty: 0 | Refills: 0 | Status: DISCONTINUED | OUTPATIENT
Start: 2018-06-15 | End: 2018-06-17

## 2018-06-15 RX ADMIN — Medication 800 MILLIGRAM(S): at 18:03

## 2018-06-15 RX ADMIN — ATOVAQUONE 750 MILLIGRAM(S): 750 SUSPENSION ORAL at 18:03

## 2018-06-15 RX ADMIN — FLUCONAZOLE 200 MILLIGRAM(S): 150 TABLET ORAL at 15:18

## 2018-06-15 RX ADMIN — Medication 120 MILLIGRAM(S): at 18:03

## 2018-06-15 RX ADMIN — Medication 800 MILLIGRAM(S): at 06:17

## 2018-06-15 RX ADMIN — Medication 650 MILLIGRAM(S): at 18:50

## 2018-06-15 RX ADMIN — Medication 650 MILLIGRAM(S): at 08:15

## 2018-06-15 RX ADMIN — Medication 120 MILLIGRAM(S): at 06:17

## 2018-06-15 RX ADMIN — Medication 650 MILLIGRAM(S): at 18:04

## 2018-06-15 RX ADMIN — ONDANSETRON 8 MILLIGRAM(S): 8 TABLET, FILM COATED ORAL at 13:13

## 2018-06-15 RX ADMIN — ONDANSETRON 8 MILLIGRAM(S): 8 TABLET, FILM COATED ORAL at 06:17

## 2018-06-15 RX ADMIN — SODIUM CHLORIDE 75 MILLILITER(S): 9 INJECTION INTRAMUSCULAR; INTRAVENOUS; SUBCUTANEOUS at 06:18

## 2018-06-15 RX ADMIN — Medication 650 MILLIGRAM(S): at 07:36

## 2018-06-15 RX ADMIN — ONDANSETRON 8 MILLIGRAM(S): 8 TABLET, FILM COATED ORAL at 21:56

## 2018-06-15 RX ADMIN — ATOVAQUONE 750 MILLIGRAM(S): 750 SUSPENSION ORAL at 06:17

## 2018-06-15 RX ADMIN — SODIUM CHLORIDE 75 MILLILITER(S): 9 INJECTION INTRAMUSCULAR; INTRAVENOUS; SUBCUTANEOUS at 21:55

## 2018-06-15 NOTE — PROGRESS NOTE ADULT - ATTENDING COMMENTS
Tolerating 6th cycle DA R EPOCH well, d3  Await final CSF results.  Cont. chemotherapy.  OOB as tolerated.  Noted mild incr in retics: check haptoglobin, repeat retic, check AMANDA, G6PD.  Based on results so far retic count likely reflects recovery from chemotherapy. Tolerating 6th cycle DA R EPOCH well, d4  CSF from first LP of this cycle was (-).  Cont. chemotherapy.  For 2nd LP of this cycle today.  OOB as tolerated.  Noted mild incr in retics: haptoglobin, repeat retic, check AMANDA, G6PD checked.  Based on results retic count likely reflects recovery from chemotherapy. c/o transient postural lightheadedness.

## 2018-06-15 NOTE — PROGRESS NOTE ADULT - SUBJECTIVE AND OBJECTIVE BOX
Clinical Indication: Lymphoma for intrathecal chemotherapy    PREPROCEDURE:    Patient presents for diagnostic lumbar puncture.  Risks and benefits were discussed with patient and/or health care proxy.  Risks include but are not limited to headache, bleeding, infection and nerve damage.    Patient and/or health care proxy understands and consents to procedure.    POSTPROCEDURE:    Lumbar puncture was performed at theb  L3-4  level using a 22 gauge needle using fluoroscopic guidance. 3 cc of CSF  was collected and hand delivered to the lab. 12 mlg of Methotrexate were intrathecally injected without incident.    Patient tolerated the procedure well and left the department in stable condition.

## 2018-06-15 NOTE — PROGRESS NOTE ADULT - SUBJECTIVE AND OBJECTIVE BOX
Diagnosis: HIV associated DLBCL    Protocol/Chemo Regimen: Cycle 6 DA-EPOCH (no dose change from last cycle)    Day: 4     Pt endorsed: Feel well, no complaints offered    Review of Systems: Patient denies headache, dizziness, visual changes, chest pain, palpitations, SOB, abdominal pain, nausea, vomiting, diarrhea or dysuria.    Pain scale:   0                                       Diet: regular    Allergies: No Known Allergies      ANTIMICROBIALS  abacavir 600 mG/dolutegravir 50 mG/lamiVUDine 300 mG 1 Tablet(s) Oral daily  acyclovir    Suspension 800 milliGRAM(s) Oral two times a day  atovaquone Suspension 750 milliGRAM(s) Oral two times a day  fluconAZOLE   40 mG/mL Suspension 200 milliGRAM(s) Oral daily      HEME/ONC MEDICATIONS  DOXOrubicin IVPB w/vinCRIStine 24 milliGRAM(s) IV Intermittent daily  etoposide IVPB 119 milliGRAM(s) IV Intermittent daily      STANDING MEDICATIONS  ondansetron Injectable 8 milliGRAM(s) IV Push every 8 hours  predniSONE   Tablet 120 milliGRAM(s) Oral two times a day  sodium chloride 0.9%. 1000 milliLiter(s) IV Continuous <Continuous>      PRN MEDICATIONS  acetaminophen   Tablet 650 milliGRAM(s) Oral every 6 hours PRN  acetaminophen   Tablet. 650 milliGRAM(s) Oral every 6 hours PRN  docusate sodium 100 milliGRAM(s) Oral two times a day PRN  hydrocortisone sodium succinate Injectable 100 milliGRAM(s) IV Push once PRN      Vital Signs Last 24 Hrs  T(C): 36.3 (15 Quique 2018 04:55), Max: 36.9 (14 Jun 2018 14:36)  T(F): 97.3 (15 Quique 2018 04:55), Max: 98.4 (14 Jun 2018 14:36)  HR: 82 (15 Quique 2018 04:55) (72 - 103)  BP: 136/84 (15 Quique 2018 04:55) (129/70 - 142/75)  BP(mean): --  RR: 18 (15 Quique 2018 04:55) (18 - 18)  SpO2: 100% (15 Quique 2018 04:55) (97% - 100%)      PHYSICAL EXAM  General: NAD  HEENT: PERRLA, EOMI, clear oropharynx, anicteric sclera, pink conjunctiva  Neck: supple  CV: (+) S1/S2 RRR  Lungs: clear to auscultation, no wheezes or rales  Abdomen: soft, non-tender, non-distended (+) BS  Ext: no clubbing, cyanosis or edema  Skin: no rashes and no petechiae  Neuro: alert and oriented X 3, no focal deficits  Central Line: R ACW Mediport C/D/I        LABS:                        9.8    3.8   )-----------( 234      ( 15 Quique 2018 07:04 )             29.8         Mean Cell Volume : 93.1 fl  Mean Cell Hemoglobin : 30.6 pg  Mean Cell Hemoglobin Concentration : 32.8 gm/dL  Auto Neutrophil # : 2.2 K/uL  Auto Lymphocyte # : 1.1 K/uL  Auto Monocyte # : 0.4 K/uL  Auto Eosinophil # : 0.0 K/uL  Auto Basophil # : 0.0 K/uL  Auto Neutrophil % : 58.6 %  Auto Lymphocyte % : 29.3 %  Auto Monocyte % : 11.5 %  Auto Eosinophil % : 0.2 %  Auto Basophil % : 0.4 %      06-15    139  |  103  |  14  ----------------------------<  107<H>  3.5   |  25  |  0.97    Ca    8.4      15 Quique 2018 07:04  Phos  3.1     06-15  Mg     2.1     06-15    TPro  6.3  /  Alb  3.9  /  TBili  0.2  /  DBili  x   /  AST  19  /  ALT  27  /  AlkPhos  56  06-15      Mg 2.1  Phos 3.1 Diagnosis: HIV associated DLBCL    Protocol/Chemo Regimen: Cycle 6 DA-EPOCH (no dose change from last cycle)    Day: 4     Pt endorsed: headache, right side of neck sore and lightheadedness when standing up from lying    Review of Systems: Patient denies visual changes, chest pain, palpitations, SOB, abdominal pain, nausea, vomiting, diarrhea or dysuria.    Pain scale:   0                                       Diet: regular    Allergies: No Known Allergies      ANTIMICROBIALS  abacavir 600 mG/dolutegravir 50 mG/lamiVUDine 300 mG 1 Tablet(s) Oral daily  acyclovir    Suspension 800 milliGRAM(s) Oral two times a day  atovaquone Suspension 750 milliGRAM(s) Oral two times a day  fluconAZOLE   40 mG/mL Suspension 200 milliGRAM(s) Oral daily      HEME/ONC MEDICATIONS  DOXOrubicin IVPB w/vinCRIStine 24 milliGRAM(s) IV Intermittent daily  etoposide IVPB 119 milliGRAM(s) IV Intermittent daily      STANDING MEDICATIONS  ondansetron Injectable 8 milliGRAM(s) IV Push every 8 hours  predniSONE   Tablet 120 milliGRAM(s) Oral two times a day  sodium chloride 0.9%. 1000 milliLiter(s) IV Continuous <Continuous>      PRN MEDICATIONS  acetaminophen   Tablet 650 milliGRAM(s) Oral every 6 hours PRN  acetaminophen   Tablet. 650 milliGRAM(s) Oral every 6 hours PRN  docusate sodium 100 milliGRAM(s) Oral two times a day PRN  hydrocortisone sodium succinate Injectable 100 milliGRAM(s) IV Push once PRN      Vital Signs Last 24 Hrs  T(C): 36.3 (15 Quique 2018 04:55), Max: 36.9 (14 Jun 2018 14:36)  T(F): 97.3 (15 Quique 2018 04:55), Max: 98.4 (14 Jun 2018 14:36)  HR: 82 (15 Quique 2018 04:55) (72 - 103)  BP: 136/84 (15 Quique 2018 04:55) (129/70 - 142/75)  BP(mean): --  RR: 18 (15 Quique 2018 04:55) (18 - 18)  SpO2: 100% (15 Quique 2018 04:55) (97% - 100%)      PHYSICAL EXAM  General: NAD  HEENT: PERRLA, EOMI, clear oropharynx, anicteric sclera, pink conjunctiva. Right side of neck with tenderness on palpation likely from positioning  Neck: supple  CV: (+) S1/S2 RRR  Lungs: clear to auscultation, no wheezes or rales  Abdomen: soft, non-tender, non-distended (+) BS  Ext: no clubbing, cyanosis or edema  Skin: no rashes and no petechiae  Neuro: alert and oriented X 3, no focal deficits  Central Line: R ACW Mediport C/D/I        LABS:                        9.8    3.8   )-----------( 234      ( 15 Quique 2018 07:04 )             29.8         Mean Cell Volume : 93.1 fl  Mean Cell Hemoglobin : 30.6 pg  Mean Cell Hemoglobin Concentration : 32.8 gm/dL  Auto Neutrophil # : 2.2 K/uL  Auto Lymphocyte # : 1.1 K/uL  Auto Monocyte # : 0.4 K/uL  Auto Eosinophil # : 0.0 K/uL  Auto Basophil # : 0.0 K/uL  Auto Neutrophil % : 58.6 %  Auto Lymphocyte % : 29.3 %  Auto Monocyte % : 11.5 %  Auto Eosinophil % : 0.2 %  Auto Basophil % : 0.4 %      06-15    139  |  103  |  14  ----------------------------<  107<H>  3.5   |  25  |  0.97    Ca    8.4      15 Quique 2018 07:04  Phos  3.1     06-15  Mg     2.1     06-15    TPro  6.3  /  Alb  3.9  /  TBili  0.2  /  DBili  x   /  AST  19  /  ALT  27  /  AlkPhos  56  06-15      Mg 2.1  Phos 3.1

## 2018-06-15 NOTE — PROGRESS NOTE ADULT - PROBLEM SELECTOR PLAN 3
6/1 5.3% on lab work  6/14 LDH, Haptoglobin and Direct jeb not indicative of hemolysis may have been attributed to marrow regeneration after chemotherapy  G6PD still pending to be followed outpatient

## 2018-06-16 LAB
ALBUMIN SERPL ELPH-MCNC: 3.9 G/DL — SIGNIFICANT CHANGE UP (ref 3.3–5)
ALP SERPL-CCNC: 57 U/L — SIGNIFICANT CHANGE UP (ref 40–120)
ALT FLD-CCNC: 28 U/L — SIGNIFICANT CHANGE UP (ref 10–45)
ANION GAP SERPL CALC-SCNC: 12 MMOL/L — SIGNIFICANT CHANGE UP (ref 5–17)
AST SERPL-CCNC: 15 U/L — SIGNIFICANT CHANGE UP (ref 10–40)
BASOPHILS # BLD AUTO: 0 K/UL — SIGNIFICANT CHANGE UP (ref 0–0.2)
BASOPHILS NFR BLD AUTO: 0.1 % — SIGNIFICANT CHANGE UP (ref 0–2)
BILIRUB SERPL-MCNC: 0.5 MG/DL — SIGNIFICANT CHANGE UP (ref 0.2–1.2)
BUN SERPL-MCNC: 11 MG/DL — SIGNIFICANT CHANGE UP (ref 7–23)
CALCIUM SERPL-MCNC: 8.8 MG/DL — SIGNIFICANT CHANGE UP (ref 8.4–10.5)
CHLORIDE SERPL-SCNC: 100 MMOL/L — SIGNIFICANT CHANGE UP (ref 96–108)
CO2 SERPL-SCNC: 26 MMOL/L — SIGNIFICANT CHANGE UP (ref 22–31)
CREAT SERPL-MCNC: 0.94 MG/DL — SIGNIFICANT CHANGE UP (ref 0.5–1.3)
EOSINOPHIL # BLD AUTO: 0 K/UL — SIGNIFICANT CHANGE UP (ref 0–0.5)
EOSINOPHIL NFR BLD AUTO: 0.2 % — SIGNIFICANT CHANGE UP (ref 0–6)
G6PD RBC-CCNC: 20.8 U/G HGB — HIGH (ref 7–20.5)
GLUCOSE SERPL-MCNC: 132 MG/DL — HIGH (ref 70–99)
HCT VFR BLD CALC: 29.7 % — LOW (ref 39–50)
HGB BLD-MCNC: 10 G/DL — LOW (ref 13–17)
LYMPHOCYTES # BLD AUTO: 0.7 K/UL — LOW (ref 1–3.3)
LYMPHOCYTES # BLD AUTO: 16.2 % — SIGNIFICANT CHANGE UP (ref 13–44)
MAGNESIUM SERPL-MCNC: 1.9 MG/DL — SIGNIFICANT CHANGE UP (ref 1.6–2.6)
MCHC RBC-ENTMCNC: 30.9 PG — SIGNIFICANT CHANGE UP (ref 27–34)
MCHC RBC-ENTMCNC: 33.6 GM/DL — SIGNIFICANT CHANGE UP (ref 32–36)
MCV RBC AUTO: 92.1 FL — SIGNIFICANT CHANGE UP (ref 80–100)
MONOCYTES # BLD AUTO: 0.2 K/UL — SIGNIFICANT CHANGE UP (ref 0–0.9)
MONOCYTES NFR BLD AUTO: 3.8 % — SIGNIFICANT CHANGE UP (ref 2–14)
NEUTROPHILS # BLD AUTO: 3.6 K/UL — SIGNIFICANT CHANGE UP (ref 1.8–7.4)
NEUTROPHILS NFR BLD AUTO: 79.6 % — HIGH (ref 43–77)
PHOSPHATE SERPL-MCNC: 3.6 MG/DL — SIGNIFICANT CHANGE UP (ref 2.5–4.5)
PLATELET # BLD AUTO: 205 K/UL — SIGNIFICANT CHANGE UP (ref 150–400)
POTASSIUM SERPL-MCNC: 3.5 MMOL/L — SIGNIFICANT CHANGE UP (ref 3.5–5.3)
POTASSIUM SERPL-SCNC: 3.5 MMOL/L — SIGNIFICANT CHANGE UP (ref 3.5–5.3)
PROT SERPL-MCNC: 6.5 G/DL — SIGNIFICANT CHANGE UP (ref 6–8.3)
RBC # BLD: 3.23 M/UL — LOW (ref 4.2–5.8)
RBC # FLD: 17.7 % — HIGH (ref 10.3–14.5)
SODIUM SERPL-SCNC: 138 MMOL/L — SIGNIFICANT CHANGE UP (ref 135–145)
WBC # BLD: 4.5 K/UL — SIGNIFICANT CHANGE UP (ref 3.8–10.5)
WBC # FLD AUTO: 4.5 K/UL — SIGNIFICANT CHANGE UP (ref 3.8–10.5)

## 2018-06-16 PROCEDURE — 99232 SBSQ HOSP IP/OBS MODERATE 35: CPT

## 2018-06-16 RX ADMIN — Medication 800 MILLIGRAM(S): at 06:00

## 2018-06-16 RX ADMIN — ATOVAQUONE 750 MILLIGRAM(S): 750 SUSPENSION ORAL at 16:59

## 2018-06-16 RX ADMIN — FLUCONAZOLE 200 MILLIGRAM(S): 150 TABLET ORAL at 13:00

## 2018-06-16 RX ADMIN — ONDANSETRON 8 MILLIGRAM(S): 8 TABLET, FILM COATED ORAL at 13:00

## 2018-06-16 RX ADMIN — Medication 120 MILLIGRAM(S): at 06:00

## 2018-06-16 RX ADMIN — SODIUM CHLORIDE 75 MILLILITER(S): 9 INJECTION INTRAMUSCULAR; INTRAVENOUS; SUBCUTANEOUS at 12:59

## 2018-06-16 RX ADMIN — SODIUM CHLORIDE 75 MILLILITER(S): 9 INJECTION INTRAMUSCULAR; INTRAVENOUS; SUBCUTANEOUS at 05:59

## 2018-06-16 RX ADMIN — SODIUM CHLORIDE 75 MILLILITER(S): 9 INJECTION INTRAMUSCULAR; INTRAVENOUS; SUBCUTANEOUS at 22:08

## 2018-06-16 RX ADMIN — Medication 800 MILLIGRAM(S): at 16:58

## 2018-06-16 RX ADMIN — Medication 650 MILLIGRAM(S): at 16:58

## 2018-06-16 RX ADMIN — Medication 120 MILLIGRAM(S): at 16:59

## 2018-06-16 RX ADMIN — ONDANSETRON 8 MILLIGRAM(S): 8 TABLET, FILM COATED ORAL at 22:08

## 2018-06-16 RX ADMIN — Medication 650 MILLIGRAM(S): at 17:30

## 2018-06-16 RX ADMIN — ATOVAQUONE 750 MILLIGRAM(S): 750 SUSPENSION ORAL at 06:00

## 2018-06-16 RX ADMIN — ONDANSETRON 8 MILLIGRAM(S): 8 TABLET, FILM COATED ORAL at 06:00

## 2018-06-16 NOTE — PROGRESS NOTE ADULT - ASSESSMENT
This is a 40 yo M with PMHx of HIV and recent diagnosis of high-grade B-cell, CD10+ lymphoma with CNS involvement admitted for Cycle 6  DA-R-EPOCH with no dose adjustment from previous cycle.

## 2018-06-16 NOTE — PROGRESS NOTE ADULT - PROBLEM SELECTOR PLAN 1
Admit to [facility]  Chemotherapy Cycle 6 of DA-R-EPOCH with no dose adjustment from previous cycle  Rituxan 375mg/m2 on Day 1  Doxorubicin 12 mg/m2 continuous IV infusion Day 2-5  Etoposide 60 mg/m2 continuous IV infusion Day 2-5  Vincristine 0.4 mg/m2 continuous IV infusion Day 2-5  Prednisone 60 mg/m2 PO BID Day 2-6  Cyclophosphamide 900 mg/m2 IV x 1 on Day 6  LP with IT MTX x 2 during this admission   Monitor CBC/Lytes and transfuse/replete PRN  Strict Is and Os/Daily weights/Mouth Care  Antiemetics  IV hydration  Neulasta post chemotherapy

## 2018-06-16 NOTE — PROGRESS NOTE ADULT - ATTENDING COMMENTS
Tolerating 6th cycle DA R EPOCH well, d4  CSF from first LP of this cycle was (-).  Cont. chemotherapy.  For 2nd LP of this cycle today.  OOB as tolerated.  Noted mild incr in retics: haptoglobin, repeat retic, check AMANDA, G6PD checked.  Based on results retic count likely reflects recovery from chemotherapy. c/o transient postural lightheadedness. Tolerating 6th cycle DA R EPOCH well, d5;  CSF from first LP of this cycle was (-).  Cont. chemotherapy.  For 2nd LP of this cycle 6/15.  OOB as tolerated.  Noted mild incr in retics: haptoglobin, repeat retic, check AMANDA, G6PD checked.  Based on results retic count likely reflects recovery from chemotherapy. c/o transient postural lightheadedness.  Continue prophylactic medications  Antiemetics  OOB

## 2018-06-16 NOTE — PROGRESS NOTE ADULT - SUBJECTIVE AND OBJECTIVE BOX
Diagnosis: HIV associated DLBCL    Protocol/Chemo Regimen: Cycle 6 DA-EPOCH (no dose change from last cycle)    Day: 5     Pt endorsed: headache, right side of neck sore and lightheadedness when standing up from lying    Review of Systems: Patient denies visual changes, chest pain, palpitations, SOB, abdominal pain, nausea, vomiting, diarrhea or dysuria.    Pain scale:   0                                       Diet: regular    Allergies    No Known Allergies    Intolerances        ANTIMICROBIALS  abacavir 600 mG/dolutegravir 50 mG/lamiVUDine 300 mG 1 Tablet(s) Oral daily  acyclovir    Suspension 800 milliGRAM(s) Oral two times a day  atovaquone Suspension 750 milliGRAM(s) Oral two times a day  fluconAZOLE   40 mG/mL Suspension 200 milliGRAM(s) Oral daily      HEME/ONC MEDICATIONS  DOXOrubicin IVPB w/vinCRIStine 24 milliGRAM(s) IV Intermittent daily  etoposide IVPB 119 milliGRAM(s) IV Intermittent daily  methotrexate PF IntraThecal 12 milliGRAM(s) IntraThecal once      STANDING MEDICATIONS  ondansetron Injectable 8 milliGRAM(s) IV Push every 8 hours  predniSONE   Tablet 120 milliGRAM(s) Oral two times a day  sodium chloride 0.9%. 1000 milliLiter(s) IV Continuous <Continuous>      PRN MEDICATIONS  acetaminophen   Tablet 650 milliGRAM(s) Oral every 6 hours PRN  acetaminophen   Tablet. 650 milliGRAM(s) Oral every 6 hours PRN  docusate sodium 100 milliGRAM(s) Oral two times a day PRN  hydrocortisone sodium succinate Injectable 100 milliGRAM(s) IV Push once PRN        Vital Signs Last 24 Hrs  T(C): 36.4 (16 Jun 2018 05:25), Max: 37.1 (15 Quique 2018 22:11)  T(F): 97.5 (16 Jun 2018 05:25), Max: 98.7 (15 Quique 2018 22:11)  HR: 90 (16 Jun 2018 05:25) (73 - 99)  BP: 140/77 (16 Jun 2018 05:25) (140/77 - 157/85)  BP(mean): --  RR: 18 (16 Jun 2018 05:25) (18 - 18)  SpO2: 95% (16 Jun 2018 05:25) (95% - 98%)    PHYSICAL EXAM  General: NAD  HEENT: clear oropharynx, anicteric sclera, pink conjunctiva  Neck: supple  CV: (+) S1/S2 RRR  Lungs: positive air movement b/l ant lungs, clear to auscultation, no wheezes, no rales  Abdomen: soft, non-tender, non-distended  Ext: no clubbing cyanosis or edema  Skin: no rashes and no petechiae  Neuro: alert and oriented X 3, no focal deficits  Central Line:     RECENT CULTURES:        LABS:                        10.0   4.5   )-----------( 205      ( 16 Jun 2018 07:15 )             29.7         Mean Cell Volume : 92.1 fl  Mean Cell Hemoglobin : 30.9 pg  Mean Cell Hemoglobin Concentration : 33.6 gm/dL  Auto Neutrophil # : 3.6 K/uL  Auto Lymphocyte # : 0.7 K/uL  Auto Monocyte # : 0.2 K/uL  Auto Eosinophil # : 0.0 K/uL  Auto Basophil # : 0.0 K/uL  Auto Neutrophil % : 79.6 %  Auto Lymphocyte % : 16.2 %  Auto Monocyte % : 3.8 %  Auto Eosinophil % : 0.2 %  Auto Basophil % : 0.1 %      06-16    138  |  100  |  11  ----------------------------<  132<H>  3.5   |  26  |  0.94    Ca    8.8      16 Jun 2018 07:15  Phos  3.6     06-16  Mg     1.9     06-16    TPro  6.5  /  Alb  3.9  /  TBili  0.5  /  DBili  x   /  AST  15  /  ALT  28  /  AlkPhos  57  06-16      Mg 1.9  Phos 3.6              RADIOLOGY & ADDITIONAL STUDIES: Diagnosis: HIV associated DLBCL    Protocol/Chemo Regimen: Cycle 6 DA-EPOCH (no dose change from last cycle)    Day: 5     Pt endorsed: headache, right side of neck sore and lightheadedness when standing up from lying    Review of Systems: no n/v/d, no cough cp sob, no abd pain. no mouth pain.     Pain scale:   0                                       Diet: regular    Allergies    No Known Allergies    Intolerances        ANTIMICROBIALS  abacavir 600 mG/dolutegravir 50 mG/lamiVUDine 300 mG 1 Tablet(s) Oral daily  acyclovir    Suspension 800 milliGRAM(s) Oral two times a day  atovaquone Suspension 750 milliGRAM(s) Oral two times a day  fluconAZOLE   40 mG/mL Suspension 200 milliGRAM(s) Oral daily      HEME/ONC MEDICATIONS  DOXOrubicin IVPB w/vinCRIStine 24 milliGRAM(s) IV Intermittent daily  etoposide IVPB 119 milliGRAM(s) IV Intermittent daily  methotrexate PF IntraThecal 12 milliGRAM(s) IntraThecal once      STANDING MEDICATIONS  ondansetron Injectable 8 milliGRAM(s) IV Push every 8 hours  predniSONE   Tablet 120 milliGRAM(s) Oral two times a day  sodium chloride 0.9%. 1000 milliLiter(s) IV Continuous <Continuous>      PRN MEDICATIONS  acetaminophen   Tablet 650 milliGRAM(s) Oral every 6 hours PRN  acetaminophen   Tablet. 650 milliGRAM(s) Oral every 6 hours PRN  docusate sodium 100 milliGRAM(s) Oral two times a day PRN  hydrocortisone sodium succinate Injectable 100 milliGRAM(s) IV Push once PRN        Vital Signs Last 24 Hrs  T(C): 36.4 (16 Jun 2018 05:25), Max: 37.1 (15 Quique 2018 22:11)  T(F): 97.5 (16 Jun 2018 05:25), Max: 98.7 (15 Quique 2018 22:11)  HR: 90 (16 Jun 2018 05:25) (73 - 99)  BP: 140/77 (16 Jun 2018 05:25) (140/77 - 157/85)  BP(mean): --  RR: 18 (16 Jun 2018 05:25) (18 - 18)  SpO2: 95% (16 Jun 2018 05:25) (95% - 98%)    PHYSICAL EXAM  General: NAD  HEENT: clear oropharynx,   CV: (+) S1/S2 RRR  Lungs: positive air movement b/l ant lungs, clear to auscultation, no wheezes, no rales  Abdomen: soft, non-tender, non-distended  Ext: no edema  Skin: no rashes and no petechiae  Neuro: alert and oriented X 3, no focal deficits  Central Line: mediport cdi    RECENT CULTURES:        LABS:                        10.0   4.5   )-----------( 205      ( 16 Jun 2018 07:15 )             29.7         Mean Cell Volume : 92.1 fl  Mean Cell Hemoglobin : 30.9 pg  Mean Cell Hemoglobin Concentration : 33.6 gm/dL  Auto Neutrophil # : 3.6 K/uL  Auto Lymphocyte # : 0.7 K/uL  Auto Monocyte # : 0.2 K/uL  Auto Eosinophil # : 0.0 K/uL  Auto Basophil # : 0.0 K/uL  Auto Neutrophil % : 79.6 %  Auto Lymphocyte % : 16.2 %  Auto Monocyte % : 3.8 %  Auto Eosinophil % : 0.2 %  Auto Basophil % : 0.1 %      06-16    138  |  100  |  11  ----------------------------<  132<H>  3.5   |  26  |  0.94    Ca    8.8      16 Jun 2018 07:15  Phos  3.6     06-16  Mg     1.9     06-16    TPro  6.5  /  Alb  3.9  /  TBili  0.5  /  DBili  x   /  AST  15  /  ALT  28  /  AlkPhos  57  06-16      Mg 1.9  Phos 3.6              RADIOLOGY & ADDITIONAL STUDIES: Diagnosis: HIV associated DLBCL    Protocol/Chemo Regimen: Cycle 6 DA-EPOCH (no dose change from last cycle)    Day: 5     Pt endorsed: headache, right side of neck sore and lightheadedness when standing up from lying    Review of Systems: no n/v/d, no cough, cp, sob, no abd pain. no mouth pain.     Pain scale:   0                                       Diet: regular    Allergies    No Known Allergies    Intolerances        ANTIMICROBIALS  abacavir 600 mG/dolutegravir 50 mG/lamiVUDine 300 mG 1 Tablet(s) Oral daily  acyclovir    Suspension 800 milliGRAM(s) Oral two times a day  atovaquone Suspension 750 milliGRAM(s) Oral two times a day  fluconAZOLE   40 mG/mL Suspension 200 milliGRAM(s) Oral daily      HEME/ONC MEDICATIONS  DOXOrubicin IVPB w/vinCRIStine 24 milliGRAM(s) IV Intermittent daily  etoposide IVPB 119 milliGRAM(s) IV Intermittent daily  methotrexate PF IntraThecal 12 milliGRAM(s) IntraThecal once      STANDING MEDICATIONS  ondansetron Injectable 8 milliGRAM(s) IV Push every 8 hours  predniSONE   Tablet 120 milliGRAM(s) Oral two times a day  sodium chloride 0.9%. 1000 milliLiter(s) IV Continuous <Continuous>      PRN MEDICATIONS  acetaminophen   Tablet 650 milliGRAM(s) Oral every 6 hours PRN  acetaminophen   Tablet. 650 milliGRAM(s) Oral every 6 hours PRN  docusate sodium 100 milliGRAM(s) Oral two times a day PRN  hydrocortisone sodium succinate Injectable 100 milliGRAM(s) IV Push once PRN        Vital Signs Last 24 Hrs  T(C): 36.4 (16 Jun 2018 05:25), Max: 37.1 (15 Quique 2018 22:11)  T(F): 97.5 (16 Jun 2018 05:25), Max: 98.7 (15 Quique 2018 22:11)  HR: 90 (16 Jun 2018 05:25) (73 - 99)  BP: 140/77 (16 Jun 2018 05:25) (140/77 - 157/85)  BP(mean): --  RR: 18 (16 Jun 2018 05:25) (18 - 18)  SpO2: 95% (16 Jun 2018 05:25) (95% - 98%)    PHYSICAL EXAM  General: NAD  HEENT: clear oropharynx  CV: (+) S1/S2 RRR  Lungs: positive air movement b/l ant lungs, clear to auscultation, no wheezes, no rales  Abdomen: soft, non-tender, non-distended  Ext: no edema  Skin: no rash and no petechiae  Neuro: alert and oriented X 3, no focal deficits  Central Line: mediport cdi    RECENT CULTURES:        LABS:                        10.0   4.5   )-----------( 205      ( 16 Jun 2018 07:15 )             29.7         Mean Cell Volume : 92.1 fl  Mean Cell Hemoglobin : 30.9 pg  Mean Cell Hemoglobin Concentration : 33.6 gm/dL  Auto Neutrophil # : 3.6 K/uL  Auto Lymphocyte # : 0.7 K/uL  Auto Monocyte # : 0.2 K/uL  Auto Eosinophil # : 0.0 K/uL  Auto Basophil # : 0.0 K/uL  Auto Neutrophil % : 79.6 %  Auto Lymphocyte % : 16.2 %  Auto Monocyte % : 3.8 %  Auto Eosinophil % : 0.2 %  Auto Basophil % : 0.1 %      06-16    138  |  100  |  11  ----------------------------<  132<H>  3.5   |  26  |  0.94    Ca    8.8      16 Jun 2018 07:15  Phos  3.6     06-16  Mg     1.9     06-16    TPro  6.5  /  Alb  3.9  /  TBili  0.5  /  DBili  x   /  AST  15  /  ALT  28  /  AlkPhos  57  06-16      Mg 1.9  Phos 3.6              RADIOLOGY & ADDITIONAL STUDIES:

## 2018-06-17 VITALS
DIASTOLIC BLOOD PRESSURE: 90 MMHG | HEART RATE: 67 BPM | RESPIRATION RATE: 18 BRPM | TEMPERATURE: 98 F | SYSTOLIC BLOOD PRESSURE: 167 MMHG | OXYGEN SATURATION: 98 %

## 2018-06-17 LAB
ALBUMIN SERPL ELPH-MCNC: 3.6 G/DL — SIGNIFICANT CHANGE UP (ref 3.3–5)
ALP SERPL-CCNC: 50 U/L — SIGNIFICANT CHANGE UP (ref 40–120)
ALT FLD-CCNC: 34 U/L — SIGNIFICANT CHANGE UP (ref 10–45)
ANION GAP SERPL CALC-SCNC: 11 MMOL/L — SIGNIFICANT CHANGE UP (ref 5–17)
AST SERPL-CCNC: 17 U/L — SIGNIFICANT CHANGE UP (ref 10–40)
BASOPHILS # BLD AUTO: 0 K/UL — SIGNIFICANT CHANGE UP (ref 0–0.2)
BASOPHILS NFR BLD AUTO: 0.2 % — SIGNIFICANT CHANGE UP (ref 0–2)
BILIRUB SERPL-MCNC: 0.6 MG/DL — SIGNIFICANT CHANGE UP (ref 0.2–1.2)
BUN SERPL-MCNC: 13 MG/DL — SIGNIFICANT CHANGE UP (ref 7–23)
CALCIUM SERPL-MCNC: 8.9 MG/DL — SIGNIFICANT CHANGE UP (ref 8.4–10.5)
CHLORIDE SERPL-SCNC: 100 MMOL/L — SIGNIFICANT CHANGE UP (ref 96–108)
CO2 SERPL-SCNC: 27 MMOL/L — SIGNIFICANT CHANGE UP (ref 22–31)
CREAT SERPL-MCNC: 0.89 MG/DL — SIGNIFICANT CHANGE UP (ref 0.5–1.3)
EOSINOPHIL # BLD AUTO: 0 K/UL — SIGNIFICANT CHANGE UP (ref 0–0.5)
EOSINOPHIL NFR BLD AUTO: 0.3 % — SIGNIFICANT CHANGE UP (ref 0–6)
GLUCOSE SERPL-MCNC: 124 MG/DL — HIGH (ref 70–99)
HCT VFR BLD CALC: 27.6 % — LOW (ref 39–50)
HGB BLD-MCNC: 9.3 G/DL — LOW (ref 13–17)
LYMPHOCYTES # BLD AUTO: 0.8 K/UL — LOW (ref 1–3.3)
LYMPHOCYTES # BLD AUTO: 18.1 % — SIGNIFICANT CHANGE UP (ref 13–44)
MAGNESIUM SERPL-MCNC: 2.1 MG/DL — SIGNIFICANT CHANGE UP (ref 1.6–2.6)
MCHC RBC-ENTMCNC: 30.7 PG — SIGNIFICANT CHANGE UP (ref 27–34)
MCHC RBC-ENTMCNC: 33.6 GM/DL — SIGNIFICANT CHANGE UP (ref 32–36)
MCV RBC AUTO: 91.3 FL — SIGNIFICANT CHANGE UP (ref 80–100)
MONOCYTES # BLD AUTO: 0.1 K/UL — SIGNIFICANT CHANGE UP (ref 0–0.9)
MONOCYTES NFR BLD AUTO: 2.4 % — SIGNIFICANT CHANGE UP (ref 2–14)
NEUTROPHILS # BLD AUTO: 3.3 K/UL — SIGNIFICANT CHANGE UP (ref 1.8–7.4)
NEUTROPHILS NFR BLD AUTO: 79 % — HIGH (ref 43–77)
PHOSPHATE SERPL-MCNC: 3.6 MG/DL — SIGNIFICANT CHANGE UP (ref 2.5–4.5)
PLATELET # BLD AUTO: 176 K/UL — SIGNIFICANT CHANGE UP (ref 150–400)
POTASSIUM SERPL-MCNC: 3.4 MMOL/L — LOW (ref 3.5–5.3)
POTASSIUM SERPL-SCNC: 3.4 MMOL/L — LOW (ref 3.5–5.3)
PROT SERPL-MCNC: 6.2 G/DL — SIGNIFICANT CHANGE UP (ref 6–8.3)
RBC # BLD: 3.03 M/UL — LOW (ref 4.2–5.8)
RBC # FLD: 17 % — HIGH (ref 10.3–14.5)
SODIUM SERPL-SCNC: 138 MMOL/L — SIGNIFICANT CHANGE UP (ref 135–145)
WBC # BLD: 4.2 K/UL — SIGNIFICANT CHANGE UP (ref 3.8–10.5)
WBC # FLD AUTO: 4.2 K/UL — SIGNIFICANT CHANGE UP (ref 3.8–10.5)

## 2018-06-17 PROCEDURE — 89051 BODY FLUID CELL COUNT: CPT

## 2018-06-17 PROCEDURE — 82945 GLUCOSE OTHER FLUID: CPT

## 2018-06-17 PROCEDURE — 86901 BLOOD TYPING SEROLOGIC RH(D): CPT

## 2018-06-17 PROCEDURE — 82955 ASSAY OF G6PD ENZYME: CPT

## 2018-06-17 PROCEDURE — 77003 FLUOROGUIDE FOR SPINE INJECT: CPT

## 2018-06-17 PROCEDURE — 85027 COMPLETE CBC AUTOMATED: CPT

## 2018-06-17 PROCEDURE — 88184 FLOWCYTOMETRY/ TC 1 MARKER: CPT

## 2018-06-17 PROCEDURE — 83615 LACTATE (LD) (LDH) ENZYME: CPT

## 2018-06-17 PROCEDURE — 83010 ASSAY OF HAPTOGLOBIN QUANT: CPT

## 2018-06-17 PROCEDURE — 62272 THER SPI PNXR DRG CSF: CPT

## 2018-06-17 PROCEDURE — 85610 PROTHROMBIN TIME: CPT

## 2018-06-17 PROCEDURE — 84100 ASSAY OF PHOSPHORUS: CPT

## 2018-06-17 PROCEDURE — 80053 COMPREHEN METABOLIC PANEL: CPT

## 2018-06-17 PROCEDURE — 85730 THROMBOPLASTIN TIME PARTIAL: CPT

## 2018-06-17 PROCEDURE — 86850 RBC ANTIBODY SCREEN: CPT

## 2018-06-17 PROCEDURE — 86900 BLOOD TYPING SEROLOGIC ABO: CPT

## 2018-06-17 PROCEDURE — 84157 ASSAY OF PROTEIN OTHER: CPT

## 2018-06-17 PROCEDURE — 83735 ASSAY OF MAGNESIUM: CPT

## 2018-06-17 PROCEDURE — 99238 HOSP IP/OBS DSCHRG MGMT 30/<: CPT

## 2018-06-17 PROCEDURE — 86880 COOMBS TEST DIRECT: CPT

## 2018-06-17 PROCEDURE — 88185 FLOWCYTOMETRY/TC ADD-ON: CPT

## 2018-06-17 RX ORDER — POTASSIUM CHLORIDE 20 MEQ
40 PACKET (EA) ORAL ONCE
Qty: 0 | Refills: 0 | Status: COMPLETED | OUTPATIENT
Start: 2018-06-17 | End: 2018-06-17

## 2018-06-17 RX ORDER — CYCLOPHOSPHAMIDE 100 MG
1791 VIAL (EA) INTRAVENOUS ONCE
Qty: 0 | Refills: 0 | Status: DISCONTINUED | OUTPATIENT
Start: 2018-06-17 | End: 2018-06-17

## 2018-06-17 RX ADMIN — Medication 40 MILLIEQUIVALENT(S): at 14:10

## 2018-06-17 RX ADMIN — Medication 120 MILLIGRAM(S): at 06:07

## 2018-06-17 RX ADMIN — Medication 120 MILLIGRAM(S): at 14:09

## 2018-06-17 RX ADMIN — Medication 800 MILLIGRAM(S): at 06:07

## 2018-06-17 RX ADMIN — ONDANSETRON 8 MILLIGRAM(S): 8 TABLET, FILM COATED ORAL at 13:41

## 2018-06-17 RX ADMIN — FLUCONAZOLE 200 MILLIGRAM(S): 150 TABLET ORAL at 12:05

## 2018-06-17 RX ADMIN — SODIUM CHLORIDE 75 MILLILITER(S): 9 INJECTION INTRAMUSCULAR; INTRAVENOUS; SUBCUTANEOUS at 06:06

## 2018-06-17 RX ADMIN — ATOVAQUONE 750 MILLIGRAM(S): 750 SUSPENSION ORAL at 06:07

## 2018-06-17 RX ADMIN — ONDANSETRON 8 MILLIGRAM(S): 8 TABLET, FILM COATED ORAL at 06:06

## 2018-06-17 NOTE — ADVANCED PRACTICE NURSE CONSULT - ASSESSMENT
Pt. seen in bed,alert and oriented times four. Pt ambulate to bathroom with steady gait no distress noted. Mediport to right chest wall intact and patent.Dsg dry and intact.remain  with + blood return and flush easily with 10 ml NS.Site with no s/s of redness,swelling or pain. Chemotherapy teaching done with help of  pt verbalized understanding, also given drug card which outline side effect. Pt lab result was reviewed by Dr Cervantes during rounds. Pt was started with Day 6 of Cyclophosphamide 900mg/o5=9121 mg IV to mediport via alaris pump running over one hour. Report given to primary nurse.Report given to primary nurse.
Pt admitted for chemotherapy oriented to unit routine alert and oriented times four. Pt ambulate to bathroom with steady gait no distress noted. Mediport to right chest wall access by primary nurse, remain  with + blood return and flush easily. Chemotherapy teaching done with help of  pt verbalized understanding, also given drug card which outline side effect. Pt lab result was reviewed by Dr Cortes during rounds. Pt was pre-medicated with zofran 8 mg ivss by primary nurse . Pt s/p MUGA scan result on chart. Pt was started on Doxorubicin 12  mg/m2= 24 mg, Vincristine 0.4 mg/m2= 0.8 mg and Etoposide 60 mg/m2= 119 mg iv all to run over 24 hours in separate 500 cc normal saline at 1100 am.  Report given to primary nurse. Left pt in bed with visitors at bedside. Report given to primary nurse.
Pt admitted for chemotherapy oriented to unit routine alert and oriented times four. Pt ambulate to bathroom with steady gait no distress noted. Mediport to right chest wall access by primary nurse, remain  with + blood return and flush easily. Chemotherapy teaching done with help of  pt verbalized understanding, also given drug card which outline side effect. Pt lab result was reviewed by Dr Cortes during rounds. Pt was pre-medicated with zofran 8 mg ivss by primary nurse . Pt s/p MUGA scan result on chart. Pt was started on Doxorubicin 12  mg/m2= 24 mg, Vincristine 0.4 mg/m2= 0.8 mg and Etoposide 60 mg/m2= 119 mg iv all to run over 24 hours in separate 500 cc normal saline at 1100 am.  Report given to primary nurse. Left pt in bed with visitors at bedside. Report given to primary nurse.
Pt. seen in bed a/o x4,denies any discomfort at this time.S/p lumbar tap.Pt. right chest wall mediport accessed .Dsg dry and intact.With positive blood return noted and flushing easily with 10 ml NS.Labs drawn and sent to lab.Pt. verbalized understanding of rituxan infusion and possible side effects.Drug verification done by 2 RN.'s.Dr. Cortes reviewed lab result prior to start of chemotherapy.Pt. premedicated with tylenol 650 mg po and benadryl 50 mg IvP given.Day 1 Rituxan at 1600 started Rituxan 375mg/z4=203 mg IV at 50 ml for 30 minutes then 199  for one hour .Pt. monitored closely v/s monitored every 30 minutes.Pt. sleeping at this time.Primary RN aware of present treatment.
Pt. seen in bed a/ox4,denies any discomfort at this time.Chemotherapy teachings done.Pt. verbalized understanding of it.Pt. has right chest wall mediport.Dsg dry and intact.Site with no s/s of redness/swelling and pain,with positive blood return noted and flushing easily with 10 ml NS.Drug verification done with primary RN.Lab values reviewed on rounds by Dr. Cortes.Emend 150 mg IV given as premedication.At 1050 Started day 1/4 Etoposide 60mg/x5=893ko IV infusing well and to the lowest port is Day 1/4  Doxorubicin 12mg/m2=24mg IV continuous with vincristine 0.4mg/m2=0.8 mg IV continuous ,all running over for 24 hours to mediport via alaris pump.Left pt. comfortable in bed.Primary RN aware of present treatment.
Pt. seen in bed,alert and oriented times four. Pt ambulate to bathroom with steady gait no distress noted. Mediport to right chest wall intact and patent.Dsg dry and intact.remain  with + blood return and flush easily with 10 ml NS.Site with no s/s of redness,swelling or pain. Chemotherapy teaching done with help of  pt verbalized understanding, also given drug card which outline side effect. Pt lab result was reviewed by Dr Cervantes during rounds. Pt was started on Doxorubicin 12  mg/m2= 24 mg, Vincristine 0.4 mg/m2= 0.8 mg and Etoposide 60 mg/m2= 119 mg iv all to run over 24 hours in separate 500 cc normal saline at 1100 am.  Report given to primary nurse. Left pt in bed with visitors at bedside. Report given to primary nurse.

## 2018-06-17 NOTE — ADVANCED PRACTICE NURSE CONSULT - REASON FOR CONSULT
Chemotherapy note day 2/6 of DA-R-EPOCH cycle
Chemotherapy note day 3/6 of DA-R-EPOCH cycle
Chemotherapy note day 4/6 of DA-R-EPOCH cycle
DAy 1/6 R EPOCH cycle 6
Day 1 Rituxan
Chemotherapy note day 6/6 of DA-R-EPOCH cycle

## 2018-06-17 NOTE — PROGRESS NOTE ADULT - PROBLEM SELECTOR PLAN 2
The patient is not neutropenic  If febrile Pan Cx and CXR    Continue Acyclovir and mepron

## 2018-06-17 NOTE — PROGRESS NOTE ADULT - PROBLEM SELECTOR PLAN 1
Admit to [facility]  Chemotherapy Cycle 6 of DA-R-EPOCH with no dose adjustment from previous cycle  Rituxan 375mg/m2 on Day 1  Doxorubicin 12 mg/m2 continuous IV infusion Day 2-5  Etoposide 60 mg/m2 continuous IV infusion Day 2-5  Vincristine 0.4 mg/m2 continuous IV infusion Day 2-5  Prednisone 60 mg/m2 PO BID Day 2-6  Cyclophosphamide 900 mg/m2 IV x 1 on Day 6  LP with IT MTX x 2 during this admission   Monitor CBC/Lytes and transfuse/replete PRN  Strict Is and Os/Daily weights/Mouth Care  Antiemetics  IV hydration  Neulasta post chemotherapy  discharge today

## 2018-06-17 NOTE — PROGRESS NOTE ADULT - ATTENDING COMMENTS
Tolerating 6th cycle DA R EPOCH well, d5;  CSF from first LP of this cycle was (-).  Cont. chemotherapy.  For 2nd LP of this cycle 6/15.  OOB as tolerated.  Noted mild incr in retics: haptoglobin, repeat retic, check AMANDA, G6PD checked.  Based on results retic count likely reflects recovery from chemotherapy. c/o transient postural lightheadedness.  Continue prophylactic medications  Antiemetics  OOB Tolerating 6th cycle DA R EPOCH well, d6;  CSF from first LP of this cycle was (-).  Cont. chemotherapy.  For 2nd LP of this cycle 6/15.  OOB as tolerated.  Noted mild incr in retics: haptoglobin, repeat retic, check AMANDA, G6PD checked.  Based on results retic count likely reflects recovery from chemotherapy. c/o transient postural lightheadedness, now resolved  Continue prophylactic medications  Supplement potassium  Antiemetics prn  OOB/ambulate  For discharge to home today and followup at [facility] as directed.

## 2018-06-17 NOTE — PROGRESS NOTE ADULT - SUBJECTIVE AND OBJECTIVE BOX
Diagnosis: HIV associated DLBCL    Protocol/Chemo Regimen: Cycle 6 DA-EPOCH (no dose change from last cycle)    Day: 6     Pt endorsed: headache, right side of neck sore.   Review of Systems: no n/v/d, no cough, cp, sob, no abd pain. no mouth pain.     Pain scale:   0                                       Diet: regular    Allergies    No Known Allergies    Intolerances        ANTIMICROBIALS  abacavir 600 mG/dolutegravir 50 mG/lamiVUDine 300 mG 1 Tablet(s) Oral daily  acyclovir    Suspension 800 milliGRAM(s) Oral two times a day  atovaquone Suspension 750 milliGRAM(s) Oral two times a day  fluconAZOLE   40 mG/mL Suspension 200 milliGRAM(s) Oral daily      HEME/ONC MEDICATIONS  cyclophosphamide IVPB 1791 milliGRAM(s) IV Intermittent once  DOXOrubicin IVPB w/vinCRIStine 24 milliGRAM(s) IV Intermittent daily  etoposide IVPB 119 milliGRAM(s) IV Intermittent daily  methotrexate PF IntraThecal 12 milliGRAM(s) IntraThecal once      STANDING MEDICATIONS  ondansetron Injectable 8 milliGRAM(s) IV Push every 8 hours  potassium chloride    Tablet ER 40 milliEquivalent(s) Oral once  predniSONE   Tablet 120 milliGRAM(s) Oral two times a day  sodium chloride 0.9%. 1000 milliLiter(s) IV Continuous <Continuous>      PRN MEDICATIONS  acetaminophen   Tablet 650 milliGRAM(s) Oral every 6 hours PRN  acetaminophen   Tablet. 650 milliGRAM(s) Oral every 6 hours PRN  docusate sodium 100 milliGRAM(s) Oral two times a day PRN  hydrocortisone sodium succinate Injectable 100 milliGRAM(s) IV Push once PRN        Vital Signs Last 24 Hrs  T(C): 36.7 (17 Jun 2018 10:48), Max: 37.2 (16 Jun 2018 21:32)  T(F): 98.1 (17 Jun 2018 10:48), Max: 98.9 (16 Jun 2018 21:32)  HR: 84 (17 Jun 2018 10:48) (70 - 97)  BP: 166/97 (17 Jun 2018 10:48) (130/85 - 166/97)  BP(mean): --  RR: 18 (17 Jun 2018 10:48) (18 - 18)  SpO2: 100% (17 Jun 2018 10:48) (98% - 100%)      PHYSICAL EXAM  General: NAD  HEENT: clear oropharynx  CV: (+) S1/S2 RRR  Lungs: positive air movement b/l ant lungs, clear to auscultation, no wheezes, no rales  Abdomen: soft, non-tender, non-distended  Ext: no edema  Skin: no rash and no petechiae  Neuro: alert and oriented X 3, no focal deficits  Central Line: mediport cdi    RECENT CULTURES:        LABS:                        9.3    4.2   )-----------( 176      ( 17 Jun 2018 07:19 )             27.6         Mean Cell Volume : 91.3 fl  Mean Cell Hemoglobin : 30.7 pg  Mean Cell Hemoglobin Concentration : 33.6 gm/dL  Auto Neutrophil # : 3.3 K/uL  Auto Lymphocyte # : 0.8 K/uL  Auto Monocyte # : 0.1 K/uL  Auto Eosinophil # : 0.0 K/uL  Auto Basophil # : 0.0 K/uL  Auto Neutrophil % : 79.0 %  Auto Lymphocyte % : 18.1 %  Auto Monocyte % : 2.4 %  Auto Eosinophil % : 0.3 %  Auto Basophil % : 0.2 %      06-17    138  |  100  |  13  ----------------------------<  124<H>  3.4<L>   |  27  |  0.89    Ca    8.9      17 Jun 2018 07:15  Phos  3.6     06-17  Mg     2.1     06-17    TPro  6.2  /  Alb  3.6  /  TBili  0.6  /  DBili  x   /  AST  17  /  ALT  34  /  AlkPhos  50  06-17      Mg 2.1  Phos 3.6              RADIOLOGY & ADDITIONAL STUDIES: Diagnosis: HIV associated DLBCL    Protocol/Chemo Regimen: Cycle 6 DA-EPOCH (no dose change from last cycle)    Day: 6     Pt endorsed: occasional headache, right side of neck remains sore but improvement with tylenol.   Review of Systems: no n/v/d, no cough, cp, sob, no abd pain. no mouth pain.     Pain scale:   0                                       Diet: regular    Allergies    No Known Allergies    Intolerances        ANTIMICROBIALS  abacavir 600 mG/dolutegravir 50 mG/lamiVUDine 300 mG 1 Tablet(s) Oral daily  acyclovir    Suspension 800 milliGRAM(s) Oral two times a day  atovaquone Suspension 750 milliGRAM(s) Oral two times a day  fluconAZOLE   40 mG/mL Suspension 200 milliGRAM(s) Oral daily      HEME/ONC MEDICATIONS  cyclophosphamide IVPB 1791 milliGRAM(s) IV Intermittent once  DOXOrubicin IVPB w/vinCRIStine 24 milliGRAM(s) IV Intermittent daily  etoposide IVPB 119 milliGRAM(s) IV Intermittent daily  methotrexate PF IntraThecal 12 milliGRAM(s) IntraThecal once      STANDING MEDICATIONS  ondansetron Injectable 8 milliGRAM(s) IV Push every 8 hours  potassium chloride    Tablet ER 40 milliEquivalent(s) Oral once  predniSONE   Tablet 120 milliGRAM(s) Oral two times a day  sodium chloride 0.9%. 1000 milliLiter(s) IV Continuous <Continuous>      PRN MEDICATIONS  acetaminophen   Tablet 650 milliGRAM(s) Oral every 6 hours PRN  acetaminophen   Tablet. 650 milliGRAM(s) Oral every 6 hours PRN  docusate sodium 100 milliGRAM(s) Oral two times a day PRN  hydrocortisone sodium succinate Injectable 100 milliGRAM(s) IV Push once PRN        Vital Signs Last 24 Hrs  T(C): 36.7 (17 Jun 2018 10:48), Max: 37.2 (16 Jun 2018 21:32)  T(F): 98.1 (17 Jun 2018 10:48), Max: 98.9 (16 Jun 2018 21:32)  HR: 84 (17 Jun 2018 10:48) (70 - 97)  BP: 166/97 (17 Jun 2018 10:48) (130/85 - 166/97)  BP(mean): --  RR: 18 (17 Jun 2018 10:48) (18 - 18)  SpO2: 100% (17 Jun 2018 10:48) (98% - 100%)      PHYSICAL EXAM  General: NAD  HEENT: clear oropharynx  CV: (+) S1/S2 RRR  Lungs: positive air movement b/l ant lungs, clear to auscultation, no wheezes, no rales  Abdomen: soft, non-tender, non-distended  Ext: no edema  Skin: no rash and no petechiae  Neuro: alert and oriented X 3, no focal deficits  Central Line: mediport cdi    RECENT CULTURES:        LABS:                        9.3    4.2   )-----------( 176      ( 17 Jun 2018 07:19 )             27.6         Mean Cell Volume : 91.3 fl  Mean Cell Hemoglobin : 30.7 pg  Mean Cell Hemoglobin Concentration : 33.6 gm/dL  Auto Neutrophil # : 3.3 K/uL  Auto Lymphocyte # : 0.8 K/uL  Auto Monocyte # : 0.1 K/uL  Auto Eosinophil # : 0.0 K/uL  Auto Basophil # : 0.0 K/uL  Auto Neutrophil % : 79.0 %  Auto Lymphocyte % : 18.1 %  Auto Monocyte % : 2.4 %  Auto Eosinophil % : 0.3 %  Auto Basophil % : 0.2 %      06-17    138  |  100  |  13  ----------------------------<  124<H>  3.4<L>   |  27  |  0.89    Ca    8.9      17 Jun 2018 07:15  Phos  3.6     06-17  Mg     2.1     06-17    TPro  6.2  /  Alb  3.6  /  TBili  0.6  /  DBili  x   /  AST  17  /  ALT  34  /  AlkPhos  50  06-17      Mg 2.1  Phos 3.6              RADIOLOGY & ADDITIONAL STUDIES: Diagnosis: HIV associated DLBCL    Protocol/Chemo Regimen: Cycle 6 DA-EPOCH (no dose change from last cycle)    Day: 6     Pt endorsed: occasional headache, right side of neck remains sore but improvement with tylenol.   Review of Systems: no n/v/d, no cough, cp, sob, no abd pain. no mouth pain.     Pain scale:   0                                       Diet: regular    Allergies    No Known Allergies    Intolerances        ANTIMICROBIALS  abacavir 600 mG/dolutegravir 50 mG/lamiVUDine 300 mG 1 Tablet(s) Oral daily  acyclovir    Suspension 800 milliGRAM(s) Oral two times a day  atovaquone Suspension 750 milliGRAM(s) Oral two times a day  fluconAZOLE   40 mG/mL Suspension 200 milliGRAM(s) Oral daily      HEME/ONC MEDICATIONS  cyclophosphamide IVPB 1791 milliGRAM(s) IV Intermittent once  DOXOrubicin IVPB w/vinCRIStine 24 milliGRAM(s) IV Intermittent daily  etoposide IVPB 119 milliGRAM(s) IV Intermittent daily  methotrexate PF IntraThecal 12 milliGRAM(s) IntraThecal once      STANDING MEDICATIONS  ondansetron Injectable 8 milliGRAM(s) IV Push every 8 hours  potassium chloride    Tablet ER 40 milliEquivalent(s) Oral once  predniSONE   Tablet 120 milliGRAM(s) Oral two times a day  sodium chloride 0.9%. 1000 milliLiter(s) IV Continuous <Continuous>      PRN MEDICATIONS  acetaminophen   Tablet 650 milliGRAM(s) Oral every 6 hours PRN  acetaminophen   Tablet. 650 milliGRAM(s) Oral every 6 hours PRN  docusate sodium 100 milliGRAM(s) Oral two times a day PRN  hydrocortisone sodium succinate Injectable 100 milliGRAM(s) IV Push once PRN        Vital Signs Last 24 Hrs  T(C): 36.7 (17 Jun 2018 10:48), Max: 37.2 (16 Jun 2018 21:32)  T(F): 98.1 (17 Jun 2018 10:48), Max: 98.9 (16 Jun 2018 21:32)  HR: 84 (17 Jun 2018 10:48) (70 - 97)  BP: 166/97 (17 Jun 2018 10:48) (130/85 - 166/97)  BP(mean): --  RR: 18 (17 Jun 2018 10:48) (18 - 18)  SpO2: 100% (17 Jun 2018 10:48) (98% - 100%)      PHYSICAL EXAM  General: NAD  HEENT: clear oropharynx  CV: (+) S1/S2 RRR  Lungs: positive air movement b/l lungs, clear to auscultation, no wheezes, no rales  Abdomen: soft, non-tender, non-distended, +BS  Ext: no edema  Skin: no rash   Neuro: alert and oriented X 3  Central Line: mediport cdi    RECENT CULTURES:        LABS:                        9.3    4.2   )-----------( 176      ( 17 Jun 2018 07:19 )             27.6         Mean Cell Volume : 91.3 fl  Mean Cell Hemoglobin : 30.7 pg  Mean Cell Hemoglobin Concentration : 33.6 gm/dL  Auto Neutrophil # : 3.3 K/uL  Auto Lymphocyte # : 0.8 K/uL  Auto Monocyte # : 0.1 K/uL  Auto Eosinophil # : 0.0 K/uL  Auto Basophil # : 0.0 K/uL  Auto Neutrophil % : 79.0 %  Auto Lymphocyte % : 18.1 %  Auto Monocyte % : 2.4 %  Auto Eosinophil % : 0.3 %  Auto Basophil % : 0.2 %      06-17    138  |  100  |  13  ----------------------------<  124<H>  3.4<L>   |  27  |  0.89    Ca    8.9      17 Jun 2018 07:15  Phos  3.6     06-17  Mg     2.1     06-17    TPro  6.2  /  Alb  3.6  /  TBili  0.6  /  DBili  x   /  AST  17  /  ALT  34  /  AlkPhos  50  06-17      Mg 2.1  Phos 3.6              RADIOLOGY & ADDITIONAL STUDIES:

## 2018-06-17 NOTE — PROGRESS NOTE ADULT - PROBLEM SELECTOR PLAN 5
Lovenox 40mg SQ x 1 today then restart 24 hours after next LP (scheduled on 6/15)

## 2018-06-18 ENCOUNTER — APPOINTMENT (OUTPATIENT)
Dept: HEMATOLOGY ONCOLOGY | Facility: CLINIC | Age: 43
End: 2018-06-18

## 2018-06-18 LAB — TM INTERPRETATION: SIGNIFICANT CHANGE UP

## 2018-06-19 ENCOUNTER — APPOINTMENT (OUTPATIENT)
Dept: HEMATOLOGY ONCOLOGY | Facility: CLINIC | Age: 43
End: 2018-06-19

## 2018-06-19 ENCOUNTER — APPOINTMENT (OUTPATIENT)
Dept: INFUSION THERAPY | Facility: HOSPITAL | Age: 43
End: 2018-06-19

## 2018-06-20 ENCOUNTER — RESULT REVIEW (OUTPATIENT)
Age: 43
End: 2018-06-20

## 2018-06-20 ENCOUNTER — APPOINTMENT (OUTPATIENT)
Dept: INFUSION THERAPY | Facility: HOSPITAL | Age: 43
End: 2018-06-20

## 2018-06-20 ENCOUNTER — APPOINTMENT (OUTPATIENT)
Dept: HEMATOLOGY ONCOLOGY | Facility: CLINIC | Age: 43
End: 2018-06-20

## 2018-06-20 LAB
BASOPHILS # BLD AUTO: 0 K/UL — SIGNIFICANT CHANGE UP (ref 0–0.2)
BASOPHILS NFR BLD AUTO: 0.6 % — SIGNIFICANT CHANGE UP (ref 0–2)
EOSINOPHIL # BLD AUTO: 0.2 K/UL — SIGNIFICANT CHANGE UP (ref 0–0.5)
EOSINOPHIL NFR BLD AUTO: 9.3 % — HIGH (ref 0–6)
HCT VFR BLD CALC: 26.7 % — LOW (ref 39–50)
HGB BLD-MCNC: 9.3 G/DL — LOW (ref 13–17)
LYMPHOCYTES # BLD AUTO: 0.7 K/UL — LOW (ref 1–3.3)
LYMPHOCYTES # BLD AUTO: 29.9 % — SIGNIFICANT CHANGE UP (ref 13–44)
MCHC RBC-ENTMCNC: 31.5 PG — SIGNIFICANT CHANGE UP (ref 27–34)
MCHC RBC-ENTMCNC: 34.7 G/DL — SIGNIFICANT CHANGE UP (ref 32–36)
MCV RBC AUTO: 90.8 FL — SIGNIFICANT CHANGE UP (ref 80–100)
MONOCYTES # BLD AUTO: 0 K/UL — SIGNIFICANT CHANGE UP (ref 0–0.9)
MONOCYTES NFR BLD AUTO: 0.6 % — LOW (ref 2–14)
NEUTROPHILS # BLD AUTO: 1.4 K/UL — LOW (ref 1.8–7.4)
NEUTROPHILS NFR BLD AUTO: 59.5 % — SIGNIFICANT CHANGE UP (ref 43–77)
PLATELET # BLD AUTO: 99 K/UL — LOW (ref 150–400)
RBC # BLD: 2.94 M/UL — LOW (ref 4.2–5.8)
RBC # FLD: 17.1 % — HIGH (ref 10.3–14.5)
WBC # BLD: 2.4 K/UL — LOW (ref 3.8–10.5)
WBC # FLD AUTO: 2.4 K/UL — LOW (ref 3.8–10.5)

## 2018-06-21 DIAGNOSIS — Z51.89 ENCOUNTER FOR OTHER SPECIFIED AFTERCARE: ICD-10-CM

## 2018-06-25 ENCOUNTER — RESULT REVIEW (OUTPATIENT)
Age: 43
End: 2018-06-25

## 2018-06-25 ENCOUNTER — APPOINTMENT (OUTPATIENT)
Dept: HEMATOLOGY ONCOLOGY | Facility: CLINIC | Age: 43
End: 2018-06-25
Payer: MEDICAID

## 2018-06-25 VITALS
HEART RATE: 110 BPM | TEMPERATURE: 98.8 F | DIASTOLIC BLOOD PRESSURE: 80 MMHG | BODY MASS INDEX: 27.35 KG/M2 | OXYGEN SATURATION: 98 % | SYSTOLIC BLOOD PRESSURE: 120 MMHG | RESPIRATION RATE: 16 BRPM | WEIGHT: 185.19 LBS

## 2018-06-25 LAB
ALBUMIN SERPL ELPH-MCNC: 4.1 G/DL
ALBUMIN SERPL ELPH-MCNC: 4.4 G/DL
ALP BLD-CCNC: 61 U/L
ALP BLD-CCNC: 83 U/L
ALT SERPL-CCNC: 25 U/L
ALT SERPL-CCNC: 31 U/L
ANION GAP SERPL CALC-SCNC: 11 MMOL/L
ANION GAP SERPL CALC-SCNC: 12 MMOL/L
ANISOCYTOSIS BLD QL: SLIGHT — SIGNIFICANT CHANGE UP
AST SERPL-CCNC: 16 U/L
AST SERPL-CCNC: 22 U/L
BACTERIA BLD CULT: NORMAL
BASOPHILS # BLD AUTO: 0 K/UL — SIGNIFICANT CHANGE UP (ref 0–0.2)
BASOPHILS NFR BLD AUTO: 1 % — SIGNIFICANT CHANGE UP (ref 0–2)
BILIRUB SERPL-MCNC: 0.2 MG/DL
BILIRUB SERPL-MCNC: <0.2 MG/DL
BUN SERPL-MCNC: 6 MG/DL
BUN SERPL-MCNC: 9 MG/DL
CALCIUM SERPL-MCNC: 9.1 MG/DL
CALCIUM SERPL-MCNC: 9.3 MG/DL
CHLORIDE SERPL-SCNC: 104 MMOL/L
CHLORIDE SERPL-SCNC: 105 MMOL/L
CO2 SERPL-SCNC: 23 MMOL/L
CO2 SERPL-SCNC: 25 MMOL/L
CREAT SERPL-MCNC: 1.1 MG/DL
CREAT SERPL-MCNC: 1.17 MG/DL
DOHLE BOD BLD QL SMEAR: PRESENT — SIGNIFICANT CHANGE UP
EOSINOPHIL # BLD AUTO: 0.1 K/UL — SIGNIFICANT CHANGE UP (ref 0–0.5)
EOSINOPHIL NFR BLD AUTO: 4 % — SIGNIFICANT CHANGE UP (ref 0–6)
GLUCOSE SERPL-MCNC: 100 MG/DL
GLUCOSE SERPL-MCNC: 108 MG/DL
HCT VFR BLD CALC: 26.1 % — LOW (ref 39–50)
HGB BLD-MCNC: 9 G/DL — LOW (ref 13–17)
HIV1 RNA # SERPL NAA+PROBE: ABNORMAL
HIV1 RNA # SERPL NAA+PROBE: ABNORMAL COPIES/ML
HYPOCHROMIA BLD QL: SLIGHT — SIGNIFICANT CHANGE UP
LDH SERPL-CCNC: 198 U/L
LDH SERPL-CCNC: 299 U/L
LYMPHOCYTES # BLD AUTO: 1 K/UL — SIGNIFICANT CHANGE UP (ref 1–3.3)
LYMPHOCYTES # BLD AUTO: 29 % — SIGNIFICANT CHANGE UP (ref 13–44)
MACROCYTES BLD QL: SLIGHT — SIGNIFICANT CHANGE UP
MCHC RBC-ENTMCNC: 31.5 PG — SIGNIFICANT CHANGE UP (ref 27–34)
MCHC RBC-ENTMCNC: 34.4 G/DL — SIGNIFICANT CHANGE UP (ref 32–36)
MCV RBC AUTO: 91.7 FL — SIGNIFICANT CHANGE UP (ref 80–100)
METAMYELOCYTES # FLD: 2 % — HIGH (ref 0–0)
MICROCYTES BLD QL: SLIGHT — SIGNIFICANT CHANGE UP
MONOCYTES # BLD AUTO: 0.6 K/UL — SIGNIFICANT CHANGE UP (ref 0–0.9)
MONOCYTES NFR BLD AUTO: 11 % — SIGNIFICANT CHANGE UP (ref 2–14)
MYELOCYTES NFR BLD: 5 % — HIGH (ref 0–0)
NEUTROPHILS # BLD AUTO: 1.5 K/UL — LOW (ref 1.8–7.4)
NEUTROPHILS NFR BLD AUTO: 45 % — SIGNIFICANT CHANGE UP (ref 43–77)
NEUTS BAND # BLD: 2 % — SIGNIFICANT CHANGE UP (ref 0–8)
PLAT MORPH BLD: NORMAL — SIGNIFICANT CHANGE UP
PLATELET # BLD AUTO: 32 K/UL — LOW (ref 150–400)
POIKILOCYTOSIS BLD QL AUTO: SLIGHT — SIGNIFICANT CHANGE UP
POLYCHROMASIA BLD QL SMEAR: SLIGHT — SIGNIFICANT CHANGE UP
POTASSIUM SERPL-SCNC: 4.3 MMOL/L
POTASSIUM SERPL-SCNC: 4.3 MMOL/L
PROMYELOCYTES # FLD: 1 % — HIGH (ref 0–0)
PROT SERPL-MCNC: 6.5 G/DL
PROT SERPL-MCNC: 7.1 G/DL
RBC # BLD: 2.85 M/UL — LOW (ref 4.2–5.8)
RBC # FLD: 17.8 % — HIGH (ref 10.3–14.5)
RBC BLD AUTO: ABNORMAL
SODIUM SERPL-SCNC: 138 MMOL/L
SODIUM SERPL-SCNC: 142 MMOL/L
VIRAL LOAD INTERP: NORMAL
VIRAL LOAD LOG: ABNORMAL LG COP/ML
WBC # BLD: 3.3 K/UL — LOW (ref 3.8–10.5)
WBC # FLD AUTO: 3.3 K/UL — LOW (ref 3.8–10.5)

## 2018-06-25 PROCEDURE — 99214 OFFICE O/P EST MOD 30 MIN: CPT

## 2018-07-01 ENCOUNTER — OUTPATIENT (OUTPATIENT)
Dept: OUTPATIENT SERVICES | Facility: HOSPITAL | Age: 43
LOS: 1 days | End: 2018-07-01
Payer: MEDICAID

## 2018-07-05 ENCOUNTER — RESULT REVIEW (OUTPATIENT)
Age: 43
End: 2018-07-05

## 2018-07-05 ENCOUNTER — APPOINTMENT (OUTPATIENT)
Dept: HEMATOLOGY ONCOLOGY | Facility: CLINIC | Age: 43
End: 2018-07-05
Payer: MEDICAID

## 2018-07-05 ENCOUNTER — OUTPATIENT (OUTPATIENT)
Dept: OUTPATIENT SERVICES | Facility: HOSPITAL | Age: 43
LOS: 1 days | Discharge: HOME | End: 2018-07-05

## 2018-07-05 VITALS
TEMPERATURE: 98.2 F | WEIGHT: 185.19 LBS | OXYGEN SATURATION: 99 % | SYSTOLIC BLOOD PRESSURE: 120 MMHG | RESPIRATION RATE: 18 BRPM | BODY MASS INDEX: 27.35 KG/M2 | DIASTOLIC BLOOD PRESSURE: 78 MMHG | HEART RATE: 99 BPM

## 2018-07-05 DIAGNOSIS — C85.88 OTHER SPECIFIED TYPES OF NON-HODGKIN LYMPHOMA, LYMPH NODES OF MULTIPLE SITES: ICD-10-CM

## 2018-07-05 LAB
BASOPHILS # BLD AUTO: 0 K/UL — SIGNIFICANT CHANGE UP (ref 0–0.2)
BASOPHILS NFR BLD AUTO: 0.3 % — SIGNIFICANT CHANGE UP (ref 0–2)
EOSINOPHIL # BLD AUTO: 0.1 K/UL — SIGNIFICANT CHANGE UP (ref 0–0.5)
EOSINOPHIL NFR BLD AUTO: 0.9 % — SIGNIFICANT CHANGE UP (ref 0–6)
HCT VFR BLD CALC: 34.3 % — LOW (ref 39–50)
HGB BLD-MCNC: 11 G/DL — LOW (ref 13–17)
LYMPHOCYTES # BLD AUTO: 1.5 K/UL — SIGNIFICANT CHANGE UP (ref 1–3.3)
LYMPHOCYTES # BLD AUTO: 25.8 % — SIGNIFICANT CHANGE UP (ref 13–44)
MCHC RBC-ENTMCNC: 29.2 PG — SIGNIFICANT CHANGE UP (ref 27–34)
MCHC RBC-ENTMCNC: 32 G/DL — SIGNIFICANT CHANGE UP (ref 32–36)
MCV RBC AUTO: 91.3 FL — SIGNIFICANT CHANGE UP (ref 80–100)
MONOCYTES # BLD AUTO: 0.5 K/UL — SIGNIFICANT CHANGE UP (ref 0–0.9)
MONOCYTES NFR BLD AUTO: 8.6 % — SIGNIFICANT CHANGE UP (ref 2–14)
NEUTROPHILS # BLD AUTO: 3.8 K/UL — SIGNIFICANT CHANGE UP (ref 1.8–7.4)
NEUTROPHILS NFR BLD AUTO: 64.3 % — SIGNIFICANT CHANGE UP (ref 43–77)
PLATELET # BLD AUTO: 218 K/UL — SIGNIFICANT CHANGE UP (ref 150–400)
RBC # BLD: 3.76 M/UL — LOW (ref 4.2–5.8)
RBC # FLD: 18.3 % — HIGH (ref 10.3–14.5)
WBC # BLD: 5.9 K/UL — SIGNIFICANT CHANGE UP (ref 3.8–10.5)
WBC # FLD AUTO: 5.9 K/UL — SIGNIFICANT CHANGE UP (ref 3.8–10.5)

## 2018-07-05 PROCEDURE — 99214 OFFICE O/P EST MOD 30 MIN: CPT

## 2018-07-05 RX ORDER — TRIAMCINOLONE ACETONIDE 1 MG/G
0.1 PASTE DENTAL 4 TIMES DAILY
Qty: 1 | Refills: 0 | Status: COMPLETED | COMMUNITY
Start: 2018-06-01 | End: 2018-07-05

## 2018-07-08 LAB
ALBUMIN SERPL ELPH-MCNC: 4.9 G/DL
ALP BLD-CCNC: 94 U/L
ALT SERPL-CCNC: 27 U/L
ANION GAP SERPL CALC-SCNC: 14 MMOL/L
AST SERPL-CCNC: 26 U/L
B2 MICROGLOB SERPL-MCNC: 1.9 MG/L
BILIRUB SERPL-MCNC: <0.2 MG/DL
BUN SERPL-MCNC: 17 MG/DL
CALCIUM SERPL-MCNC: 10.1 MG/DL
CHLORIDE SERPL-SCNC: 102 MMOL/L
CO2 SERPL-SCNC: 25 MMOL/L
CREAT SERPL-MCNC: 1.25 MG/DL
GLUCOSE SERPL-MCNC: 89 MG/DL
LDH SERPL-CCNC: 262 U/L
POTASSIUM SERPL-SCNC: 4.8 MMOL/L
PROT SERPL-MCNC: 7.5 G/DL
SODIUM SERPL-SCNC: 141 MMOL/L

## 2018-07-13 RX ORDER — FLUCONAZOLE 200 MG/1
200 TABLET ORAL DAILY
Qty: 30 | Refills: 0 | Status: DISCONTINUED | COMMUNITY
Start: 2018-06-01 | End: 2018-07-13

## 2018-07-13 RX ORDER — DIPHENHYDRAMINE HYDROCHLORIDE AND LIDOCAINE HYDROCHLORIDE AND ALUMINUM HYDROXIDE AND MAGNESIUM HYDRO
KIT
Qty: 240 | Refills: 1 | Status: DISCONTINUED | COMMUNITY
Start: 2018-05-29 | End: 2018-07-13

## 2018-07-13 RX ORDER — ACYCLOVIR 200 MG/5ML
200 SUSPENSION ORAL
Qty: 120 | Refills: 3 | Status: DISCONTINUED | COMMUNITY
Start: 2018-05-07 | End: 2018-07-13

## 2018-07-19 PROBLEM — K12.30 ORAL MUCOSITIS (ULCERATIVE), UNSPECIFIED: Chronic | Status: ACTIVE | Noted: 2018-05-30

## 2018-07-19 PROBLEM — B20 HUMAN IMMUNODEFICIENCY VIRUS [HIV] DISEASE: Chronic | Status: ACTIVE | Noted: 2018-02-22

## 2018-07-19 PROBLEM — C85.10 UNSPECIFIED B-CELL LYMPHOMA, UNSPECIFIED SITE: Chronic | Status: ACTIVE | Noted: 2018-02-22

## 2018-07-22 DIAGNOSIS — Z71.89 OTHER SPECIFIED COUNSELING: ICD-10-CM

## 2018-07-30 ENCOUNTER — FORM ENCOUNTER (OUTPATIENT)
Age: 43
End: 2018-07-30

## 2018-07-31 ENCOUNTER — APPOINTMENT (OUTPATIENT)
Dept: NUCLEAR MEDICINE | Facility: CLINIC | Age: 43
End: 2018-07-31
Payer: MEDICAID

## 2018-07-31 ENCOUNTER — OUTPATIENT (OUTPATIENT)
Dept: OUTPATIENT SERVICES | Facility: HOSPITAL | Age: 43
LOS: 1 days | End: 2018-07-31

## 2018-07-31 DIAGNOSIS — C85.10 UNSPECIFIED B-CELL LYMPHOMA, UNSPECIFIED SITE: ICD-10-CM

## 2018-07-31 PROCEDURE — 78815 PET IMAGE W/CT SKULL-THIGH: CPT | Mod: 26,PS

## 2018-09-07 ENCOUNTER — MEDICATION RENEWAL (OUTPATIENT)
Age: 43
End: 2018-09-07

## 2018-10-11 ENCOUNTER — APPOINTMENT (OUTPATIENT)
Dept: INFECTIOUS DISEASE | Facility: CLINIC | Age: 43
End: 2018-10-11

## 2018-10-29 ENCOUNTER — APPOINTMENT (OUTPATIENT)
Dept: HEMATOLOGY ONCOLOGY | Facility: CLINIC | Age: 43
End: 2018-10-29
Payer: MEDICAID

## 2018-10-29 ENCOUNTER — RESULT REVIEW (OUTPATIENT)
Age: 43
End: 2018-10-29

## 2018-10-29 ENCOUNTER — OUTPATIENT (OUTPATIENT)
Dept: OUTPATIENT SERVICES | Facility: HOSPITAL | Age: 43
LOS: 1 days | Discharge: HOME | End: 2018-10-29

## 2018-10-29 VITALS
BODY MASS INDEX: 29.79 KG/M2 | WEIGHT: 201.72 LBS | SYSTOLIC BLOOD PRESSURE: 148 MMHG | OXYGEN SATURATION: 98 % | TEMPERATURE: 98.7 F | HEART RATE: 102 BPM | DIASTOLIC BLOOD PRESSURE: 96 MMHG | RESPIRATION RATE: 17 BRPM

## 2018-10-29 DIAGNOSIS — C85.88 OTHER SPECIFIED TYPES OF NON-HODGKIN LYMPHOMA, LYMPH NODES OF MULTIPLE SITES: ICD-10-CM

## 2018-10-29 LAB
BASOPHILS # BLD AUTO: 0 K/UL — SIGNIFICANT CHANGE UP (ref 0–0.2)
BASOPHILS NFR BLD AUTO: 0.5 % — SIGNIFICANT CHANGE UP (ref 0–2)
EOSINOPHIL # BLD AUTO: 0.1 K/UL — SIGNIFICANT CHANGE UP (ref 0–0.5)
EOSINOPHIL NFR BLD AUTO: 2 % — SIGNIFICANT CHANGE UP (ref 0–6)
HCT VFR BLD CALC: 45.6 % — SIGNIFICANT CHANGE UP (ref 39–50)
HGB BLD-MCNC: 14.9 G/DL — SIGNIFICANT CHANGE UP (ref 13–17)
LYMPHOCYTES # BLD AUTO: 1.6 K/UL — SIGNIFICANT CHANGE UP (ref 1–3.3)
LYMPHOCYTES # BLD AUTO: 25.5 % — SIGNIFICANT CHANGE UP (ref 13–44)
MCHC RBC-ENTMCNC: 27.7 PG — SIGNIFICANT CHANGE UP (ref 27–34)
MCHC RBC-ENTMCNC: 32.6 G/DL — SIGNIFICANT CHANGE UP (ref 32–36)
MCV RBC AUTO: 85 FL — SIGNIFICANT CHANGE UP (ref 80–100)
MONOCYTES # BLD AUTO: 0.6 K/UL — SIGNIFICANT CHANGE UP (ref 0–0.9)
MONOCYTES NFR BLD AUTO: 9.1 % — SIGNIFICANT CHANGE UP (ref 2–14)
NEUTROPHILS # BLD AUTO: 3.9 K/UL — SIGNIFICANT CHANGE UP (ref 1.8–7.4)
NEUTROPHILS NFR BLD AUTO: 63 % — SIGNIFICANT CHANGE UP (ref 43–77)
PLATELET # BLD AUTO: 197 K/UL — SIGNIFICANT CHANGE UP (ref 150–400)
RBC # BLD: 5.37 M/UL — SIGNIFICANT CHANGE UP (ref 4.2–5.8)
RBC # FLD: 15.8 % — HIGH (ref 10.3–14.5)
WBC # BLD: 6.2 K/UL — SIGNIFICANT CHANGE UP (ref 3.8–10.5)
WBC # FLD AUTO: 6.2 K/UL — SIGNIFICANT CHANGE UP (ref 3.8–10.5)

## 2018-10-29 PROCEDURE — 99213 OFFICE O/P EST LOW 20 MIN: CPT

## 2018-10-29 RX ORDER — METOCLOPRAMIDE HYDROCHLORIDE 10 MG/1
10 TABLET ORAL EVERY 6 HOURS
Qty: 120 | Refills: 4 | Status: COMPLETED | COMMUNITY
End: 2018-10-29

## 2018-10-29 RX ORDER — SENNOSIDES 8.6 MG TABLETS 8.6 MG/1
8.6 TABLET ORAL
Qty: 60 | Refills: 2 | Status: COMPLETED | COMMUNITY
Start: 2018-05-29 | End: 2018-10-29

## 2018-10-29 RX ORDER — B-COMPLEX WITH VITAMIN C
CAPSULE ORAL
Qty: 30 | Refills: 0 | Status: COMPLETED | COMMUNITY
Start: 2018-06-25 | End: 2018-10-29

## 2018-10-29 RX ORDER — DOCUSATE SODIUM 100 MG/1
100 CAPSULE ORAL 3 TIMES DAILY
Qty: 90 | Refills: 5 | Status: COMPLETED | COMMUNITY
Start: 2018-05-29 | End: 2018-10-29

## 2018-10-29 RX ORDER — OXYCODONE 10 MG/1
10 TABLET ORAL
Qty: 84 | Refills: 0 | Status: COMPLETED | COMMUNITY
Start: 2018-05-07 | End: 2018-10-29

## 2018-11-15 ENCOUNTER — FORM ENCOUNTER (OUTPATIENT)
Age: 43
End: 2018-11-15

## 2018-11-16 ENCOUNTER — OUTPATIENT (OUTPATIENT)
Dept: OUTPATIENT SERVICES | Facility: HOSPITAL | Age: 43
LOS: 1 days | End: 2018-11-16
Payer: MEDICAID

## 2018-11-16 ENCOUNTER — APPOINTMENT (OUTPATIENT)
Dept: CT IMAGING | Facility: IMAGING CENTER | Age: 43
End: 2018-11-16
Payer: MEDICAID

## 2018-11-16 ENCOUNTER — APPOINTMENT (OUTPATIENT)
Dept: INFECTIOUS DISEASE | Facility: CLINIC | Age: 43
End: 2018-11-16
Payer: MEDICAID

## 2018-11-16 VITALS
HEART RATE: 108 BPM | DIASTOLIC BLOOD PRESSURE: 99 MMHG | HEIGHT: 69 IN | SYSTOLIC BLOOD PRESSURE: 158 MMHG | TEMPERATURE: 98.9 F | OXYGEN SATURATION: 98 % | WEIGHT: 199 LBS | BODY MASS INDEX: 29.47 KG/M2

## 2018-11-16 DIAGNOSIS — C85.10 UNSPECIFIED B-CELL LYMPHOMA, UNSPECIFIED SITE: ICD-10-CM

## 2018-11-16 DIAGNOSIS — Z23 ENCOUNTER FOR IMMUNIZATION: ICD-10-CM

## 2018-11-16 PROCEDURE — 90670 PCV13 VACCINE IM: CPT

## 2018-11-16 PROCEDURE — 80053 COMPREHEN METABOLIC PANEL: CPT

## 2018-11-16 PROCEDURE — 90686 IIV4 VACC NO PRSV 0.5 ML IM: CPT

## 2018-11-16 PROCEDURE — 87491 CHLMYD TRACH DNA AMP PROBE: CPT

## 2018-11-16 PROCEDURE — 82306 VITAMIN D 25 HYDROXY: CPT

## 2018-11-16 PROCEDURE — 85027 COMPLETE CBC AUTOMATED: CPT

## 2018-11-16 PROCEDURE — 90834 PSYTX W PT 45 MINUTES: CPT

## 2018-11-16 PROCEDURE — 74177 CT ABD & PELVIS W/CONTRAST: CPT | Mod: 26

## 2018-11-16 PROCEDURE — 70491 CT SOFT TISSUE NECK W/DYE: CPT | Mod: 26

## 2018-11-16 PROCEDURE — G0008: CPT

## 2018-11-16 PROCEDURE — 99214 OFFICE O/P EST MOD 30 MIN: CPT

## 2018-11-16 PROCEDURE — 74177 CT ABD & PELVIS W/CONTRAST: CPT

## 2018-11-16 PROCEDURE — 83036 HEMOGLOBIN GLYCOSYLATED A1C: CPT

## 2018-11-16 PROCEDURE — 71260 CT THORAX DX C+: CPT

## 2018-11-16 PROCEDURE — 86480 TB TEST CELL IMMUN MEASURE: CPT

## 2018-11-16 PROCEDURE — 71260 CT THORAX DX C+: CPT | Mod: 26

## 2018-11-16 PROCEDURE — 90472 IMMUNIZATION ADMIN EACH ADD: CPT

## 2018-11-16 PROCEDURE — 87633 RESP VIRUS 12-25 TARGETS: CPT

## 2018-11-16 PROCEDURE — 87486 CHLMYD PNEUM DNA AMP PROBE: CPT

## 2018-11-16 PROCEDURE — 87536 HIV-1 QUANT&REVRSE TRNSCRPJ: CPT

## 2018-11-16 PROCEDURE — 81001 URINALYSIS AUTO W/SCOPE: CPT

## 2018-11-16 PROCEDURE — 86360 T CELL ABSOLUTE COUNT/RATIO: CPT

## 2018-11-16 PROCEDURE — 87591 N.GONORRHOEAE DNA AMP PROB: CPT

## 2018-11-16 PROCEDURE — 86803 HEPATITIS C AB TEST: CPT

## 2018-11-16 PROCEDURE — 87581 M.PNEUMON DNA AMP PROBE: CPT

## 2018-11-16 PROCEDURE — 80061 LIPID PANEL: CPT

## 2018-11-16 PROCEDURE — 86780 TREPONEMA PALLIDUM: CPT

## 2018-11-16 PROCEDURE — G0463: CPT | Mod: 25

## 2018-11-16 PROCEDURE — 70491 CT SOFT TISSUE NECK W/DYE: CPT

## 2018-11-16 PROCEDURE — 82550 ASSAY OF CK (CPK): CPT

## 2018-11-16 PROCEDURE — 82565 ASSAY OF CREATININE: CPT

## 2018-11-26 DIAGNOSIS — B20 HUMAN IMMUNODEFICIENCY VIRUS [HIV] DISEASE: ICD-10-CM

## 2018-12-27 ENCOUNTER — OUTPATIENT (OUTPATIENT)
Dept: OUTPATIENT SERVICES | Facility: HOSPITAL | Age: 43
LOS: 1 days | Discharge: HOME | End: 2018-12-27

## 2018-12-27 ENCOUNTER — APPOINTMENT (OUTPATIENT)
Dept: HEMATOLOGY ONCOLOGY | Facility: CLINIC | Age: 43
End: 2018-12-27

## 2018-12-27 DIAGNOSIS — C85.88 OTHER SPECIFIED TYPES OF NON-HODGKIN LYMPHOMA, LYMPH NODES OF MULTIPLE SITES: ICD-10-CM

## 2018-12-29 ENCOUNTER — INPATIENT (INPATIENT)
Facility: HOSPITAL | Age: 43
LOS: 1 days | Discharge: HOME | DRG: 976 | End: 2018-12-31
Attending: HOSPITALIST | Admitting: HOSPITALIST
Payer: MEDICAID

## 2018-12-29 VITALS
OXYGEN SATURATION: 96 % | DIASTOLIC BLOOD PRESSURE: 102 MMHG | HEIGHT: 68 IN | RESPIRATION RATE: 20 BRPM | TEMPERATURE: 101 F | WEIGHT: 199.96 LBS | SYSTOLIC BLOOD PRESSURE: 176 MMHG | HEART RATE: 130 BPM

## 2018-12-29 DIAGNOSIS — R00.0 TACHYCARDIA, UNSPECIFIED: ICD-10-CM

## 2018-12-29 DIAGNOSIS — Z29.9 ENCOUNTER FOR PROPHYLACTIC MEASURES, UNSPECIFIED: ICD-10-CM

## 2018-12-29 DIAGNOSIS — B20 HUMAN IMMUNODEFICIENCY VIRUS [HIV] DISEASE: ICD-10-CM

## 2018-12-29 DIAGNOSIS — B97.4 RESPIRATORY SYNCYTIAL VIRUS AS THE CAUSE OF DISEASES CLASSIFIED ELSEWHERE: ICD-10-CM

## 2018-12-29 DIAGNOSIS — E55.9 VITAMIN D DEFICIENCY, UNSPECIFIED: ICD-10-CM

## 2018-12-29 DIAGNOSIS — N39.0 URINARY TRACT INFECTION, SITE NOT SPECIFIED: ICD-10-CM

## 2018-12-29 DIAGNOSIS — C85.10 UNSPECIFIED B-CELL LYMPHOMA, UNSPECIFIED SITE: ICD-10-CM

## 2018-12-29 DIAGNOSIS — R74.0 NONSPECIFIC ELEVATION OF LEVELS OF TRANSAMINASE AND LACTIC ACID DEHYDROGENASE [LDH]: ICD-10-CM

## 2018-12-29 DIAGNOSIS — A41.9 SEPSIS, UNSPECIFIED ORGANISM: ICD-10-CM

## 2018-12-29 LAB
ALBUMIN SERPL ELPH-MCNC: 4.8 G/DL — SIGNIFICANT CHANGE UP (ref 3.3–5)
ALP SERPL-CCNC: 104 U/L — SIGNIFICANT CHANGE UP (ref 40–120)
ALT FLD-CCNC: 80 U/L — HIGH (ref 10–45)
ANION GAP SERPL CALC-SCNC: 16 MMOL/L — SIGNIFICANT CHANGE UP (ref 5–17)
APPEARANCE UR: CLEAR — SIGNIFICANT CHANGE UP
AST SERPL-CCNC: 44 U/L — HIGH (ref 10–40)
BACTERIA # UR AUTO: NEGATIVE — SIGNIFICANT CHANGE UP
BASE EXCESS BLDV CALC-SCNC: 3.4 MMOL/L — HIGH (ref -2–2)
BASOPHILS # BLD AUTO: 0 K/UL — SIGNIFICANT CHANGE UP (ref 0–0.2)
BASOPHILS NFR BLD AUTO: 0.2 % — SIGNIFICANT CHANGE UP (ref 0–2)
BILIRUB SERPL-MCNC: 0.5 MG/DL — SIGNIFICANT CHANGE UP (ref 0.2–1.2)
BILIRUB UR-MCNC: NEGATIVE — SIGNIFICANT CHANGE UP
BUN SERPL-MCNC: 9 MG/DL — SIGNIFICANT CHANGE UP (ref 7–23)
CA-I SERPL-SCNC: 1.12 MMOL/L — SIGNIFICANT CHANGE UP (ref 1.12–1.3)
CALCIUM SERPL-MCNC: 9.6 MG/DL — SIGNIFICANT CHANGE UP (ref 8.4–10.5)
CHLORIDE BLDV-SCNC: 110 MMOL/L — HIGH (ref 96–108)
CHLORIDE SERPL-SCNC: 97 MMOL/L — SIGNIFICANT CHANGE UP (ref 96–108)
CO2 BLDV-SCNC: 31 MMOL/L — HIGH (ref 22–30)
CO2 SERPL-SCNC: 26 MMOL/L — SIGNIFICANT CHANGE UP (ref 22–31)
COLOR SPEC: COLORLESS — SIGNIFICANT CHANGE UP
CREAT SERPL-MCNC: 1.3 MG/DL — SIGNIFICANT CHANGE UP (ref 0.5–1.3)
DIFF PNL FLD: ABNORMAL
EOSINOPHIL # BLD AUTO: 0.1 K/UL — SIGNIFICANT CHANGE UP (ref 0–0.5)
EOSINOPHIL NFR BLD AUTO: 0.9 % — SIGNIFICANT CHANGE UP (ref 0–6)
EPI CELLS # UR: 0 /HPF — SIGNIFICANT CHANGE UP
GAS PNL BLDV: 135 MMOL/L — LOW (ref 136–145)
GAS PNL BLDV: SIGNIFICANT CHANGE UP
GLUCOSE BLDV-MCNC: 86 MG/DL — SIGNIFICANT CHANGE UP (ref 70–99)
GLUCOSE SERPL-MCNC: 195 MG/DL — HIGH (ref 70–99)
GLUCOSE UR QL: NEGATIVE — SIGNIFICANT CHANGE UP
HCO3 BLDV-SCNC: 29 MMOL/L — SIGNIFICANT CHANGE UP (ref 21–29)
HCT VFR BLD CALC: 44.9 % — SIGNIFICANT CHANGE UP (ref 39–50)
HCT VFR BLDA CALC: 42 % — SIGNIFICANT CHANGE UP (ref 39–50)
HGB BLD CALC-MCNC: 13.6 G/DL — SIGNIFICANT CHANGE UP (ref 13–17)
HGB BLD-MCNC: 15.2 G/DL — SIGNIFICANT CHANGE UP (ref 13–17)
HOROWITZ INDEX BLDV+IHG-RTO: SIGNIFICANT CHANGE UP
HYALINE CASTS # UR AUTO: 0 /LPF — SIGNIFICANT CHANGE UP (ref 0–2)
KETONES UR-MCNC: NEGATIVE — SIGNIFICANT CHANGE UP
LACTATE BLDV-MCNC: 1.1 MMOL/L — SIGNIFICANT CHANGE UP (ref 0.7–2)
LEUKOCYTE ESTERASE UR-ACNC: ABNORMAL
LYMPHOCYTES # BLD AUTO: 2 K/UL — SIGNIFICANT CHANGE UP (ref 1–3.3)
LYMPHOCYTES # BLD AUTO: 20 % — SIGNIFICANT CHANGE UP (ref 13–44)
MCHC RBC-ENTMCNC: 29.9 PG — SIGNIFICANT CHANGE UP (ref 27–34)
MCHC RBC-ENTMCNC: 33.9 GM/DL — SIGNIFICANT CHANGE UP (ref 32–36)
MCV RBC AUTO: 88.4 FL — SIGNIFICANT CHANGE UP (ref 80–100)
MONOCYTES # BLD AUTO: 0.7 K/UL — SIGNIFICANT CHANGE UP (ref 0–0.9)
MONOCYTES NFR BLD AUTO: 7.2 % — SIGNIFICANT CHANGE UP (ref 2–14)
NEUTROPHILS # BLD AUTO: 7.1 K/UL — SIGNIFICANT CHANGE UP (ref 1.8–7.4)
NEUTROPHILS NFR BLD AUTO: 71.7 % — SIGNIFICANT CHANGE UP (ref 43–77)
NITRITE UR-MCNC: NEGATIVE — SIGNIFICANT CHANGE UP
PCO2 BLDV: 52 MMHG — HIGH (ref 35–50)
PH BLDV: 7.37 — SIGNIFICANT CHANGE UP (ref 7.35–7.45)
PH UR: 6.5 — SIGNIFICANT CHANGE UP (ref 5–8)
PLATELET # BLD AUTO: 193 K/UL — SIGNIFICANT CHANGE UP (ref 150–400)
PO2 BLDV: 34 MMHG — SIGNIFICANT CHANGE UP (ref 25–45)
POTASSIUM BLDV-SCNC: 4 MMOL/L — SIGNIFICANT CHANGE UP (ref 3.5–5.3)
POTASSIUM SERPL-MCNC: 4.1 MMOL/L — SIGNIFICANT CHANGE UP (ref 3.5–5.3)
POTASSIUM SERPL-SCNC: 4.1 MMOL/L — SIGNIFICANT CHANGE UP (ref 3.5–5.3)
PROT SERPL-MCNC: 8 G/DL — SIGNIFICANT CHANGE UP (ref 6–8.3)
PROT UR-MCNC: NEGATIVE — SIGNIFICANT CHANGE UP
RAPID RVP RESULT: DETECTED
RBC # BLD: 5.08 M/UL — SIGNIFICANT CHANGE UP (ref 4.2–5.8)
RBC # FLD: 16 % — HIGH (ref 10.3–14.5)
RBC CASTS # UR COMP ASSIST: 1 /HPF — SIGNIFICANT CHANGE UP (ref 0–4)
RSV RNA SPEC QL NAA+PROBE: DETECTED
SAO2 % BLDV: 56 % — LOW (ref 67–88)
SODIUM SERPL-SCNC: 139 MMOL/L — SIGNIFICANT CHANGE UP (ref 135–145)
SP GR SPEC: 1.01 — LOW (ref 1.01–1.02)
UROBILINOGEN FLD QL: NEGATIVE — SIGNIFICANT CHANGE UP
WBC # BLD: 9.9 K/UL — SIGNIFICANT CHANGE UP (ref 3.8–10.5)
WBC # FLD AUTO: 9.9 K/UL — SIGNIFICANT CHANGE UP (ref 3.8–10.5)
WBC UR QL: 14 /HPF — HIGH (ref 0–5)

## 2018-12-29 PROCEDURE — 71046 X-RAY EXAM CHEST 2 VIEWS: CPT | Mod: 26

## 2018-12-29 PROCEDURE — 99285 EMERGENCY DEPT VISIT HI MDM: CPT | Mod: 25

## 2018-12-29 PROCEDURE — 93010 ELECTROCARDIOGRAM REPORT: CPT

## 2018-12-29 PROCEDURE — 99223 1ST HOSP IP/OBS HIGH 75: CPT | Mod: GC

## 2018-12-29 PROCEDURE — 71275 CT ANGIOGRAPHY CHEST: CPT | Mod: 26

## 2018-12-29 RX ORDER — ENOXAPARIN SODIUM 100 MG/ML
40 INJECTION SUBCUTANEOUS DAILY
Qty: 0 | Refills: 0 | Status: DISCONTINUED | OUTPATIENT
Start: 2018-12-29 | End: 2018-12-31

## 2018-12-29 RX ORDER — ACYCLOVIR SODIUM 500 MG
800 VIAL (EA) INTRAVENOUS
Qty: 0 | Refills: 0 | COMMUNITY

## 2018-12-29 RX ORDER — SODIUM CHLORIDE 9 MG/ML
1000 INJECTION INTRAMUSCULAR; INTRAVENOUS; SUBCUTANEOUS
Qty: 0 | Refills: 0 | Status: DISCONTINUED | OUTPATIENT
Start: 2018-12-29 | End: 2018-12-31

## 2018-12-29 RX ORDER — SODIUM CHLORIDE 9 MG/ML
2000 INJECTION INTRAMUSCULAR; INTRAVENOUS; SUBCUTANEOUS ONCE
Qty: 0 | Refills: 0 | Status: COMPLETED | OUTPATIENT
Start: 2018-12-29 | End: 2018-12-29

## 2018-12-29 RX ORDER — ATOVAQUONE 750 MG/5ML
750 SUSPENSION ORAL
Qty: 0 | Refills: 0 | Status: DISCONTINUED | OUTPATIENT
Start: 2018-12-29 | End: 2018-12-31

## 2018-12-29 RX ORDER — AZITHROMYCIN 500 MG/1
500 TABLET, FILM COATED ORAL ONCE
Qty: 0 | Refills: 0 | Status: COMPLETED | OUTPATIENT
Start: 2018-12-29 | End: 2018-12-29

## 2018-12-29 RX ORDER — ONDANSETRON 8 MG/1
0 TABLET, FILM COATED ORAL
Qty: 0 | Refills: 0 | COMMUNITY

## 2018-12-29 RX ORDER — CEFTRIAXONE 500 MG/1
1 INJECTION, POWDER, FOR SOLUTION INTRAMUSCULAR; INTRAVENOUS ONCE
Qty: 0 | Refills: 0 | Status: COMPLETED | OUTPATIENT
Start: 2018-12-29 | End: 2018-12-29

## 2018-12-29 RX ORDER — ACYCLOVIR SODIUM 500 MG
1 VIAL (EA) INTRAVENOUS
Qty: 0 | Refills: 0 | COMMUNITY

## 2018-12-29 RX ORDER — CHOLECALCIFEROL (VITAMIN D3) 125 MCG
1000 CAPSULE ORAL DAILY
Qty: 0 | Refills: 0 | Status: DISCONTINUED | OUTPATIENT
Start: 2018-12-29 | End: 2018-12-31

## 2018-12-29 RX ORDER — SODIUM CHLORIDE 9 MG/ML
1000 INJECTION INTRAMUSCULAR; INTRAVENOUS; SUBCUTANEOUS ONCE
Qty: 0 | Refills: 0 | Status: DISCONTINUED | OUTPATIENT
Start: 2018-12-29 | End: 2018-12-29

## 2018-12-29 RX ORDER — ACETAMINOPHEN 500 MG
650 TABLET ORAL ONCE
Qty: 0 | Refills: 0 | Status: COMPLETED | OUTPATIENT
Start: 2018-12-29 | End: 2018-12-29

## 2018-12-29 RX ORDER — CEFTRIAXONE 500 MG/1
1 INJECTION, POWDER, FOR SOLUTION INTRAMUSCULAR; INTRAVENOUS EVERY 24 HOURS
Qty: 0 | Refills: 0 | Status: DISCONTINUED | OUTPATIENT
Start: 2018-12-29 | End: 2018-12-29

## 2018-12-29 RX ORDER — SODIUM CHLORIDE 9 MG/ML
1000 INJECTION INTRAMUSCULAR; INTRAVENOUS; SUBCUTANEOUS ONCE
Qty: 0 | Refills: 0 | Status: COMPLETED | OUTPATIENT
Start: 2018-12-29 | End: 2018-12-29

## 2018-12-29 RX ORDER — CEFTRIAXONE 500 MG/1
1 INJECTION, POWDER, FOR SOLUTION INTRAMUSCULAR; INTRAVENOUS EVERY 24 HOURS
Qty: 0 | Refills: 0 | Status: DISCONTINUED | OUTPATIENT
Start: 2018-12-30 | End: 2018-12-31

## 2018-12-29 RX ORDER — ACYCLOVIR SODIUM 500 MG
400 VIAL (EA) INTRAVENOUS THREE TIMES A DAY
Qty: 0 | Refills: 0 | Status: DISCONTINUED | OUTPATIENT
Start: 2018-12-29 | End: 2018-12-31

## 2018-12-29 RX ADMIN — Medication 400 MILLIGRAM(S): at 23:33

## 2018-12-29 RX ADMIN — Medication 650 MILLIGRAM(S): at 11:22

## 2018-12-29 RX ADMIN — SODIUM CHLORIDE 2000 MILLILITER(S): 9 INJECTION INTRAMUSCULAR; INTRAVENOUS; SUBCUTANEOUS at 11:23

## 2018-12-29 RX ADMIN — CEFTRIAXONE 100 GRAM(S): 500 INJECTION, POWDER, FOR SOLUTION INTRAMUSCULAR; INTRAVENOUS at 13:48

## 2018-12-29 RX ADMIN — CEFTRIAXONE 1 GRAM(S): 500 INJECTION, POWDER, FOR SOLUTION INTRAMUSCULAR; INTRAVENOUS at 14:42

## 2018-12-29 RX ADMIN — Medication 650 MILLIGRAM(S): at 12:26

## 2018-12-29 RX ADMIN — SODIUM CHLORIDE 1000 MILLILITER(S): 9 INJECTION INTRAMUSCULAR; INTRAVENOUS; SUBCUTANEOUS at 17:51

## 2018-12-29 RX ADMIN — AZITHROMYCIN 250 MILLIGRAM(S): 500 TABLET, FILM COATED ORAL at 20:54

## 2018-12-29 RX ADMIN — SODIUM CHLORIDE 2000 MILLILITER(S): 9 INJECTION INTRAMUSCULAR; INTRAVENOUS; SUBCUTANEOUS at 13:58

## 2018-12-29 RX ADMIN — SODIUM CHLORIDE 1000 MILLILITER(S): 9 INJECTION INTRAMUSCULAR; INTRAVENOUS; SUBCUTANEOUS at 15:59

## 2018-12-29 RX ADMIN — SODIUM CHLORIDE 50 MILLILITER(S): 9 INJECTION INTRAMUSCULAR; INTRAVENOUS; SUBCUTANEOUS at 21:55

## 2018-12-29 NOTE — H&P ADULT - PROBLEM SELECTOR PLAN 1
- pt septic with RSV + on RVP  - will continue to treat symptomatically - pt septic with RSV + on RVP  - will continue to treat symptomatically, was given ceftriaxone and azithromycin x1 in the ED but will hold off on further abx  - imaging with no signs of consolidation - pt septic with RSV + on RVP  - will continue to treat symptomatically, was given ceftriaxone and azithromycin x1 in the ED, will c/w ceftriaxone for UTI  - imaging with no signs of consolidation -Pt has positive UA and  immunocompromised for several reasons (prior chemo for cancer and HIV). Pt would benefit from inpatient admission for IV antibiotics for UTI and follow up with [facility].

## 2018-12-29 NOTE — H&P ADULT - PROBLEM SELECTOR PLAN 8
- Lovenox dvt ppx - pt noted to have vit D level on 15.8 on prior admission  - start supplementation.

## 2018-12-29 NOTE — ED PROVIDER NOTE - OBJECTIVE STATEMENT
43M, pmh of HIV, leukemia (last chemo 6 months ago) presenting with fever. Patient reports cough for the past week and fever last night but does not know how high. Complaining of mild headache. Denies any nausea, vomiting, difficulty breathing, abdominal pain, rashes, pain with urination, pain or swelling of lower extremities.

## 2018-12-29 NOTE — H&P ADULT - ASSESSMENT
44 yo M with pmhx of HIV, high-grade B-cell, CD10+ lymphoma with CNS involvement (now off chemo per pt) presenting with fevers/chills and cough.  Pt was found to be septic and RSV + on admission.

## 2018-12-29 NOTE — H&P ADULT - PROBLEM SELECTOR PLAN 7
- pt noted to have vit D level on 15.8 on prior admission  - would consider discharging on vit D supplementation or encourage PCP f/u - pt noted to have vit D level on 15.8 on prior admission  - start supplementation. - mild transaminitis noted on admission  - will continue to trend and consider further imaging if persistent  - will check AM coags

## 2018-12-29 NOTE — ED PROVIDER NOTE - MEDICAL DECISION MAKING DETAILS
43M presenting with cough and fever. has headache but no neck stiffness or rashes. given possible respiratory source less concerned for meningitis. concern for pna vs influenza. plan for cbc, cmp, rvp, cxr. will reassess need for spinal tap.

## 2018-12-29 NOTE — ED ADULT NURSE NOTE - NSIMPLEMENTINTERV_GEN_ALL_ED
Implemented All Universal Safety Interventions:  [place] to call system. Call bell, personal items and telephone within reach. Instruct patient to call for assistance. Room bathroom lighting operational. Non-slip footwear when patient is off stretcher. Physically safe environment: no spills, clutter or unnecessary equipment. Stretcher in lowest position, wheels locked, appropriate side rails in place.

## 2018-12-29 NOTE — CHART NOTE - NSCHARTNOTEFT_GEN_A_CORE
Sepsis re-evaluation note:  Patient is still having productive mucus/yellow cough, no SOB.    Allergies    No Known Allergies    Intolerances      PAST MEDICAL & SURGICAL HISTORY:  Mucositis  HIV (human immunodeficiency virus infection)  High grade B-cell lymphoma  No significant past surgical history    Vital Signs Last 24 Hrs  T(C): 38 (29 Dec 2018 12:26), Max: 38.3 (29 Dec 2018 10:28)  T(F): 100.4 (29 Dec 2018 12:26), Max: 101 (29 Dec 2018 10:28)  HR: 120 (29 Dec 2018 15:39) (112 - 130)  BP: 165/98 (29 Dec 2018 15:39) (165/98 - 176/102)  BP(mean): --  RR: 18 (29 Dec 2018 15:39) (18 - 20)  SpO2: 97% (29 Dec 2018 15:39) (96% - 97%)    PHYSICAL EXAM  GENERAL: The patient is awake and alert in no apparent distress.   HEENT: Head is normocephalic and atraumatic. Extraocular muscles are intact. Mucous membranes are moist. No throat erythema/exudates no lymphadenopathy, no JVD,   LUNGS: Clear to auscultation BL without wheezing, rales or rhonchi; respirations unlabored  HEART: Tachycardic with regular rhythm ,+S1/+S2, no murmurs, rubs, gallops  ABDOMEN: Soft, nontender, and nondistended, no rebound, guarding rigidity, bowel sounds in all 4 quadrants  EXTREMITIES: Warm and without any cyanosis, clubbing, rash, lesions or edema. Capillary refill is < 2 seconds  SKIN: No new rashes or lesions.  NEUROLOGIC: Grossly intact.                            15.2   9.9   )-----------( 193      ( 29 Dec 2018 11:07 )             44.9     12-    139  |  97  |  9   ----------------------------<  195<H>  4.1   |  26  |  1.30    Ca    9.6      29 Dec 2018 11:07    TPro  8.0  /  Alb  4.8  /  TBili  0.5  /  DBili  x   /  AST  44<H>  /  ALT  80<H>  /  AlkPhos  104  12      LIVER FUNCTIONS - ( 29 Dec 2018 11:07 )  Alb: 4.8 g/dL / Pro: 8.0 g/dL / ALK PHOS: 104 U/L / ALT: 80 U/L / AST: 44 U/L / GGT: x         Urinalysis Basic - ( 29 Dec 2018 11:07 )    Color: Colorless / Appearance: Clear / S.006 / pH: x  Gluc: x / Ketone: Negative  / Bili: Negative / Urobili: Negative   Blood: x / Protein: Negative / Nitrite: Negative   Leuk Esterase: Large / RBC: 1 /hpf / WBC 14 /hpf   Sq Epi: x / Non Sq Epi: 0 /hpf / Bacteria: Negative           Assessment- 42 yo M with a PMH HIV and B-cell lymphoma (not on chemo) who presented with cough x 1 week , found to be in sepsis 2/2 RSV    Plan  - Lactate improved from 2.8 to 1.7 on IVFs, 3L  - Patient is still tachycardic, sinus on EKG with stable blood pressure  - Pt still tachycardic, would cover with a dose of Azithromycin as well  - Tylenol PRN Sepsis re-evaluation note:  Patient is still having productive mucus/yellow cough, no SOB.    Allergies    No Known Allergies    Intolerances      PAST MEDICAL & SURGICAL HISTORY:  Mucositis  HIV (human immunodeficiency virus infection)  High grade B-cell lymphoma  No significant past surgical history    Vital Signs Last 24 Hrs  T(C): 38 (29 Dec 2018 12:26), Max: 38.3 (29 Dec 2018 10:28)  T(F): 100.4 (29 Dec 2018 12:26), Max: 101 (29 Dec 2018 10:28)  HR: 120 (29 Dec 2018 15:39) (112 - 130)  BP: 165/98 (29 Dec 2018 15:39) (165/98 - 176/102)  BP(mean): --  RR: 18 (29 Dec 2018 15:39) (18 - 20)  SpO2: 97% (29 Dec 2018 15:39) (96% - 97%)    PHYSICAL EXAM  GENERAL: The patient is awake and alert in no apparent distress.   HEENT: Head is normocephalic and atraumatic. Extraocular muscles are intact. Mucous membranes are moist. No throat erythema/exudates no lymphadenopathy, no JVD,   LUNGS: Clear to auscultation BL without wheezing, rales or rhonchi; respirations unlabored  HEART: Tachycardic with regular rhythm ,+S1/+S2, no murmurs, rubs, gallops  ABDOMEN: Soft, nontender, and nondistended, no rebound, guarding rigidity, bowel sounds in all 4 quadrants  EXTREMITIES: Warm and without any cyanosis, clubbing, rash, lesions or edema. Capillary refill is < 2 seconds  SKIN: No new rashes or lesions.  NEUROLOGIC: Grossly intact.                            15.2   9.9   )-----------( 193      ( 29 Dec 2018 11:07 )             44.9     12-    139  |  97  |  9   ----------------------------<  195<H>  4.1   |  26  |  1.30    Ca    9.6      29 Dec 2018 11:07    TPro  8.0  /  Alb  4.8  /  TBili  0.5  /  DBili  x   /  AST  44<H>  /  ALT  80<H>  /  AlkPhos  104  12      LIVER FUNCTIONS - ( 29 Dec 2018 11:07 )  Alb: 4.8 g/dL / Pro: 8.0 g/dL / ALK PHOS: 104 U/L / ALT: 80 U/L / AST: 44 U/L / GGT: x         Urinalysis Basic - ( 29 Dec 2018 11:07 )    Color: Colorless / Appearance: Clear / S.006 / pH: x  Gluc: x / Ketone: Negative  / Bili: Negative / Urobili: Negative   Blood: x / Protein: Negative / Nitrite: Negative   Leuk Esterase: Large / RBC: 1 /hpf / WBC 14 /hpf   Sq Epi: x / Non Sq Epi: 0 /hpf / Bacteria: Negative           Assessment- 42 yo M with a PMH HIV and B-cell lymphoma (not on chemo) who presented with cough x 1 week , found to be in sepsis 2/2 RSV    Plan  - Lactate improved from 2.7 to 1.1 on IVFs, 3L  - Patient is still tachycardic, sinus on EKG with stable blood pressure  - Pt still tachycardic, would cover with a dose of Azithromycin as well  - Tylenol PRN

## 2018-12-29 NOTE — H&P ADULT - ATTENDING COMMENTS
Patient assigned to me by night hospitalist in charge for management and care for patient for this evening only. Care to be resumed by day hospitalist in the morning and thereafter.     Patient seen and examined at bedside. Agree with resident note above. Changes made where appropriate. Translation service used with ID number [id number]. Doubt this is PCP pneumonia as he reports adherence, no groundglass opacities on imaging and not hypoxic. Also, less likely bacterial pneumonia based on hx and positive RSV and CT imaging. HE does not have urinary symptoms but given immunocompromised state (HIV and malignancy hx) and positive UA would favor treating with IV ceftriaxone for couple days. Chest port does not appear to be infected. Questions answered to patient's satisfaction Patient assigned to me by night hospitalist in charge for management and care for patient for this evening only. Care to be resumed by day hospitalist in the morning and thereafter.     Patient seen and examined at bedside. Agree with resident note above. Changes made where appropriate. Translation service used with ID number [id number]. Doubt this is PCP pneumonia as he reports adherence, no groundglass opacities on imaging and not hypoxic. Also, less likely bacterial pneumonia based on hx and positive RSV and CT imaging. He reported minimal headaches to ER but this has resolved. He has no neck stiffness or photophobia so doubt this is meningitis/encephalitis picture. HE does not have urinary symptoms but given immunocompromised state (HIV and malignancy hx) and positive UA would favor treating with IV ceftriaxone for couple days. Chest port does not appear to be infected. Questions answered to patient's satisfaction with  service.

## 2018-12-29 NOTE — H&P ADULT - HISTORY OF PRESENT ILLNESS
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 Patient reports cough for the past week and fever last night but does not know how high. Complaining of mild headache. Denies any nausea, vomiting, difficulty breathing, abdominal pain, rashes, pain with urination, pain or swelling of lower extremities. 42 yo M with pmhx of HIV, high-grade B-cell, CD10+ lymphoma with CNS involvement (now off chemo per pt) presenting with fevers/chills and cough.  He says the cough has been a dry cough with occasional clear phlegm production which has been going on for the past week.  When he checked at home the fever was as high as 100.9.  He reports compliance with his home meds and has followed up outpt with ID and Onc.  Denies any nausea, vomiting, sob, abdominal pain, rashes, chest pain, dysuria.  Of note, the pt called his sister to help with translation.  He was offered the use of a  phone but preferred to use his sister. 42 yo M with pmhx of HIV, high-grade B-cell, CD10+ lymphoma with CNS involvement (now off chemo per pt) presenting with fevers/chills and cough.  He says the cough has been a dry cough with occasional clear phlegm production which has been going on for the past week.  When he checked at home the fever was as high as 100.9.  He reports compliance with his home meds and has followed up outpt with ID and Onc.  Denies any nausea, vomiting, sob, abdominal pain, rashes, chest pain, dysuria.  No sick contacts.

## 2018-12-29 NOTE — H&P ADULT - NSHPLABSRESULTS_GEN_ALL_CORE
15.2   9.9   )-----------( 193      ( 29 Dec 2018 11:07 )             44.9           139  |  97  |  9   ----------------------------<  195<H>  4.1   |  26  |  1.30    Ca    9.6      29 Dec 2018 11:07    TPro  8.0  /  Alb  4.8  /  TBili  0.5  /  DBili  x   /  AST  44<H>  /  ALT  80<H>  /  AlkPhos  104            CAPILLARY BLOOD GLUCOSE              Lactate Trend                Urinalysis Basic - ( 29 Dec 2018 11:07 )    Color: Colorless / Appearance: Clear / S.006 / pH: x  Gluc: x / Ketone: Negative  / Bili: Negative / Urobili: Negative   Blood: x / Protein: Negative / Nitrite: Negative   Leuk Esterase: Large / RBC: 1 /hpf / WBC 14 /hpf   Sq Epi: x / Non Sq Epi: 0 /hpf / Bacteria: Negative

## 2018-12-29 NOTE — ED PROVIDER NOTE - SEVERE SEPSIS ALERT DETAILS
MD Ramirez:  patient afebrile, no leukocytosis.   I suspect elevated lactate 2.7 due to dehydration.  Patient has been hydrated and HR improved to 105.  will recheck lactate.

## 2018-12-29 NOTE — H&P ADULT - NSHPPHYSICALEXAM_GEN_ALL_CORE
T(C): 37.9 (12-29-18 @ 18:20), Max: 38.3 (12-29-18 @ 10:28)  HR: 130 (12-29-18 @ 18:57) (112 - 130)  BP: 160/96 (12-29-18 @ 18:20) (160/96 - 176/102)  RR: 18 (12-29-18 @ 18:57) (18 - 20)  SpO2: 97% (12-29-18 @ 18:57) (96% - 97%)    CONSTITUTIONAL: well appearing, well developed, no apparent distress  HEAD: Head atraumatic, normal cephalic shape.  EYES: sclera clear bilaterally, EOMI  CARDIAC: RRR, normal S1/S2, no murmurs  RESPIRATORY: no respiratory distress, normal breath sounds  CHEST: R sided port, no erythema, warmth, tenderness or crepitus  GASTROINTESTINAL: soft, nontender, bowel sounds present  MUSCULOSKELETAL: normal strength and ROM, no muscle or joint tenderness  NEUROLOGICAL: AAOx3, no focal deficits, full strength   SKIN: normal skin color, no apparent rashes  PSYCH: normal mood/affect PHYSICAL EXAM:  Vital Signs Last 24 Hrs  T(C): 37.6 (12-29-18 @ 19:41)  T(F): 99.7 (12-29-18 @ 19:41), Max: 101 (12-29-18 @ 10:28)  HR: 112 (12-29-18 @ 19:41) (112 - 130)  BP: 137/90 (12-29-18 @ 19:41)  BP(mean): --  RR: 18 (12-29-18 @ 19:41) (18 - 20)  SpO2: 95% (12-29-18 @ 19:41) (95% - 97%)  Wt(kg): --    Constitutional: NAD, awake and alert  EYES: EOMI, +conjunctival injections bilaterally.   ENT:  Normal Hearing, no tonsillar exudates   Neck: Soft and supple , no thyromegaly   Respiratory: Breath sounds are clear bilaterally, No wheezing, rales or rhonchi  Cardiovascular: S1 and S2, regular rate and rhythm, no Murmurs, gallops or rubs, no JVD,    Gastrointestinal: Bowel Sounds present, soft, nontender, nondistended, no guarding, no rebound  Extremities: No cyanosis or clubbing; warm to touch  Vascular: 2+ peripheral pulses lower ex  Neurological: No focal deficits, CN II-XII intact bilaterally, sensation to light touch intact in all extremities, gait intact. Pupils are equally reactive to light and symmetrical in size.   Musculoskeletal: 5/5 strength b/l upper and lower extremities; no joint swelling.  Skin: No rashes  Psych: no depression or anhedonia, AAOx3  HEME: no bruises, no nose bleeds

## 2018-12-29 NOTE — H&P ADULT - PROBLEM SELECTOR PLAN 2
- pt septic on admission with tachycardia and fever  - lactate originally elevated, now wnl s/p 3 L IVF  - c/w ceftriaxone and azithromycin to cover for possible CAP along with UA +  - blood and urine cx pending  - CXR with no obvious consolidation, CTA with no obv - pt septic on admission with tachycardia and fever  - lactate originally elevated, now wnl s/p 3 L IVF  - s/p ceftriaxone and azithromycin in the ED, will hold off on abx for now but low threshold to resume  - blood and urine cx pending  - CXR with no obvious consolidation, CTA with no PE or obvious source of infection  - UA + but pt asymptomatic, no need to treat - pt septic on admission with tachycardia and fever  - lactate originally elevated, now wnl s/p 3 L IVF  - s/p ceftriaxone and azithromycin in the ED, c/w ceftriaxone for UTI in setting of immunocompromised pt  - blood and urine cx pending  - CXR with no obvious consolidation, CTA with no PE or obvious source of infection  - UA +, c/w ceftriaxone for UTI  - c/w IVF @ 50 for 16 hours - pt septic on admission with tachycardia and fever  - lactate originally elevated, now wnl s/p 3 L IVF and now normalized.   - s/p ceftriaxone and azithromycin in the ED, c/w ceftriaxone for UTI in setting of immunocompromised pt  - blood and urine cx pending  - CXR with no obvious consolidation, CTA with no PE or obvious source of infection  - c/w IVF @ 50 for 16 hours - pt septic with RSV + on RVP  - will continue to treat symptomatically, was given ceftriaxone and azithromycin x1 in the ED, will c/w ceftriaxone for UTI  - imaging with no signs of consolidation

## 2018-12-29 NOTE — H&P ADULT - PROBLEM SELECTOR PLAN 3
- EKG with sinus tach  - admit to tele - last CD4 in 11/18 was wnl (594)  - c/w home Triumeq, acyclovir, atovaquone - pt septic on admission with tachycardia and fever  - lactate originally elevated, now wnl s/p 3 L IVF and now normalized.   - s/p ceftriaxone and azithromycin in the ED, c/w ceftriaxone for UTI in setting of immunocompromised pt  - blood and urine cx pending  - CXR with no obvious consolidation, CTA with no PE or obvious source of infection  - c/w IVF @ 50 for 16 hours

## 2018-12-29 NOTE — ED PROVIDER NOTE - PHYSICAL EXAMINATION
General: well appearing male, no acute distress   HEENT: normocephalic, atraumatic   Respiratory: normal work of breathing, lungs clear to auscultation bilaterally   Cardiac: regular rate and rhythm   Abdomen: soft, non-tender   MSk: no swelling or tenderness of lower extremities   Skin: no rashes   Neuro: A&Ox3

## 2018-12-29 NOTE — H&P ADULT - PROBLEM SELECTOR PLAN 6
- mild transaminitis noted on admission  - will continue to trend and consider further imaging if persistent  - will check AM coags - likely 2/2 sepsis  - EKG with sinus tach, no TWI  - admit to tele for further monitoring  - CTA negative for PE

## 2018-12-29 NOTE — ED PROVIDER NOTE - PROGRESS NOTE DETAILS
updated patient and family on results. patient remains tachycardiac. will admit to medicine for further monitoring. - resident Benjamín Urbina

## 2018-12-29 NOTE — ED PROVIDER NOTE - ATTENDING CONTRIBUTION TO CARE
MD Ramirez:  patient seen and evaluated with the resident.  I was present for key portions of the History & Physical, and I agree with the Impression & Plan.  MD Ramirez: 44 yo M, Hx lymphoma; last Tx'd 6mos ago, c/o fever and nonproductive cough.  Associated Sx: no CP/SOB; Tm 100.1, no leg pain/edema, no back pain, no HA, no congestion.  Better/worse: no clear modifiers.  Duration of cough x 1wk.  Intensity: 7/10.  VS: tachy 130, afebrile, normotensive.  Physical Exam: adult M, NAD, NCAT, PERRL, EOMI, neck supple, CTA B, RRR, Abd: s/nd/nt, ext: no edema.

## 2018-12-29 NOTE — H&P ADULT - NSHPREVIEWOFSYSTEMS_GEN_ALL_CORE
REVIEW OF SYSTEMS:  CONSTITUTIONAL: fevers, No weakness  HEENT: No visual changes; No vertigo or throat pain   NECK: No pain or stiffness  RESPIRATORY: cough, no wheezing or hemoptysis; No shortness of breath  CARDIOVASCULAR: No chest pain or palpitations  GASTROINTESTINAL: no abdominal discomfort, No nausea, vomiting, or hematemesis; No diarrhea or constipation. No melena or hematochezia.  GENITOURINARY: No dysuria, frequency or hematuria  NEUROLOGICAL: No numbness or weakness  MSK: no joint tenderness  SKIN: No itching, no rash REVIEW OF SYSTEMS:  CONSTITUTIONAL: fevers, No weakness  HEENT: No visual changes; No vertigo or throat pain   NECK: No pain or stiffness  RESPIRATORY: cough, no wheezing or hemoptysis; No shortness of breath  CARDIOVASCULAR: No chest pain or palpitations  GASTROINTESTINAL: no abdominal discomfort, No nausea, vomiting, or hematemesis; No diarrhea or constipation. No melena or hematochezia.  GENITOURINARY: No dysuria, frequency or hematuria  NEUROLOGICAL: No numbness or weakness  MSK: no joint tenderness  SKIN: No itching, no rash  PSYCH: no depression or anxiety

## 2018-12-29 NOTE — H&P ADULT - PROBLEM SELECTOR PLAN 4
- stable L sided axiliary lymphadenopathy on CTA - stable L sided axiliary lymphadenopathy on CTA  - pt not receiving treatment at this time - last CD4 in 11/18 was wnl (594)  - c/w home Triumeq, acyclovir, atovaquone

## 2018-12-29 NOTE — ED ADULT NURSE NOTE - OBJECTIVE STATEMENT
43 year old male presents to ED c/o cough/fever x 1 week with some headache. PMH of leukemia with last treatment in June 2018 and currently in remission. Patient is tachycardic and febrile. Lung sounds clear. Abd nondistended nontender. Skin warm dry intact. Sister at bedside.

## 2018-12-30 LAB
ALBUMIN SERPL ELPH-MCNC: 3.9 G/DL — SIGNIFICANT CHANGE UP (ref 3.3–5)
ALP SERPL-CCNC: 91 U/L — SIGNIFICANT CHANGE UP (ref 40–120)
ALT FLD-CCNC: 61 U/L — HIGH (ref 10–45)
ANION GAP SERPL CALC-SCNC: 12 MMOL/L — SIGNIFICANT CHANGE UP (ref 5–17)
AST SERPL-CCNC: 52 U/L — HIGH (ref 10–40)
BASOPHILS # BLD AUTO: 0 K/UL — SIGNIFICANT CHANGE UP (ref 0–0.2)
BASOPHILS NFR BLD AUTO: 0.3 % — SIGNIFICANT CHANGE UP (ref 0–2)
BILIRUB SERPL-MCNC: 0.3 MG/DL — SIGNIFICANT CHANGE UP (ref 0.2–1.2)
BUN SERPL-MCNC: 11 MG/DL — SIGNIFICANT CHANGE UP (ref 7–23)
CALCIUM SERPL-MCNC: 8.6 MG/DL — SIGNIFICANT CHANGE UP (ref 8.4–10.5)
CHLORIDE SERPL-SCNC: 101 MMOL/L — SIGNIFICANT CHANGE UP (ref 96–108)
CO2 SERPL-SCNC: 25 MMOL/L — SIGNIFICANT CHANGE UP (ref 22–31)
CREAT SERPL-MCNC: 1.1 MG/DL — SIGNIFICANT CHANGE UP (ref 0.5–1.3)
CULTURE RESULTS: NO GROWTH — SIGNIFICANT CHANGE UP
EOSINOPHIL # BLD AUTO: 0.2 K/UL — SIGNIFICANT CHANGE UP (ref 0–0.5)
EOSINOPHIL NFR BLD AUTO: 2.5 % — SIGNIFICANT CHANGE UP (ref 0–6)
GLUCOSE SERPL-MCNC: 140 MG/DL — HIGH (ref 70–99)
HCT VFR BLD CALC: 39.9 % — SIGNIFICANT CHANGE UP (ref 39–50)
HGB BLD-MCNC: 13.5 G/DL — SIGNIFICANT CHANGE UP (ref 13–17)
INR BLD: 1.18 RATIO — HIGH (ref 0.88–1.16)
LYMPHOCYTES # BLD AUTO: 2.2 K/UL — SIGNIFICANT CHANGE UP (ref 1–3.3)
LYMPHOCYTES # BLD AUTO: 24.2 % — SIGNIFICANT CHANGE UP (ref 13–44)
MAGNESIUM SERPL-MCNC: 2.2 MG/DL — SIGNIFICANT CHANGE UP (ref 1.6–2.6)
MCHC RBC-ENTMCNC: 29.6 PG — SIGNIFICANT CHANGE UP (ref 27–34)
MCHC RBC-ENTMCNC: 33.9 GM/DL — SIGNIFICANT CHANGE UP (ref 32–36)
MCV RBC AUTO: 87.1 FL — SIGNIFICANT CHANGE UP (ref 80–100)
MONOCYTES # BLD AUTO: 0.8 K/UL — SIGNIFICANT CHANGE UP (ref 0–0.9)
MONOCYTES NFR BLD AUTO: 9.2 % — SIGNIFICANT CHANGE UP (ref 2–14)
NEUTROPHILS # BLD AUTO: 5.8 K/UL — SIGNIFICANT CHANGE UP (ref 1.8–7.4)
NEUTROPHILS NFR BLD AUTO: 63.7 % — SIGNIFICANT CHANGE UP (ref 43–77)
PLATELET # BLD AUTO: 169 K/UL — SIGNIFICANT CHANGE UP (ref 150–400)
POTASSIUM SERPL-MCNC: 3.9 MMOL/L — SIGNIFICANT CHANGE UP (ref 3.5–5.3)
POTASSIUM SERPL-SCNC: 3.9 MMOL/L — SIGNIFICANT CHANGE UP (ref 3.5–5.3)
PROT SERPL-MCNC: 7.1 G/DL — SIGNIFICANT CHANGE UP (ref 6–8.3)
PROTHROM AB SERPL-ACNC: 13.5 SEC — HIGH (ref 10–12.9)
RBC # BLD: 4.57 M/UL — SIGNIFICANT CHANGE UP (ref 4.2–5.8)
RBC # FLD: 15.5 % — HIGH (ref 10.3–14.5)
SODIUM SERPL-SCNC: 138 MMOL/L — SIGNIFICANT CHANGE UP (ref 135–145)
SPECIMEN SOURCE: SIGNIFICANT CHANGE UP
WBC # BLD: 9.2 K/UL — SIGNIFICANT CHANGE UP (ref 3.8–10.5)
WBC # FLD AUTO: 9.2 K/UL — SIGNIFICANT CHANGE UP (ref 3.8–10.5)

## 2018-12-30 PROCEDURE — 99233 SBSQ HOSP IP/OBS HIGH 50: CPT

## 2018-12-30 RX ORDER — SODIUM CHLORIDE 9 MG/ML
1000 INJECTION INTRAMUSCULAR; INTRAVENOUS; SUBCUTANEOUS
Qty: 0 | Refills: 0 | Status: DISCONTINUED | OUTPATIENT
Start: 2018-12-30 | End: 2018-12-31

## 2018-12-30 RX ORDER — SODIUM CHLORIDE 9 MG/ML
1000 INJECTION INTRAMUSCULAR; INTRAVENOUS; SUBCUTANEOUS
Qty: 0 | Refills: 0 | Status: DISCONTINUED | OUTPATIENT
Start: 2018-12-30 | End: 2018-12-30

## 2018-12-30 RX ADMIN — ENOXAPARIN SODIUM 40 MILLIGRAM(S): 100 INJECTION SUBCUTANEOUS at 12:37

## 2018-12-30 RX ADMIN — SODIUM CHLORIDE 100 MILLILITER(S): 9 INJECTION INTRAMUSCULAR; INTRAVENOUS; SUBCUTANEOUS at 12:42

## 2018-12-30 RX ADMIN — Medication 400 MILLIGRAM(S): at 05:37

## 2018-12-30 RX ADMIN — Medication 1000 UNIT(S): at 12:38

## 2018-12-30 RX ADMIN — Medication 200 MILLIGRAM(S): at 12:39

## 2018-12-30 RX ADMIN — ATOVAQUONE 750 MILLIGRAM(S): 750 SUSPENSION ORAL at 17:36

## 2018-12-30 RX ADMIN — SODIUM CHLORIDE 100 MILLILITER(S): 9 INJECTION INTRAMUSCULAR; INTRAVENOUS; SUBCUTANEOUS at 22:01

## 2018-12-30 RX ADMIN — ATOVAQUONE 750 MILLIGRAM(S): 750 SUSPENSION ORAL at 05:37

## 2018-12-30 RX ADMIN — CEFTRIAXONE 100 GRAM(S): 500 INJECTION, POWDER, FOR SOLUTION INTRAMUSCULAR; INTRAVENOUS at 12:37

## 2018-12-30 RX ADMIN — Medication 400 MILLIGRAM(S): at 22:01

## 2018-12-30 RX ADMIN — Medication 400 MILLIGRAM(S): at 12:38

## 2018-12-31 ENCOUNTER — TRANSCRIPTION ENCOUNTER (OUTPATIENT)
Age: 43
End: 2018-12-31

## 2018-12-31 VITALS
OXYGEN SATURATION: 96 % | SYSTOLIC BLOOD PRESSURE: 150 MMHG | RESPIRATION RATE: 20 BRPM | DIASTOLIC BLOOD PRESSURE: 98 MMHG | HEART RATE: 102 BPM | TEMPERATURE: 99 F

## 2018-12-31 LAB
ANION GAP SERPL CALC-SCNC: 13 MMOL/L — SIGNIFICANT CHANGE UP (ref 5–17)
BUN SERPL-MCNC: 12 MG/DL — SIGNIFICANT CHANGE UP (ref 7–23)
CALCIUM SERPL-MCNC: 8.8 MG/DL — SIGNIFICANT CHANGE UP (ref 8.4–10.5)
CHLORIDE SERPL-SCNC: 101 MMOL/L — SIGNIFICANT CHANGE UP (ref 96–108)
CO2 SERPL-SCNC: 23 MMOL/L — SIGNIFICANT CHANGE UP (ref 22–31)
CREAT SERPL-MCNC: 1.07 MG/DL — SIGNIFICANT CHANGE UP (ref 0.5–1.3)
GLUCOSE SERPL-MCNC: 114 MG/DL — HIGH (ref 70–99)
HCT VFR BLD CALC: 37.9 % — LOW (ref 39–50)
HGB BLD-MCNC: 12.7 G/DL — LOW (ref 13–17)
MCHC RBC-ENTMCNC: 28.5 PG — SIGNIFICANT CHANGE UP (ref 27–34)
MCHC RBC-ENTMCNC: 33.5 GM/DL — SIGNIFICANT CHANGE UP (ref 32–36)
MCV RBC AUTO: 85.2 FL — SIGNIFICANT CHANGE UP (ref 80–100)
PLATELET # BLD AUTO: 176 K/UL — SIGNIFICANT CHANGE UP (ref 150–400)
POTASSIUM SERPL-MCNC: 4.1 MMOL/L — SIGNIFICANT CHANGE UP (ref 3.5–5.3)
POTASSIUM SERPL-SCNC: 4.1 MMOL/L — SIGNIFICANT CHANGE UP (ref 3.5–5.3)
RBC # BLD: 4.45 M/UL — SIGNIFICANT CHANGE UP (ref 4.2–5.8)
RBC # FLD: 16.5 % — HIGH (ref 10.3–14.5)
SODIUM SERPL-SCNC: 137 MMOL/L — SIGNIFICANT CHANGE UP (ref 135–145)
WBC # BLD: 6.63 K/UL — SIGNIFICANT CHANGE UP (ref 3.8–10.5)
WBC # FLD AUTO: 6.63 K/UL — SIGNIFICANT CHANGE UP (ref 3.8–10.5)

## 2018-12-31 PROCEDURE — 99239 HOSP IP/OBS DSCHRG MGMT >30: CPT

## 2018-12-31 RX ORDER — CHOLECALCIFEROL (VITAMIN D3) 125 MCG
1 CAPSULE ORAL
Qty: 30 | Refills: 0 | OUTPATIENT
Start: 2018-12-31 | End: 2019-01-29

## 2018-12-31 RX ORDER — CHOLECALCIFEROL (VITAMIN D3) 125 MCG
1000 CAPSULE ORAL
Qty: 0 | Refills: 0 | COMMUNITY
Start: 2018-12-31

## 2018-12-31 RX ADMIN — Medication 1000 UNIT(S): at 13:23

## 2018-12-31 RX ADMIN — ATOVAQUONE 750 MILLIGRAM(S): 750 SUSPENSION ORAL at 05:20

## 2018-12-31 RX ADMIN — SODIUM CHLORIDE 100 MILLILITER(S): 9 INJECTION INTRAMUSCULAR; INTRAVENOUS; SUBCUTANEOUS at 09:27

## 2018-12-31 RX ADMIN — Medication 400 MILLIGRAM(S): at 05:20

## 2018-12-31 RX ADMIN — CEFTRIAXONE 100 GRAM(S): 500 INJECTION, POWDER, FOR SOLUTION INTRAMUSCULAR; INTRAVENOUS at 13:20

## 2018-12-31 RX ADMIN — ENOXAPARIN SODIUM 40 MILLIGRAM(S): 100 INJECTION SUBCUTANEOUS at 13:23

## 2018-12-31 RX ADMIN — Medication 400 MILLIGRAM(S): at 13:23

## 2018-12-31 NOTE — DISCHARGE NOTE ADULT - CARE PROVIDER_API CALL
Esther Berry), Infectious Disease; Internal Medicine  [address] [place] [address]  Phone: [phone]  Fax: [phone]    Niall Cortes), Hematology; Internal Medicine; Medical Oncology  [address] [place] [address]  Phone: [phone]  Fax: [phone]

## 2018-12-31 NOTE — PROGRESS NOTE ADULT - ATTENDING COMMENTS
Plan d/w patient and NP (Joleen). Stable for D/C home this afternoon.    Discharge planning time 38 minutes.    Jordan Carroll M.D.  Hospitalist  Pager: [phone]
disposition: can likely dc tomorrow, if cultures remains negative and patient clinically stable

## 2018-12-31 NOTE — DISCHARGE NOTE ADULT - CARE PROVIDERS DIRECT ADDRESSES
,[email or web address]

## 2018-12-31 NOTE — DISCHARGE NOTE ADULT - PATIENT PORTAL LINK FT
You can access the [facility] Portal, offered by [facility], by registering with the following website: [email or web address]

## 2018-12-31 NOTE — DISCHARGE NOTE ADULT - PLAN OF CARE
stable c/w supplementation HOME CARE INSTRUCTIONS  f you were prescribed antibiotics, take them exactly as your caregiver instructs you. Finish the medication even if you feel better after you have only taken some of the medication.  Drink enough water and fluids to keep your urine clear or pale yellow.  Avoid caffeine, tea, and carbonated beverages. They tend to irritate your bladder.  Empty your bladder often. Avoid holding urine for long periods of time.  Empty your bladder before and after sexual intercourse.  After a bowel movement, women should cleanse from front to back. Use each tissue only once.  SEEK MEDICAL CARE IF:  You have back pain.  You develop a fever.  Your symptoms do not begin to resolve within 3 days.  SEEK IMMEDIATE MEDICAL CARE IF:  You have severe back pain or lower abdominal pain.  You develop chills.  You have nausea or vomiting.  You have continued burning or discomfort with urination. c/w supportive care c/w HAART STABLE let sided lymphadenopathy There was no evidence that you had a true urinary tract infection. You have no pain while urinating and are urinating normally. Your urine cultures were negative. You do not require further antibiotics at this time.  Please follow up with your infectious disease doctor.  Notify your physician if you develop fever or pain urinating. Symptoms will continue to improve.  You can use cough drops and cough suppressants as needed. Continue your current home therapy. Follow up with your hematologist. Follow up with your PCP.

## 2018-12-31 NOTE — DISCHARGE NOTE ADULT - CARE PLAN
Principal Discharge DX:	Complicated UTI (urinary tract infection)  Goal:	stable  Assessment and plan of treatment:	HOME CARE INSTRUCTIONS  f you were prescribed antibiotics, take them exactly as your caregiver instructs you. Finish the medication even if you feel better after you have only taken some of the medication.  Drink enough water and fluids to keep your urine clear or pale yellow.  Avoid caffeine, tea, and carbonated beverages. They tend to irritate your bladder.  Empty your bladder often. Avoid holding urine for long periods of time.  Empty your bladder before and after sexual intercourse.  After a bowel movement, women should cleanse from front to back. Use each tissue only once.  SEEK MEDICAL CARE IF:  You have back pain.  You develop a fever.  Your symptoms do not begin to resolve within 3 days.  SEEK IMMEDIATE MEDICAL CARE IF:  You have severe back pain or lower abdominal pain.  You develop chills.  You have nausea or vomiting.  You have continued burning or discomfort with urination.  Secondary Diagnosis:	RSV infection  Goal:	stable  Assessment and plan of treatment:	c/w supportive care  Secondary Diagnosis:	HIV (human immunodeficiency virus infection)  Goal:	stable  Assessment and plan of treatment:	c/w HAART  Secondary Diagnosis:	High grade B-cell lymphoma  Goal:	STABLE  Assessment and plan of treatment:	let sided lymphadenopathy  Secondary Diagnosis:	Vitamin D deficiency  Goal:	stable  Assessment and plan of treatment:	c/w supplementation Principal Discharge DX:	Complicated UTI (urinary tract infection)  Goal:	stable  Assessment and plan of treatment:	There was no evidence that you had a true urinary tract infection. You have no pain while urinating and are urinating normally. Your urine cultures were negative. You do not require further antibiotics at this time.  Please follow up with your infectious disease doctor.  Notify your physician if you develop fever or pain urinating.  Secondary Diagnosis:	RSV infection  Goal:	stable  Assessment and plan of treatment:	Symptoms will continue to improve.  You can use cough drops and cough suppressants as needed.  Secondary Diagnosis:	HIV (human immunodeficiency virus infection)  Goal:	stable  Assessment and plan of treatment:	Continue your current home therapy.  Secondary Diagnosis:	High grade B-cell lymphoma  Goal:	STABLE  Assessment and plan of treatment:	Follow up with your hematologist.  Secondary Diagnosis:	Vitamin D deficiency  Goal:	stable  Assessment and plan of treatment:	Follow up with your PCP.

## 2018-12-31 NOTE — PROGRESS NOTE ADULT - PROBLEM SELECTOR PLAN 2
- RSV + on RVP  - imaging with no signs of consolidation  - supportive management
Patient afebrile with NO dysuria/polyuria. His urine and blood cx are negative. His admission UA was weakly positive (at best), as nitrite neg, no bacteria. There is no overt evidence of UTI, suspect initial fever in setting of RSV infection.  - Will d/c without ABX  - Instructed to contact PCP/ID MD if develops dysuria or recurrent fever

## 2018-12-31 NOTE — DISCHARGE NOTE ADULT - SECONDARY DIAGNOSIS.
RSV infection HIV (human immunodeficiency virus infection) High grade B-cell lymphoma Vitamin D deficiency

## 2018-12-31 NOTE — PROGRESS NOTE ADULT - ASSESSMENT
43 year old male with pmhx of HIV on HAART, high-grade B-cell, CD10+ lymphoma with CNS involvement (now off chemotherapy per pt) presenting with fevers/chills, cough and dysuria admitted for complicated cystitis and RSV +.
44 y/o man w/ PMH HIV on HAART, high-grade B-cell, CD10+ lymphoma with CNS involvement presenting with fevers/chills and cough, found to have +RSV.

## 2018-12-31 NOTE — PROGRESS NOTE ADULT - PROBLEM SELECTOR PLAN 3
- last CD4 in 11/18 was wnl (594)  - c/w home Triumeq, acyclovir, atovaquone
- last CD4 in 11/18 was wnl (594)  - c/w home Triumeq, acyclovir, atovaquone

## 2018-12-31 NOTE — DISCHARGE NOTE ADULT - HOSPITAL COURSE
43 year old male with pmhx of HIV on HAART, high-grade B-cell, CD10+ lymphoma with CNS involvement (now off chemotherapy per pt) presenting with fevers/chills, cough and dysuria admitted for complicated cystitis and RSV +. You have completed a course of antibiotics for cystitis 43 year old male with pmhx of HIV on HAART, high-grade B-cell, CD10+ lymphoma with CNS involvement (now off chemotherapy per pt) presenting with fevers/chills, cough and dysuria admitted for complicated cystitis and RSV +. You have completed a course of antibiotics for positive urine analysis. Blood cultures and urine culture with no growth past 48 hrs. You are stable and cleared for discharge with close follow up. 43 year old male with pmhx of HIV on HAART, high-grade B-cell, CD10+ lymphoma with CNS involvement presenting with fevers/chills and cough. Patient found to be RSV+. CTA chest completed that showed no evidence of PE or lung consolidation. UA on admission showed +LE, but NO bacteria, nitrite negative. He reported no dysuria on my exam. There was NO evidence of active UTI. He did complete three days of ABX therapy, but did not require further antimicrobials. His RSV was managed symptomatically and he improved significantly. He had no evidence of hypoxia, tachypnea, or tachycardia on the day of D/C. He was comfortable and stable for D/C. He will f/u with hematology and ID as outpt. No medication changes were made. Patient in agreement with plan and all questions answered on the day of D/C.

## 2018-12-31 NOTE — DISCHARGE NOTE ADULT - MEDICATION SUMMARY - MEDICATIONS TO TAKE
I will START or STAY ON the medications listed below when I get home from the hospital:    Triumeq oral tablet  -- 1 tab(s) by mouth once a day  -- Indication: For HIV (human immunodeficiency virus infection)    acyclovir 400 mg oral tablet  -- 1 tab(s) by mouth 3 times a day  -- Indication: For HIV (human immunodeficiency virus infection)    Mepron 750 mg/5 mL oral suspension  -- 5 milliliter(s) by mouth 2 times a day  -- Indication: For HIV (human immunodeficiency virus infection)

## 2018-12-31 NOTE — PROGRESS NOTE ADULT - PROBLEM SELECTOR PLAN 4
- stable L sided axiliary lymphadenopathy on CTA  - pt not receiving treatment at this time
- stable L sided axiliary lymphadenopathy on CTA  - Outpt heme f/u

## 2018-12-31 NOTE — DISCHARGE NOTE ADULT - ADDITIONAL INSTRUCTIONS
Please f/u with your PCP in 4 to 7days Follow up with your PCP in one week.  Follow up with hematologist.

## 2018-12-31 NOTE — PROGRESS NOTE ADULT - PROBLEM SELECTOR PLAN 1
-Pt has positive UA and  immunocompromised for several reasons (prior chemo for cancer and HIV).   - c/w ceftriaxone 1 gm daily day 2  - UA positive, urine culture and blood cultures with NGTD  - given tachycardia and low grade temps, start NS at 75 cc/hr
- RSV + on RVP  - imaging with no signs of consolidation  - supportive management  - Significant symptomatic improvement, no hypoxia, tachypnea, wheezing

## 2018-12-31 NOTE — PROGRESS NOTE ADULT - SUBJECTIVE AND OBJECTIVE BOX
Patient admits to fevers, chills and some pain with urination. He denies shortness of breath, cough or sputum production.     GENERAL: + fevers, + chills.  EYES: No blurry vision,  No photophobia  ENT: No sore throat.  No dysphagia  Cardiovascular: No chest pain, palpitations, orthopnea  Pulmonary: No cough, no wheezing. No shortness of breath  Gastrointestinal: No abdominal pain, no diarrhea, no constipation.    : + dysuria.  No hematuria  Musculoskeletal: No weakness.  No myalgias.  Dermatology:  No rashes.  Neuro: No Headache.  No vertigo.  No dizziness.    Vital Signs Last 24 Hrs  T(C): 37.2 (30 Dec 2018 05:21), Max: 37.9 (29 Dec 2018 18:20)  T(F): 98.9 (30 Dec 2018 05:21), Max: 100.2 (29 Dec 2018 18:20)  HR: 108 (30 Dec 2018 05:21) (108 - 130)  BP: 109/69 (30 Dec 2018 05:21) (109/69 - 165/98)  BP(mean): --  RR: 18 (30 Dec 2018 05:21) (18 - 18)  SpO2: 96% (30 Dec 2018 05:21) (95% - 97%)    GENERAL: NAD, well-developed  HEAD:  Atraumatic, Normocephalic  EYES: EOMI, PERRLA, conjunctiva and sclera clear  ENT: Pharynx not erythematous  PULMONARY: Clear to auscultation bilaterally; No wheeze  CARDIOVASCULAR: Regular rate and rhythm; No murmurs, rubs, or gallops  ABDOMEN: Soft, Nontender, Nondistended; Bowel sounds present  EXTREMITIES:  2+ Peripheral Pulses, No clubbing, cyanosis, or edema  MUSCULOSKELETAL: No calf tenderness  PSYCH: AAOx3, normal affect  SKIN: warm and dry, No rashes or lesions
Patient is a 43y old  Male who presents with a chief complaint of fever (31 Dec 2018 13:29)        SUBJECTIVE / OVERNIGHT EVENTS: Patient seen and examined. Feels well this AM. He denies chest pain and SOB. Denies dysuria and polyuria. No fever or chills.     MEDICATIONS  (STANDING):  abacavir 600 mG/dolutegravir 50 mG/lamiVUDine 300 mG 1 Tablet(s) Oral daily  acyclovir   Oral Tab/Cap 400 milliGRAM(s) Oral three times a day  atovaquone Suspension 750 milliGRAM(s) Oral two times a day  cefTRIAXone   IVPB 1 Gram(s) IV Intermittent every 24 hours  cholecalciferol 1000 Unit(s) Oral daily  enoxaparin Injectable 40 milliGRAM(s) SubCutaneous daily    MEDICATIONS  (PRN):  guaiFENesin    Syrup 200 milliGRAM(s) Oral every 6 hours PRN Cough        I&O's Summary    30 Dec 2018 07:01  -  31 Dec 2018 07:00  --------------------------------------------------------  IN: 2040 mL / OUT: 2550 mL / NET: -510 mL    31 Dec 2018 07:01  -  31 Dec 2018 13:37  --------------------------------------------------------  IN: 240 mL / OUT: 350 mL / NET: -110 mL      Vital Signs Last 24 Hrs  T(C): 37 (31 Dec 2018 04:28), Max: 37.6 (30 Dec 2018 20:46)  T(F): 98.6 (31 Dec 2018 04:28), Max: 99.6 (30 Dec 2018 20:46)  HR: 95 (31 Dec 2018 04:28) (95 - 111)  BP: 129/78 (31 Dec 2018 04:28) (129/78 - 152/94)  BP(mean): --  RR: 18 (31 Dec 2018 04:28) (18 - 18)  SpO2: 96% (31 Dec 2018 04:28) (96% - 96%)    PHYSICAL EXAM:      Constitutional: Well-appearing, NAD, lying in bed, appears stated age    Eyes: EOMI, PERRLA, clear conjunctiva    ENMT: No pharyngeal erythema, mucus membranes moist    Neck: No JVD    Back: No spinal tenderness or kyphosis    Respiratory: Lungs clear to auscultation     Cardiovascular: Regular rate and rhythm, S1S2+, no murmurs appreciated.  No LE edema    Gastrointestinal: Soft, non-tender, non-distended, bowel sounds positive all four quadrants    Extremities: no clubbing or cyanosis    Vascular: 2+ pulses radially and dorsalis pedis    Neurological: Alert and oriented to name, place, and date.  Cranial nerves II-XII intact.  No focal neurological deficits.  5/5 motor strength all four extremities    Skin: Warm and dry.  No rashes. +R chest wall port     Lymph Nodes: No cervical lymphadenopathy    Musculoskeletal: No joint swelling or erythema    Psychiatric: Pleasant affect    LABS:                        12.7   6.63  )-----------( 176      ( 31 Dec 2018 08:10 )             37.9     12-31    137  |  101  |  12  ----------------------------<  114<H>  4.1   |  23  |  1.07    Ca    8.8      31 Dec 2018 05:53  Phos  2.4     12-30  Mg     2.2     12-30    TPro  7.1  /  Alb  3.9  /  TBili  0.3  /  DBili  x   /  AST  52<H>  /  ALT  61<H>  /  AlkPhos  91  12-30    PT/INR - ( 30 Dec 2018 14:45 )   PT: 13.5 sec;   INR: 1.18 ratio                 RADIOLOGY & ADDITIONAL TESTS:    Imaging Personally Reviewed: CT chest  Consultant(s) Notes Reviewed:    Care Discussed with Consultants/Other Providers:

## 2019-01-01 NOTE — PATIENT PROFILE ADULT. - PRO ARRIVE FROM
home
I will SWITCH the dose or number of times a day I take the medications listed below when I get home from the hospital:  None

## 2019-01-04 LAB
ALBUMIN SERPL ELPH-MCNC: 4.8 G/DL
ALP BLD-CCNC: 97 U/L
ALT SERPL-CCNC: 41 U/L
ANION GAP SERPL CALC-SCNC: 11 MMOL/L
AST SERPL-CCNC: 22 U/L
B2 MICROGLOB SERPL-MCNC: 1.5 MG/L
BILIRUB SERPL-MCNC: 0.2 MG/DL
BUN SERPL-MCNC: 10 MG/DL
CALCIUM SERPL-MCNC: 9.3 MG/DL
CHLORIDE SERPL-SCNC: 104 MMOL/L
CO2 SERPL-SCNC: 26 MMOL/L
CREAT SERPL-MCNC: 1 MG/DL
GLUCOSE SERPL-MCNC: 100 MG/DL
LDH SERPL-CCNC: 185 U/L
POTASSIUM SERPL-SCNC: 5 MMOL/L
PROT SERPL-MCNC: 7.4 G/DL
SODIUM SERPL-SCNC: 141 MMOL/L

## 2019-01-08 ENCOUNTER — APPOINTMENT (OUTPATIENT)
Dept: HEMATOLOGY ONCOLOGY | Facility: CLINIC | Age: 44
End: 2019-01-08
Payer: MEDICAID

## 2019-01-08 ENCOUNTER — RESULT REVIEW (OUTPATIENT)
Age: 44
End: 2019-01-08

## 2019-01-08 VITALS
OXYGEN SATURATION: 99 % | HEART RATE: 104 BPM | BODY MASS INDEX: 29.79 KG/M2 | TEMPERATURE: 99.3 F | DIASTOLIC BLOOD PRESSURE: 87 MMHG | RESPIRATION RATE: 16 BRPM | WEIGHT: 201.72 LBS | SYSTOLIC BLOOD PRESSURE: 146 MMHG

## 2019-01-08 LAB
BASOPHILS # BLD AUTO: 0.1 K/UL — SIGNIFICANT CHANGE UP (ref 0–0.2)
BASOPHILS NFR BLD AUTO: 2 % — SIGNIFICANT CHANGE UP (ref 0–2)
EOSINOPHIL # BLD AUTO: 0.1 K/UL — SIGNIFICANT CHANGE UP (ref 0–0.5)
EOSINOPHIL NFR BLD AUTO: 1.5 % — SIGNIFICANT CHANGE UP (ref 0–6)
HCT VFR BLD CALC: 43.3 % — SIGNIFICANT CHANGE UP (ref 39–50)
HGB BLD-MCNC: 14.8 G/DL — SIGNIFICANT CHANGE UP (ref 13–17)
LYMPHOCYTES # BLD AUTO: 2.3 K/UL — SIGNIFICANT CHANGE UP (ref 1–3.3)
LYMPHOCYTES # BLD AUTO: 41.5 % — SIGNIFICANT CHANGE UP (ref 13–44)
MCHC RBC-ENTMCNC: 29.9 PG — SIGNIFICANT CHANGE UP (ref 27–34)
MCHC RBC-ENTMCNC: 34.3 G/DL — SIGNIFICANT CHANGE UP (ref 32–36)
MCV RBC AUTO: 87.1 FL — SIGNIFICANT CHANGE UP (ref 80–100)
MONOCYTES # BLD AUTO: 0.3 K/UL — SIGNIFICANT CHANGE UP (ref 0–0.9)
MONOCYTES NFR BLD AUTO: 4.7 % — SIGNIFICANT CHANGE UP (ref 2–14)
NEUTROPHILS # BLD AUTO: 2.8 K/UL — SIGNIFICANT CHANGE UP (ref 1.8–7.4)
NEUTROPHILS NFR BLD AUTO: 50.4 % — SIGNIFICANT CHANGE UP (ref 43–77)
PLATELET # BLD AUTO: 304 K/UL — SIGNIFICANT CHANGE UP (ref 150–400)
RBC # BLD: 4.97 M/UL — SIGNIFICANT CHANGE UP (ref 4.2–5.8)
RBC # FLD: 15.1 % — HIGH (ref 10.3–14.5)
WBC # BLD: 5.5 K/UL — SIGNIFICANT CHANGE UP (ref 3.8–10.5)
WBC # FLD AUTO: 5.5 K/UL — SIGNIFICANT CHANGE UP (ref 3.8–10.5)

## 2019-01-08 PROCEDURE — 99214 OFFICE O/P EST MOD 30 MIN: CPT

## 2019-01-08 RX ORDER — ATOVAQUONE 750 MG/5ML
750 SUSPENSION ORAL TWICE DAILY
Qty: 480 | Refills: 6 | Status: COMPLETED | COMMUNITY
Start: 2018-10-29 | End: 2019-01-08

## 2019-01-08 RX ORDER — ACYCLOVIR 400 MG/1
400 TABLET ORAL
Qty: 90 | Refills: 1 | Status: DISCONTINUED | COMMUNITY
Start: 2018-07-13 | End: 2019-01-08

## 2019-01-08 RX ORDER — GABAPENTIN 100 MG/1
100 CAPSULE ORAL
Qty: 90 | Refills: 2 | Status: DISCONTINUED | COMMUNITY
Start: 2018-07-05 | End: 2019-01-08

## 2019-01-11 ENCOUNTER — INBOUND DOCUMENT (OUTPATIENT)
Age: 44
End: 2019-01-11

## 2019-01-27 LAB
ALBUMIN SERPL ELPH-MCNC: 4.7 G/DL
ALP BLD-CCNC: 107 U/L
ALT SERPL-CCNC: 43 U/L
ANION GAP SERPL CALC-SCNC: 12 MMOL/L
APTT BLD: 31.3 SEC
AST SERPL-CCNC: 22 U/L
B2 MICROGLOB SERPL-MCNC: 1.4 MG/L
BILIRUB SERPL-MCNC: <0.2 MG/DL
BUN SERPL-MCNC: 7 MG/DL
CALCIUM SERPL-MCNC: 9.7 MG/DL
CHLORIDE SERPL-SCNC: 102 MMOL/L
CO2 SERPL-SCNC: 25 MMOL/L
CREAT SERPL-MCNC: 1.17 MG/DL
GLUCOSE SERPL-MCNC: 95 MG/DL
INR PPP: 1.02 RATIO
LDH SERPL-CCNC: 184 U/L
POTASSIUM SERPL-SCNC: 4.8 MMOL/L
PROT SERPL-MCNC: 7.4 G/DL
PT BLD: 11.4 SEC
SODIUM SERPL-SCNC: 139 MMOL/L

## 2019-02-04 NOTE — CHART NOTE - NSCHARTNOTEFT_GEN_A_CORE
****patient with localized infection only. NO SEPSIS PRESENT AT ADMISSION OR IN HOSPITALIZATION*****

## 2019-03-01 ENCOUNTER — OUTPATIENT (OUTPATIENT)
Dept: OUTPATIENT SERVICES | Facility: HOSPITAL | Age: 44
LOS: 1 days | End: 2019-03-01
Payer: MEDICAID

## 2019-03-04 ENCOUNTER — APPOINTMENT (OUTPATIENT)
Dept: ENDOVASCULAR SURGERY | Facility: CLINIC | Age: 44
End: 2019-03-04

## 2019-03-18 ENCOUNTER — LABORATORY RESULT (OUTPATIENT)
Age: 44
End: 2019-03-18

## 2019-03-18 ENCOUNTER — APPOINTMENT (OUTPATIENT)
Dept: INFECTIOUS DISEASE | Facility: CLINIC | Age: 44
End: 2019-03-18

## 2019-03-18 ENCOUNTER — OUTPATIENT (OUTPATIENT)
Dept: OUTPATIENT SERVICES | Facility: HOSPITAL | Age: 44
LOS: 1 days | End: 2019-03-18
Payer: MEDICAID

## 2019-03-18 VITALS
RESPIRATION RATE: 16 BRPM | WEIGHT: 210 LBS | TEMPERATURE: 98.3 F | HEART RATE: 89 BPM | HEIGHT: 69 IN | OXYGEN SATURATION: 98 % | BODY MASS INDEX: 31.1 KG/M2 | DIASTOLIC BLOOD PRESSURE: 98 MMHG | SYSTOLIC BLOOD PRESSURE: 144 MMHG

## 2019-03-18 DIAGNOSIS — Z23 ENCOUNTER FOR IMMUNIZATION: ICD-10-CM

## 2019-03-18 DIAGNOSIS — C85.10 UNSPECIFIED B-CELL LYMPHOMA, UNSPECIFIED SITE: ICD-10-CM

## 2019-03-18 DIAGNOSIS — B20 HUMAN IMMUNODEFICIENCY VIRUS [HIV] DISEASE: ICD-10-CM

## 2019-03-18 PROCEDURE — 83036 HEMOGLOBIN GLYCOSYLATED A1C: CPT

## 2019-03-18 PROCEDURE — 86803 HEPATITIS C AB TEST: CPT

## 2019-03-18 PROCEDURE — 86480 TB TEST CELL IMMUN MEASURE: CPT

## 2019-03-18 PROCEDURE — 86360 T CELL ABSOLUTE COUNT/RATIO: CPT

## 2019-03-18 PROCEDURE — G0009: CPT

## 2019-03-18 PROCEDURE — 90732 PPSV23 VACC 2 YRS+ SUBQ/IM: CPT

## 2019-03-18 PROCEDURE — 85027 COMPLETE CBC AUTOMATED: CPT

## 2019-03-18 PROCEDURE — G0463: CPT | Mod: 25

## 2019-03-18 PROCEDURE — 87491 CHLMYD TRACH DNA AMP PROBE: CPT

## 2019-03-18 PROCEDURE — 87591 N.GONORRHOEAE DNA AMP PROB: CPT

## 2019-03-18 PROCEDURE — 80053 COMPREHEN METABOLIC PANEL: CPT

## 2019-03-18 PROCEDURE — 86780 TREPONEMA PALLIDUM: CPT

## 2019-03-18 PROCEDURE — 87536 HIV-1 QUANT&REVRSE TRNSCRPJ: CPT

## 2019-03-18 PROCEDURE — 81001 URINALYSIS AUTO W/SCOPE: CPT

## 2019-03-18 PROCEDURE — 80061 LIPID PANEL: CPT

## 2019-03-18 NOTE — PHYSICAL EXAM
[General Appearance - Alert] : alert [General Appearance - In No Acute Distress] : in no acute distress [Sclera] : the sclera and conjunctiva were normal [Extraocular Movements] : extraocular movements were intact [PERRL With Normal Accommodation] : pupils were equal in size, round, reactive to light [Outer Ear] : the ears and nose were normal in appearance [Neck Appearance] : the appearance of the neck was normal [Oropharynx] : the oropharynx was normal with no thrush [Jugular Venous Distention Increased] : there was no jugular-venous distention [Neck Cervical Mass (___cm)] : no neck mass was observed [Thyroid Diffuse Enlargement] : the thyroid was not enlarged [Auscultation Breath Sounds / Voice Sounds] : lungs were clear to auscultation bilaterally [Heart Rate And Rhythm] : heart rate was normal and rhythm regular [Heart Sounds] : normal S1 and S2 [Heart Sounds Gallop] : no gallops [Murmurs] : no murmurs [Heart Sounds Pericardial Friction Rub] : no pericardial rub [Full Pulse] : the pedal pulses are present [Edema] : there was no peripheral edema [Abdomen Soft] : soft [Abdomen Tenderness] : non-tender [Bowel Sounds] : normal bowel sounds [Abdomen Mass (___ Cm)] : no abdominal mass palpated [Costovertebral Angle Tenderness] : no CVA tenderness [Penis Abnormality] : the penis was normal [Testes Swelling] : the testicles were not swollen [Scrotum] : the scrotum was normal [No Palpable Adenopathy] : no palpable adenopathy [Testes Mass (___cm)] : there were no testicular masses [Motor Tone] : muscle strength and tone were normal [Nail Clubbing] : no clubbing  or cyanosis of the fingernails [Musculoskeletal - Swelling] : no joint swelling [Skin Color & Pigmentation] : normal skin color and pigmentation [] : no rash [Deep Tendon Reflexes (DTR)] : deep tendon reflexes were 2+ and symmetric [Sensation] : the sensory exam was normal to light touch and pinprick [Oriented To Time, Place, And Person] : oriented to person, place, and time [No Focal Deficits] : no focal deficits [Affect] : the affect was normal

## 2019-03-19 LAB
4/8 RATIO: 0.51 RATIO — LOW (ref 0.9–3.6)
ABS CD8: 1590 /UL — HIGH (ref 142–740)
ALBUMIN SERPL ELPH-MCNC: 4.8 G/DL — SIGNIFICANT CHANGE UP (ref 3.3–5)
ALP SERPL-CCNC: 103 U/L — SIGNIFICANT CHANGE UP (ref 40–120)
ALT FLD-CCNC: 37 U/L — SIGNIFICANT CHANGE UP (ref 10–45)
ANION GAP SERPL CALC-SCNC: 13 MMOL/L — SIGNIFICANT CHANGE UP (ref 5–17)
APPEARANCE UR: CLEAR — SIGNIFICANT CHANGE UP
AST SERPL-CCNC: 25 U/L — SIGNIFICANT CHANGE UP (ref 10–40)
BACTERIA # UR AUTO: NEGATIVE — SIGNIFICANT CHANGE UP
BILIRUB SERPL-MCNC: 0.3 MG/DL — SIGNIFICANT CHANGE UP (ref 0.2–1.2)
BILIRUB UR-MCNC: NEGATIVE — SIGNIFICANT CHANGE UP
BUN SERPL-MCNC: 11 MG/DL — SIGNIFICANT CHANGE UP (ref 7–23)
C TRACH RRNA SPEC QL NAA+PROBE: SIGNIFICANT CHANGE UP
CALCIUM SERPL-MCNC: 9.6 MG/DL — SIGNIFICANT CHANGE UP (ref 8.4–10.5)
CD3 BLASTS SPEC-ACNC: 2437 /UL — HIGH (ref 672–1870)
CD3 BLASTS SPEC-ACNC: 82 % — SIGNIFICANT CHANGE UP (ref 59–83)
CD4 %: 27 % — LOW (ref 30–62)
CD8 %: 53 % — HIGH (ref 12–36)
CHLORIDE SERPL-SCNC: 102 MMOL/L — SIGNIFICANT CHANGE UP (ref 96–108)
CHOLEST SERPL-MCNC: 168 MG/DL — SIGNIFICANT CHANGE UP (ref 10–199)
CO2 SERPL-SCNC: 25 MMOL/L — SIGNIFICANT CHANGE UP (ref 22–31)
COLOR SPEC: SIGNIFICANT CHANGE UP
CREAT SERPL-MCNC: 1.37 MG/DL — HIGH (ref 0.5–1.3)
DIFF PNL FLD: NEGATIVE — SIGNIFICANT CHANGE UP
EPI CELLS # UR: 0 /HPF — SIGNIFICANT CHANGE UP (ref 0–5)
GLUCOSE SERPL-MCNC: 92 MG/DL — SIGNIFICANT CHANGE UP (ref 70–99)
GLUCOSE UR QL: NEGATIVE — SIGNIFICANT CHANGE UP
HBA1C BLD-MCNC: 5.9 % — HIGH (ref 4–5.6)
HCT VFR BLD CALC: 47.8 % — SIGNIFICANT CHANGE UP (ref 39–50)
HCV AB S/CO SERPL IA: 0.1 S/CO — SIGNIFICANT CHANGE UP (ref 0–0.79)
HCV AB SERPL-IMP: SIGNIFICANT CHANGE UP
HDLC SERPL-MCNC: 43 MG/DL — SIGNIFICANT CHANGE UP
HGB BLD-MCNC: 15.3 G/DL — SIGNIFICANT CHANGE UP (ref 13–17)
HIV-1 VIRAL LOAD RESULT: ABNORMAL
HIV1 RNA # SERPL NAA+PROBE: SIGNIFICANT CHANGE UP
HIV1 RNA SER-IMP: SIGNIFICANT CHANGE UP
HIV1 RNA SERPL NAA+PROBE-ACNC: ABNORMAL
HIV1 RNA SERPL NAA+PROBE-LOG#: ABNORMAL LG COP/ML
HYALINE CASTS # UR AUTO: 2 /LPF — SIGNIFICANT CHANGE UP (ref 0–7)
KETONES UR-MCNC: NEGATIVE — SIGNIFICANT CHANGE UP
LEUKOCYTE ESTERASE UR-ACNC: NEGATIVE — SIGNIFICANT CHANGE UP
LIPID PNL WITH DIRECT LDL SERPL: 98 MG/DL — SIGNIFICANT CHANGE UP
MCHC RBC-ENTMCNC: 29.6 PG — SIGNIFICANT CHANGE UP (ref 27–34)
MCHC RBC-ENTMCNC: 32 GM/DL — SIGNIFICANT CHANGE UP (ref 32–36)
MCV RBC AUTO: 92.5 FL — SIGNIFICANT CHANGE UP (ref 80–100)
N GONORRHOEA RRNA SPEC QL NAA+PROBE: SIGNIFICANT CHANGE UP
NITRITE UR-MCNC: NEGATIVE — SIGNIFICANT CHANGE UP
PH UR: 7 — SIGNIFICANT CHANGE UP (ref 5–8)
PLATELET # BLD AUTO: 210 K/UL — SIGNIFICANT CHANGE UP (ref 150–400)
POTASSIUM SERPL-MCNC: 4.8 MMOL/L — SIGNIFICANT CHANGE UP (ref 3.5–5.3)
POTASSIUM SERPL-SCNC: 4.8 MMOL/L — SIGNIFICANT CHANGE UP (ref 3.5–5.3)
PROT SERPL-MCNC: 7.3 G/DL — SIGNIFICANT CHANGE UP (ref 6–8.3)
PROT UR-MCNC: SIGNIFICANT CHANGE UP
RBC # BLD: 5.17 M/UL — SIGNIFICANT CHANGE UP (ref 4.2–5.8)
RBC # FLD: 14.6 % — HIGH (ref 10.3–14.5)
RBC CASTS # UR COMP ASSIST: 2 /HPF — SIGNIFICANT CHANGE UP (ref 0–4)
SODIUM SERPL-SCNC: 140 MMOL/L — SIGNIFICANT CHANGE UP (ref 135–145)
SP GR SPEC: 1.02 — SIGNIFICANT CHANGE UP (ref 1.01–1.02)
SPECIMEN SOURCE: SIGNIFICANT CHANGE UP
T PALLIDUM AB TITR SER: NEGATIVE — SIGNIFICANT CHANGE UP
T-CELL CD4 SUBSET PNL BLD: 810 /UL — SIGNIFICANT CHANGE UP (ref 489–1457)
TOTAL CHOLESTEROL/HDL RATIO MEASUREMENT: 3.9 RATIO — SIGNIFICANT CHANGE UP (ref 3.4–9.6)
TRIGL SERPL-MCNC: 135 MG/DL — SIGNIFICANT CHANGE UP (ref 10–149)
UROBILINOGEN FLD QL: SIGNIFICANT CHANGE UP
WBC # BLD: 6.31 K/UL — SIGNIFICANT CHANGE UP (ref 3.8–10.5)
WBC # FLD AUTO: 6.31 K/UL — SIGNIFICANT CHANGE UP (ref 3.8–10.5)
WBC UR QL: 1 /HPF — SIGNIFICANT CHANGE UP (ref 0–5)

## 2019-03-19 NOTE — HISTORY OF PRESENT ILLNESS
[FreeTextEntry1] : 43 yo man born in [facility] with a history of AIDs diagnosed while still in [facility], he was diagnosed with Bcell lymphoma and started chemotherapy in January or February.\par He has a double lumen right sided port in place\par \par He is feeling well and receives chemotherapy q 3 weeks and denies any problems with the chemotherapy Rituxan and DA EPOCH and reports tolerating without issues\par \par He reports adherence with his ARVs\par Noted adenopathy under his right axilla for which he is [facility] [Sexually Active] : The patient is not sexually active [de-identified] : Cortes [de-identified] : not working/disability [de-identified] : alone [de-identified] : sister accompanying him

## 2019-03-19 NOTE — ASSESSMENT
[Treatment Education] : treatment education [Treatment Adherence] : treatment adherence [Rx Dose / Side Effects] : Rx dose/side effects [Risk Reduction] : risk reduction [Nutritional / Food Issues] : nutritional/food issues [Universal Precautions] : universal precautions [Medical Care Issues] : medical care issues [Drug Interactions / Side Effects] : drug interactions/side effects [Disclosure of Diagnosis] : disclosure of diagnosis [HIV Education] : HIV Education [Sexuality / Safer Sex] : sexuality/safer sex [Anticipatory Guidance] : anticipatory guidance [FreeTextEntry1] : 41 yo man with a history of AIDs but no history of OIs, prior to the B cell lymphoma diagnosis. He reports feeling well and adherence with medications and completing chemotherapy for B cell lymphoma\par \par He needs  HIV labs  today- tcells, Vl, SMAC, CBC\par check RVP\par last visit Cd4 was 400 and almost non det VL\par could not afford the magic mouthwash- will try kenalog to the sores and start the Valtrex and fluconazole and \par may be able to stop some of his OI prophylaxis meds if T cells and VL show improvement\par continue the Triumeq insurance not active and social work completing ADAP paper work\par \par Pneumovax today\par \par RTc 4mo\par \par \par follow up with Oncology for B cell lymphoma\par \par pain in left hand ? arthritis check Xray\par cough x 2 weeks- check quantiferon and RVP\par \par

## 2019-03-23 LAB
GAMMA INTERFERON BACKGROUND BLD IA-ACNC: 0.04 IU/ML — SIGNIFICANT CHANGE UP
M TB IFN-G BLD-IMP: NEGATIVE — SIGNIFICANT CHANGE UP
M TB IFN-G CD4+ BCKGRND COR BLD-ACNC: 0 IU/ML — SIGNIFICANT CHANGE UP
M TB IFN-G CD4+CD8+ BCKGRND COR BLD-ACNC: 0 IU/ML — SIGNIFICANT CHANGE UP
QUANT TB PLUS MITOGEN MINUS NIL: >10 IU/ML — SIGNIFICANT CHANGE UP

## 2019-03-31 ENCOUNTER — FORM ENCOUNTER (OUTPATIENT)
Age: 44
End: 2019-03-31

## 2019-04-01 ENCOUNTER — APPOINTMENT (OUTPATIENT)
Dept: ENDOVASCULAR SURGERY | Facility: CLINIC | Age: 44
End: 2019-04-01
Payer: MEDICAID

## 2019-04-01 PROCEDURE — 77001 FLUOROGUIDE FOR VEIN DEVICE: CPT

## 2019-04-01 PROCEDURE — 36590 REMOVAL TUNNELED CV CATH: CPT

## 2019-04-09 NOTE — PATIENT PROFILE ADULT. - NS MD HP INPLANTS MED DEV
[de-identified] : Patient is WDWN, alert, and in no acute distress. Breathing is unlabored. He is grossly oriented to person, place, and time. \par \par Left Wrist: There is no tenderness, swelling or deformities. No discoloration. The ROM is full. Pain with rotation. There is no joint instability on provocative testing. Sensation is intact to light touch. \par Strength: extension, flexion, ulnar deviation and radial deviation 5/5.  [de-identified] : AP, lateral and oblique views of the left wrist were obtained today on 04/09/2019 and revealed carpal  interosseous bone cysts. No evidence of fx or osteoarthritis.  medi port

## 2019-04-21 LAB
ALBUMIN MFR SERPL ELPH: 50.2 %
ALBUMIN SERPL-MCNC: 4.5 G/DL
ALBUMIN/GLOB SERPL: 1 RATIO
ALPHA1 GLOB MFR SERPL ELPH: 3.2 %
ALPHA1 GLOB SERPL ELPH-MCNC: 0.3 G/DL
ALPHA2 GLOB MFR SERPL ELPH: 8.4 %
ALPHA2 GLOB SERPL ELPH-MCNC: 0.7 G/DL
B-GLOBULIN MFR SERPL ELPH: 11.8 %
B-GLOBULIN SERPL ELPH-MCNC: 1.1 G/DL
GAMMA GLOB FLD ELPH-MCNC: 2.3 G/DL
GAMMA GLOB MFR SERPL ELPH: 26.4 %
INTERPRETATION SERPL IEP-IMP: NORMAL
M PROTEIN MFR SERPL ELPH: NORMAL %
MONOCLON BAND OBS SERPL: NORMAL G/DL
PROT SERPL-MCNC: 8.9 G/DL
PROT SERPL-MCNC: 8.9 G/DL

## 2019-05-01 ENCOUNTER — OUTPATIENT (OUTPATIENT)
Dept: OUTPATIENT SERVICES | Facility: HOSPITAL | Age: 44
LOS: 1 days | End: 2019-05-01
Payer: MEDICAID

## 2019-05-15 PROCEDURE — G9005: CPT

## 2019-06-17 DIAGNOSIS — Z71.89 OTHER SPECIFIED COUNSELING: ICD-10-CM

## 2019-07-01 ENCOUNTER — OUTPATIENT (OUTPATIENT)
Dept: OUTPATIENT SERVICES | Facility: HOSPITAL | Age: 44
LOS: 1 days | End: 2019-07-01

## 2019-07-01 PROCEDURE — G9005: CPT

## 2019-07-08 NOTE — DISCHARGE NOTE ADULT - MEDICATION SUMMARY - MEDICATIONS TO TAKE
I will START or STAY ON the medications listed below when I get home from the hospital:    Zofran 8 mg oral tablet  -- orally every 8 hours, As Needed  -- Indication: For for nausea    fluconazole 40 mg/mL oral liquid  -- 5 milliliter(s) by mouth once a day  -- Indication: For antifungal prophylaxis    acyclovir 200 mg/5 mL oral suspension  -- 800 milligram(s) by mouth 2 times a day  -- Indication: For antviral prophylaxis    Triumeq oral tablet  -- 1 tab(s) by mouth once a day  -- Indication: For HIV    Mepron 750 mg/5 mL oral suspension  -- 5 milliliter(s) by mouth 2 times a day  -- Indication: For prophylaxis
08-Jul-2019 06:07

## 2019-07-10 PROCEDURE — 87086 URINE CULTURE/COLONY COUNT: CPT

## 2019-07-10 PROCEDURE — 84295 ASSAY OF SERUM SODIUM: CPT

## 2019-07-10 PROCEDURE — 85027 COMPLETE CBC AUTOMATED: CPT

## 2019-07-10 PROCEDURE — 82803 BLOOD GASES ANY COMBINATION: CPT

## 2019-07-10 PROCEDURE — 82947 ASSAY GLUCOSE BLOOD QUANT: CPT

## 2019-07-10 PROCEDURE — 85014 HEMATOCRIT: CPT

## 2019-07-10 PROCEDURE — 87633 RESP VIRUS 12-25 TARGETS: CPT

## 2019-07-10 PROCEDURE — 96361 HYDRATE IV INFUSION ADD-ON: CPT | Mod: XU

## 2019-07-10 PROCEDURE — 87486 CHLMYD PNEUM DNA AMP PROBE: CPT

## 2019-07-10 PROCEDURE — 83605 ASSAY OF LACTIC ACID: CPT

## 2019-07-10 PROCEDURE — 82435 ASSAY OF BLOOD CHLORIDE: CPT

## 2019-07-10 PROCEDURE — 85610 PROTHROMBIN TIME: CPT

## 2019-07-10 PROCEDURE — 87040 BLOOD CULTURE FOR BACTERIA: CPT

## 2019-07-10 PROCEDURE — 84132 ASSAY OF SERUM POTASSIUM: CPT

## 2019-07-10 PROCEDURE — 80048 BASIC METABOLIC PNL TOTAL CA: CPT

## 2019-07-10 PROCEDURE — 71046 X-RAY EXAM CHEST 2 VIEWS: CPT

## 2019-07-10 PROCEDURE — 84100 ASSAY OF PHOSPHORUS: CPT

## 2019-07-10 PROCEDURE — 80053 COMPREHEN METABOLIC PANEL: CPT

## 2019-07-10 PROCEDURE — 71275 CT ANGIOGRAPHY CHEST: CPT

## 2019-07-10 PROCEDURE — 83735 ASSAY OF MAGNESIUM: CPT

## 2019-07-10 PROCEDURE — 99285 EMERGENCY DEPT VISIT HI MDM: CPT | Mod: 25

## 2019-07-10 PROCEDURE — 96365 THER/PROPH/DIAG IV INF INIT: CPT | Mod: XU

## 2019-07-10 PROCEDURE — 82330 ASSAY OF CALCIUM: CPT

## 2019-07-10 PROCEDURE — 81001 URINALYSIS AUTO W/SCOPE: CPT

## 2019-07-10 PROCEDURE — 93005 ELECTROCARDIOGRAM TRACING: CPT

## 2019-07-10 PROCEDURE — 87581 M.PNEUMON DNA AMP PROBE: CPT

## 2019-07-10 PROCEDURE — 87798 DETECT AGENT NOS DNA AMP: CPT

## 2019-07-18 ENCOUNTER — OUTPATIENT (OUTPATIENT)
Dept: OUTPATIENT SERVICES | Facility: HOSPITAL | Age: 44
LOS: 1 days | Discharge: HOME | End: 2019-07-18

## 2019-07-18 DIAGNOSIS — C85.88 OTHER SPECIFIED TYPES OF NON-HODGKIN LYMPHOMA, LYMPH NODES OF MULTIPLE SITES: ICD-10-CM

## 2019-07-22 ENCOUNTER — APPOINTMENT (OUTPATIENT)
Dept: HEMATOLOGY ONCOLOGY | Facility: CLINIC | Age: 44
End: 2019-07-22
Payer: MEDICAID

## 2019-07-22 ENCOUNTER — RESULT REVIEW (OUTPATIENT)
Age: 44
End: 2019-07-22

## 2019-07-22 VITALS
RESPIRATION RATE: 16 BRPM | SYSTOLIC BLOOD PRESSURE: 165 MMHG | TEMPERATURE: 98.8 F | BODY MASS INDEX: 31.25 KG/M2 | HEART RATE: 86 BPM | WEIGHT: 211.64 LBS | DIASTOLIC BLOOD PRESSURE: 108 MMHG | OXYGEN SATURATION: 99 %

## 2019-07-22 LAB
BASOPHILS # BLD AUTO: 0 K/UL — SIGNIFICANT CHANGE UP (ref 0–0.2)
BASOPHILS NFR BLD AUTO: 0.5 % — SIGNIFICANT CHANGE UP (ref 0–2)
EOSINOPHIL # BLD AUTO: 0.1 K/UL — SIGNIFICANT CHANGE UP (ref 0–0.5)
EOSINOPHIL NFR BLD AUTO: 1.3 % — SIGNIFICANT CHANGE UP (ref 0–6)
HCT VFR BLD CALC: 43.8 % — SIGNIFICANT CHANGE UP (ref 39–50)
HGB BLD-MCNC: 14.4 G/DL — SIGNIFICANT CHANGE UP (ref 13–17)
LYMPHOCYTES # BLD AUTO: 3 K/UL — SIGNIFICANT CHANGE UP (ref 1–3.3)
LYMPHOCYTES # BLD AUTO: 49.5 % — HIGH (ref 13–44)
MCHC RBC-ENTMCNC: 30 PG — SIGNIFICANT CHANGE UP (ref 27–34)
MCHC RBC-ENTMCNC: 32.9 G/DL — SIGNIFICANT CHANGE UP (ref 32–36)
MCV RBC AUTO: 91.3 FL — SIGNIFICANT CHANGE UP (ref 80–100)
MONOCYTES # BLD AUTO: 0.5 K/UL — SIGNIFICANT CHANGE UP (ref 0–0.9)
MONOCYTES NFR BLD AUTO: 7.9 % — SIGNIFICANT CHANGE UP (ref 2–14)
NEUTROPHILS # BLD AUTO: 2.5 K/UL — SIGNIFICANT CHANGE UP (ref 1.8–7.4)
NEUTROPHILS NFR BLD AUTO: 40.8 % — LOW (ref 43–77)
PLATELET # BLD AUTO: 197 K/UL — SIGNIFICANT CHANGE UP (ref 150–400)
RBC # BLD: 4.8 M/UL — SIGNIFICANT CHANGE UP (ref 4.2–5.8)
RBC # FLD: 13.4 % — SIGNIFICANT CHANGE UP (ref 10.3–14.5)
WBC # BLD: 6.1 K/UL — SIGNIFICANT CHANGE UP (ref 3.8–10.5)
WBC # FLD AUTO: 6.1 K/UL — SIGNIFICANT CHANGE UP (ref 3.8–10.5)

## 2019-07-22 PROCEDURE — 99214 OFFICE O/P EST MOD 30 MIN: CPT

## 2019-07-22 NOTE — ADDENDUM
[FreeTextEntry1] : Documented by Manuelito Gonsales acting as a scribe for Dr. Niall Cortes on 07/22/2019.\par \par All medical record entries made by a scribe were at my, Dr. Niall Cortes direction and personally dictated by me on 07/22/2019. I have reviewed the chart and agree that the record accurately reflects my personal performance of the history, physical exam, procedure and imaging.\par

## 2019-07-22 NOTE — ASSESSMENT
[Curative] : Goals of care discussed with patient: Curative [Palliative Care Plan] : not applicable at this time [FreeTextEntry1] : High grade CD10+ B cell lymphoma, in HIV+ patient with (-) viral load on HAART. completed final rx more than one year ago. \par Continue HAART and f/u with his ID doctor.\par Continue Acyclovir , Mepron.\par Add Gabapentin can be stops based on resolution of PN.\par Mediport has been removed.\par Anosmia to be evaluated by ENT. No other neuro c/o.\par Follow CMP, LDH, Beta-2 MG \par Planned CT of the C/A/P\par Rv 3 months.

## 2019-07-22 NOTE — ASSESSMENT
[Curative] : Goals of care discussed with patient: Curative [Palliative Care Plan] : not applicable at this time [FreeTextEntry1] : High grade CD10+ B cell lymphoma, in HIV+ patient with (-) viral load on HAART. completed 6 final rx on 6/12 \par  Continue HAART and f/u with his ID doctor\par continue Acyclovir , Mepron \par follow CMP, LDH \par add Gabapentin 100 mg 1-3 tablets 3 x a day\par schedule removal Mediport\par f/u 3 months\par follow CMP, LDH\par Repeat scans in May

## 2019-07-22 NOTE — HISTORY OF PRESENT ILLNESS
[Disease:__________________________] : Disease: [unfilled] [Treatment Protocol] : Treatment Protocol [Therapy: ___] : Therapy: [unfilled] [Cycle: ___] : Cycle: [unfilled] [Day: ___] : Day: [unfilled] [de-identified] : Mr. Simmons is a 41-year-old man who is HIV-positive and has been diagnosed with a high-grade B-cell lymphoma.  He was admitted to [facility] with fever of 10 days ago.  He was found to have left axillary lymphadenopathy.  He had not been fully compliant with his antiretroviral therapy until December of 2017 when he restarted his medication.  His CD4 count is actually normal.\par CT of chest showed left axillary lymphadenopathy with a node measuring up to 5 cm.  Minimal bibasilar and right middle lobe atelectasis was seen with a tiny left pleural effusion.  Mild cardiomegaly was noted.  CT of abdomen and pelvis showed no hepatosplenomegaly or mass and no significant adenopathy..  Gastric wall thickening was seen \par of unclear etiology.\par Left axillary lymph node biopsy showed a high-grade CD10 positive B cell lymphoma expressing CD20 and BCL 6.  65% of nuclei showed a myc rearrangement.  BCL-2 and BCL 6 were not rearranged.  Ki-67 was about 90%.. LDH was 618, but I think it was tested using a higher range.  CBC, creat were normal.  Totgal protein and globulins were increased He defervesced on antyibiotics and was discharged home a few days ago.  He did not bring his medication list todsay. [de-identified] : I pending work-up; [de-identified] : to be reviewed here [FreeTextEntry1] : Rituxan with DA EPOCH /Neulasta x6 6/12/18 l/p x 6 [de-identified] : myc rearranged, bcl-2, bcl-6 germline [de-identified] : lymphoma completed therapy in June 2018 PET/CT scan done end of July showed .  Slightly increased conspicuity of a subcentimeter left axillary lymph \par node now with FDG avidity visually greater than liver background, possibly viable lymphoma.2.  Prominent minimally FDG avid or non-FDG avid residual left axillary \par and inguinal lymph nodes, the majority of which demonstrate FDG avidity equal to mediastinal background (Deauville score 2).3.  New left maxillary mucoperiosteal thickening, probably inflammatory.\par neuropathy - tingling /numbness fingers cant button clothes easy pain hands\par HIV - seeing Dr at infection control   \par H/A scale  1-10 daily now resolved\par

## 2019-07-22 NOTE — HISTORY OF PRESENT ILLNESS
[Disease:__________________________] : Disease: [unfilled] [Treatment Protocol] : Treatment Protocol [Therapy: ___] : Therapy: [unfilled] [Day: ___] : Day: [unfilled] [Cycle: ___] : Cycle: [unfilled] [de-identified] : Mr. Simmons is a 41-year-old man who is HIV-positive and has been diagnosed with a high-grade B-cell lymphoma.  He was admitted to [facility] with fever of 10 days ago.  He was found to have left axillary lymphadenopathy.  He had not been fully compliant with his antiretroviral therapy until December of 2017 when he restarted his medication.  His CD4 count is actually normal.\par CT of chest showed left axillary lymphadenopathy with a node measuring up to 5 cm.  Minimal bibasilar and right middle lobe atelectasis was seen with a tiny left pleural effusion.  Mild cardiomegaly was noted.  CT of abdomen and pelvis showed no hepatosplenomegaly or mass and no significant adenopathy..  Gastric wall thickening was seen \par of unclear etiology.\par Left axillary lymph node biopsy showed a high-grade CD10 positive B cell lymphoma expressing CD20 and BCL 6.  65% of nuclei showed a myc rearrangement.  BCL-2 and BCL 6 were not rearranged.  Ki-67 was about 90%.. LDH was 618, but I think it was tested using a higher range.  CBC, creat were normal.  Totgal protein and globulins were increased He defervesced on antyibiotics and was discharged home a few days ago.  He did not bring his medication list todsay. [de-identified] : I pending work-up; [de-identified] : to be reviewed here [de-identified] : myc rearranged, bcl-2, bcl-6 germline [FreeTextEntry1] : Rituxan with DA EPOCH /Neulasta x6 6/12/18 l/p x 6 [de-identified] : Lymphoma- completed therapy in June 2018.\par He c/o impaired sense of smell. Problems with smell started about one month ago. No H/A or other neuro c/o, no visual c/o. No nasal congestion or drainage. No sinus pain.\par Peripheral neuropathy now resolved.\par PET/CT scan done end of July showed slightly increased conspicuity of a subcentimeter left axillary lymph \par node now with FDG avidity visually greater than liver background, possibly viable lymphoma.2.  Prominent minimally FDG avid or non-FDG avid residual left axillary \par and inguinal lymph nodes, the majority of which demonstrate FDG avidity equal to mediastinal background (Deauville score 2).3.  New left maxillary mucoperiosteal thickening, probably inflammatory.\par HIV - seeing Dr at infection control, remains on HAART.\par H/A scale  1-10 daily now resolved, no constitutional c/o\par Today CBC shows WBC 6.1, Hgb 14.4, Plt 197K. 49.5% Lymp 40.8%-PMN\par CT of C/A/P on 11/16/18 showed no new findings\par LDH on 1/8/19 was 184.

## 2019-08-06 ENCOUNTER — FORM ENCOUNTER (OUTPATIENT)
Age: 44
End: 2019-08-06

## 2019-08-07 ENCOUNTER — OUTPATIENT (OUTPATIENT)
Dept: OUTPATIENT SERVICES | Facility: HOSPITAL | Age: 44
LOS: 1 days | End: 2019-08-07
Payer: MEDICAID

## 2019-08-07 ENCOUNTER — APPOINTMENT (OUTPATIENT)
Dept: CT IMAGING | Facility: CLINIC | Age: 44
End: 2019-08-07

## 2019-08-07 ENCOUNTER — APPOINTMENT (OUTPATIENT)
Dept: CT IMAGING | Facility: CLINIC | Age: 44
End: 2019-08-07
Payer: MEDICAID

## 2019-08-07 DIAGNOSIS — C85.10 UNSPECIFIED B-CELL LYMPHOMA, UNSPECIFIED SITE: ICD-10-CM

## 2019-08-07 PROCEDURE — 71260 CT THORAX DX C+: CPT | Mod: 26

## 2019-08-07 PROCEDURE — 74177 CT ABD & PELVIS W/CONTRAST: CPT | Mod: 26

## 2019-08-07 PROCEDURE — 74177 CT ABD & PELVIS W/CONTRAST: CPT

## 2019-08-07 PROCEDURE — 71260 CT THORAX DX C+: CPT

## 2019-08-07 PROCEDURE — 70491 CT SOFT TISSUE NECK W/DYE: CPT | Mod: 26

## 2019-08-07 PROCEDURE — 70491 CT SOFT TISSUE NECK W/DYE: CPT

## 2019-08-14 LAB
ALBUMIN SERPL ELPH-MCNC: 5 G/DL
ALP BLD-CCNC: 95 U/L
ALT SERPL-CCNC: 27 U/L
ANION GAP SERPL CALC-SCNC: 13 MMOL/L
AST SERPL-CCNC: 23 U/L
BILIRUB SERPL-MCNC: 0.3 MG/DL
BUN SERPL-MCNC: 11 MG/DL
CALCIUM SERPL-MCNC: 9.5 MG/DL
CHLORIDE SERPL-SCNC: 103 MMOL/L
CO2 SERPL-SCNC: 24 MMOL/L
CREAT SERPL-MCNC: 1.2 MG/DL
GLUCOSE SERPL-MCNC: 111 MG/DL
LDH SERPL-CCNC: 171 U/L
POTASSIUM SERPL-SCNC: 4.5 MMOL/L
PROT SERPL-MCNC: 7.3 G/DL
SODIUM SERPL-SCNC: 140 MMOL/L

## 2019-10-04 ENCOUNTER — APPOINTMENT (OUTPATIENT)
Dept: OTOLARYNGOLOGY | Facility: CLINIC | Age: 44
End: 2019-10-04

## 2019-10-10 ENCOUNTER — FORM ENCOUNTER (OUTPATIENT)
Age: 44
End: 2019-10-10

## 2019-10-11 ENCOUNTER — LABORATORY RESULT (OUTPATIENT)
Age: 44
End: 2019-10-11

## 2019-10-11 ENCOUNTER — APPOINTMENT (OUTPATIENT)
Dept: INFECTIOUS DISEASE | Facility: CLINIC | Age: 44
End: 2019-10-11
Payer: MEDICAID

## 2019-10-11 ENCOUNTER — OUTPATIENT (OUTPATIENT)
Dept: OUTPATIENT SERVICES | Facility: HOSPITAL | Age: 44
LOS: 1 days | End: 2019-10-11
Payer: MEDICAID

## 2019-10-11 VITALS
BODY MASS INDEX: 31.4 KG/M2 | OXYGEN SATURATION: 97 % | HEART RATE: 114 BPM | HEIGHT: 69 IN | DIASTOLIC BLOOD PRESSURE: 111 MMHG | SYSTOLIC BLOOD PRESSURE: 155 MMHG | WEIGHT: 212 LBS | TEMPERATURE: 98.6 F

## 2019-10-11 DIAGNOSIS — B20 HUMAN IMMUNODEFICIENCY VIRUS [HIV] DISEASE: ICD-10-CM

## 2019-10-11 LAB
24R-OH-CALCIDIOL SERPL-MCNC: 15.6 NG/ML — LOW (ref 30–80)
4/8 RATIO: 0.63 RATIO — LOW (ref 0.9–3.6)
ABS CD8: 1215 /UL — HIGH (ref 142–740)
ALBUMIN SERPL ELPH-MCNC: 4.7 G/DL — SIGNIFICANT CHANGE UP (ref 3.3–5)
ALP SERPL-CCNC: 97 U/L — SIGNIFICANT CHANGE UP (ref 40–120)
ALT FLD-CCNC: 50 U/L — HIGH (ref 10–45)
ANION GAP SERPL CALC-SCNC: 12 MMOL/L — SIGNIFICANT CHANGE UP (ref 5–17)
APPEARANCE UR: CLEAR — SIGNIFICANT CHANGE UP
AST SERPL-CCNC: 24 U/L — SIGNIFICANT CHANGE UP (ref 10–40)
BACTERIA # UR AUTO: NEGATIVE — SIGNIFICANT CHANGE UP
BILIRUB SERPL-MCNC: 0.2 MG/DL — SIGNIFICANT CHANGE UP (ref 0.2–1.2)
BILIRUB UR-MCNC: NEGATIVE — SIGNIFICANT CHANGE UP
BUN SERPL-MCNC: 9 MG/DL — SIGNIFICANT CHANGE UP (ref 7–23)
CALCIUM SERPL-MCNC: 9.9 MG/DL — SIGNIFICANT CHANGE UP (ref 8.4–10.5)
CD3 BLASTS SPEC-ACNC: 2010 /UL — HIGH (ref 672–1870)
CD3 BLASTS SPEC-ACNC: 77 % — SIGNIFICANT CHANGE UP (ref 59–83)
CD4 %: 29 % — LOW (ref 30–62)
CD8 %: 47 % — HIGH (ref 12–36)
CHLORIDE SERPL-SCNC: 101 MMOL/L — SIGNIFICANT CHANGE UP (ref 96–108)
CHOLEST SERPL-MCNC: 179 MG/DL — SIGNIFICANT CHANGE UP (ref 10–199)
CO2 SERPL-SCNC: 25 MMOL/L — SIGNIFICANT CHANGE UP (ref 22–31)
COLOR SPEC: SIGNIFICANT CHANGE UP
CREAT SERPL-MCNC: 1.39 MG/DL — HIGH (ref 0.5–1.3)
DIFF PNL FLD: NEGATIVE — SIGNIFICANT CHANGE UP
EPI CELLS # UR: 0 /HPF — SIGNIFICANT CHANGE UP (ref 0–5)
ESTIMATED AVERAGE GLUCOSE: 120 MG/DL — HIGH (ref 68–114)
GLUCOSE SERPL-MCNC: 96 MG/DL — SIGNIFICANT CHANGE UP (ref 70–99)
GLUCOSE UR QL: NEGATIVE — SIGNIFICANT CHANGE UP
HBA1C BLD-MCNC: 5.8 % — HIGH (ref 4–5.6)
HCT VFR BLD CALC: 47.9 % — SIGNIFICANT CHANGE UP (ref 39–50)
HDLC SERPL-MCNC: 49 MG/DL — SIGNIFICANT CHANGE UP
HGB BLD-MCNC: 15.6 G/DL — SIGNIFICANT CHANGE UP (ref 13–17)
HYALINE CASTS # UR AUTO: 0 /LPF — SIGNIFICANT CHANGE UP (ref 0–7)
KETONES UR-MCNC: NEGATIVE — SIGNIFICANT CHANGE UP
LEUKOCYTE ESTERASE UR-ACNC: NEGATIVE — SIGNIFICANT CHANGE UP
LIPID PNL WITH DIRECT LDL SERPL: 108 MG/DL — HIGH
MCHC RBC-ENTMCNC: 29.2 PG — SIGNIFICANT CHANGE UP (ref 27–34)
MCHC RBC-ENTMCNC: 32.6 GM/DL — SIGNIFICANT CHANGE UP (ref 32–36)
MCV RBC AUTO: 89.5 FL — SIGNIFICANT CHANGE UP (ref 80–100)
NITRITE UR-MCNC: NEGATIVE — SIGNIFICANT CHANGE UP
PH UR: 7.5 — SIGNIFICANT CHANGE UP (ref 5–8)
PLATELET # BLD AUTO: 231 K/UL — SIGNIFICANT CHANGE UP (ref 150–400)
POTASSIUM SERPL-MCNC: 4.6 MMOL/L — SIGNIFICANT CHANGE UP (ref 3.5–5.3)
POTASSIUM SERPL-SCNC: 4.6 MMOL/L — SIGNIFICANT CHANGE UP (ref 3.5–5.3)
PROT SERPL-MCNC: 7.3 G/DL — SIGNIFICANT CHANGE UP (ref 6–8.3)
PROT UR-MCNC: NEGATIVE — SIGNIFICANT CHANGE UP
PSA SERPL-MCNC: 0.9 NG/ML — SIGNIFICANT CHANGE UP (ref 0–4)
RBC # BLD: 5.35 M/UL — SIGNIFICANT CHANGE UP (ref 4.2–5.8)
RBC # FLD: 13.8 % — SIGNIFICANT CHANGE UP (ref 10.3–14.5)
RBC CASTS # UR COMP ASSIST: 5 /HPF — HIGH (ref 0–4)
SODIUM SERPL-SCNC: 138 MMOL/L — SIGNIFICANT CHANGE UP (ref 135–145)
SP GR SPEC: 1.02 — SIGNIFICANT CHANGE UP (ref 1.01–1.02)
T PALLIDUM AB TITR SER: NEGATIVE — SIGNIFICANT CHANGE UP
T-CELL CD4 SUBSET PNL BLD: 759 /UL — SIGNIFICANT CHANGE UP (ref 489–1457)
TOTAL CHOLESTEROL/HDL RATIO MEASUREMENT: 3.7 RATIO — SIGNIFICANT CHANGE UP (ref 3.4–9.6)
TRIGL SERPL-MCNC: 109 MG/DL — SIGNIFICANT CHANGE UP (ref 10–149)
UROBILINOGEN FLD QL: SIGNIFICANT CHANGE UP
WBC # BLD: 7.03 K/UL — SIGNIFICANT CHANGE UP (ref 3.8–10.5)
WBC # FLD AUTO: 7.03 K/UL — SIGNIFICANT CHANGE UP (ref 3.8–10.5)
WBC UR QL: 0 /HPF — SIGNIFICANT CHANGE UP (ref 0–5)

## 2019-10-11 PROCEDURE — 86780 TREPONEMA PALLIDUM: CPT

## 2019-10-11 PROCEDURE — G0463: CPT | Mod: 25

## 2019-10-11 PROCEDURE — 83036 HEMOGLOBIN GLYCOSYLATED A1C: CPT

## 2019-10-11 PROCEDURE — 87536 HIV-1 QUANT&REVRSE TRNSCRPJ: CPT

## 2019-10-11 PROCEDURE — 99214 OFFICE O/P EST MOD 30 MIN: CPT

## 2019-10-11 PROCEDURE — 82306 VITAMIN D 25 HYDROXY: CPT

## 2019-10-11 PROCEDURE — 87491 CHLMYD TRACH DNA AMP PROBE: CPT

## 2019-10-11 PROCEDURE — 86480 TB TEST CELL IMMUN MEASURE: CPT

## 2019-10-11 PROCEDURE — 81001 URINALYSIS AUTO W/SCOPE: CPT

## 2019-10-11 PROCEDURE — 84153 ASSAY OF PSA TOTAL: CPT

## 2019-10-11 PROCEDURE — 90686 IIV4 VACC NO PRSV 0.5 ML IM: CPT

## 2019-10-11 PROCEDURE — 85027 COMPLETE CBC AUTOMATED: CPT

## 2019-10-11 PROCEDURE — 86360 T CELL ABSOLUTE COUNT/RATIO: CPT

## 2019-10-11 PROCEDURE — G0008: CPT

## 2019-10-11 PROCEDURE — 80053 COMPREHEN METABOLIC PANEL: CPT

## 2019-10-11 PROCEDURE — 90834 PSYTX W PT 45 MINUTES: CPT

## 2019-10-11 PROCEDURE — 87591 N.GONORRHOEAE DNA AMP PROB: CPT

## 2019-10-11 PROCEDURE — 80061 LIPID PANEL: CPT

## 2019-10-11 NOTE — PHYSICAL EXAM
[General Appearance - Alert] : alert [General Appearance - In No Acute Distress] : in no acute distress [Extraocular Movements] : extraocular movements were intact [PERRL With Normal Accommodation] : pupils were equal in size, round, reactive to light [Sclera] : the sclera and conjunctiva were normal [Oropharynx] : the oropharynx was normal with no thrush [Neck Cervical Mass (___cm)] : no neck mass was observed [Neck Appearance] : the appearance of the neck was normal [Outer Ear] : the ears and nose were normal in appearance [Jugular Venous Distention Increased] : there was no jugular-venous distention [Thyroid Diffuse Enlargement] : the thyroid was not enlarged [Auscultation Breath Sounds / Voice Sounds] : lungs were clear to auscultation bilaterally [Heart Rate And Rhythm] : heart rate was normal and rhythm regular [Heart Sounds Gallop] : no gallops [Murmurs] : no murmurs [Heart Sounds] : normal S1 and S2 [Full Pulse] : the pedal pulses are present [Edema] : there was no peripheral edema [Heart Sounds Pericardial Friction Rub] : no pericardial rub [Abdomen Soft] : soft [Bowel Sounds] : normal bowel sounds [Costovertebral Angle Tenderness] : no CVA tenderness [Abdomen Mass (___ Cm)] : no abdominal mass palpated [Abdomen Tenderness] : non-tender [Penis Abnormality] : the penis was normal [Scrotum] : the scrotum was normal [Testes Swelling] : the testicles were not swollen [Testes Mass (___cm)] : there were no testicular masses [No Palpable Adenopathy] : no palpable adenopathy [Motor Tone] : muscle strength and tone were normal [Musculoskeletal - Swelling] : no joint swelling [Nail Clubbing] : no clubbing  or cyanosis of the fingernails [Skin Color & Pigmentation] : normal skin color and pigmentation [] : no rash [Deep Tendon Reflexes (DTR)] : deep tendon reflexes were 2+ and symmetric [No Focal Deficits] : no focal deficits [Sensation] : the sensory exam was normal to light touch and pinprick [Affect] : the affect was normal [Oriented To Time, Place, And Person] : oriented to person, place, and time

## 2019-10-12 LAB
C TRACH RRNA SPEC QL NAA+PROBE: SIGNIFICANT CHANGE UP
HIV-1 VIRAL LOAD RESULT: ABNORMAL
HIV1 RNA # SERPL NAA+PROBE: SIGNIFICANT CHANGE UP
HIV1 RNA SER-IMP: SIGNIFICANT CHANGE UP
HIV1 RNA SERPL NAA+PROBE-ACNC: ABNORMAL
HIV1 RNA SERPL NAA+PROBE-LOG#: ABNORMAL LG COP/ML
N GONORRHOEA RRNA SPEC QL NAA+PROBE: SIGNIFICANT CHANGE UP
SPECIMEN SOURCE: SIGNIFICANT CHANGE UP

## 2019-10-13 NOTE — HISTORY OF PRESENT ILLNESS
[FreeTextEntry1] : 43 yo man born in [facility] with a history of AIDs diagnosed while still in [facility], he was diagnosed with Bcell lymphoma and completed chemotherapy and by patient and sister report he is in remission and sees Heme/Onc every three mos.\par He has his port catheter removed\par \par Feels well no complaint of SOB, fevers or chills [Sexually Active] : The patient is not sexually active [de-identified] : Cortes [de-identified] : not working/disability [de-identified] : alone [de-identified] : sister accompanying him

## 2019-10-13 NOTE — ASSESSMENT
[Treatment Education] : treatment education [Rx Dose / Side Effects] : Rx dose/side effects [Treatment Adherence] : treatment adherence [Nutritional / Food Issues] : nutritional/food issues [Drug Interactions / Side Effects] : drug interactions/side effects [Risk Reduction] : risk reduction [HIV Education] : HIV Education [Sexuality / Safer Sex] : sexuality/safer sex [Disclosure of Diagnosis] : disclosure of diagnosis [Anticipatory Guidance] : anticipatory guidance [FreeTextEntry1] : 43 yo man with a history of AIDs but no history of OIs, prior to the B cell lymphoma diagnosis. He reports feeling well and adherence with medications and completed chemotherapy for B cell lymphoma and is in remission with close Heme/Onc follow up\par \par He needs  HIV labs  today- tcells, Vl, SMAC, CBC\par \par \par Flu vaccine today\par \par Can continue the triumeq but will discontinue the Mepron as he is not taking and Tcells improving\par \par RTC 4mo\par \par

## 2019-10-14 LAB
GAMMA INTERFERON BACKGROUND BLD IA-ACNC: 0.03 IU/ML — SIGNIFICANT CHANGE UP
M TB IFN-G BLD-IMP: NEGATIVE — SIGNIFICANT CHANGE UP
M TB IFN-G CD4+ BCKGRND COR BLD-ACNC: 0 IU/ML — SIGNIFICANT CHANGE UP
M TB IFN-G CD4+CD8+ BCKGRND COR BLD-ACNC: 0 IU/ML — SIGNIFICANT CHANGE UP
QUANT TB PLUS MITOGEN MINUS NIL: 8.22 IU/ML — SIGNIFICANT CHANGE UP

## 2019-10-17 DIAGNOSIS — Z23 ENCOUNTER FOR IMMUNIZATION: ICD-10-CM

## 2019-11-12 ENCOUNTER — OUTPATIENT (OUTPATIENT)
Dept: OUTPATIENT SERVICES | Facility: HOSPITAL | Age: 44
LOS: 1 days | Discharge: HOME | End: 2019-11-12

## 2019-11-12 DIAGNOSIS — C85.88 OTHER SPECIFIED TYPES OF NON-HODGKIN LYMPHOMA, LYMPH NODES OF MULTIPLE SITES: ICD-10-CM

## 2019-11-18 ENCOUNTER — RESULT REVIEW (OUTPATIENT)
Age: 44
End: 2019-11-18

## 2019-11-18 ENCOUNTER — APPOINTMENT (OUTPATIENT)
Dept: HEMATOLOGY ONCOLOGY | Facility: CLINIC | Age: 44
End: 2019-11-18
Payer: MEDICAID

## 2019-11-18 VITALS
RESPIRATION RATE: 17 BRPM | SYSTOLIC BLOOD PRESSURE: 158 MMHG | DIASTOLIC BLOOD PRESSURE: 111 MMHG | WEIGHT: 213.85 LBS | OXYGEN SATURATION: 98 % | HEART RATE: 92 BPM | TEMPERATURE: 98.8 F | BODY MASS INDEX: 31.58 KG/M2

## 2019-11-18 DIAGNOSIS — R43.0 ANOSMIA: ICD-10-CM

## 2019-11-18 DIAGNOSIS — Z82.49 FAMILY HISTORY OF ISCHEMIC HEART DISEASE AND OTHER DISEASES OF THE CIRCULATORY SYSTEM: ICD-10-CM

## 2019-11-18 LAB
BASOPHILS # BLD AUTO: 0 K/UL — SIGNIFICANT CHANGE UP (ref 0–0.2)
BASOPHILS NFR BLD AUTO: 0.3 % — SIGNIFICANT CHANGE UP (ref 0–2)
EOSINOPHIL # BLD AUTO: 0.1 K/UL — SIGNIFICANT CHANGE UP (ref 0–0.5)
EOSINOPHIL NFR BLD AUTO: 1.4 % — SIGNIFICANT CHANGE UP (ref 0–6)
HCT VFR BLD CALC: 49 % — SIGNIFICANT CHANGE UP (ref 39–50)
HGB BLD-MCNC: 16.4 G/DL — SIGNIFICANT CHANGE UP (ref 13–17)
LYMPHOCYTES # BLD AUTO: 3.6 K/UL — HIGH (ref 1–3.3)
LYMPHOCYTES # BLD AUTO: 48.9 % — HIGH (ref 13–44)
MCHC RBC-ENTMCNC: 30.6 PG — SIGNIFICANT CHANGE UP (ref 27–34)
MCHC RBC-ENTMCNC: 33.4 G/DL — SIGNIFICANT CHANGE UP (ref 32–36)
MCV RBC AUTO: 91.9 FL — SIGNIFICANT CHANGE UP (ref 80–100)
MONOCYTES # BLD AUTO: 0.4 K/UL — SIGNIFICANT CHANGE UP (ref 0–0.9)
MONOCYTES NFR BLD AUTO: 5.9 % — SIGNIFICANT CHANGE UP (ref 2–14)
NEUTROPHILS # BLD AUTO: 3.2 K/UL — SIGNIFICANT CHANGE UP (ref 1.8–7.4)
NEUTROPHILS NFR BLD AUTO: 43.7 % — SIGNIFICANT CHANGE UP (ref 43–77)
PLATELET # BLD AUTO: 196 K/UL — SIGNIFICANT CHANGE UP (ref 150–400)
RBC # BLD: 5.34 M/UL — SIGNIFICANT CHANGE UP (ref 4.2–5.8)
RBC # FLD: 13.1 % — SIGNIFICANT CHANGE UP (ref 10.3–14.5)
WBC # BLD: 7.4 K/UL — SIGNIFICANT CHANGE UP (ref 3.8–10.5)
WBC # FLD AUTO: 7.4 K/UL — SIGNIFICANT CHANGE UP (ref 3.8–10.5)

## 2019-11-18 PROCEDURE — 99213 OFFICE O/P EST LOW 20 MIN: CPT

## 2019-11-18 RX ORDER — ACYCLOVIR 400 MG/1
400 TABLET ORAL 3 TIMES DAILY
Qty: 90 | Refills: 2 | Status: DISCONTINUED | COMMUNITY
Start: 2018-10-29 | End: 2019-11-18

## 2019-11-18 NOTE — ADDENDUM
[FreeTextEntry1] : Documented by Manuelito Gonsales acting as a scribe for Dr. Niall Cortes on 11/18/2019\par \par All medical record entries made by a scribe were at my, Dr. Niall Cortes direction and personally dictated by me on 11/18/2019 . I have reviewed the chart and agree that the record accurately reflects my personal performance of the history, physical exam, procedure and imaging.\par

## 2019-11-18 NOTE — ASSESSMENT
[Curative] : Goals of care discussed with patient: Curative [Palliative Care Plan] : not applicable at this time [FreeTextEntry1] : High grade CD10+ B cell lymphoma, in HIV+ patient with (minimal) viral load on HAART. \par Completed chemotherapy more than one year ago. \par Continue HAART and f/u with his ID doctor.\par Continue Mepron as directed.\par Off Gabapentin;PN no longer present.\par Mediport has been removed.\par Anosmia was to be evaluated by ENT. No other neuro c/o.\par Follow CMP, LDH, Beta-2 MG.\par Received flu shot this year.\par \par Rv 3 months.

## 2019-11-18 NOTE — REASON FOR VISIT
[Follow-Up Visit] : a follow-up visit for [Lymphoma] : lymphoma [Family Member] : family member [FreeTextEntry2] : high grade B cell lymphoma, AIDS

## 2019-11-18 NOTE — HISTORY OF PRESENT ILLNESS
[Disease:__________________________] : Disease: [unfilled] [Treatment Protocol] : Treatment Protocol [Therapy: ___] : Therapy: [unfilled] [Day: ___] : Day: [unfilled] [Cycle: ___] : Cycle: [unfilled] [de-identified] : Mr. Simmons is a 41-year-old man who is HIV-positive and has been diagnosed with a high-grade B-cell lymphoma.  He was admitted to [facility] with fever of 10 days ago.  He was found to have left axillary lymphadenopathy.  He had not been fully compliant with his antiretroviral therapy until December of 2017 when he restarted his medication.  His CD4 count is actually normal.\par CT of chest showed left axillary lymphadenopathy with a node measuring up to 5 cm.  Minimal bibasilar and right middle lobe atelectasis was seen with a tiny left pleural effusion.  Mild cardiomegaly was noted.  CT of abdomen and pelvis showed no hepatosplenomegaly or mass and no significant adenopathy..  Gastric wall thickening was seen \par of unclear etiology.\par Left axillary lymph node biopsy showed a high-grade CD10 positive B cell lymphoma expressing CD20 and BCL 6.  65% of nuclei showed a myc rearrangement.  BCL-2 and BCL 6 were not rearranged.  Ki-67 was about 90%.. LDH was 618, but I think it was tested using a higher range.  CBC, creat were normal.  Totgal protein and globulins were increased He defervesced on antyibiotics and was discharged home a few days ago.  He did not bring his medication list todsay. [de-identified] : I pending work-up; [de-identified] : to be reviewed here [de-identified] : myc rearranged, bcl-2, bcl-6 germline [FreeTextEntry1] : Rituxan with DA EPOCH /Neulasta x6 6/12/18 l/p x 6 [de-identified] : HIV with high grade B-cell lymphoma, myc rearrangement. S/p completion of treatment in 6/2018, with 6 cycles of DA R-EPOCH and IT MTX prophylaxis.\par PET/CT on 7/20/2018 showed FDG (-) bilateral inguinal nodes and slight increase in prominence of a subcm left axillary node, thought to possibly represent viable  NHL. \par This was f/u with a CT in 11/2018 which showed no change. A repeat CT in 8/28/2019 showed the left axillary node to be unchanged and there was a stable to slight increase in inguinal which was previously not FDG (+).\par T-cell subsets in 10/2019 showed that the CD4 count was 759. Viral load at that time was low level pos.\par Vit D was low at 15.6.\par He has no constitutional c/o. He continues to c/o impaired sense of smell. Problems with smell started after chemotherapy ended. \par AIDS -  being followed by ID, remains on HAART.\par Occasional H/As that last up to 15 to 30 mins and resolve.\par PN discomfort has resolved.\par Today CBC shows WBC 6.1, Hgb 14.4, Plt 197K. 49.5% Lymp 40.8%-PMN\par CT of C/A/P on 11/16/18 showed no new findings\par LDH on 1/8/19 was 184.

## 2020-02-04 ENCOUNTER — OUTPATIENT (OUTPATIENT)
Dept: OUTPATIENT SERVICES | Facility: HOSPITAL | Age: 45
LOS: 1 days | Discharge: HOME | End: 2020-02-04

## 2020-02-04 DIAGNOSIS — C85.88 OTHER SPECIFIED TYPES OF NON-HODGKIN LYMPHOMA, LYMPH NODES OF MULTIPLE SITES: ICD-10-CM

## 2020-02-10 ENCOUNTER — RESULT REVIEW (OUTPATIENT)
Age: 45
End: 2020-02-10

## 2020-02-10 ENCOUNTER — APPOINTMENT (OUTPATIENT)
Dept: HEMATOLOGY ONCOLOGY | Facility: CLINIC | Age: 45
End: 2020-02-10
Payer: MEDICAID

## 2020-02-10 VITALS
TEMPERATURE: 99.2 F | SYSTOLIC BLOOD PRESSURE: 165 MMHG | OXYGEN SATURATION: 99 % | HEART RATE: 105 BPM | RESPIRATION RATE: 17 BRPM | DIASTOLIC BLOOD PRESSURE: 103 MMHG | WEIGHT: 214.51 LBS | BODY MASS INDEX: 31.68 KG/M2

## 2020-02-10 LAB
ALBUMIN SERPL ELPH-MCNC: 4.9 G/DL
ALP BLD-CCNC: 100 U/L
ALT SERPL-CCNC: 42 U/L
ANION GAP SERPL CALC-SCNC: 14 MMOL/L
AST SERPL-CCNC: 26 U/L
B2 MICROGLOB SERPL-MCNC: 1.4 MG/L
BASOPHILS # BLD AUTO: 0 K/UL — SIGNIFICANT CHANGE UP (ref 0–0.2)
BASOPHILS NFR BLD AUTO: 0.6 % — SIGNIFICANT CHANGE UP (ref 0–2)
BILIRUB SERPL-MCNC: 0.3 MG/DL
BUN SERPL-MCNC: 10 MG/DL
CALCIUM SERPL-MCNC: 9.4 MG/DL
CHLORIDE SERPL-SCNC: 103 MMOL/L
CO2 SERPL-SCNC: 23 MMOL/L
CREAT SERPL-MCNC: 1.16 MG/DL
EOSINOPHIL # BLD AUTO: 0 K/UL — SIGNIFICANT CHANGE UP (ref 0–0.5)
EOSINOPHIL NFR BLD AUTO: 0.1 % — SIGNIFICANT CHANGE UP (ref 0–6)
GLUCOSE SERPL-MCNC: 93 MG/DL
HCT VFR BLD CALC: 47.1 % — SIGNIFICANT CHANGE UP (ref 39–50)
HGB BLD-MCNC: 15.5 G/DL — SIGNIFICANT CHANGE UP (ref 13–17)
LDH SERPL-CCNC: 180 U/L
LYMPHOCYTES # BLD AUTO: 2.2 K/UL — SIGNIFICANT CHANGE UP (ref 1–3.3)
LYMPHOCYTES # BLD AUTO: 26.1 % — SIGNIFICANT CHANGE UP (ref 13–44)
MCHC RBC-ENTMCNC: 30.1 PG — SIGNIFICANT CHANGE UP (ref 27–34)
MCHC RBC-ENTMCNC: 32.9 G/DL — SIGNIFICANT CHANGE UP (ref 32–36)
MCV RBC AUTO: 91.3 FL — SIGNIFICANT CHANGE UP (ref 80–100)
MONOCYTES # BLD AUTO: 0.3 K/UL — SIGNIFICANT CHANGE UP (ref 0–0.9)
MONOCYTES NFR BLD AUTO: 3.5 % — SIGNIFICANT CHANGE UP (ref 2–14)
NEUTROPHILS # BLD AUTO: 6 K/UL — SIGNIFICANT CHANGE UP (ref 1.8–7.4)
NEUTROPHILS NFR BLD AUTO: 69.7 % — SIGNIFICANT CHANGE UP (ref 43–77)
PLATELET # BLD AUTO: 221 K/UL — SIGNIFICANT CHANGE UP (ref 150–400)
POTASSIUM SERPL-SCNC: 4.5 MMOL/L
PROT SERPL-MCNC: 7.5 G/DL
RBC # BLD: 5.16 M/UL — SIGNIFICANT CHANGE UP (ref 4.2–5.8)
RBC # FLD: 12.9 % — SIGNIFICANT CHANGE UP (ref 10.3–14.5)
SODIUM SERPL-SCNC: 140 MMOL/L
WBC # BLD: 8.6 K/UL — SIGNIFICANT CHANGE UP (ref 3.8–10.5)
WBC # FLD AUTO: 8.6 K/UL — SIGNIFICANT CHANGE UP (ref 3.8–10.5)

## 2020-02-10 PROCEDURE — 99214 OFFICE O/P EST MOD 30 MIN: CPT

## 2020-02-12 LAB
ALBUMIN SERPL ELPH-MCNC: 5.2 G/DL
ALP BLD-CCNC: 90 U/L
ALT SERPL-CCNC: 36 U/L
ANION GAP SERPL CALC-SCNC: 15 MMOL/L
AST SERPL-CCNC: 24 U/L
B2 MICROGLOB SERPL-MCNC: 1.4 MG/L
BILIRUB SERPL-MCNC: 0.4 MG/DL
BUN SERPL-MCNC: 9 MG/DL
CALCIUM SERPL-MCNC: 9.7 MG/DL
CHLORIDE SERPL-SCNC: 104 MMOL/L
CO2 SERPL-SCNC: 23 MMOL/L
CREAT SERPL-MCNC: 1.24 MG/DL
GLUCOSE SERPL-MCNC: 97 MG/DL
LDH SERPL-CCNC: 186 U/L
POTASSIUM SERPL-SCNC: 4.6 MMOL/L
PROT SERPL-MCNC: 7.7 G/DL
SODIUM SERPL-SCNC: 142 MMOL/L

## 2020-02-12 NOTE — END OF VISIT
[] : Fellow [FreeTextEntry3] : No evidence of recurrence.  BP very poorly controlled.  Will assess laerge but stable large axillary LN with U/S.  Set up pt with primary care provider near where he lives.

## 2020-02-12 NOTE — REVIEW OF SYSTEMS
[Negative] : Allergic/Immunologic [Night Sweats] : no night sweats [Fever] : no fever [Odynophagia] : no odynophagia [Fatigue] : no fatigue [Dysphagia] : no dysphagia [Chest Pain] : no chest pain [Palpitations] : no palpitations [SOB on Exertion] : no shortness of breath during exertion [Vomiting] : no vomiting [Abdominal Pain] : no abdominal pain [Constipation] : no constipation [Diarrhea] : no diarrhea [Joint Pain] : no joint pain [Skin Rash] : no skin rash [Easy Bleeding] : no tendency for easy bleeding [Easy Bruising] : no tendency for easy bruising [Swollen Glands] : no swollen glands [FreeTextEntry2] : Headaches

## 2020-02-12 NOTE — ASSESSMENT
[Curative] : Goals of care discussed with patient: Curative [Palliative Care Plan] : not applicable at this time [FreeTextEntry1] : High grade CD10+ B cell lymphoma, in HIV+ patient with (minimal) viral load on HAART. \par Completed chemotherapy in July 2018. Almost 2 years out. CT 8/2019 with persistent and stable left axillary and left inguinal LAD of uncertain etiology, unlikely lymphoma given indolence. Will order an US to evaluate left axillary LN and consider biopsy depending on results. \par Continue HAART and f/u with his ID doctor. CD4 count has improved.\par Instructed to d/w Dr. Berry about whether mepron should be continued. He has a normal CD4 count, therefore i do not believe it is needed.\par Off Gabapentin;PN no longer present.\par Mediport has been removed.\par Anosmia was to be evaluated by ENT. No other neuro c/o.\par Follow CMP, LDH, Beta-2 MG.\par Needs a flu shot. \par HTN - referring to primary care doctor. Counseled extensively on the importance of this and long-term risks of heart attack, stroke, heart failure and progressive kidney disease if not managed.\par Obesity - encouraged diet/exercise and weight loss. To establish care with a PCP, we will provide him with information.\par \par Rv 3 months. He has agreed to see a primary care doctor well before then.

## 2020-02-12 NOTE — HISTORY OF PRESENT ILLNESS
[Disease:__________________________] : Disease: [unfilled] [Treatment Protocol] : Treatment Protocol [Therapy: ___] : Therapy: [unfilled] [Cycle: ___] : Cycle: [unfilled] [Day: ___] : Day: [unfilled] [de-identified] : At the time of his diagnosis, Mr. Simmons was a 41 year old male who was found to be HIV-positive and had been diagnosed with a high-grade B-cell lymphoma.  He was admitted to [facility] with fever of 10 days ago.  He was found to have left axillary lymphadenopathy.  He had not been fully compliant with his antiretroviral therapy until December of 2017 when he restarted his medication.  His CD4 count is actually normal.\par \par CT of chest showed left axillary lymphadenopathy with a node measuring up to 5 cm.  Minimal bibasilar and right middle lobe atelectasis was seen with a tiny left pleural effusion.  Mild cardiomegaly was noted.  CT of abdomen and pelvis showed no hepatosplenomegaly or mass and no significant adenopathy.  Gastric wall thickening was seen \par of unclear etiology.\par Left axillary lymph node biopsy showed a high-grade CD10 positive B cell lymphoma expressing CD20 and BCL 6.  65% of nuclei showed a myc rearrangement.  BCL-2 and BCL 6 were not rearranged.  Ki-67 was about 90%.. LDH was 618, but I think it was tested using a higher range.  CBC, creat were normal.  Totgal protein and globulins were increased He defervesced on antibiotics and was discharged home a few days ago.  He did not bring his medication list today. [de-identified] : myc rearranged, bcl-2, bcl-6 germline [de-identified] : I pending work-up; [de-identified] : to be reviewed here [FreeTextEntry1] : Rituxan with DA EPOCH /Neulasta x6 6/12/18 l/p x 6 [de-identified] : Feels okay. Denies any new complaints. No hospitalizations since last visit. Had a URI in January 2020.\par \par HIV with high grade B-cell lymphoma, myc rearrangement - S/p completion of treatment in 6/2018, with 6 cycles of DA R-EPOCH and IT MTX prophylaxis. No constitutional symptoms. Appetite is good. Reports if he touches the left inguinal LN it hurts. \par \par PET/CT on 7/20/2018 showed FDG (-) bilateral inguinal nodes and slight increase in prominence of a subcm left axillary node, initially thought to possibly represent viable  NHL. \par This was f/u with a CT in 11/2018 which showed no change. A repeat CT in 8/28/2019 showed the left axillary node to be unchanged and there was a stable to slight increase in inguinal which was previously not FDG (+).  He continues to c/o impaired sense of smell. Problems with smell started after chemotherapy ended. \par \par HIV - T-cell subsets in 10/2019 showed that the CD4 count was 759. Viral load at that time was low level positive. He is on triumeq. Per last ID note, the Mepron was stopped. He is to follow up with his ID physician in May 2020. \par \par Vitamin D deficiency - Vit D was low at 15.6. Not taking his supplement. Instructed him to call prescribing physician.\par \par Headaches resolved.\par \par CBC is normal today.\par \par HTN - does not see a primary care doctor. His sister who is with him states he has not followed with anyone since 2018. patient is asymptomatic, denies headache, vision changes, SOB, chest pain.\par

## 2020-02-13 NOTE — H&P ADULT - INGUINAL L
Pt was due for a follow-up 09/2019, had an appt scheduled for 12/2019 but canceled. Currently has an appt scheduled for 03/30/2020.     Ok to refill pended meds? If so, please send.   normal

## 2020-02-19 ENCOUNTER — FORM ENCOUNTER (OUTPATIENT)
Age: 45
End: 2020-02-19

## 2020-02-19 NOTE — PROGRESS NOTE ADULT - PROBLEM SELECTOR PLAN 3
Prophylactic anticoagulation with Lovenox  on hold for planned LP in am     Contact information: [phone] Stable  Avoid hepatotoxic meds Stable  Avoid hepatotoxic meds  Monitor LFTs daily Consent (Temporal Branch)/Introductory Paragraph: The rationale for Mohs was explained to the patient and consent was obtained. The risks, benefits and alternatives to therapy were discussed in detail. Specifically, the risks of damage to the temporal branch of the facial nerve, infection, scarring, bleeding, prolonged wound healing, incomplete removal, allergy to anesthesia, and recurrence were addressed. Prior to the procedure, the treatment site was clearly identified and confirmed by the patient. All components of Universal Protocol/PAUSE Rule completed.

## 2020-02-20 ENCOUNTER — APPOINTMENT (OUTPATIENT)
Dept: ULTRASOUND IMAGING | Facility: CLINIC | Age: 45
End: 2020-02-20
Payer: MEDICAID

## 2020-02-20 ENCOUNTER — OUTPATIENT (OUTPATIENT)
Dept: OUTPATIENT SERVICES | Facility: HOSPITAL | Age: 45
LOS: 1 days | End: 2020-02-20
Payer: MEDICAID

## 2020-02-20 DIAGNOSIS — C85.10 UNSPECIFIED B-CELL LYMPHOMA, UNSPECIFIED SITE: ICD-10-CM

## 2020-02-20 PROCEDURE — 76882 US LMTD JT/FCL EVL NVASC XTR: CPT | Mod: 26,LT

## 2020-02-20 PROCEDURE — 76882 US LMTD JT/FCL EVL NVASC XTR: CPT

## 2020-02-28 DIAGNOSIS — Z71.89 OTHER SPECIFIED COUNSELING: ICD-10-CM

## 2020-03-30 ENCOUNTER — EMERGENCY (EMERGENCY)
Facility: HOSPITAL | Age: 45
LOS: 1 days | Discharge: HOME | End: 2020-03-30
Attending: EMERGENCY MEDICINE
Payer: MEDICAID

## 2020-03-30 VITALS
DIASTOLIC BLOOD PRESSURE: 104 MMHG | HEART RATE: 122 BPM | SYSTOLIC BLOOD PRESSURE: 154 MMHG | TEMPERATURE: 101 F | OXYGEN SATURATION: 98 % | RESPIRATION RATE: 18 BRPM

## 2020-03-30 VITALS
HEIGHT: 67 IN | WEIGHT: 212.08 LBS | RESPIRATION RATE: 18 BRPM | OXYGEN SATURATION: 98 % | SYSTOLIC BLOOD PRESSURE: 174 MMHG | DIASTOLIC BLOOD PRESSURE: 98 MMHG | HEART RATE: 121 BPM | TEMPERATURE: 100 F

## 2020-03-30 LAB
ALBUMIN SERPL ELPH-MCNC: 4.5 G/DL — SIGNIFICANT CHANGE UP (ref 3.3–5)
ALP SERPL-CCNC: 85 U/L — SIGNIFICANT CHANGE UP (ref 40–120)
ALT FLD-CCNC: 46 U/L — HIGH (ref 10–45)
ANION GAP SERPL CALC-SCNC: 15 MMOL/L — SIGNIFICANT CHANGE UP (ref 5–17)
AST SERPL-CCNC: 50 U/L — HIGH (ref 10–40)
BASOPHILS # BLD AUTO: 0.02 K/UL — SIGNIFICANT CHANGE UP (ref 0–0.2)
BASOPHILS NFR BLD AUTO: 0.4 % — SIGNIFICANT CHANGE UP (ref 0–2)
BILIRUB SERPL-MCNC: 0.2 MG/DL — SIGNIFICANT CHANGE UP (ref 0.2–1.2)
BUN SERPL-MCNC: 6 MG/DL — LOW (ref 7–23)
CALCIUM SERPL-MCNC: 8.9 MG/DL — SIGNIFICANT CHANGE UP (ref 8.4–10.5)
CHLORIDE SERPL-SCNC: 97 MMOL/L — SIGNIFICANT CHANGE UP (ref 96–108)
CO2 SERPL-SCNC: 23 MMOL/L — SIGNIFICANT CHANGE UP (ref 22–31)
CREAT SERPL-MCNC: 1.06 MG/DL — SIGNIFICANT CHANGE UP (ref 0.5–1.3)
EOSINOPHIL # BLD AUTO: 0 K/UL — SIGNIFICANT CHANGE UP (ref 0–0.5)
EOSINOPHIL NFR BLD AUTO: 0 % — SIGNIFICANT CHANGE UP (ref 0–6)
GLUCOSE SERPL-MCNC: 110 MG/DL — HIGH (ref 70–99)
HCT VFR BLD CALC: 47.4 % — SIGNIFICANT CHANGE UP (ref 39–50)
HGB BLD-MCNC: 15.8 G/DL — SIGNIFICANT CHANGE UP (ref 13–17)
IMM GRANULOCYTES NFR BLD AUTO: 0.2 % — SIGNIFICANT CHANGE UP (ref 0–1.5)
LYMPHOCYTES # BLD AUTO: 1.95 K/UL — SIGNIFICANT CHANGE UP (ref 1–3.3)
LYMPHOCYTES # BLD AUTO: 39.3 % — SIGNIFICANT CHANGE UP (ref 13–44)
MCHC RBC-ENTMCNC: 29.2 PG — SIGNIFICANT CHANGE UP (ref 27–34)
MCHC RBC-ENTMCNC: 33.3 GM/DL — SIGNIFICANT CHANGE UP (ref 32–36)
MCV RBC AUTO: 87.6 FL — SIGNIFICANT CHANGE UP (ref 80–100)
MONOCYTES # BLD AUTO: 0.32 K/UL — SIGNIFICANT CHANGE UP (ref 0–0.9)
MONOCYTES NFR BLD AUTO: 6.5 % — SIGNIFICANT CHANGE UP (ref 2–14)
NEUTROPHILS # BLD AUTO: 2.66 K/UL — SIGNIFICANT CHANGE UP (ref 1.8–7.4)
NEUTROPHILS NFR BLD AUTO: 53.6 % — SIGNIFICANT CHANGE UP (ref 43–77)
NRBC # BLD: 0 /100 WBCS — SIGNIFICANT CHANGE UP (ref 0–0)
PLATELET # BLD AUTO: 180 K/UL — SIGNIFICANT CHANGE UP (ref 150–400)
POTASSIUM SERPL-MCNC: 4.8 MMOL/L — SIGNIFICANT CHANGE UP (ref 3.5–5.3)
POTASSIUM SERPL-SCNC: 4.8 MMOL/L — SIGNIFICANT CHANGE UP (ref 3.5–5.3)
PROT SERPL-MCNC: 8.6 G/DL — HIGH (ref 6–8.3)
RBC # BLD: 5.41 M/UL — SIGNIFICANT CHANGE UP (ref 4.2–5.8)
RBC # FLD: 13.5 % — SIGNIFICANT CHANGE UP (ref 10.3–14.5)
SODIUM SERPL-SCNC: 135 MMOL/L — SIGNIFICANT CHANGE UP (ref 135–145)
WBC # BLD: 4.96 K/UL — SIGNIFICANT CHANGE UP (ref 3.8–10.5)
WBC # FLD AUTO: 4.96 K/UL — SIGNIFICANT CHANGE UP (ref 3.8–10.5)

## 2020-03-30 PROCEDURE — 87635 SARS-COV-2 COVID-19 AMP PRB: CPT

## 2020-03-30 PROCEDURE — 87040 BLOOD CULTURE FOR BACTERIA: CPT

## 2020-03-30 PROCEDURE — 85027 COMPLETE CBC AUTOMATED: CPT

## 2020-03-30 PROCEDURE — 80053 COMPREHEN METABOLIC PANEL: CPT

## 2020-03-30 PROCEDURE — 99283 EMERGENCY DEPT VISIT LOW MDM: CPT

## 2020-03-30 PROCEDURE — 99284 EMERGENCY DEPT VISIT MOD MDM: CPT

## 2020-03-30 RX ORDER — ACETAMINOPHEN 500 MG
650 TABLET ORAL ONCE
Refills: 0 | Status: COMPLETED | OUTPATIENT
Start: 2020-03-30 | End: 2020-03-30

## 2020-03-30 RX ADMIN — Medication 650 MILLIGRAM(S): at 13:39

## 2020-03-30 NOTE — ED PROVIDER NOTE - NSFOLLOWUPINSTRUCTIONS_ED_ALL_ED_FT
For a 14 day period:  -Stay inside your home. avoid public places or public interaction  -Do not go to work  -If you do enter any public domain, at minimum wear a surgical mask at all times  -Even while indoors, attempt to remain isolated from other individuals such as family or friends, as much as possible  -Return to the Emergency Department for any symptoms such as worsening shortness of breath, significant worsening cough, high fevers despite over the counter fever-reducing medications, or severe weakness/malaise    Please take 600 mg of Ibuprofen (aka Motrin, Advil) and/or 650 mg Acetaminophen (aka Tylenol) every 6 hours, as needed, for mild-moderate pain or fever.

## 2020-03-30 NOTE — ED PROVIDER NOTE - OBJECTIVE STATEMENT
44 pmg PMH AIDS and cancer (last chemo 2 years ago) who is compliant with aids medications who goes to surveillance out patients visits at [facility] q3 months presents with days of generalized body aches and fever. Last tylenol last night. No known covid contacts. Has not been tested for covid. No sick contacts at home. Also c/o dry cough. No shortness of breath. No n/v/d. Tolerating PO intake. No abd pain no best pain.

## 2020-03-30 NOTE — ED PROVIDER NOTE - ATTENDING CONTRIBUTION TO CARE
*** PATIENT SEEN DURING COVID PANDEMIC -- [place] IN STATE OF [facility] -- HOSPITAL RESROUCES ARE CRITICALLY OVERWHELMED -- NORMAL DECISION MAKING PROCESS IS SIGNIFICANTLY ALTERED -- RISK/BENEFIT ANALYSIS FAVORS DISCHARGE/OUTPATIENT MANAGEMENT IN MANY CASES THAT WOULD NORMALLY BE ADMITTED ***     45 yo male hx B cell lymphona, HIV last known CD4 759 in Oct 2019 last chemo ~2 years ago per patient last seen by heme onc for surveillance ~3 weeks ago p/w fever, myalgias, cough.  nontoxic on exam, no respiratory distress.  given potentially doubly immunocompromised will send blood cultures, check COVID, CBC with diff.

## 2020-03-30 NOTE — ED ADULT NURSE NOTE - OBJECTIVE STATEMENT
44y male arrived to ED complaining of fever. PMHx lymphoma (last chemo ~2 years ago), HIV. Patient reports about 3 weeks of fever, cough and fatigue. Patient tachycardic 120s in ED, febrile 100.9. Patient denies taking Tylenol today. Hypertensive in ED. Patient A&Ox4, ambulatory. No distress. Denies CP, SOB, n/v/d, abd pain, urinary sx.

## 2020-03-30 NOTE — ED PROVIDER NOTE - PHYSICAL EXAMINATION
Physical Exam:    I have reviewed the triage vital signs.    Gen: NAD, AOx3, non-toxic appearing, able to ambulate without assistance  Head and Neck: NCAT, Neck supple without meningismus   HEENT: EOMI, PEERLA, normal conjunctiva, tongue midline, oral mucosa moist  Lung: CTAB, no respiratory distress, no wheezes/rhonchi/rales B/L, speaking in full sentences  CV: RRR, no murmurs, rubs or gallops  Abd: soft, NT, ND, no guarding, no rigidity, no rebound tenderness, no CVA tenderness, no masses.   MSK: no gross deformities, ROM normal in UE/LE, no back tenderness  Neuro: CNs II-XII grossly intact. No focal sensory or motor deficits  Skin: Warm, well perfused, no rash, no leg swelling  Psych: Appropriate mood and affect

## 2020-03-30 NOTE — ED ADULT TRIAGE NOTE - CHIEF COMPLAINT QUOTE
had prostate surgery 2 years ago; had blood cancer with chemo, last chemo was told to come to er if has fever; had 100.5;

## 2020-03-30 NOTE — ED PROVIDER NOTE - PATIENT PORTAL LINK FT
You can access the FollowMyHealth Patient Portal offered by [facility] by registering at the following website: [email or web address]. By joining [facility]’s FollowMyHealth portal, you will also be able to view your health information using other applications (apps) compatible with our system.

## 2020-03-30 NOTE — ED PROVIDER NOTE - CLINICAL SUMMARY MEDICAL DECISION MAKING FREE TEXT BOX
see attending attestation authored by me, Dao Mcclendon MD see attending attestation authored by me, MD Raghav Angelo: pt presents with fever and body aches likely due to viral illness (COVID high possibility) Patient looks well on exam, no respiratory distress or hypoxia. Patient on tx for AIDS and in remission for B cell cancer (last chemo 2 years ago however). Plan: COVID test, cbc w/ diff to r/o neutropenic fever, fever control reassess.

## 2020-04-01 LAB — SARS-COV-2 RNA SPEC QL NAA+PROBE: DETECTED

## 2020-04-23 NOTE — H&P ADULT - NEUROLOGICAL
Nutrition Services negative Alert & oriented; no sensory, motor or coordination deficits, normal reflexes

## 2020-04-29 ENCOUNTER — OUTPATIENT (OUTPATIENT)
Dept: OUTPATIENT SERVICES | Facility: HOSPITAL | Age: 45
LOS: 1 days | Discharge: HOME | End: 2020-04-29

## 2020-04-29 DIAGNOSIS — C85.88 OTHER SPECIFIED TYPES OF NON-HODGKIN LYMPHOMA, LYMPH NODES OF MULTIPLE SITES: ICD-10-CM

## 2020-05-03 ENCOUNTER — LABORATORY RESULT (OUTPATIENT)
Age: 45
End: 2020-05-03

## 2020-05-05 ENCOUNTER — APPOINTMENT (OUTPATIENT)
Dept: HEMATOLOGY ONCOLOGY | Facility: CLINIC | Age: 45
End: 2020-05-05
Payer: MEDICAID

## 2020-05-05 DIAGNOSIS — E55.9 VITAMIN D DEFICIENCY, UNSPECIFIED: ICD-10-CM

## 2020-05-05 DIAGNOSIS — Z83.3 FAMILY HISTORY OF DIABETES MELLITUS: ICD-10-CM

## 2020-05-05 PROCEDURE — 99213 OFFICE O/P EST LOW 20 MIN: CPT | Mod: 95

## 2020-05-05 NOTE — ASSESSMENT
[Palliative Care Plan] : not applicable at this time [Curative] : Goals of care discussed with patient: Curative [FreeTextEntry1] : High grade CD10+ B cell lymphoma, in HIV+ patient with (minimal) viral load on HAART. \par Completed chemotherapy in July 2018. Almost 2 years out.  Persistent but not rapidly growing left axillary adenopathy; will repeat U/S post next visit and consider bx.\par Continue HAART and f/u with his ID doctor.  ID f/u scheduled.\par Referred to Cardiology/IM group in [place] to establish care, john re HTN\par Off Gabapentin;PN no longer present.\par Mediport has been removed.\par Anosmia was to be evaluated by ENT. No other neuro c/o.\par Will set up blood draw CMP, LDH, Beta-2 MG.\par s/p Covid-19 infection, has recovered for more than 2 wks.\par Covid nasal swab repeated yest, results pending\par RV 3 mos\par \par  \par  \par Obesity - encouraged diet/exercise and weight loss. To establish care with a PCP, we will provide him with information.\par \par Rv 3 months. He has agreed to see a primary care doctor well before then.

## 2020-05-05 NOTE — HISTORY OF PRESENT ILLNESS
[Disease:__________________________] : Disease: [unfilled] [Treatment Protocol] : Treatment Protocol [Therapy: ___] : Therapy: [unfilled] [Cycle: ___] : Cycle: [unfilled] [Day: ___] : Day: [unfilled] [de-identified] : Mr. Cherry presented in early 2018 - he was treated for a high grade, myc rearranged DLBCL, HIV-positive .  [facility]  He had not been fully compliant with his antiretroviral therapy until December of 2017 when he restarted his medication.  His CD4 count was actually normal.\par \par CT of chest showed left axillary lymphadenopathy with a node measuring up to 5 cm.  Minimal bibasilar and right middle lobe atelectasis was seen with a tiny left pleural effusion.  Mild cardiomegaly was noted.  CT of abdomen and pelvis showed no hepatosplenomegaly or mass and no significant adenopathy.  Gastric wall thickening was seen \par of unclear etiology.\par Left axillary lymph node biopsy showed a high-grade CD10 positive B cell lymphoma expressing CD20 and BCL 6.  65% of nuclei showed a myc rearrangement.  BCL-2 and BCL 6 were not rearranged.  Ki-67 was about 90%.. LDH was 618, but I think it was tested using a higher range.  CBC, creat were normal.  Total protein and globulins were increased.    [de-identified] : I pending work-up; [de-identified] : to be reviewed here [de-identified] : myc rearranged, bcl-2, bcl-6 germline [FreeTextEntry1] : Rituxan with DA EPOCH /Neulasta x6 6/12/18 l/p x 6 [de-identified] :  S/p completion of treatment in 6/2018, with 6 cycles of DA R-EPOCH and IT MTX prophylaxis. No constitutional symptoms. Appetite is good. Reports if he touches the left inguinal LN it hurts. \par \par PET/CT on 7/20/2018 showed FDG (-) bilateral inguinal nodes and slight increase in prominence of a subcm left axillary node, initially thought to possibly represent viable  NHL. \par This was f/u with a CT in 11/2018 which showed no change. A repeat CT in 8/28/2019 showed the left axillary node to be unchanged and there was a stable to slight increase in inguinal which was previously not FDG (+).  Most recent left axillary U/S - node sl larger, described as abnormal, but there has been no change convincing enough to suggest recurrent high grade NHL.  Pt unaware of dz in axilla.\par \par Persistently high diastolic BP - still has not seen internist, needs referral.\par \par Recovering from Covid-19.  No fever or resp sxs at present.  Nasal swab was first (+) on 3/30/20.\par \par No constitutional or neurologic c/o\par \par HIV - T-cell subsets in 10/2019 showed that the CD4 count was 759. Viral load at that time was low level positive. He is on triumeq. Per last ID note, the Mepron was stopped but pt/wife say pt still taking it. . He is to follow up with his ID physician in May 2020. \par \par Vitamin D deficiency - Vit D was low at 15.6. Not taking his supplement. Instructed him to call prescribing physician.\par \par Headaches resolved.\par \par CBC is normal today.\par \par HTN - does not see a primary care doctor. His sister who is with him states he has not followed with anyone since 2018. patient is asymptomatic, denies headache, vision changes, SOB, chest pain.\par

## 2020-05-05 NOTE — REVIEW OF SYSTEMS
[Negative] : Cardiovascular [Fever] : no fever [Night Sweats] : no night sweats [Fatigue] : no fatigue [Dysphagia] : no dysphagia [Odynophagia] : no odynophagia [Chest Pain] : no chest pain [Palpitations] : no palpitations [SOB on Exertion] : no shortness of breath during exertion [Abdominal Pain] : no abdominal pain [Vomiting] : no vomiting [Constipation] : no constipation [Diarrhea] : no diarrhea [Joint Pain] : no joint pain [Skin Rash] : no skin rash [Easy Bleeding] : no tendency for easy bleeding [Easy Bruising] : no tendency for easy bruising [Swollen Glands] : no swollen glands

## 2020-06-05 ENCOUNTER — RX RENEWAL (OUTPATIENT)
Age: 45
End: 2020-06-05

## 2020-06-19 ENCOUNTER — APPOINTMENT (OUTPATIENT)
Dept: INFECTIOUS DISEASE | Facility: CLINIC | Age: 45
End: 2020-06-19

## 2020-06-28 ENCOUNTER — RX RENEWAL (OUTPATIENT)
Age: 45
End: 2020-06-28

## 2020-07-17 NOTE — PROGRESS NOTE ADULT - ASSESSMENT
42M with HIV (CD4 762, viral load 65 copies) history of poor medication adherence and AIDs, recently diagnosed with B-cell lymphoma, was admitted 2/22/18 for his first cycle of chemotherapy.  Negative Toxoplasma antibody, Syphilis screen, Quantiferon gold (4/2016), Hep C (4/2016). Past Hep B infection- core and surface antibody positive.     Continue Triumeq   f/u strongyloides ab  should also check HTLV-1 ab.    If patient is discharged he can follow up in ID clinic by calling [phone]    Guille Berry MD  [phone]  After 5pm/weekends [phone]

## 2020-07-27 ENCOUNTER — LABORATORY RESULT (OUTPATIENT)
Age: 45
End: 2020-07-27

## 2020-07-27 ENCOUNTER — APPOINTMENT (OUTPATIENT)
Dept: INFECTIOUS DISEASE | Facility: CLINIC | Age: 45
End: 2020-07-27
Payer: MEDICAID

## 2020-07-27 ENCOUNTER — APPOINTMENT (OUTPATIENT)
Dept: INFECTIOUS DISEASE | Facility: CLINIC | Age: 45
End: 2020-07-27

## 2020-07-27 ENCOUNTER — OUTPATIENT (OUTPATIENT)
Dept: OUTPATIENT SERVICES | Facility: HOSPITAL | Age: 45
LOS: 1 days | End: 2020-07-27
Payer: MEDICAID

## 2020-07-27 VITALS
HEIGHT: 69 IN | HEART RATE: 96 BPM | DIASTOLIC BLOOD PRESSURE: 107 MMHG | RESPIRATION RATE: 16 BRPM | TEMPERATURE: 98.7 F | SYSTOLIC BLOOD PRESSURE: 153 MMHG | BODY MASS INDEX: 31.99 KG/M2 | OXYGEN SATURATION: 99 % | WEIGHT: 216 LBS

## 2020-07-27 DIAGNOSIS — B20 HUMAN IMMUNODEFICIENCY VIRUS [HIV] DISEASE: ICD-10-CM

## 2020-07-27 PROCEDURE — G0463: CPT

## 2020-07-27 PROCEDURE — 99214 OFFICE O/P EST MOD 30 MIN: CPT

## 2020-07-27 PROCEDURE — 90834 PSYTX W PT 45 MINUTES: CPT

## 2020-07-29 NOTE — PHYSICAL EXAM
[General Appearance - Alert] : alert [General Appearance - In No Acute Distress] : in no acute distress [PERRL With Normal Accommodation] : pupils were equal in size, round, reactive to light [Sclera] : the sclera and conjunctiva were normal [Extraocular Movements] : extraocular movements were intact [Outer Ear] : the ears and nose were normal in appearance [Neck Appearance] : the appearance of the neck was normal [Oropharynx] : the oropharynx was normal with no thrush [Thyroid Diffuse Enlargement] : the thyroid was not enlarged [Jugular Venous Distention Increased] : there was no jugular-venous distention [Neck Cervical Mass (___cm)] : no neck mass was observed [Auscultation Breath Sounds / Voice Sounds] : lungs were clear to auscultation bilaterally [Heart Sounds] : normal S1 and S2 [Heart Rate And Rhythm] : heart rate was normal and rhythm regular [Murmurs] : no murmurs [Heart Sounds Gallop] : no gallops [Heart Sounds Pericardial Friction Rub] : no pericardial rub [Full Pulse] : the pedal pulses are present [Edema] : there was no peripheral edema [Bowel Sounds] : normal bowel sounds [Abdomen Soft] : soft [Abdomen Tenderness] : non-tender [Costovertebral Angle Tenderness] : no CVA tenderness [Abdomen Mass (___ Cm)] : no abdominal mass palpated [Penis Abnormality] : the penis was normal [Scrotum] : the scrotum was normal [Testes Swelling] : the testicles were not swollen [Testes Mass (___cm)] : there were no testicular masses [No Palpable Adenopathy] : no palpable adenopathy [Musculoskeletal - Swelling] : no joint swelling [Nail Clubbing] : no clubbing  or cyanosis of the fingernails [Motor Tone] : muscle strength and tone were normal [Skin Color & Pigmentation] : normal skin color and pigmentation [] : no rash [Deep Tendon Reflexes (DTR)] : deep tendon reflexes were 2+ and symmetric [Sensation] : the sensory exam was normal to light touch and pinprick [No Focal Deficits] : no focal deficits [Oriented To Time, Place, And Person] : oriented to person, place, and time [Affect] : the affect was normal [FreeTextEntry1] : upper right chest old port site ok- port removed

## 2020-07-29 NOTE — HISTORY OF PRESENT ILLNESS
[Sexually Active] : The patient is not sexually active [de-identified] : Cortes [FreeTextEntry1] : 43 yo man with a history of HIV/AIDs  He was sick in March with COVID and reports prolonged illness but currently feeling well.\par \par Reports not working and living in a room in someone's house\par \par Reports a prolonged illness with COVID in March and April but feeling better at this point. Of note, swab in May was still swab positive\par \par Oncology visit is stating that he is in remission from the B cell lymphoma [de-identified] : alone [de-identified] : sister accompanying him [de-identified] : not working/disability

## 2020-07-29 NOTE — ASSESSMENT
[Treatment Education] : treatment education [Treatment Adherence] : treatment adherence [Rx Dose / Side Effects] : Rx dose/side effects [Risk Reduction] : risk reduction [Nutritional / Food Issues] : nutritional/food issues [Universal Precautions] : universal precautions [Medical Care Issues] : medical care issues [Drug Interactions / Side Effects] : drug interactions/side effects [Disclosure of Diagnosis] : disclosure of diagnosis [HIV Education] : HIV Education [Sexuality / Safer Sex] : sexuality/safer sex [Anticipatory Guidance] : anticipatory guidance [Education Materials Provided] : Eduction materials were provided [FreeTextEntry1] : 43 yo man with a history of AIDS, history of high grade B cell lymphoma in remission post chemotherapy\par \par Needs annual labs today\par CBC, SMAC, Tcells, viral load\par STI screen\par syphilis\par Quantferon\par \par COVID ab testing based on his hx of COVID\par \par HM- HTN will start antiHTN meds and will refer to obtain a  PMD for ongoing management\par \par B cell lymphoma- follow up with Oncology as instructed\par \par

## 2020-07-30 DIAGNOSIS — C85.10 UNSPECIFIED B-CELL LYMPHOMA, UNSPECIFIED SITE: ICD-10-CM

## 2020-07-30 DIAGNOSIS — I10 ESSENTIAL (PRIMARY) HYPERTENSION: ICD-10-CM

## 2020-08-10 DIAGNOSIS — Z71.89 OTHER SPECIFIED COUNSELING: ICD-10-CM

## 2020-08-24 ENCOUNTER — LABORATORY RESULT (OUTPATIENT)
Age: 45
End: 2020-08-24

## 2020-08-24 ENCOUNTER — APPOINTMENT (OUTPATIENT)
Dept: INFECTIOUS DISEASE | Facility: CLINIC | Age: 45
End: 2020-08-24

## 2020-10-15 ENCOUNTER — RX RENEWAL (OUTPATIENT)
Age: 45
End: 2020-10-15

## 2020-11-10 ENCOUNTER — FORM ENCOUNTER (OUTPATIENT)
Age: 45
End: 2020-11-10

## 2020-11-11 ENCOUNTER — LABORATORY RESULT (OUTPATIENT)
Age: 45
End: 2020-11-11

## 2020-11-11 ENCOUNTER — APPOINTMENT (OUTPATIENT)
Dept: INFECTIOUS DISEASE | Facility: CLINIC | Age: 45
End: 2020-11-11
Payer: MEDICAID

## 2020-11-11 ENCOUNTER — OUTPATIENT (OUTPATIENT)
Dept: OUTPATIENT SERVICES | Facility: HOSPITAL | Age: 45
LOS: 1 days | End: 2020-11-11
Payer: MEDICAID

## 2020-11-11 VITALS
BODY MASS INDEX: 31.4 KG/M2 | DIASTOLIC BLOOD PRESSURE: 104 MMHG | WEIGHT: 212 LBS | HEART RATE: 94 BPM | SYSTOLIC BLOOD PRESSURE: 164 MMHG | TEMPERATURE: 98.2 F | HEIGHT: 69 IN | OXYGEN SATURATION: 100 %

## 2020-11-11 DIAGNOSIS — B20 HUMAN IMMUNODEFICIENCY VIRUS [HIV] DISEASE: ICD-10-CM

## 2020-11-11 PROCEDURE — 87591 N.GONORRHOEAE DNA AMP PROB: CPT

## 2020-11-11 PROCEDURE — 87517 HEPATITIS B DNA QUANT: CPT

## 2020-11-11 PROCEDURE — 85027 COMPLETE CBC AUTOMATED: CPT

## 2020-11-11 PROCEDURE — 86803 HEPATITIS C AB TEST: CPT

## 2020-11-11 PROCEDURE — 99214 OFFICE O/P EST MOD 30 MIN: CPT

## 2020-11-11 PROCEDURE — 80053 COMPREHEN METABOLIC PANEL: CPT

## 2020-11-11 PROCEDURE — 90688 IIV4 VACCINE SPLT 0.5 ML IM: CPT

## 2020-11-11 PROCEDURE — 83036 HEMOGLOBIN GLYCOSYLATED A1C: CPT

## 2020-11-11 PROCEDURE — 86780 TREPONEMA PALLIDUM: CPT

## 2020-11-11 PROCEDURE — G0008: CPT

## 2020-11-11 PROCEDURE — 86480 TB TEST CELL IMMUN MEASURE: CPT

## 2020-11-11 PROCEDURE — 87536 HIV-1 QUANT&REVRSE TRNSCRPJ: CPT

## 2020-11-11 PROCEDURE — 80061 LIPID PANEL: CPT

## 2020-11-11 PROCEDURE — G0463: CPT | Mod: 25

## 2020-11-11 PROCEDURE — 86769 SARS-COV-2 COVID-19 ANTIBODY: CPT

## 2020-11-11 PROCEDURE — 87491 CHLMYD TRACH DNA AMP PROBE: CPT

## 2020-11-11 PROCEDURE — 86359 T CELLS TOTAL COUNT: CPT

## 2020-11-11 PROCEDURE — 86360 T CELL ABSOLUTE COUNT/RATIO: CPT

## 2020-11-12 LAB
4/8 RATIO: 0.88 RATIO — LOW (ref 0.9–3.6)
A1C WITH ESTIMATED AVERAGE GLUCOSE RESULT: 5.7 % — HIGH (ref 4–5.6)
ABS CD8: 875 /UL — HIGH (ref 142–740)
ALBUMIN SERPL ELPH-MCNC: 5 G/DL — SIGNIFICANT CHANGE UP (ref 3.3–5)
ALP SERPL-CCNC: 87 U/L — SIGNIFICANT CHANGE UP (ref 40–120)
ALT FLD-CCNC: 25 U/L — SIGNIFICANT CHANGE UP (ref 10–45)
ANION GAP SERPL CALC-SCNC: 14 MMOL/L — SIGNIFICANT CHANGE UP (ref 5–17)
AST SERPL-CCNC: 25 U/L — SIGNIFICANT CHANGE UP (ref 10–40)
BILIRUB SERPL-MCNC: 0.4 MG/DL — SIGNIFICANT CHANGE UP (ref 0.2–1.2)
BUN SERPL-MCNC: 12 MG/DL — SIGNIFICANT CHANGE UP (ref 7–23)
C TRACH RRNA SPEC QL NAA+PROBE: SIGNIFICANT CHANGE UP
CALCIUM SERPL-MCNC: 9.8 MG/DL — SIGNIFICANT CHANGE UP (ref 8.4–10.5)
CD3 BLASTS SPEC-ACNC: 1671 /UL — SIGNIFICANT CHANGE UP (ref 672–1870)
CD3 BLASTS SPEC-ACNC: 71 % — SIGNIFICANT CHANGE UP (ref 59–83)
CD4 %: 33 % — SIGNIFICANT CHANGE UP (ref 30–62)
CD8 %: 37 % — HIGH (ref 12–36)
CHLORIDE SERPL-SCNC: 104 MMOL/L — SIGNIFICANT CHANGE UP (ref 96–108)
CHOLEST SERPL-MCNC: 163 MG/DL — SIGNIFICANT CHANGE UP
CO2 SERPL-SCNC: 23 MMOL/L — SIGNIFICANT CHANGE UP (ref 22–31)
CREAT SERPL-MCNC: 1.34 MG/DL — HIGH (ref 0.5–1.3)
ESTIMATED AVERAGE GLUCOSE: 117 MG/DL — HIGH (ref 68–114)
GLUCOSE SERPL-MCNC: 85 MG/DL — SIGNIFICANT CHANGE UP (ref 70–99)
HBV DNA # SERPL NAA+PROBE: SIGNIFICANT CHANGE UP IU/ML
HBV DNA SERPL NAA+PROBE-LOG#: SIGNIFICANT CHANGE UP LOG10IU/ML
HCT VFR BLD CALC: 47.4 % — SIGNIFICANT CHANGE UP (ref 39–50)
HCV AB S/CO SERPL IA: 0.07 S/CO — SIGNIFICANT CHANGE UP (ref 0–0.99)
HCV AB SERPL-IMP: SIGNIFICANT CHANGE UP
HDLC SERPL-MCNC: 44 MG/DL — SIGNIFICANT CHANGE UP
HGB BLD-MCNC: 15.2 G/DL — SIGNIFICANT CHANGE UP (ref 13–17)
LIPID PNL WITH DIRECT LDL SERPL: 97 MG/DL — SIGNIFICANT CHANGE UP
MCHC RBC-ENTMCNC: 29.1 PG — SIGNIFICANT CHANGE UP (ref 27–34)
MCHC RBC-ENTMCNC: 32.1 GM/DL — SIGNIFICANT CHANGE UP (ref 32–36)
MCV RBC AUTO: 90.8 FL — SIGNIFICANT CHANGE UP (ref 80–100)
N GONORRHOEA RRNA SPEC QL NAA+PROBE: SIGNIFICANT CHANGE UP
NON HDL CHOLESTEROL: 119 MG/DL — SIGNIFICANT CHANGE UP
PLATELET # BLD AUTO: 238 K/UL — SIGNIFICANT CHANGE UP (ref 150–400)
POTASSIUM SERPL-MCNC: 4.9 MMOL/L — SIGNIFICANT CHANGE UP (ref 3.5–5.3)
POTASSIUM SERPL-SCNC: 4.9 MMOL/L — SIGNIFICANT CHANGE UP (ref 3.5–5.3)
PROT SERPL-MCNC: 7.5 G/DL — SIGNIFICANT CHANGE UP (ref 6–8.3)
RBC # BLD: 5.22 M/UL — SIGNIFICANT CHANGE UP (ref 4.2–5.8)
RBC # FLD: 14 % — SIGNIFICANT CHANGE UP (ref 10.3–14.5)
SARS-COV-2 IGG SERPL QL IA: POSITIVE
SARS-COV-2 IGM SERPL IA-ACNC: 223 INDEX — HIGH
SODIUM SERPL-SCNC: 141 MMOL/L — SIGNIFICANT CHANGE UP (ref 135–145)
SPECIMEN SOURCE: SIGNIFICANT CHANGE UP
T PALLIDUM AB TITR SER: NEGATIVE — SIGNIFICANT CHANGE UP
T-CELL CD4 SUBSET PNL BLD: 771 /UL — SIGNIFICANT CHANGE UP (ref 489–1457)
TRIGL SERPL-MCNC: 111 MG/DL — SIGNIFICANT CHANGE UP
WBC # BLD: 7.07 K/UL — SIGNIFICANT CHANGE UP (ref 3.8–10.5)
WBC # FLD AUTO: 7.07 K/UL — SIGNIFICANT CHANGE UP (ref 3.8–10.5)

## 2020-11-13 ENCOUNTER — OUTPATIENT (OUTPATIENT)
Dept: OUTPATIENT SERVICES | Facility: HOSPITAL | Age: 45
LOS: 1 days | Discharge: HOME | End: 2020-11-13

## 2020-11-13 DIAGNOSIS — C85.88 OTHER SPECIFIED TYPES OF NON-HODGKIN LYMPHOMA, LYMPH NODES OF MULTIPLE SITES: ICD-10-CM

## 2020-11-13 LAB
GAMMA INTERFERON BACKGROUND BLD IA-ACNC: 0.04 IU/ML — SIGNIFICANT CHANGE UP
M TB IFN-G BLD-IMP: NEGATIVE — SIGNIFICANT CHANGE UP
M TB IFN-G CD4+ BCKGRND COR BLD-ACNC: 0 IU/ML — SIGNIFICANT CHANGE UP
M TB IFN-G CD4+CD8+ BCKGRND COR BLD-ACNC: 0 IU/ML — SIGNIFICANT CHANGE UP
QUANT TB PLUS MITOGEN MINUS NIL: 6.46 IU/ML — SIGNIFICANT CHANGE UP

## 2020-11-14 LAB
HIV-1 VIRAL LOAD RESULT: ABNORMAL
HIV1 RNA # SERPL NAA+PROBE: SIGNIFICANT CHANGE UP
HIV1 RNA SER-IMP: SIGNIFICANT CHANGE UP
HIV1 RNA SERPL NAA+PROBE-ACNC: ABNORMAL
HIV1 RNA SERPL NAA+PROBE-LOG#: ABNORMAL LG COP/ML

## 2020-11-14 NOTE — HISTORY OF PRESENT ILLNESS
[FreeTextEntry1] : 45 yo man with a history of HIV/AIDs  He was sick in March with COVID and reports prolonged illness but currently feeling well.\par \par Reports not working and living in a room in someone's house\par \par Reports a prolonged illness with COVID in March and April but feeling better at this point. Of note, swab in May was still swab positive\par \par Oncology visit is stating that he is in remission from the B cell lymphoma [Sexually Active] : The patient is not sexually active [de-identified] : Cortes [de-identified] : not working/disability [de-identified] : alone [de-identified] : sister accompanying him

## 2020-11-14 NOTE — PHYSICAL EXAM
[General Appearance - Alert] : alert [General Appearance - In No Acute Distress] : in no acute distress [Sclera] : the sclera and conjunctiva were normal [PERRL With Normal Accommodation] : pupils were equal in size, round, reactive to light [Extraocular Movements] : extraocular movements were intact [Outer Ear] : the ears and nose were normal in appearance [Oropharynx] : the oropharynx was normal with no thrush [Neck Appearance] : the appearance of the neck was normal [Neck Cervical Mass (___cm)] : no neck mass was observed [Jugular Venous Distention Increased] : there was no jugular-venous distention [Thyroid Diffuse Enlargement] : the thyroid was not enlarged [Auscultation Breath Sounds / Voice Sounds] : lungs were clear to auscultation bilaterally [Heart Rate And Rhythm] : heart rate was normal and rhythm regular [Heart Sounds] : normal S1 and S2 [Heart Sounds Gallop] : no gallops [Murmurs] : no murmurs [Heart Sounds Pericardial Friction Rub] : no pericardial rub [Full Pulse] : the pedal pulses are present [Edema] : there was no peripheral edema [Bowel Sounds] : normal bowel sounds [Abdomen Soft] : soft [Abdomen Tenderness] : non-tender [Abdomen Mass (___ Cm)] : no abdominal mass palpated [Costovertebral Angle Tenderness] : no CVA tenderness [Penis Abnormality] : the penis was normal [Scrotum] : the scrotum was normal [Testes Swelling] : the testicles were not swollen [Testes Mass (___cm)] : there were no testicular masses [No Palpable Adenopathy] : no palpable adenopathy [Musculoskeletal - Swelling] : no joint swelling [Nail Clubbing] : no clubbing  or cyanosis of the fingernails [Motor Tone] : muscle strength and tone were normal [Skin Color & Pigmentation] : normal skin color and pigmentation [] : no rash [Deep Tendon Reflexes (DTR)] : deep tendon reflexes were 2+ and symmetric [Sensation] : the sensory exam was normal to light touch and pinprick [No Focal Deficits] : no focal deficits [Oriented To Time, Place, And Person] : oriented to person, place, and time [Affect] : the affect was normal [FreeTextEntry1] : upper right chest old port site ok- port removed

## 2020-11-14 NOTE — ASSESSMENT
[Treatment Education] : treatment education [Treatment Adherence] : treatment adherence [Rx Dose / Side Effects] : Rx dose/side effects [Risk Reduction] : risk reduction [Nutritional / Food Issues] : nutritional/food issues [Universal Precautions] : universal precautions [Medical Care Issues] : medical care issues [Drug Interactions / Side Effects] : drug interactions/side effects [Disclosure of Diagnosis] : disclosure of diagnosis [HIV Education] : HIV Education [Sexuality / Safer Sex] : sexuality/safer sex [Anticipatory Guidance] : anticipatory guidance [FreeTextEntry1] : 43 yo man with a history of AIDS, 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

## 2020-11-16 DIAGNOSIS — Z23 ENCOUNTER FOR IMMUNIZATION: ICD-10-CM

## 2020-11-17 ENCOUNTER — RESULT REVIEW (OUTPATIENT)
Age: 45
End: 2020-11-17

## 2020-11-17 ENCOUNTER — APPOINTMENT (OUTPATIENT)
Dept: HEMATOLOGY ONCOLOGY | Facility: CLINIC | Age: 45
End: 2020-11-17
Payer: MEDICAID

## 2020-11-17 VITALS
HEIGHT: 69.45 IN | DIASTOLIC BLOOD PRESSURE: 107 MMHG | RESPIRATION RATE: 16 BRPM | WEIGHT: 209.66 LBS | SYSTOLIC BLOOD PRESSURE: 164 MMHG | TEMPERATURE: 98.7 F | BODY MASS INDEX: 30.7 KG/M2 | HEART RATE: 93 BPM | OXYGEN SATURATION: 100 %

## 2020-11-17 DIAGNOSIS — Z87.891 PERSONAL HISTORY OF NICOTINE DEPENDENCE: ICD-10-CM

## 2020-11-17 DIAGNOSIS — U07.1 COVID-19: ICD-10-CM

## 2020-11-17 LAB
BASOPHILS # BLD AUTO: 0.03 K/UL — SIGNIFICANT CHANGE UP (ref 0–0.2)
BASOPHILS NFR BLD AUTO: 0.4 % — SIGNIFICANT CHANGE UP (ref 0–2)
EOSINOPHIL # BLD AUTO: 0.06 K/UL — SIGNIFICANT CHANGE UP (ref 0–0.5)
EOSINOPHIL NFR BLD AUTO: 0.8 % — SIGNIFICANT CHANGE UP (ref 0–6)
HCT VFR BLD CALC: 47.6 % — SIGNIFICANT CHANGE UP (ref 39–50)
HGB BLD-MCNC: 15.7 G/DL — SIGNIFICANT CHANGE UP (ref 13–17)
IMM GRANULOCYTES NFR BLD AUTO: 0.3 % — SIGNIFICANT CHANGE UP (ref 0–1.5)
LYMPHOCYTES # BLD AUTO: 3.56 K/UL — HIGH (ref 1–3.3)
LYMPHOCYTES # BLD AUTO: 49.2 % — HIGH (ref 13–44)
MCHC RBC-ENTMCNC: 29.6 PG — SIGNIFICANT CHANGE UP (ref 27–34)
MCHC RBC-ENTMCNC: 33 G/DL — SIGNIFICANT CHANGE UP (ref 32–36)
MCV RBC AUTO: 89.6 FL — SIGNIFICANT CHANGE UP (ref 80–100)
MONOCYTES # BLD AUTO: 0.53 K/UL — SIGNIFICANT CHANGE UP (ref 0–0.9)
MONOCYTES NFR BLD AUTO: 7.3 % — SIGNIFICANT CHANGE UP (ref 2–14)
NEUTROPHILS # BLD AUTO: 3.04 K/UL — SIGNIFICANT CHANGE UP (ref 1.8–7.4)
NEUTROPHILS NFR BLD AUTO: 42 % — LOW (ref 43–77)
NRBC # BLD: 0 /100 WBCS — SIGNIFICANT CHANGE UP (ref 0–0)
PLATELET # BLD AUTO: 212 K/UL — SIGNIFICANT CHANGE UP (ref 150–400)
RBC # BLD: 5.31 M/UL — SIGNIFICANT CHANGE UP (ref 4.2–5.8)
RBC # FLD: 13.8 % — SIGNIFICANT CHANGE UP (ref 10.3–14.5)
WBC # BLD: 7.24 K/UL — SIGNIFICANT CHANGE UP (ref 3.8–10.5)
WBC # FLD AUTO: 7.24 K/UL — SIGNIFICANT CHANGE UP (ref 3.8–10.5)

## 2020-11-17 PROCEDURE — 99214 OFFICE O/P EST MOD 30 MIN: CPT

## 2020-11-17 RX ORDER — ATOVAQUONE 750 MG/5ML
750 SUSPENSION ORAL TWICE DAILY
Qty: 210 | Refills: 4 | Status: DISCONTINUED | COMMUNITY
Start: 2018-05-07 | End: 2020-11-17

## 2020-11-26 PROBLEM — Z87.891 FORMER SMOKER: Status: ACTIVE | Noted: 2018-01-31

## 2020-11-26 LAB
ALBUMIN SERPL ELPH-MCNC: 4.9 G/DL
ALP BLD-CCNC: 89 U/L
ALT SERPL-CCNC: 27 U/L
ANION GAP SERPL CALC-SCNC: 12 MMOL/L
AST SERPL-CCNC: 29 U/L
B2 MICROGLOB SERPL-MCNC: 1.5 MG/L
BILIRUB SERPL-MCNC: 0.4 MG/DL
BUN SERPL-MCNC: 9 MG/DL
CALCIUM SERPL-MCNC: 9.8 MG/DL
CHLORIDE SERPL-SCNC: 102 MMOL/L
CO2 SERPL-SCNC: 27 MMOL/L
CREAT SERPL-MCNC: 1.29 MG/DL
GLUCOSE SERPL-MCNC: 92 MG/DL
LDH SERPL-CCNC: 202 U/L
POTASSIUM SERPL-SCNC: 3.9 MMOL/L
PROT SERPL-MCNC: 7.3 G/DL
SODIUM SERPL-SCNC: 141 MMOL/L

## 2020-11-26 NOTE — ASSESSMENT
[FreeTextEntry1] : High grade CD10+ B cell lymphoma, in HIV+ patient with (minimal) viral load on HAART. \par Completed chemotherapy in July 2018. Almost 2 years out.  Persistent but not rapidly growing left axillary adenopathy; axilla adenopathy not palpable will follow but not repeat US at this point \par Continue HAART and f/u with his ID doctor.  ID f/u scheduled.\par Off Gabapentin;PN no longer present.\par Mediport has been removed.\par Will set up blood draw CMP, LDH, Beta-2 MG.\par s/p Covid-19 infection \par \par RV 3 mos\par \par  \par  \par Obesity - encouraged diet/exercise and weight loss. To establish care with a PCP, we will provide him with information.\par \par Rv 3 months. He has agreed to see a primary care doctor well before then. [Curative] : Goals of care discussed with patient: Curative [Palliative Care Plan] : not applicable at this time

## 2020-11-26 NOTE — HISTORY OF PRESENT ILLNESS
[Disease:__________________________] : Disease: [unfilled] [de-identified] : Mr. Cherry presented in early 2018 - he was treated for a high grade, myc rearranged DLBCL, HIV-positive .  [facility]  He had not been fully compliant with his antiretroviral therapy until December of 2017 when he restarted his medication.  His CD4 count was actually normal.\par \par CT of chest showed left axillary lymphadenopathy with a node measuring up to 5 cm.  Minimal bibasilar and right middle lobe atelectasis was seen with a tiny left pleural effusion.  Mild cardiomegaly was noted.  CT of abdomen and pelvis showed no hepatosplenomegaly or mass and no significant adenopathy.  Gastric wall thickening was seen \par of unclear etiology.\par Left axillary lymph node biopsy showed a high-grade CD10 positive B cell lymphoma expressing CD20 and BCL 6.  65% of nuclei showed a myc rearrangement.  BCL-2 and BCL 6 were not rearranged.  Ki-67 was about 90%.. LDH was 618, but I think it was tested using a higher range.  CBC, creat were normal.  Total protein and globulins were increased.    [de-identified] : I pending work-up; [de-identified] : to be reviewed here [de-identified] : myc rearranged, bcl-2, bcl-6 germline [Treatment Protocol] : Treatment Protocol [Therapy: ___] : Therapy: [unfilled] [Cycle: ___] : Cycle: [unfilled] [Day: ___] : Day: [unfilled] [FreeTextEntry1] : Rituxan with DA EPOCH /Neulasta x6 6/12/18 l/p x 6 [de-identified] :  S/p completion of treatment in 6/2018, with 6 cycles of DA R-EPOCH and IT MTX prophylaxis. No constitutional symptoms. Appetite is good., denies fever , chill or body ach\par \par PET/CT on 7/20/2018 showed FDG (-) bilateral inguinal nodes and slight increase in prominence of a subcm left axillary node, initially thought to possibly represent viable  NHL. \par This was f/u with a CT in 11/2018 which showed no change. A repeat CT in 8/28/2019 showed the left axillary node to be unchanged and there was a stable to slight increase in inguinal which was previously not FDG (+).  Most recent left axillary U/S - node sl larger, described as abnormal, but there has been no change convincing enough to suggest recurrent high grade NHL.  Pt unaware of dz in axilla.\par \par Persistently high diastolic BP - recently started antihypertensive therapy\par COVID - Reports a prolonged illness with COVID in March and April but feeling better at this point. Of note, swab in May was still swab positive\par working as a  for [employer]\par \par \par \par Recovering from Covid-19.  No fever or resp sxs at present.  Nasal swab was first (+) on 3/30/20.\par \par No constitutional or neurologic c/o\par \par HIV - T-cell subsets in 10/2019 showed that the CD4 count was 759. Viral load at that time was low level positive. He is on triumeq. Per last ID note, the Mepron was stopped but pt/wife say pt still taking it. . He is to follow up with his ID physician in May 2020. \par \par Vitamin D deficiency - Vit D was low at 15.6. Not taking his supplement. Instructed him to call prescribing physician.\par \par Headaches resolved.\par \par CBC is normal today.\par \par HTN - does not see a primary care doctor. His sister who is with him states he has not followed with anyone since 2018. patient is asymptomatic, denies headache, vision changes, SOB, chest pain.\par

## 2020-12-24 ENCOUNTER — RX RENEWAL (OUTPATIENT)
Age: 45
End: 2020-12-24

## 2021-01-18 NOTE — PATIENT PROFILE ADULT - NSPROPOAURINARYCATHETER_GEN_A_NUR
You can access the FollowMyHealth Patient Portal offered by [facility] by registering at the following website: [email or web address]. By joining [facility]’s FollowMyHealth portal, you will also be able to view your health information using other applications (apps) compatible with our system. no

## 2021-01-26 ENCOUNTER — OUTPATIENT (OUTPATIENT)
Dept: OUTPATIENT SERVICES | Facility: HOSPITAL | Age: 46
LOS: 1 days | Discharge: HOME | End: 2021-01-26

## 2021-01-26 DIAGNOSIS — C85.88 OTHER SPECIFIED TYPES OF NON-HODGKIN LYMPHOMA, LYMPH NODES OF MULTIPLE SITES: ICD-10-CM

## 2021-02-05 ENCOUNTER — APPOINTMENT (OUTPATIENT)
Dept: HEMATOLOGY ONCOLOGY | Facility: CLINIC | Age: 46
End: 2021-02-05

## 2021-02-22 ENCOUNTER — APPOINTMENT (OUTPATIENT)
Dept: HEMATOLOGY ONCOLOGY | Facility: CLINIC | Age: 46
End: 2021-02-22

## 2021-02-23 ENCOUNTER — RX RENEWAL (OUTPATIENT)
Age: 46
End: 2021-02-23

## 2021-02-25 ENCOUNTER — OUTPATIENT (OUTPATIENT)
Dept: OUTPATIENT SERVICES | Facility: HOSPITAL | Age: 46
LOS: 1 days | Discharge: HOME | End: 2021-02-25

## 2021-02-25 DIAGNOSIS — C85.88 OTHER SPECIFIED TYPES OF NON-HODGKIN LYMPHOMA, LYMPH NODES OF MULTIPLE SITES: ICD-10-CM

## 2021-03-01 ENCOUNTER — RESULT REVIEW (OUTPATIENT)
Age: 46
End: 2021-03-01

## 2021-03-01 ENCOUNTER — APPOINTMENT (OUTPATIENT)
Dept: HEMATOLOGY ONCOLOGY | Facility: CLINIC | Age: 46
End: 2021-03-01
Payer: MEDICAID

## 2021-03-01 VITALS
BODY MASS INDEX: 30.16 KG/M2 | DIASTOLIC BLOOD PRESSURE: 112 MMHG | HEART RATE: 107 BPM | HEIGHT: 69.76 IN | WEIGHT: 208.33 LBS | TEMPERATURE: 97.9 F | OXYGEN SATURATION: 99 % | RESPIRATION RATE: 17 BRPM | SYSTOLIC BLOOD PRESSURE: 172 MMHG

## 2021-03-01 LAB
BASOPHILS # BLD AUTO: 0.03 K/UL — SIGNIFICANT CHANGE UP (ref 0–0.2)
BASOPHILS NFR BLD AUTO: 0.4 % — SIGNIFICANT CHANGE UP (ref 0–2)
EOSINOPHIL # BLD AUTO: 0.07 K/UL — SIGNIFICANT CHANGE UP (ref 0–0.5)
EOSINOPHIL NFR BLD AUTO: 1 % — SIGNIFICANT CHANGE UP (ref 0–6)
HCT VFR BLD CALC: 44.6 % — SIGNIFICANT CHANGE UP (ref 39–50)
HGB BLD-MCNC: 14.8 G/DL — SIGNIFICANT CHANGE UP (ref 13–17)
IMM GRANULOCYTES NFR BLD AUTO: 0.4 % — SIGNIFICANT CHANGE UP (ref 0–1.5)
LYMPHOCYTES # BLD AUTO: 2.98 K/UL — SIGNIFICANT CHANGE UP (ref 1–3.3)
LYMPHOCYTES # BLD AUTO: 43.3 % — SIGNIFICANT CHANGE UP (ref 13–44)
MCHC RBC-ENTMCNC: 29.5 PG — SIGNIFICANT CHANGE UP (ref 27–34)
MCHC RBC-ENTMCNC: 33.2 G/DL — SIGNIFICANT CHANGE UP (ref 32–36)
MCV RBC AUTO: 88.8 FL — SIGNIFICANT CHANGE UP (ref 80–100)
MONOCYTES # BLD AUTO: 0.51 K/UL — SIGNIFICANT CHANGE UP (ref 0–0.9)
MONOCYTES NFR BLD AUTO: 7.4 % — SIGNIFICANT CHANGE UP (ref 2–14)
NEUTROPHILS # BLD AUTO: 3.27 K/UL — SIGNIFICANT CHANGE UP (ref 1.8–7.4)
NEUTROPHILS NFR BLD AUTO: 47.5 % — SIGNIFICANT CHANGE UP (ref 43–77)
NRBC # BLD: 0 /100 WBCS — SIGNIFICANT CHANGE UP (ref 0–0)
PLATELET # BLD AUTO: 230 K/UL — SIGNIFICANT CHANGE UP (ref 150–400)
RBC # BLD: 5.02 M/UL — SIGNIFICANT CHANGE UP (ref 4.2–5.8)
RBC # FLD: 13.8 % — SIGNIFICANT CHANGE UP (ref 10.3–14.5)
WBC # BLD: 6.89 K/UL — SIGNIFICANT CHANGE UP (ref 3.8–10.5)
WBC # FLD AUTO: 6.89 K/UL — SIGNIFICANT CHANGE UP (ref 3.8–10.5)

## 2021-03-01 PROCEDURE — 99214 OFFICE O/P EST MOD 30 MIN: CPT

## 2021-03-01 PROCEDURE — 99072 ADDL SUPL MATRL&STAF TM PHE: CPT

## 2021-03-04 LAB
ALBUMIN SERPL ELPH-MCNC: 4.4 G/DL
ALP BLD-CCNC: 86 U/L
ALT SERPL-CCNC: 32 U/L
ANION GAP SERPL CALC-SCNC: 10 MMOL/L
AST SERPL-CCNC: 23 U/L
B2 MICROGLOB SERPL-MCNC: 1.3 MG/L
BILIRUB SERPL-MCNC: <0.2 MG/DL
BUN SERPL-MCNC: 14 MG/DL
CALCIUM SERPL-MCNC: 9.1 MG/DL
CHLORIDE SERPL-SCNC: 106 MMOL/L
CO2 SERPL-SCNC: 25 MMOL/L
CREAT SERPL-MCNC: 1.34 MG/DL
GLUCOSE SERPL-MCNC: 95 MG/DL
LDH SERPL-CCNC: 165 U/L
POTASSIUM SERPL-SCNC: 4 MMOL/L
PROT SERPL-MCNC: 7 G/DL
SODIUM SERPL-SCNC: 141 MMOL/L

## 2021-03-07 NOTE — PHYSICAL EXAM
[Fully active, able to carry on all pre-disease performance without restriction] : Status 0 - Fully active, able to carry on all pre-disease performance without restriction [Normal] : affect appropriate [de-identified] : repeat B/P 142/90

## 2021-03-07 NOTE — HISTORY OF PRESENT ILLNESS
[Disease:__________________________] : Disease: [unfilled] [Treatment Protocol] : Treatment Protocol [Therapy: ___] : Therapy: [unfilled] [Cycle: ___] : Cycle: [unfilled] [Day: ___] : Day: [unfilled] [de-identified] : Mr. Cherry presented in early 2018 - he was treated for a high grade, myc rearranged DLBCL, HIV-positive .  [facility]  He had not been fully compliant with his antiretroviral therapy until December of 2017 when he restarted his medication.  His CD4 count was actually normal.\par \par CT of chest showed left axillary lymphadenopathy with a node measuring up to 5 cm.  Minimal bibasilar and right middle lobe atelectasis was seen with a tiny left pleural effusion.  Mild cardiomegaly was noted.  CT of abdomen and pelvis showed no hepatosplenomegaly or mass and no significant adenopathy.  Gastric wall thickening was seen \par of unclear etiology.\par Left axillary lymph node biopsy showed a high-grade CD10 positive B cell lymphoma expressing CD20 and BCL 6.  65% of nuclei showed a myc rearrangement.  BCL-2 and BCL 6 were not rearranged.  Ki-67 was about 90%.. LDH was 618, but I think it was tested using a higher range.  CBC, creat were normal.  Total protein and globulins were increased.    [de-identified] : I pending work-up; [de-identified] : to be reviewed here [de-identified] : myc rearranged, bcl-2, bcl-6 germline [FreeTextEntry1] : Rituxan with DA EPOCH /Neulasta x6 6/12/18 l/p x 6 [de-identified] :  S/p completion of treatment in 6/2018, with 6 cycles of DA R-EPOCH and IT MTX prophylaxis. No constitutional symptoms. Appetite is good., denies fever , chill or body ach\par \par PET/CT on 7/20/2018 showed FDG (-) bilateral inguinal nodes and slight increase in prominence of a subcm left axillary node, initially thought to possibly represent viable  NHL. \par This was f/u with a CT in 11/2018 which showed no change. A repeat CT in 8/28/2019 showed the left axillary node to be unchanged and there was a stable to slight increase in inguinal which was previously not FDG (+).  Most recent left axillary U/S - node sl larger, described as abnormal, but there has been no change convincing enough to suggest recurrent high grade NHL.  Pt unaware of dz in axilla.\par \par Persistently high diastolic BP - recently started antihypertensive therapy self stopped medication advised and stressed consequence of self stopping\par COVID - Reports a prolonged illness with COVID in March and April but feeling better at this point. Of note, swab in May was still swab positive\par working as a  for [employer]\par \par \par \par Recovering from Covid-19.  No fever or resp sxs at present.  Nasal swab was first (+) on 3/30/20.\par \par No constitutional or neurologic c/o\par \par HIV - T-cell subsets in 10/2019 showed that the CD4 count was 759. Viral load at that time was low level positive. He is on triumeq. Per last ID note, the Mepron was stopped but pt/sister  say pt still taking it. . He is to follow up with his ID physician in May 2020. \par .\par \par \par \par

## 2021-03-07 NOTE — ASSESSMENT
[FreeTextEntry1] : High grade CD10+ B cell lymphoma, in HIV+ patient with (minimal) viral load on HAART. \par Completed chemotherapy in July 2018. Almost 2 years out.  Persistent but not rapidly growing left axillary adenopathy; axilla adenopathy not palpable will follow but not repeat US at this point \par Continue HAART and f/u with his ID doctor.  ID f/u scheduled.\par  CMP, LDH, Beta-2 MG.\par s/p Covid-19 infection \par \par RV 3 mos [Curative] : Goals of care discussed with patient: Curative

## 2021-03-21 ENCOUNTER — RX RENEWAL (OUTPATIENT)
Age: 46
End: 2021-03-21

## 2021-03-24 ENCOUNTER — RX RENEWAL (OUTPATIENT)
Age: 46
End: 2021-03-24

## 2021-04-07 ENCOUNTER — APPOINTMENT (OUTPATIENT)
Dept: INFECTIOUS DISEASE | Facility: CLINIC | Age: 46
End: 2021-04-07

## 2021-04-22 ENCOUNTER — RX RENEWAL (OUTPATIENT)
Age: 46
End: 2021-04-22

## 2021-05-24 ENCOUNTER — RX RENEWAL (OUTPATIENT)
Age: 46
End: 2021-05-24

## 2021-05-24 NOTE — PATIENT PROFILE ADULT. - NSTOBACCO TYPE_GEN_A_CORE_RD
[No studies available for review at this time.] : No studies available for review at this time.
Cigarettes

## 2021-06-10 ENCOUNTER — LABORATORY RESULT (OUTPATIENT)
Age: 46
End: 2021-06-10

## 2021-06-10 ENCOUNTER — APPOINTMENT (OUTPATIENT)
Dept: INFECTIOUS DISEASE | Facility: CLINIC | Age: 46
End: 2021-06-10
Payer: MEDICAID

## 2021-06-10 ENCOUNTER — OUTPATIENT (OUTPATIENT)
Dept: OUTPATIENT SERVICES | Facility: HOSPITAL | Age: 46
LOS: 1 days | End: 2021-06-10
Payer: MEDICAID

## 2021-06-10 VITALS
WEIGHT: 206 LBS | SYSTOLIC BLOOD PRESSURE: 155 MMHG | BODY MASS INDEX: 30.51 KG/M2 | DIASTOLIC BLOOD PRESSURE: 108 MMHG | HEART RATE: 103 BPM | HEIGHT: 69 IN | TEMPERATURE: 98.6 F | OXYGEN SATURATION: 99 %

## 2021-06-10 DIAGNOSIS — B20 HUMAN IMMUNODEFICIENCY VIRUS [HIV] DISEASE: ICD-10-CM

## 2021-06-10 DIAGNOSIS — T45.1X5A DRUG-INDUCED POLYNEUROPATHY: ICD-10-CM

## 2021-06-10 DIAGNOSIS — G62.0 DRUG-INDUCED POLYNEUROPATHY: ICD-10-CM

## 2021-06-10 PROCEDURE — 86769 SARS-COV-2 COVID-19 ANTIBODY: CPT

## 2021-06-10 PROCEDURE — 99213 OFFICE O/P EST LOW 20 MIN: CPT

## 2021-06-10 PROCEDURE — 86780 TREPONEMA PALLIDUM: CPT

## 2021-06-10 PROCEDURE — 90834 PSYTX W PT 45 MINUTES: CPT

## 2021-06-10 PROCEDURE — 85027 COMPLETE CBC AUTOMATED: CPT

## 2021-06-10 PROCEDURE — 87536 HIV-1 QUANT&REVRSE TRNSCRPJ: CPT

## 2021-06-10 PROCEDURE — 80061 LIPID PANEL: CPT

## 2021-06-10 PROCEDURE — 87517 HEPATITIS B DNA QUANT: CPT

## 2021-06-10 PROCEDURE — 87491 CHLMYD TRACH DNA AMP PROBE: CPT

## 2021-06-10 PROCEDURE — 86480 TB TEST CELL IMMUN MEASURE: CPT

## 2021-06-10 PROCEDURE — 86360 T CELL ABSOLUTE COUNT/RATIO: CPT

## 2021-06-10 PROCEDURE — 83036 HEMOGLOBIN GLYCOSYLATED A1C: CPT

## 2021-06-10 PROCEDURE — G0463: CPT

## 2021-06-10 PROCEDURE — 87522 HEPATITIS C REVRS TRNSCRPJ: CPT

## 2021-06-10 PROCEDURE — 86359 T CELLS TOTAL COUNT: CPT

## 2021-06-10 PROCEDURE — 87591 N.GONORRHOEAE DNA AMP PROB: CPT

## 2021-06-10 PROCEDURE — 80053 COMPREHEN METABOLIC PANEL: CPT

## 2021-06-11 LAB
4/8 RATIO: 0.96 RATIO — SIGNIFICANT CHANGE UP (ref 0.9–3.6)
A1C WITH ESTIMATED AVERAGE GLUCOSE RESULT: 6.1 % — HIGH (ref 4–5.6)
ABS CD8: 1172 /UL — HIGH (ref 142–740)
ALBUMIN SERPL ELPH-MCNC: 4.8 G/DL — SIGNIFICANT CHANGE UP (ref 3.3–5)
ALP SERPL-CCNC: 89 U/L — SIGNIFICANT CHANGE UP (ref 40–120)
ALT FLD-CCNC: 42 U/L — SIGNIFICANT CHANGE UP (ref 10–45)
ANION GAP SERPL CALC-SCNC: 13 MMOL/L — SIGNIFICANT CHANGE UP (ref 5–17)
AST SERPL-CCNC: 24 U/L — SIGNIFICANT CHANGE UP (ref 10–40)
BILIRUB SERPL-MCNC: <0.2 MG/DL — SIGNIFICANT CHANGE UP (ref 0.2–1.2)
BUN SERPL-MCNC: 12 MG/DL — SIGNIFICANT CHANGE UP (ref 7–23)
C TRACH RRNA SPEC QL NAA+PROBE: SIGNIFICANT CHANGE UP
C TRACH RRNA SPEC QL NAA+PROBE: SIGNIFICANT CHANGE UP
C TRACH+GC RRNA SPEC QL PROBE: SIGNIFICANT CHANGE UP
CALCIUM SERPL-MCNC: 9.7 MG/DL — SIGNIFICANT CHANGE UP (ref 8.4–10.5)
CD3 BLASTS SPEC-ACNC: 2366 /UL — HIGH (ref 672–1870)
CD3 BLASTS SPEC-ACNC: 73 % — SIGNIFICANT CHANGE UP (ref 59–83)
CD4 %: 35 % — SIGNIFICANT CHANGE UP (ref 30–62)
CD8 %: 36 % — SIGNIFICANT CHANGE UP (ref 12–36)
CHLAMYDIA/N. GONORRHEA, ORAL/THROAT, TMA - SOURCE ORAL: SIGNIFICANT CHANGE UP
CHLORIDE SERPL-SCNC: 101 MMOL/L — SIGNIFICANT CHANGE UP (ref 96–108)
CHOLEST SERPL-MCNC: 169 MG/DL — SIGNIFICANT CHANGE UP
CO2 SERPL-SCNC: 23 MMOL/L — SIGNIFICANT CHANGE UP (ref 22–31)
COVID-19 NUCLEOCAPSID GAM AB INTERP: POSITIVE
COVID-19 NUCLEOCAPSID TOTAL GAM ANTIBODY RESULT: 171 INDEX — HIGH
COVID-19 SPIKE DOMAIN AB INTERP: POSITIVE
COVID-19 SPIKE DOMAIN ANTIBODY RESULT: >250 U/ML — HIGH
CREAT SERPL-MCNC: 1.5 MG/DL — HIGH (ref 0.5–1.3)
ESTIMATED AVERAGE GLUCOSE: 128 MG/DL — HIGH (ref 68–114)
GLUCOSE SERPL-MCNC: 105 MG/DL — HIGH (ref 70–99)
HBV DNA # SERPL NAA+PROBE: SIGNIFICANT CHANGE UP IU/ML
HBV DNA SERPL NAA+PROBE-LOG#: SIGNIFICANT CHANGE UP LOG10IU/ML
HCT VFR BLD CALC: 46.2 % — SIGNIFICANT CHANGE UP (ref 39–50)
HCV RNA SERPL NAA DL=5-ACNC: SIGNIFICANT CHANGE UP IU/ML
HCV RNA SPEC NAA+PROBE-LOG IU: SIGNIFICANT CHANGE UP LOG10IU/ML
HDLC SERPL-MCNC: 42 MG/DL — SIGNIFICANT CHANGE UP
HGB BLD-MCNC: 14.9 G/DL — SIGNIFICANT CHANGE UP (ref 13–17)
HIV-1 VIRAL LOAD RESULT: ABNORMAL
HIV1 RNA # SERPL NAA+PROBE: 47 — SIGNIFICANT CHANGE UP
HIV1 RNA SER-IMP: SIGNIFICANT CHANGE UP
HIV1 RNA SERPL NAA+PROBE-ACNC: ABNORMAL
HIV1 RNA SERPL NAA+PROBE-LOG#: 1.67 — SIGNIFICANT CHANGE UP
LIPID PNL WITH DIRECT LDL SERPL: 104 MG/DL — HIGH
MCHC RBC-ENTMCNC: 29.4 PG — SIGNIFICANT CHANGE UP (ref 27–34)
MCHC RBC-ENTMCNC: 32.3 GM/DL — SIGNIFICANT CHANGE UP (ref 32–36)
MCV RBC AUTO: 91.1 FL — SIGNIFICANT CHANGE UP (ref 80–100)
N GONORRHOEA RRNA SPEC QL NAA+PROBE: SIGNIFICANT CHANGE UP
N GONORRHOEA RRNA SPEC QL NAA+PROBE: SIGNIFICANT CHANGE UP
NON HDL CHOLESTEROL: 126 MG/DL — SIGNIFICANT CHANGE UP
PLATELET # BLD AUTO: 235 K/UL — SIGNIFICANT CHANGE UP (ref 150–400)
POTASSIUM SERPL-MCNC: 4.2 MMOL/L — SIGNIFICANT CHANGE UP (ref 3.5–5.3)
POTASSIUM SERPL-SCNC: 4.2 MMOL/L — SIGNIFICANT CHANGE UP (ref 3.5–5.3)
PROT SERPL-MCNC: 7.2 G/DL — SIGNIFICANT CHANGE UP (ref 6–8.3)
RBC # BLD: 5.07 M/UL — SIGNIFICANT CHANGE UP (ref 4.2–5.8)
RBC # FLD: 14.4 % — SIGNIFICANT CHANGE UP (ref 10.3–14.5)
SARS-COV-2 IGG+IGM SERPL QL IA: 171 INDEX — HIGH
SARS-COV-2 IGG+IGM SERPL QL IA: >250 U/ML — HIGH
SARS-COV-2 IGG+IGM SERPL QL IA: POSITIVE
SARS-COV-2 IGG+IGM SERPL QL IA: POSITIVE
SODIUM SERPL-SCNC: 137 MMOL/L — SIGNIFICANT CHANGE UP (ref 135–145)
SPECIMEN SOURCE: SIGNIFICANT CHANGE UP
T PALLIDUM AB TITR SER: NEGATIVE — SIGNIFICANT CHANGE UP
T-CELL CD4 SUBSET PNL BLD: 1123 /UL — SIGNIFICANT CHANGE UP (ref 489–1457)
TRIGL SERPL-MCNC: 110 MG/DL — SIGNIFICANT CHANGE UP
WBC # BLD: 8.06 K/UL — SIGNIFICANT CHANGE UP (ref 3.8–10.5)
WBC # FLD AUTO: 8.06 K/UL — SIGNIFICANT CHANGE UP (ref 3.8–10.5)

## 2021-06-12 LAB
GAMMA INTERFERON BACKGROUND BLD IA-ACNC: 0.04 IU/ML — SIGNIFICANT CHANGE UP
M TB IFN-G BLD-IMP: NEGATIVE — SIGNIFICANT CHANGE UP
M TB IFN-G CD4+ BCKGRND COR BLD-ACNC: 0.03 IU/ML — SIGNIFICANT CHANGE UP
M TB IFN-G CD4+CD8+ BCKGRND COR BLD-ACNC: 0.03 IU/ML — SIGNIFICANT CHANGE UP
QUANT TB PLUS MITOGEN MINUS NIL: 8.64 IU/ML — SIGNIFICANT CHANGE UP

## 2021-06-12 NOTE — ASSESSMENT
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 He completed chemotherapy in 2018- no plans to repeat chemotherapy at this point  Continue ARV meds to try to help maintain remission. continue to follow up with Heme/Onc\par \par RTC4-5mo\par \par  [Treatment Education] : treatment education [Treatment Adherence] : treatment adherence [Rx Dose / Side Effects] : Rx dose/side effects [Risk Reduction] : risk reduction [Nutritional / Food Issues] : nutritional/food issues [Medical Care Issues] : medical care issues [Universal Precautions] : universal precautions [Drug Interactions / Side Effects] : drug interactions/side effects [Disclosure of Diagnosis] : disclosure of diagnosis [HIV Education] : HIV Education [Sexuality / Safer Sex] : sexuality/safer sex [Anticipatory Guidance] : anticipatory guidance

## 2021-06-12 NOTE — HISTORY OF PRESENT ILLNESS
[FreeTextEntry1] : 45 yo man with a history of HIV/AIDs  He was sick in March with COVID and reports prolonged illness but currently feeling well.\par \par Reports not working and living in a room in someone's house\par \par Reports a prolonged illness with COVID in March and April but feeling better at this point. Of note, swab in May was still swab positive\par \par Oncology visit is stating that he is in remission from the B cell lymphoma [Sexually Active] : The patient is not sexually active [de-identified] : Cortes [de-identified] : not working/disability [de-identified] : alone [de-identified] : sister accompanying him

## 2021-07-27 NOTE — PROGRESS NOTE ADULT - ATTENDING COMMENTS
3 41 yo M with HIV on HAART (but hx noncompliance), newly dx'ed high grade B-cell lymphoma myc+ stage I admitted for C1 dose-adjusted R-EPOCH day 5  Rituxan given on 2/22. well tolerated  -IT MTx LP on 2/23 -CSF (-)  Repeat LP/IT MTX 2/28, await resuklts. Pt aware CSF is (+); otherwise w/o c/o.  work with pt, home care, drug plan (if any) to ensure delivery of HAART to pt ASAP  Home as soon as HAART available  home on Bactrim/Acyclovir prophylaxis.  Cont xarxio 40 yo M with HIV on HAART (but hx noncompliance), newly dx'ed high grade B-cell lymphoma myc+ stage I admitted for C1 dose-adjusted R-EPOCH day 9  Rituxan given on 2/22. well tolerated  -IT MTx LP on 2/23 -CSF (+); Pt aware CSF is (+); otherwise w/o c/o. Continue LP with IT- MTX twice weekly  Transaminitis- likely drug induced; monitor LFT's  work with pt, home care, drug plan (if any) to ensure delivery of HAART to pt ASAP  Home as soon as HAART available  home on Bactrim/Acyclovir prophylaxis.  Cont Zarxio

## 2021-09-01 ENCOUNTER — APPOINTMENT (OUTPATIENT)
Dept: INFECTIOUS DISEASE | Facility: CLINIC | Age: 46
End: 2021-09-01

## 2021-09-02 ENCOUNTER — NON-APPOINTMENT (OUTPATIENT)
Age: 46
End: 2021-09-02

## 2021-09-19 ENCOUNTER — RX RENEWAL (OUTPATIENT)
Age: 46
End: 2021-09-19

## 2021-09-28 NOTE — ED ADULT TRIAGE NOTE - NSWEIGHTCALCTOOLDRUG_GEN_A_CORE
0344 Bed Search Update ([facility]):    [facility]-No beds available  [facility]-No beds available  [facility] beds [facility]  [facility] beds [facility]  [facility]-No beds available  [facility]-No beds available  [facility]-No beds available    Pt remains on wait list pending bed availability.  
 used

## 2021-09-29 ENCOUNTER — NON-APPOINTMENT (OUTPATIENT)
Age: 46
End: 2021-09-29

## 2021-10-04 ENCOUNTER — FORM ENCOUNTER (OUTPATIENT)
Age: 46
End: 2021-10-04

## 2021-10-05 ENCOUNTER — APPOINTMENT (OUTPATIENT)
Dept: INFECTIOUS DISEASE | Facility: CLINIC | Age: 46
End: 2021-10-05
Payer: MEDICAID

## 2021-10-05 ENCOUNTER — OUTPATIENT (OUTPATIENT)
Dept: OUTPATIENT SERVICES | Facility: HOSPITAL | Age: 46
LOS: 1 days | End: 2021-10-05
Payer: MEDICAID

## 2021-10-05 ENCOUNTER — NON-APPOINTMENT (OUTPATIENT)
Age: 46
End: 2021-10-05

## 2021-10-05 ENCOUNTER — MED ADMIN CHARGE (OUTPATIENT)
Age: 46
End: 2021-10-05

## 2021-10-05 VITALS
OXYGEN SATURATION: 99 % | HEART RATE: 96 BPM | WEIGHT: 216 LBS | HEIGHT: 69 IN | DIASTOLIC BLOOD PRESSURE: 106 MMHG | SYSTOLIC BLOOD PRESSURE: 168 MMHG | BODY MASS INDEX: 31.99 KG/M2 | TEMPERATURE: 96.6 F

## 2021-10-05 VITALS — SYSTOLIC BLOOD PRESSURE: 175 MMHG | DIASTOLIC BLOOD PRESSURE: 102 MMHG

## 2021-10-05 DIAGNOSIS — B20 HUMAN IMMUNODEFICIENCY VIRUS [HIV] DISEASE: ICD-10-CM

## 2021-10-05 PROCEDURE — 99213 OFFICE O/P EST LOW 20 MIN: CPT

## 2021-10-05 PROCEDURE — G0463: CPT

## 2021-10-05 PROCEDURE — 90688 IIV4 VACCINE SPLT 0.5 ML IM: CPT

## 2021-10-05 PROCEDURE — G0008: CPT

## 2021-10-05 PROCEDURE — 90834 PSYTX W PT 45 MINUTES: CPT

## 2021-10-05 RX ORDER — ABACAVIR SULFATE, DOLUTEGRAVIR SODIUM, LAMIVUDINE 600; 50; 300 MG/1; MG/1; MG/1
600-50-300 TABLET, FILM COATED ORAL
Qty: 30 | Refills: 0 | Status: DISCONTINUED | COMMUNITY
Start: 2018-05-07 | End: 2021-10-05

## 2021-10-06 LAB
25(OH)D3 SERPL-MCNC: 26 NG/ML
ALBUMIN SERPL ELPH-MCNC: 4.8 G/DL
ALP BLD-CCNC: 69 U/L
ALT SERPL-CCNC: 43 U/L
ANION GAP SERPL CALC-SCNC: 13 MMOL/L
AST SERPL-CCNC: 27 U/L
BASOPHILS # BLD AUTO: 0.03 K/UL
BASOPHILS NFR BLD AUTO: 0.4 %
BILIRUB SERPL-MCNC: 0.3 MG/DL
BUN SERPL-MCNC: 8 MG/DL
CALCIUM SERPL-MCNC: 9.4 MG/DL
CD3 CELLS # BLD: 1964 /UL
CD3 CELLS NFR BLD: 70 %
CD3+CD4+ CELLS # BLD: 934 /UL
CD3+CD4+ CELLS NFR BLD: 33 %
CD3+CD4+ CELLS/CD3+CD8+ CLL SPEC: 0.93 RATIO
CD3+CD8+ CELLS # SPEC: 1003 /UL
CD3+CD8+ CELLS NFR BLD: 36 %
CHLORIDE SERPL-SCNC: 101 MMOL/L
CHOLEST SERPL-MCNC: 180 MG/DL
CO2 SERPL-SCNC: 25 MMOL/L
CREAT SERPL-MCNC: 1.25 MG/DL
EOSINOPHIL # BLD AUTO: 0.09 K/UL
EOSINOPHIL NFR BLD AUTO: 1.3 %
ESTIMATED AVERAGE GLUCOSE: 120 MG/DL
GLUCOSE SERPL-MCNC: 88 MG/DL
HBA1C MFR BLD HPLC: 5.8 %
HCT VFR BLD CALC: 45.3 %
HDLC SERPL-MCNC: 45 MG/DL
HGB BLD-MCNC: 15.1 G/DL
IMM GRANULOCYTES NFR BLD AUTO: 0.3 %
LDLC SERPL CALC-MCNC: 105 MG/DL
LYMPHOCYTES # BLD AUTO: 3.2 K/UL
LYMPHOCYTES NFR BLD AUTO: 46.8 %
MAN DIFF?: NORMAL
MCHC RBC-ENTMCNC: 30.2 PG
MCHC RBC-ENTMCNC: 33.3 GM/DL
MCV RBC AUTO: 90.6 FL
MONOCYTES # BLD AUTO: 0.44 K/UL
MONOCYTES NFR BLD AUTO: 6.4 %
NEUTROPHILS # BLD AUTO: 3.06 K/UL
NEUTROPHILS NFR BLD AUTO: 44.8 %
NONHDLC SERPL-MCNC: 135 MG/DL
PLATELET # BLD AUTO: 216 K/UL
POTASSIUM SERPL-SCNC: 4.4 MMOL/L
PROT SERPL-MCNC: 7.5 G/DL
RBC # BLD: 5 M/UL
RBC # FLD: 14 %
SODIUM SERPL-SCNC: 139 MMOL/L
T PALLIDUM AB SER QL IA: NEGATIVE
TRIGL SERPL-MCNC: 149 MG/DL
WBC # FLD AUTO: 6.84 K/UL

## 2021-10-08 LAB
C TRACH RRNA SPEC QL NAA+PROBE: NOT DETECTED
C TRACH RRNA SPEC QL NAA+PROBE: NOT DETECTED
HBV DNA # SERPL NAA+PROBE: NOT DETECTED
HCV RNA SERPL NAA DL=5-ACNC: NOT DETECTED IU/ML
HCV RNA SERPL NAA+PROBE-LOG IU: NOT DETECTED LOG10IU/ML
HEPB DNA PCR LOG: NOT DETECTED LOG10IU/ML
HIV1 RNA # SERPL NAA+PROBE: NORMAL
HIV1 RNA # SERPL NAA+PROBE: NORMAL COPIES/ML
N GONORRHOEA RRNA SPEC QL NAA+PROBE: NOT DETECTED
N GONORRHOEA RRNA SPEC QL NAA+PROBE: NOT DETECTED
SOURCE AMPLIFICATION: NORMAL
SOURCE ORAL: NORMAL
VIRAL LOAD INTERP: NORMAL
VIRAL LOAD LOG: NORMAL LG COP/ML

## 2021-10-10 NOTE — HISTORY OF PRESENT ILLNESS
[FreeTextEntry1] : 45yo man with a history of HIV/AIDs  He was sick in March with COVID and reports prolonged illness but currently feeling well. His BP remains elevated and he admits to not taking his anti-HTN meds for the past few days.\par \par Reports not working and living in a room in someone's house\par \par Reports a prolonged illness with COVID in March and April but feeling better at this point. Of note, swab in May was still swab positive\par \par Oncology visit is stating that he is in remission from the B cell lymphoma\par \par HTN- significant on multiple out patient visits: we tried to refer to Internal medicine on multiple occasions and he still has not  decided to accept. Discussed risks of continued HTN\par \par Refusing to receive COVID vaccine- counseled about benefits despite having the illness\par \par Will agree to receive the season flu vaccine\par \par Will consider changing ARV meds to Biktarvy [Sexually Active] : The patient is not sexually active [de-identified] : [place]es [de-identified] : not working/disability [de-identified] : alone [de-identified] : sister accompanying him

## 2021-10-10 NOTE — ASSESSMENT
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 \par \par CD10+ high grade Bcell lymphoma, no palpable adenopathy on exam but axillary adenopathy seen on last CT scan and Oncology is following. He completed chemotherapy in 2018- no plans to repeat chemotherapy at this point  Continue ARV meds to try to help maintain remission. continue to follow up with Heme/Onc\par \par Flu vaccine\par Change ARV to Biktarvy- can return for labs in 4-6 weeks\par \par RTC4-5mo\par \par

## 2021-11-04 ENCOUNTER — RX RENEWAL (OUTPATIENT)
Age: 46
End: 2021-11-04

## 2021-12-22 NOTE — PROGRESS NOTE ADULT - PROBLEM SELECTOR PROBLEM 3
[address] [facility]   [address]   Phone: [phone]   Fax: [phone]    Physical Therapy Daily Treatment Note      Date:  2021  Patient Name:  Maco Hull    :  1979  MRN: [id number]  Physician: Rishi Hall MD  Insurance: [employer]; VISIT LIMIT 18 (PRE AUTH REQUIRED AFTER 18 VISITS)  Medical Diagnosis/Rehab Codes:   M75.102 -R-C TER L SHOULDER  S42.272 FRACTURE UPPER LEFT HUMERUS  M25.512 (ICD-10-CM) - Pain in left shoulder  M24.812 -L SHOULDER STIFFNESS  M62.81 -MUSCLE WEAKNESS  Onset Date: 10/2/2021                      Next 's appt: 10/4/2021  Visit# / total visits: 10/18  Cancels/No Shows: 1/0    Subjective: Patient reporting to therapy and states her shoulder feels sore and tight today.   Pain:  [x] Yes  [] No Location: Bicep and pec region Pain Rating: (0-10 scale) 3/10  Pain altered Tx:  [] No  [x] Yes  Action: VASO, GENTLE ROM  Comments:    Objective:  Modalities: Game Ready: held 2021  - temp: 34 degrees   - location: L shoulder  - position:seated  - time: 10minutes   - pressure level: Low     Precautions: NONE IDENTIFIED  Exercises: INSTRUCTION IN [facility] 2021  Exercise Reps/ Time Weight/ Level Comments completed   SUPINE PEC STRETCH 2X 30\"    PT'S ARM OFF EOB HORIZONTAL ABD AS TOLERATED WITH MILD OVER PRESSURE.    UBE 3'/3' 2 Fwd/retro; 12/15 increased time X   Pulley: flex/scap 2'/2'  Minimal range today  X   Finger ladder 5x 5\" HOLDS  : #16 this session X   T TUBING: ROWS, PULL DOWN, TRI PRESS 10X2 EA GREEN  INCREASED SETS X   T BAND IR/ER 10X2 EA RED TOWEL AT WAIST,   ADDED TO HEP HEP   SHOULDER ISOMETRICS IN STANDING 10X 3\" HOLD  : CONTINUED PAIN WITH SHOULDER FLEXION     SHOULDER AROM 10x standing Flexion, scaption, ABD, extension with pain at end range with limited ROM noted this session X   UT stretch 5X10\"      Levator stretch 2x30\"      Shoulder flexion AND DIAGONALS with physio ball ON HILO TABLE 10x 3\" EA  BLUE BALL- INCREASED SIZE OF BALL TO PROMOTE INCREASE ROM    PRONE: [facility] EXT AND ROWS 10X2 EA  12/1: PATIENT REPORTING SOFT END FEEL WITH SHOULDER EXTENSION    S/L ER 10X  TOWEL AT WAIST    PROM/AAROM IN SUPINE 5'  12/1 demos improved tolerance to ROM and improved ROM this session X   AAROM with Ronald       Flexion* 10x 3\" 1# Added 12/17 X   ABD* 10x 3\" 1# Added 12/17 X   Extension* 10x 3\" 1# Added 12/17 X   SL ER* 10x2 1# Added 12/17 X          SHOULDER PROTRACTION: punches, CW/CCW 10X2 1# 12/22 added weight X   Seated hula hoop #5 1x each direction  12/15: performed at standard table this session with most discomfort felt with returning rings to starting position. Other:   PATIENT EDUCATION NEED FOR ROM FREQUENTLY TO MAX ROM TO AVOID FROZEN SHOULDER. Manual Therapy  Time/reps: Total time:15' Completed  Comments    distraction  X    Grade 3 oscillation to [address] joint  X Inferior and posterior   Trigger point  X pec and sub scap   Scapular mobilization   X IN S/L, Med/lat, superior/inferior, ROTATION   STM to long head of bicep   12/17 Patient reporting decreased tightness this date.                Specific Instructions for next treatment: CONTINUE PULLEYS, ADD SHOULDER ISOMETRIC STRENGTHENING ALL PLANES, T BAND ROW AND PULL DOWNS, BALL ON TABLE FOR ROM, MANUAL STRETCHES AND MOBILIZATIONS OF L SHOULDER, MODALITIES FOR PAIN SUCH AS CP, VASO, ESTIM      HOME Exercise PROGRAM Reps/ Time Weight/ Level Comments   PENDULUMS: CIRCLES, FWD-BACK AND LATERAL 10 EA   BID HEP   TABLE SLIDES: FLEXION, SCAPTION AND ABDUCTION 5\"X20 EA   BID HEP   AAROM SHOULDER ER WITH WAND IN SITTING 5\"X10   BID HEP   SCAPULAR ADDUCTION 5\"X10   BID HEP   T BAND IR/ER 10 EA YELLOW BID HEP ADDED 11/19           AAROM with Ronald      Flexion* 10x 3\" 1#    ABD* 10x 3\" 1#    Extension* 10x 3\" 1#    SL ER* 10x2 1#            Assessment: [x] Progressing toward goals.  12/22: CONTINUED WITH STRENGTHENING AND ROM THIS SESSION TO IMPROVE OVERALL MOBILITY. PATIENT REPORTING FEELING TIGHT THROUGHOUT SESSION. PATIENT WAS PROVIDED WITH AAROM HEP THIS SESSION TO CONTINUE IMPROVING ROM WHEN AT HOME. PATIENT WAS ABLE TO REACH #16 ON FINGER LADDER THIS SESSION WITHOUT DIFFICULTY OR INCREASE IN PAIN. PATIENT IS TOLERATING WEIGHTED RESISTANCE WELL. PATIENT DECLINED VASO THIS SESSION AS SHE HAD HER SON WITH HER THIS SESSION. [] No change. [] Other:    [x] Patient would continue to benefit from skilled physical therapy services in order to: INCREASE L UE STRENGTH AND ROM, DECREASED PAIN, IMPROVE POSTURE AND INCREASE FUNCTION. STG: (to be met in 12 treatments)  1. ? Pain: NO GREATER THAN 4/10 AT NIGHT FOR IMPROVED SLEEP  2. ? ROM: PROM SHOULDER FLEX =/> 140*  3. ? Strength: 3/5 L SHOULDER IN AVAILABLE ROM  4. ? Function: IMPROVE DASH RAW SCORE (42) BY 14  5. Independent with Home Exercise Programs  LTG: (to be met in TBD treatments)  1.    Patient goals: LESS PAIN , BE ABLE TO MOVE ARM    Pt. Education:  [x] Yes  [] No  [] Reviewed Prior HEP/Ed  Method of Education: [x] Verbal  [] Demo  [] Written  Comprehension of Education:  [x] Verbalizes understanding. [] Demonstrates understanding. [] Needs review. [] Demonstrates/verbalizes HEP/Ed previously given. Plan: [x] Continue per plan of care.    [] Other:      Treatment Charges: Mins Units   []  Modalities     [x]  Ther Exercise 45 3   [x]  Manual Therapy 15 1   []  Ther Activities     []  Aquatics     []  Neuromuscular     [] Vasocompression     [] Gait Training     [] Dry needlinG        [] 1 or 2 muscles        [] 3 or more muscles     []  Other     Total Treatment time 60 4     Time In: 0900 Time Out: 1000    Electronically signed by:  Sheridan Branch PTA Prophylactic measure

## 2022-02-01 ENCOUNTER — NON-APPOINTMENT (OUTPATIENT)
Age: 47
End: 2022-02-01

## 2022-02-01 ENCOUNTER — FORM ENCOUNTER (OUTPATIENT)
Age: 47
End: 2022-02-01

## 2022-02-02 ENCOUNTER — APPOINTMENT (OUTPATIENT)
Dept: INFECTIOUS DISEASE | Facility: CLINIC | Age: 47
End: 2022-02-02
Payer: MEDICAID

## 2022-02-02 ENCOUNTER — OUTPATIENT (OUTPATIENT)
Dept: OUTPATIENT SERVICES | Facility: HOSPITAL | Age: 47
LOS: 1 days | End: 2022-02-02
Payer: MEDICAID

## 2022-02-02 VITALS
WEIGHT: 218 LBS | TEMPERATURE: 98.3 F | BODY MASS INDEX: 32.29 KG/M2 | SYSTOLIC BLOOD PRESSURE: 141 MMHG | DIASTOLIC BLOOD PRESSURE: 90 MMHG | HEART RATE: 111 BPM | HEIGHT: 69 IN | OXYGEN SATURATION: 99 %

## 2022-02-02 DIAGNOSIS — B20 HUMAN IMMUNODEFICIENCY VIRUS [HIV] DISEASE: ICD-10-CM

## 2022-02-02 PROCEDURE — G0463: CPT

## 2022-02-02 PROCEDURE — 90834 PSYTX W PT 45 MINUTES: CPT

## 2022-02-02 PROCEDURE — 99213 OFFICE O/P EST LOW 20 MIN: CPT

## 2022-02-03 LAB
C TRACH RRNA SPEC QL NAA+PROBE: NOT DETECTED
N GONORRHOEA RRNA SPEC QL NAA+PROBE: NOT DETECTED
SOURCE ORAL: NORMAL

## 2022-02-03 NOTE — ASSESSMENT
[Treatment Education] : treatment education [Treatment Adherence] : treatment adherence [Rx Dose / Side Effects] : Rx dose/side effects [Risk Reduction] : risk reduction [Nutritional / Food Issues] : nutritional/food issues [Universal Precautions] : universal precautions [Medical Care Issues] : medical care issues [Drug Interactions / Side Effects] : drug interactions/side effects [Disclosure of Diagnosis] : disclosure of diagnosis [HIV Education] : HIV Education [Sexuality / Safer Sex] : sexuality/safer sex [Anticipatory Guidance] : anticipatory guidance [Education Materials Provided] : Eduction materials were provided [FreeTextEntry1] : 45 yo man with a history of AIDS, 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 \par \par CD10+ high grade Bcell lymphoma, no palpable adenopathy on exam but axillary adenopathy seen on last CT scan and Oncology is following. He completed chemotherapy in 2018- no plans to repeat chemotherapy at this point  Continue ARV meds to try to help maintain remission. continue to follow up with Heme/Onc\par \par Flu vaccine\par Change ARV to Biktarvy- can return for labs in 4-6 weeks\par \par RTC4-5mo\par \par

## 2022-02-03 NOTE — HISTORY OF PRESENT ILLNESS
[FreeTextEntry1] : 47yo man with a history of HIV/AIDs  He was sick in March with COVID and reports prolonged illness but currently feeling well. His BP remains elevated and he admits to not taking his anti-HTN meds for the past few days.\par \par Reports not working and living in a room in someone's house\par \par Reports a prolonged illness with COVID in March and April but feeling better at this point. Of note, swab in May was still swab positive\par \par Oncology visit is stating that he is in remission from the B cell lymphoma\par \par HTN- significant on multiple out patient visits: we tried to refer to Internal medicine on multiple occasions and he still has not  decided to accept. Discussed risks of continued HTN\par \par Refusing to receive COVID vaccine- counseled about benefits despite having the illness\par \par Will agree to receive the season flu vaccine\par \par Will consider changing ARV meds to Biktarvy- pt agrees to switch [Sexually Active] : The patient is not sexually active [de-identified] : [place]es [de-identified] : not working/disability [de-identified] : alone [de-identified] : sister accompanying him

## 2022-02-04 ENCOUNTER — APPOINTMENT (OUTPATIENT)
Dept: INFECTIOUS DISEASE | Facility: CLINIC | Age: 47
End: 2022-02-04

## 2022-02-04 ENCOUNTER — OUTPATIENT (OUTPATIENT)
Dept: OUTPATIENT SERVICES | Facility: HOSPITAL | Age: 47
LOS: 1 days | End: 2022-02-04

## 2022-02-04 DIAGNOSIS — B20 HUMAN IMMUNODEFICIENCY VIRUS [HIV] DISEASE: ICD-10-CM

## 2022-02-05 LAB
ALBUMIN SERPL ELPH-MCNC: 4.7 G/DL
ALP BLD-CCNC: 76 U/L
ALT SERPL-CCNC: 44 U/L
ANION GAP SERPL CALC-SCNC: 14 MMOL/L
AST SERPL-CCNC: 26 U/L
BASOPHILS # BLD AUTO: 0.02 K/UL
BASOPHILS NFR BLD AUTO: 0.3 %
BILIRUB SERPL-MCNC: 0.6 MG/DL
BUN SERPL-MCNC: 15 MG/DL
CALCIUM SERPL-MCNC: 9.4 MG/DL
CD3 CELLS # BLD: 1935 /UL
CD3 CELLS NFR BLD: 69 %
CD3+CD4+ CELLS # BLD: 918 /UL
CD3+CD4+ CELLS NFR BLD: 33 %
CD3+CD4+ CELLS/CD3+CD8+ CLL SPEC: 0.93 RATIO
CD3+CD8+ CELLS # SPEC: 989 /UL
CD3+CD8+ CELLS NFR BLD: 35 %
CHLORIDE SERPL-SCNC: 102 MMOL/L
CHOLEST SERPL-MCNC: 166 MG/DL
CO2 SERPL-SCNC: 25 MMOL/L
CREAT SERPL-MCNC: 1.26 MG/DL
EOSINOPHIL # BLD AUTO: 0.05 K/UL
EOSINOPHIL NFR BLD AUTO: 0.7 %
ESTIMATED AVERAGE GLUCOSE: 126 MG/DL
GLUCOSE SERPL-MCNC: 116 MG/DL
HBA1C MFR BLD HPLC: 6 %
HCT VFR BLD CALC: 43.8 %
HDLC SERPL-MCNC: 44 MG/DL
HGB BLD-MCNC: 14.3 G/DL
IMM GRANULOCYTES NFR BLD AUTO: 0.3 %
LDLC SERPL CALC-MCNC: 107 MG/DL
LYMPHOCYTES # BLD AUTO: 3.04 K/UL
LYMPHOCYTES NFR BLD AUTO: 44.3 %
MAN DIFF?: NORMAL
MCHC RBC-ENTMCNC: 28.4 PG
MCHC RBC-ENTMCNC: 32.6 GM/DL
MCV RBC AUTO: 86.9 FL
MONOCYTES # BLD AUTO: 0.47 K/UL
MONOCYTES NFR BLD AUTO: 6.9 %
NEUTROPHILS # BLD AUTO: 3.26 K/UL
NEUTROPHILS NFR BLD AUTO: 47.5 %
NONHDLC SERPL-MCNC: 121 MG/DL
PLATELET # BLD AUTO: 211 K/UL
POTASSIUM SERPL-SCNC: 3.9 MMOL/L
PROT SERPL-MCNC: 7.2 G/DL
RBC # BLD: 5.04 M/UL
RBC # FLD: 13.2 %
SODIUM SERPL-SCNC: 141 MMOL/L
T PALLIDUM AB SER QL IA: NEGATIVE
TRIGL SERPL-MCNC: 73 MG/DL
WBC # FLD AUTO: 6.86 K/UL

## 2022-02-06 LAB
25(OH)D3 SERPL-MCNC: 19.4 NG/ML
C TRACH RRNA SPEC QL NAA+PROBE: NOT DETECTED
COVID-19 NUCLEOCAPSID  GAM ANTIBODY INTERPRETATION: POSITIVE
COVID-19 SPIKE DOMAIN ANTIBODY INTERPRETATION: POSITIVE
HCV RNA SERPL NAA+PROBE-LOG IU: NOT DETECTED LOGIU/ML
HEPC RNA INTERP: NOT DETECTED
HIV1 RNA # SERPL NAA+PROBE: NORMAL
HIV1 RNA # SERPL NAA+PROBE: NORMAL COPIES/ML
N GONORRHOEA RRNA SPEC QL NAA+PROBE: NOT DETECTED
SARS-COV-2 AB SERPL IA-ACNC: >250 U/ML
SARS-COV-2 AB SERPL QL IA: 75.2 INDEX
SOURCE AMPLIFICATION: NORMAL
VIRAL LOAD INTERP: NORMAL
VIRAL LOAD LOG: NORMAL LG COP/ML

## 2022-02-08 LAB
HBV DNA # SERPL NAA+PROBE: NOT DETECTED IU/ML
HEPB DNA PCR LOG: NOT DETECTED LOG10IU/ML

## 2022-02-09 LAB
M TB IFN-G BLD-IMP: NEGATIVE
QUANTIFERON TB PLUS MITOGEN MINUS NIL: 9.94 IU/ML
QUANTIFERON TB PLUS NIL: 0.06 IU/ML
QUANTIFERON TB PLUS TB1 MINUS NIL: 0.01 IU/ML
QUANTIFERON TB PLUS TB2 MINUS NIL: 0.03 IU/ML

## 2022-02-25 ENCOUNTER — RX RENEWAL (OUTPATIENT)
Age: 47
End: 2022-02-25

## 2022-03-29 ENCOUNTER — RX RENEWAL (OUTPATIENT)
Age: 47
End: 2022-03-29

## 2022-04-15 NOTE — PATIENT PROFILE ADULT. - PATIENT REPRESENTATIVE NAME
Quality 130: Documentation Of Current Medications In The Medical Record: Current Medications Documented Detail Level: Detailed Analy Ritchie

## 2022-05-23 ENCOUNTER — RX RENEWAL (OUTPATIENT)
Age: 47
End: 2022-05-23

## 2022-06-07 NOTE — DISCHARGE NOTE ADULT - PATIENT PORTAL LINK FT
You can access the [facility] Portal, offered by [facility], by registering with the following website: [email or web address]
Patient Reported Weight (Optional - Include Units): 170
Detail Level: Zone
Anticipated Starting Dosage (Optional): 40mg Daily

## 2022-06-20 ENCOUNTER — RX RENEWAL (OUTPATIENT)
Age: 47
End: 2022-06-20

## 2022-06-21 ENCOUNTER — NON-APPOINTMENT (OUTPATIENT)
Age: 47
End: 2022-06-21

## 2022-08-07 ENCOUNTER — RX RENEWAL (OUTPATIENT)
Age: 47
End: 2022-08-07

## 2022-08-08 ENCOUNTER — APPOINTMENT (OUTPATIENT)
Dept: INFECTIOUS DISEASE | Facility: CLINIC | Age: 47
End: 2022-08-08

## 2022-08-30 ENCOUNTER — APPOINTMENT (OUTPATIENT)
Dept: INFECTIOUS DISEASE | Facility: CLINIC | Age: 47
End: 2022-08-30

## 2022-08-30 VITALS
DIASTOLIC BLOOD PRESSURE: 91 MMHG | OXYGEN SATURATION: 98 % | SYSTOLIC BLOOD PRESSURE: 142 MMHG | WEIGHT: 228 LBS | TEMPERATURE: 98.6 F | HEIGHT: 69 IN | BODY MASS INDEX: 33.77 KG/M2 | HEART RATE: 101 BPM

## 2022-08-30 PROCEDURE — 99214 OFFICE O/P EST MOD 30 MIN: CPT

## 2022-08-31 LAB
25(OH)D3 SERPL-MCNC: 26.9 NG/ML
ALBUMIN SERPL ELPH-MCNC: 4.7 G/DL
ALP BLD-CCNC: 80 U/L
ALT SERPL-CCNC: 46 U/L
ANION GAP SERPL CALC-SCNC: 14 MMOL/L
APPEARANCE: CLEAR
AST SERPL-CCNC: 27 U/L
BACTERIA: NEGATIVE
BASOPHILS # BLD AUTO: 0.03 K/UL
BASOPHILS NFR BLD AUTO: 0.4 %
BILIRUB SERPL-MCNC: 0.3 MG/DL
BILIRUBIN URINE: NEGATIVE
BLOOD URINE: NEGATIVE
BUN SERPL-MCNC: 14 MG/DL
C TRACH RRNA SPEC QL NAA+PROBE: NOT DETECTED
CALCIUM SERPL-MCNC: 10.2 MG/DL
CD3 CELLS # BLD: 1552 /UL
CD3 CELLS NFR BLD: 67 %
CD3+CD4+ CELLS # BLD: 766 /UL
CD3+CD4+ CELLS NFR BLD: 33 %
CD3+CD4+ CELLS/CD3+CD8+ CLL SPEC: 0.99 RATIO
CD3+CD8+ CELLS # SPEC: 775 /UL
CD3+CD8+ CELLS NFR BLD: 34 %
CHLORIDE SERPL-SCNC: 102 MMOL/L
CHOLEST SERPL-MCNC: 169 MG/DL
CO2 SERPL-SCNC: 24 MMOL/L
COLOR: COLORLESS
CREAT SERPL-MCNC: 1.43 MG/DL
CREAT SPEC-SCNC: 76 MG/DL
CREAT/PROT UR: 0.1 RATIO
CYSTATIN C SERPL-MCNC: 0.8 MG/L
EGFR: 60 ML/MIN/1.73M2
EOSINOPHIL # BLD AUTO: 0.18 K/UL
EOSINOPHIL NFR BLD AUTO: 2.5 %
ESTIMATED AVERAGE GLUCOSE: 134 MG/DL
GFR/BSA.PRED SERPLBLD CYS-BASED-ARV: 110 ML/MIN/1.73M2
GLUCOSE QUALITATIVE U: NEGATIVE
GLUCOSE SERPL-MCNC: 113 MG/DL
HBA1C MFR BLD HPLC: 6.3 %
HCT VFR BLD CALC: 45.6 %
HCV AB SER QL: NONREACTIVE
HCV S/CO RATIO: 0.1 S/CO
HDLC SERPL-MCNC: 44 MG/DL
HGB BLD-MCNC: 15.4 G/DL
HIV1 RNA # SERPL NAA+PROBE: ABNORMAL
HIV1 RNA # SERPL NAA+PROBE: ABNORMAL COPIES/ML
HPIV4 RNA SPEC QL NAA+PROBE: DETECTED
HYALINE CASTS: 0 /LPF
IMM GRANULOCYTES NFR BLD AUTO: 0.3 %
KETONES URINE: NEGATIVE
LDLC SERPL CALC-MCNC: 105 MG/DL
LEUKOCYTE ESTERASE URINE: NEGATIVE
LYMPHOCYTES # BLD AUTO: 2.29 K/UL
LYMPHOCYTES NFR BLD AUTO: 32.4 %
MAN DIFF?: NORMAL
MCHC RBC-ENTMCNC: 28.8 PG
MCHC RBC-ENTMCNC: 33.8 GM/DL
MCV RBC AUTO: 85.4 FL
MICROSCOPIC-UA: NORMAL
MONOCYTES # BLD AUTO: 0.51 K/UL
MONOCYTES NFR BLD AUTO: 7.2 %
N GONORRHOEA RRNA SPEC QL NAA+PROBE: NOT DETECTED
NEUTROPHILS # BLD AUTO: 4.03 K/UL
NEUTROPHILS NFR BLD AUTO: 57.2 %
NITRITE URINE: NEGATIVE
NONHDLC SERPL-MCNC: 125 MG/DL
PH URINE: 6.5
PHOSPHATE SERPL-MCNC: 2.3 MG/DL
PLATELET # BLD AUTO: 203 K/UL
POTASSIUM SERPL-SCNC: 4 MMOL/L
PROT SERPL-MCNC: 7.2 G/DL
PROT UR-MCNC: 4 MG/DL
PROTEIN URINE: NEGATIVE
PSA SERPL-MCNC: 1.21 NG/ML
RAPID RVP RESULT: DETECTED
RBC # BLD: 5.34 M/UL
RBC # FLD: 14.5 %
RED BLOOD CELLS URINE: 1 /HPF
SARS-COV-2 RNA PNL RESP NAA+PROBE: NOT DETECTED
SODIUM SERPL-SCNC: 140 MMOL/L
SOURCE AMPLIFICATION: NORMAL
SPECIFIC GRAVITY URINE: 1.02
SQUAMOUS EPITHELIAL CELLS: 0 /HPF
T PALLIDUM AB SER QL IA: NEGATIVE
TRIGL SERPL-MCNC: 99 MG/DL
UROBILINOGEN URINE: NORMAL
VIRAL LOAD INTERP: NORMAL
VIRAL LOAD LOG: ABNORMAL LG COP/ML
WBC # FLD AUTO: 7.06 K/UL
WHITE BLOOD CELLS URINE: 0 /HPF

## 2022-09-01 LAB
COVID-19 NUCLEOCAPSID  GAM ANTIBODY INTERPRETATION: POSITIVE
COVID-19 SPIKE DOMAIN ANTIBODY INTERPRETATION: POSITIVE
SARS-COV-2 AB SERPL IA-ACNC: >250 U/ML
SARS-COV-2 AB SERPL QL IA: 62.4 INDEX

## 2022-09-02 LAB
M TB IFN-G BLD-IMP: NEGATIVE
QUANTIFERON TB PLUS MITOGEN MINUS NIL: 9.75 IU/ML
QUANTIFERON TB PLUS NIL: 0.07 IU/ML
QUANTIFERON TB PLUS TB1 MINUS NIL: 0 IU/ML
QUANTIFERON TB PLUS TB2 MINUS NIL: 0 IU/ML

## 2022-09-02 NOTE — HISTORY OF PRESENT ILLNESS
[FreeTextEntry1] : 46yo man with a history of HIV/AIDs  History of high grade B cell lymphoma reportedly in [facility].  Returns for follow up\par Denies intercurrent illness\par never COVID vaccinated and still declines, but reports past COVID infection.\par noticed a small lymph node under his left axilla\par \par Reports not working and living in a room in someone's house\par \par Cousin translating at patient's request- he declines monkey pox vaccine, no current partners and reports only female past partners\par \par \par Oncology follow up needed\par \par HTN- significant on multiple out patient visits: we tried to refer to Internal medicine on multiple occasions and he still has not  decided to accept. Discussed risks of continued HTN but trend improving over the past two visits. However patient has gained weight and diet /exercise discussed\par \par Refusing to receive COVID vaccine- counseled about importance [Sexually Active] : The patient is not sexually active [de-identified] : [place] [de-identified] : not working/disability [de-identified] : alone [de-identified] : sister accompanying him

## 2022-09-02 NOTE — ADDENDUM
[FreeTextEntry1] : Patient's RVP positive for parainfluenza \par supportive care\par patient to be notified if worsens or SOB come to ED

## 2022-09-02 NOTE — PHYSICAL EXAM
[General Appearance - Alert] : alert [General Appearance - In No Acute Distress] : in no acute distress [Sclera] : the sclera and conjunctiva were normal [PERRL With Normal Accommodation] : pupils were equal in size, round, reactive to light [Extraocular Movements] : extraocular movements were intact [Outer Ear] : the ears and nose were normal in appearance [Oropharynx] : the oropharynx was normal with no thrush [Neck Appearance] : the appearance of the neck was normal [Neck Cervical Mass (___cm)] : no neck mass was observed [Jugular Venous Distention Increased] : there was no jugular-venous distention [Thyroid Diffuse Enlargement] : the thyroid was not enlarged [Auscultation Breath Sounds / Voice Sounds] : lungs were clear to auscultation bilaterally [Heart Rate And Rhythm] : heart rate was normal and rhythm regular [Heart Sounds] : normal S1 and S2 [Heart Sounds Gallop] : no gallops [Murmurs] : no murmurs [Heart Sounds Pericardial Friction Rub] : no pericardial rub [Full Pulse] : the pedal pulses are present [Edema] : there was no peripheral edema [Bowel Sounds] : normal bowel sounds [Abdomen Soft] : soft [Abdomen Tenderness] : non-tender [Abdomen Mass (___ Cm)] : no abdominal mass palpated [Costovertebral Angle Tenderness] : no CVA tenderness [Penis Abnormality] : the penis was normal [Scrotum] : the scrotum was normal [Testes Swelling] : the testicles were not swollen [Testes Mass (___cm)] : there were no testicular masses [No Palpable Adenopathy] : no palpable adenopathy [Musculoskeletal - Swelling] : no joint swelling [Nail Clubbing] : no clubbing  or cyanosis of the fingernails [Motor Tone] : muscle strength and tone were normal [Skin Color & Pigmentation] : normal skin color and pigmentation [] : no rash [Deep Tendon Reflexes (DTR)] : deep tendon reflexes were 2+ and symmetric [Sensation] : the sensory exam was normal to light touch and pinprick [No Focal Deficits] : no focal deficits [Oriented To Time, Place, And Person] : oriented to person, place, and time [Affect] : the affect was normal [FreeTextEntry1] : small palpable left axilllary nodes

## 2022-09-02 NOTE — ASSESSMENT
[Treatment Education] : treatment education [Treatment Adherence] : treatment adherence [Rx Dose / Side Effects] : Rx dose/side effects [Risk Reduction] : risk reduction [Nutritional / Food Issues] : nutritional/food issues [Universal Precautions] : universal precautions [Medical Care Issues] : medical care issues [Drug Interactions / Side Effects] : drug interactions/side effects [Disclosure of Diagnosis] : disclosure of diagnosis [HIV Education] : HIV Education [Sexuality / Safer Sex] : sexuality/safer sex [Anticipatory Guidance] : anticipatory guidance [Education Materials Provided] : Eduction materials were provided [FreeTextEntry1] : 48 yo man with a history of AIDS, 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 He completed chemotherapy in 2018- no plans to repeat chemotherapy at this point  Continue ARV meds to try to help maintain remission. continued follow up with Heme/Onc is needed to assess left axillary small lymph node ?reactive?\par will order CT of chest/abdomen non contrast\par \par Flu vaccine when available\par follow up after seeing heme/onc\par \par RTC4-5mo\par \par

## 2022-09-06 ENCOUNTER — RX RENEWAL (OUTPATIENT)
Age: 47
End: 2022-09-06

## 2022-09-08 ENCOUNTER — OUTPATIENT (OUTPATIENT)
Dept: OUTPATIENT SERVICES | Facility: HOSPITAL | Age: 47
LOS: 1 days | Discharge: HOME | End: 2022-09-08

## 2022-09-08 DIAGNOSIS — C85.88 OTHER SPECIFIED TYPES OF NON-HODGKIN LYMPHOMA, LYMPH NODES OF MULTIPLE SITES: ICD-10-CM

## 2022-09-12 ENCOUNTER — APPOINTMENT (OUTPATIENT)
Dept: HEMATOLOGY ONCOLOGY | Facility: CLINIC | Age: 47
End: 2022-09-12

## 2022-09-28 ENCOUNTER — RX RENEWAL (OUTPATIENT)
Age: 47
End: 2022-09-28

## 2022-10-30 ENCOUNTER — RX RENEWAL (OUTPATIENT)
Age: 47
End: 2022-10-30

## 2022-11-28 ENCOUNTER — RX RENEWAL (OUTPATIENT)
Age: 47
End: 2022-11-28

## 2022-12-09 ENCOUNTER — OUTPATIENT (OUTPATIENT)
Dept: OUTPATIENT SERVICES | Facility: HOSPITAL | Age: 47
LOS: 1 days | Discharge: HOME | End: 2022-12-09

## 2022-12-09 DIAGNOSIS — C85.88 OTHER SPECIFIED TYPES OF NON-HODGKIN LYMPHOMA, LYMPH NODES OF MULTIPLE SITES: ICD-10-CM

## 2022-12-13 ENCOUNTER — APPOINTMENT (OUTPATIENT)
Dept: HEMATOLOGY ONCOLOGY | Facility: CLINIC | Age: 47
End: 2022-12-13
Payer: MEDICAID

## 2022-12-13 ENCOUNTER — RESULT REVIEW (OUTPATIENT)
Age: 47
End: 2022-12-13

## 2022-12-13 VITALS
SYSTOLIC BLOOD PRESSURE: 153 MMHG | TEMPERATURE: 98.4 F | DIASTOLIC BLOOD PRESSURE: 107 MMHG | HEART RATE: 114 BPM | RESPIRATION RATE: 18 BRPM | WEIGHT: 222.87 LBS | HEIGHT: 68.94 IN | BODY MASS INDEX: 33.01 KG/M2 | OXYGEN SATURATION: 99 %

## 2022-12-13 DIAGNOSIS — Z92.21 PERSONAL HISTORY OF ANTINEOPLASTIC CHEMOTHERAPY: ICD-10-CM

## 2022-12-13 DIAGNOSIS — R59.0 LOCALIZED ENLARGED LYMPH NODES: ICD-10-CM

## 2022-12-13 LAB
BASOPHILS # BLD AUTO: 0.03 K/UL — SIGNIFICANT CHANGE UP (ref 0–0.2)
BASOPHILS NFR BLD AUTO: 0.4 % — SIGNIFICANT CHANGE UP (ref 0–2)
EOSINOPHIL # BLD AUTO: 0.08 K/UL — SIGNIFICANT CHANGE UP (ref 0–0.5)
EOSINOPHIL NFR BLD AUTO: 1 % — SIGNIFICANT CHANGE UP (ref 0–6)
HCT VFR BLD CALC: 48.9 % — SIGNIFICANT CHANGE UP (ref 39–50)
HGB BLD-MCNC: 16.2 G/DL — SIGNIFICANT CHANGE UP (ref 13–17)
IMM GRANULOCYTES NFR BLD AUTO: 0.3 % — SIGNIFICANT CHANGE UP (ref 0–0.9)
LYMPHOCYTES # BLD AUTO: 2.84 K/UL — SIGNIFICANT CHANGE UP (ref 1–3.3)
LYMPHOCYTES # BLD AUTO: 35.9 % — SIGNIFICANT CHANGE UP (ref 13–44)
MCHC RBC-ENTMCNC: 28.7 PG — SIGNIFICANT CHANGE UP (ref 27–34)
MCHC RBC-ENTMCNC: 33.1 G/DL — SIGNIFICANT CHANGE UP (ref 32–36)
MCV RBC AUTO: 86.7 FL — SIGNIFICANT CHANGE UP (ref 80–100)
MONOCYTES # BLD AUTO: 0.57 K/UL — SIGNIFICANT CHANGE UP (ref 0–0.9)
MONOCYTES NFR BLD AUTO: 7.2 % — SIGNIFICANT CHANGE UP (ref 2–14)
NEUTROPHILS # BLD AUTO: 4.37 K/UL — SIGNIFICANT CHANGE UP (ref 1.8–7.4)
NEUTROPHILS NFR BLD AUTO: 55.2 % — SIGNIFICANT CHANGE UP (ref 43–77)
NRBC # BLD: 0 /100 WBCS — SIGNIFICANT CHANGE UP (ref 0–0)
PLATELET # BLD AUTO: 219 K/UL — SIGNIFICANT CHANGE UP (ref 150–400)
RBC # BLD: 5.64 M/UL — SIGNIFICANT CHANGE UP (ref 4.2–5.8)
RBC # FLD: 13.7 % — SIGNIFICANT CHANGE UP (ref 10.3–14.5)
WBC # BLD: 7.91 K/UL — SIGNIFICANT CHANGE UP (ref 3.8–10.5)
WBC # FLD AUTO: 7.91 K/UL — SIGNIFICANT CHANGE UP (ref 3.8–10.5)

## 2022-12-13 PROCEDURE — 99214 OFFICE O/P EST MOD 30 MIN: CPT

## 2022-12-13 RX ORDER — ERGOCALCIFEROL 1.25 MG/1
1.25 MG CAPSULE, LIQUID FILLED ORAL
Qty: 4 | Refills: 0 | Status: DISCONTINUED | COMMUNITY
Start: 2022-02-06 | End: 2022-12-13

## 2022-12-15 LAB
ALBUMIN SERPL ELPH-MCNC: 4.8 G/DL
ALP BLD-CCNC: 76 U/L
ALT SERPL-CCNC: 41 U/L
ANION GAP SERPL CALC-SCNC: 13 MMOL/L
AST SERPL-CCNC: 24 U/L
B2 MICROGLOB SERPL-MCNC: 1.3 MG/L
BILIRUB SERPL-MCNC: 0.3 MG/DL
BUN SERPL-MCNC: 10 MG/DL
CALCIUM SERPL-MCNC: 9.8 MG/DL
CHLORIDE SERPL-SCNC: 103 MMOL/L
CO2 SERPL-SCNC: 27 MMOL/L
CREAT SERPL-MCNC: 1.21 MG/DL
EGFR: 74 ML/MIN/1.73M2
GLUCOSE SERPL-MCNC: 106 MG/DL
LDH SERPL-CCNC: 172 U/L
POTASSIUM SERPL-SCNC: 4.1 MMOL/L
PROT SERPL-MCNC: 7.2 G/DL
SODIUM SERPL-SCNC: 142 MMOL/L

## 2022-12-30 ENCOUNTER — RX RENEWAL (OUTPATIENT)
Age: 47
End: 2022-12-30

## 2022-12-30 NOTE — PATIENT PROFILE ADULT - FLU SEASON?
Flavio Posey is a 22 year old male presenting to the walk-in clinic today for cc of intermittent left sided chest pain that started yesterday while at work. Today patient reports feeling left arm tingling. Patient reports taking 2 Aspiring tablets of 81 mg ETA 1 hour prior to arrival. Patient denies dizziness, reports feeling like he cannot take in a deep breath. Denies injury, denies URI, denies stress.    Swabs/Specimens collected during triage process:  Other, ECG    PPE worn during room process  Writer: N95, Face shield/eye protection, gown, gloves  Patient: mask     Triaged to: room [place]    Patient would like communication of their results via:        Cell Phone:   [phone]  Okay to leave a message containing results? Yes  
7
Yes...

## 2023-01-01 NOTE — HISTORY OF PRESENT ILLNESS
[Disease:__________________________] : Disease: [unfilled] [Treatment Protocol] : Treatment Protocol [Therapy: ___] : Therapy: [unfilled] [Cycle: ___] : Cycle: [unfilled] [Day: ___] : Day: [unfilled] [de-identified] : Mr. Cherry presented in early 2018 - he was treated for a high grade, myc rearranged DLBCL, HIV-positive .  [facility]  He had not been fully compliant with his antiretroviral therapy until December of 2017 when he restarted his medication.  His CD4 count was actually normal.\par \par CT of chest showed left axillary lymphadenopathy with a node measuring up to 5 cm.  Minimal bibasilar and right middle lobe atelectasis was seen with a tiny left pleural effusion.  Mild cardiomegaly was noted.  CT of abdomen and pelvis showed no hepatosplenomegaly or mass and no significant adenopathy.  Gastric wall thickening was seen \par of unclear etiology.\par Left axillary lymph node biopsy showed a high-grade CD10 positive B cell lymphoma expressing CD20 and BCL 6.  65% of nuclei showed a myc rearrangement.  BCL-2 and BCL 6 were not rearranged.  Ki-67 was about 90%.. LDH was 618, but I think it was tested using a higher range.  CBC, creat were normal.  Total protein and globulins were increased.    [de-identified] : I pending work-up; [de-identified] : to be reviewed here [de-identified] : myc rearranged, bcl-2, bcl-6 germline [FreeTextEntry1] : Rituxan with DA EPOCH /Neulasta x6 6/12/18 l/p x 6 [de-identified] :  S/p completion of treatment in 6/2018, with 6 cycles of DA R-EPOCH and IT MTX prophylaxis. No constitutional symptoms. Appetite is good., denies fever , chill or body ach\par \par PET/CT on 7/20/2018 showed FDG (-) bilateral inguinal nodes and slight increase in prominence of a subcm left axillary node, initially thought to possibly represent viable  NHL. \par This was f/u with a CT in 11/2018 which showed no change. A repeat CT in 8/28/2019 showed the left axillary node to be unchanged and there was a stable to slight increase in inguinal which was previously not FDG (+).  Most recent left axillary U/S - node sl larger, described as abnormal, but there has been no change convincing enough to suggest recurrent high grade NHL.  Pt unaware of dz in axilla. Having left upper abd pain describes it gas like pain no other associated symptoms.\par Persistently high diastolic BP -  started antihypertensive therapy self stopped medication advised and stressed consequence of self stopping\par COVID - Reports a prolonged illness with COVID in March and April but feeling better at this point. Of note, swab in May was still swab positive\par working in part factory \par \par \par \par Recovering from Covid-19.  No fever or resp sxs at present.  Nasal swab was first (+) on 3/30/20.\par \par No constitutional or neurologic c/o\par \par HIV - T-cell subsets in 10/2019 showed that the CD4 count was 759. Viral load at that time was low level positive. He is on triumeq. Per last ID note, the Mepron was stopped but pt/sister  say pt still taking it. . He is to follow up with his ID physician in May 2020. \par .\par \par \par \par

## 2023-01-01 NOTE — ASSESSMENT
[Curative] : Goals of care discussed with patient: Curative [FreeTextEntry1] : High grade CD10+ B cell lymphoma, in HIV+ patient with (minimal) viral load on HAART. \par Completed chemotherapy in July 2018. Almost 2 years out.  Persistent but not rapidly growing left axillary adenopathy; axilla adenopathy not palpable will follow but not repeat US at this point \par Continue HAART and f/u with his ID doctor.  ID f/u scheduled.\par  CMP, LDH, Beta-2 MG.\par schedule abd and axilla sonogram\par stressed again to take his B/P medication \par \par \par RV 4mos [Palliative Care Plan] : not applicable at this time

## 2023-01-25 ENCOUNTER — NON-APPOINTMENT (OUTPATIENT)
Age: 48
End: 2023-01-25

## 2023-01-25 NOTE — DISCHARGE NOTE ADULT - CARE PROVIDERS DIRECT ADDRESSES
,[email or web address] Yes ,[email or web address]

## 2023-01-31 ENCOUNTER — APPOINTMENT (OUTPATIENT)
Dept: INFECTIOUS DISEASE | Facility: CLINIC | Age: 48
End: 2023-01-31

## 2023-03-10 NOTE — PATIENT PROFILE ADULT. - MEDICATIONS BROUGHT TO HOSPITAL, PROFILE
no Complex Repair And Burow's Graft Text: The defect edges were debeveled with a #15 scalpel blade.  The primary defect was closed partially with a complex linear closure.  Given the location of the defect, shape of the defect, the proximity to free margins and the presence of a standing cone deformity a Burow's graft was deemed most appropriate to repair the remaining defect.  The graft was trimmed to fit the size of the remaining defect.  The graft was then placed in the primary defect, oriented appropriately, and sutured into place.

## 2023-06-01 ENCOUNTER — RX RENEWAL (OUTPATIENT)
Age: 48
End: 2023-06-01

## 2023-06-01 RX ORDER — OLMESARTAN MEDOXOMIL AND HYDROCHLOROTHIAZIDE 40; 25 MG/1; MG/1
40-25 TABLET ORAL
Qty: 30 | Refills: 1 | Status: ACTIVE | COMMUNITY
Start: 2020-07-27 | End: 1900-01-01

## 2023-06-05 ENCOUNTER — NON-APPOINTMENT (OUTPATIENT)
Age: 48
End: 2023-06-05

## 2023-06-06 ENCOUNTER — APPOINTMENT (OUTPATIENT)
Dept: INFECTIOUS DISEASE | Facility: CLINIC | Age: 48
End: 2023-06-06

## 2023-06-09 ENCOUNTER — NON-APPOINTMENT (OUTPATIENT)
Age: 48
End: 2023-06-09

## 2023-06-20 NOTE — ED PROVIDER NOTE - ACUTE OR EVOLVING MI?
Pt seen and examined at bedside. Pt complains of mild abdominal pain. Pt tachycardic to 135 BPM. EKG done shows sinus tachycardia. UOP decreased over past 2 hours. Given 1 L bolus w/ excellent response in HR to 105 BPM.   Pt denies any fever, chills, chest pain, SOB, palpitations, nausea or vomiting     T(F): 97.7 (06-20-23 @ 20:04), Max: 98 (06-20-23 @ 11:28)  HR: 139 (06-20-23 @ 21:41) (80 - 139)  BP: 103/59 (06-20-23 @ 21:41) (103/59 - 149/82)  RR: 22 (06-20-23 @ 21:41) (17 - 22)  SpO2: 97% (06-20-23 @ 21:41) (97% - 100%)  Wt(kg): --    06-20 @ 07:01  -  06-20 @ 22:13  --------------------------------------------------------  IN: 425 mL / OUT: 160 mL / NET: 265 mL        acetaminophen     Tablet .. 1000 milliGRAM(s) Oral every 6 hours  acetaminophen     Tablet .. 1000 milliGRAM(s) Oral every 6 hours PRN Mild Pain (1 - 3)  HYDROmorphone  Injectable 0.5 milliGRAM(s) IV Push every 15 minutes  lactated ringers Bolus 500 milliLiter(s) IV Bolus once  lactated ringers. 1000 milliLiter(s) IV Continuous <Continuous>  metoclopramide Injectable 10 milliGRAM(s) IV Push every 6 hours PRN nausea  ondansetron Injectable 8 milliGRAM(s) IV Push every 6 hours PRN Nausea and/or Vomiting  oxyCODONE    IR 5 milliGRAM(s) Oral every 4 hours PRN Moderate Pain (4 - 6)  pantoprazole    Tablet 40 milliGRAM(s) Oral before breakfast  simethicone 80 milliGRAM(s) Chew every 6 hours      Physical exam:  Constitutional: NAD  Lung: CTABL on 1 L NC sat 100%  Abdomen: pressure dressing in place. Soft, mildly tender, nondistended  Extremities: no lower extremity edema, or calve tenderness. SCDs in place    A:   31y, s/p 32 yo s/p abd myomectomy 18 wks size uterus, 50 fibroids, enetered cavity, w/  PMH of DVT/PE currently on eliquis     Plan:  Neuro: Tylenol/Oxy q4 ATC   Lung: PE: h/o PE 01/23, RA  Card:  tachycardic- EKG 6/20 sinus tach due to hypovolemia, LR 150cc/hr s/p 1L bolus @ poc, s/p 2uPRBC and 1178 cell saver intraop   GI: CLD, zofran/reglan PRN, simethicone, pantoprazole qd  GYN: S/p Abdominal myomectomy  : Galan  Heme: 11.1> (2uPRBC+1178 cell saver intraop)> 13.0  VTE: h/o DVT, NO SCDs!!!!!!!!! Pt seen and examined at bedside. Pt complains of mild abdominal pain. Pt tachycardic to 135 BPM. EKG done shows sinus tachycardia. UOP decreased over past 2 hours. Given 1 L bolus w/ excellent response in HR to 105 BPM.   Pt denies any fever, chills, chest pain, SOB, palpitations, nausea or vomiting     T(F): 97.7 (06-20-23 @ 20:04), Max: 98 (06-20-23 @ 11:28)  HR: 139 (06-20-23 @ 21:41) (80 - 139)  BP: 103/59 (06-20-23 @ 21:41) (103/59 - 149/82)  RR: 22 (06-20-23 @ 21:41) (17 - 22)  SpO2: 97% (06-20-23 @ 21:41) (97% - 100%)  Wt(kg): --    06-20 @ 07:01  -  06-20 @ 22:13  --------------------------------------------------------  IN: 425 mL / OUT: 160 mL / NET: 265 mL        acetaminophen     Tablet .. 1000 milliGRAM(s) Oral every 6 hours  acetaminophen     Tablet .. 1000 milliGRAM(s) Oral every 6 hours PRN Mild Pain (1 - 3)  HYDROmorphone  Injectable 0.5 milliGRAM(s) IV Push every 15 minutes  lactated ringers Bolus 500 milliLiter(s) IV Bolus once  lactated ringers. 1000 milliLiter(s) IV Continuous <Continuous>  metoclopramide Injectable 10 milliGRAM(s) IV Push every 6 hours PRN nausea  ondansetron Injectable 8 milliGRAM(s) IV Push every 6 hours PRN Nausea and/or Vomiting  oxyCODONE    IR 5 milliGRAM(s) Oral every 4 hours PRN Moderate Pain (4 - 6)  pantoprazole    Tablet 40 milliGRAM(s) Oral before breakfast  simethicone 80 milliGRAM(s) Chew every 6 hours      Physical exam:  Constitutional: NAD  Lung: CTABL on 1 L NC sat 100%  Abdomen: pressure dressing in place. Soft, mildly tender, nondistended  Extremities: no lower extremity edema, or calf tenderness. SCDs in place    A:   31y, s/p 32 yo s/p abd myomectomy 18 wks size uterus, 50 fibroids, entered cavity, w/  PMH of DVT/PE currently on eliquis     Plan:  Neuro: Tylenol/Oxy q4 ATC   Lung: PE: h/o PE 01/23, RA  Card:  tachycardic- EKG 6/20 sinus tach due to hypovolemia, LR 150cc/hr s/p 1L bolus @ poc, s/p 2uPRBC and 1178 cell saver intraop   GI: CLD, zofran/reglan PRN, simethicone, pantoprazole qd  GYN: S/p Abdominal myomectomy  : Galan  Heme: 11.1> (2uPRBC+1178 cell saver intraop)> 13.0  VTE: h/o DVT, NO SCDs!!!!!!!!!      Attending aware: Reviewed vitals and increased baseline fluids.   Will follow up with pulmonology if continues to be tachycardic  Will also address pain better no

## 2023-07-14 ENCOUNTER — NON-APPOINTMENT (OUTPATIENT)
Age: 48
End: 2023-07-14

## 2023-07-20 ENCOUNTER — NON-APPOINTMENT (OUTPATIENT)
Age: 48
End: 2023-07-20

## 2023-07-27 ENCOUNTER — NON-APPOINTMENT (OUTPATIENT)
Age: 48
End: 2023-07-27

## 2023-08-16 ENCOUNTER — RX RENEWAL (OUTPATIENT)
Age: 48
End: 2023-08-16

## 2023-09-06 NOTE — ED PROVIDER NOTE - NS ED MD DISPO DIVISION
What Type Of Note Output Would You Prefer (Optional)?: Standard Output On A Scale Of 0 To 10 How Would You Rate Your Itching?: 10 How Did Your Itching Occur?: sudden in onset (over a period of weeks to a few months) How Severe Is Your Itching?: mild [facility]

## 2023-09-19 ENCOUNTER — NON-APPOINTMENT (OUTPATIENT)
Age: 48
End: 2023-09-19

## 2023-10-27 ENCOUNTER — NON-APPOINTMENT (OUTPATIENT)
Age: 48
End: 2023-10-27

## 2023-12-11 ENCOUNTER — NON-APPOINTMENT (OUTPATIENT)
Age: 48
End: 2023-12-11

## 2024-02-01 ENCOUNTER — NON-APPOINTMENT (OUTPATIENT)
Age: 49
End: 2024-02-01

## 2024-02-01 NOTE — ED PROVIDER NOTE - EYES, MLM
[facility]  HOSPITAL MEDICINE PROGRESS NOTE   Patient: Jose Shelby  Today's Date: 1/31/2024    YOB: 1940  Admission Date: 1/24/2024    MRN: [id number]  Inpatient LOS: 7    Room: [address]  Hospital Day: Hospital Day: 8    Subjective   HISTORY AND SUBJECTIVE COMPLAINTS     Subjective  Comfortable and doing better.            History of present illness.  Patient is a very pleasant 83 year gentleman with past medical history significant for previous stroke hypertension , hyperlipidemia, constipation was brought to the ER with worsening weakness and fatigue dizziness.    Apparently was going to his tolerate but he passed out and fell down he also reported some injury to his neck and does not feel very well.    During my history and physical the patient is complaining of weakness and fatigue  He also has history of Parkinson's disease on Sinemet.          Past Medical History:   Diagnosis Date    Asthma     Chronic pain     Coronary arteriosclerosis     CVA (cerebral vascular accident) (CMD)     Esophageal reflux     Fracture     GERD (gastroesophageal reflux disease)     HTN (hypertension)     Hyperlipemia     Parkinson disease     Rhinorrhea        Past Surgical History:   Procedure Laterality Date    Abdomen surgery      Cardiac catherization      Cardiac surgery      Colonoscopy      Coronary artery bypass graft      \"triple\"    Fracture surgery      Tonsillectomy and adenoidectomy      Wrist fracture surgery       Current Facility-Administered Medications   Medication    warfarin (COUMADIN) tablet 3 mg    acetaminophen (TYLENOL) tablet 1,000 mg    albuterol (VENTOLIN) nebulizer 2.5 mg    ALPRAZolam (XANAX) tablet 0.25 mg    aspirin chewable 81 mg    atorvastatin (LIPITOR) tablet 20 mg    carbidopa-levodopa (SINEMET CR)  MG per CR tablet 1 tablet    carbidopa-levodopa (SINEMET)  MG per tablet 1 tablet    cloNIDine (CATAPRES) tablet 0.1 mg    diphenhydrAMINE (BENADRYL)  capsule 25 mg    docusate sodium-sennosides (SENOKOT S) 50-8.6 MG 2 tablet    linaclotide (LINZESS) capsule 72 mcg    losartan (COZAAR) tablet 25 mg    nitroGLYCERIN (NITROSTAT) sublingual tablet 0.4 mg    nystatin (MYCOSTATIN) 152536 UNIT/ML suspension 500,000 Units    oxyCODONE-acetaminophen (PERCOCET) 5-325 MG tablet 1 tablet    polyethylene glycol (MIRALAX) packet 17 g    simethicone (MYLICON) tablet 125 mg    docusate sodium (ENEMEEZ MINI) 283 MG rectal enema 283 mg    WARFARIN - PHARMACIST MONITORED Misc       .    ROS:  Pertinent systems negative except as above.    Objective   PHYSICAL EXAMINATION     Vital 24 Hour Range Most Recent Value   Temperature Temp  Min: 98.2 °F (36.8 °C)  Max: 98.2 °F (36.8 °C) 98.2 °F (36.8 °C)   Pulse Pulse  Min: 63  Max: 75 75   Respiratory Resp  Min: 16  Max: 16 16   Blood Pressure BP  Min: 106/63  Max: 124/74 111/68   Pulse Oximetry SpO2  Min: 99 %  Max: 100 % 100 %   Arterial BP No data recorded     O2 No data recorded       Recorded Intake and Output:  Intake/Output Summary (Last 24 hours) at 1/31/2024 1838  Last data filed at 1/31/2024 1252  Gross per 24 hour   Intake --   Output 1225 ml   Net -1225 ml        Recorded Last Stool Occurrence: 1 (01/28/24 0633)     Vital Most Recent Value First Value   Weight 62 kg (136 lb 11.2 oz) Weight:  (pt refused)   Height 5' 9\" (175.3 cm) Height: 5' 9\" (175.3 cm)   BMI   N/A     Tremors are seen.  Skin dry   Neck supple  Lung scattered crackles with adequate air flow  CVS S 1 and S 2  Abdomen bowel sound is present  CNS cranial nerves are intact   Motor and sensory are impaired.      TEST RESULTS     Labs: The Laboratory values listed below have been reviewed and pertinent findings discussed in the Assessment and Plan.    Laboratory values:   Recent Labs   Lab 01/25/24  1214   WBC 5.8   HGB 12.8*   HCT 39.1              Recent Labs   Lab 01/25/24  1214   SODIUM 140   POTASSIUM 4.0   CHLORIDE 111*   CO2 27   CALCIUM 9.2   GLUCOSE  103*   BUN 14   CREATININE 0.68          No results found    Invalid input(s): \"CAPTH\", \"ALKPHOS\", \"NH#\"      Radiology: Imaging studies have been reviewed and pertinent findings discussed in the Assessment and Plan.  No results found for any visits on 01/24/24 (from the past 48 hour(s)).         ANCILLARY ORDERS     Diet:  Dbl Handle Cup W/Lid for All Liquids -  Note Continuous  Nursing to Pass Tray - Set Up Tray  Plate Guard -  Note Continuous  Regular Diet  Daily W Breakfast; Ensure Plus Hp/Standard Oral Supplement, Chocolate Oral Nutrition Supplement  Telemetry: Off  Consults:    PHARMACY TO DOSE AND MONITOR WARFARIN  IP CONSULT TO SOCIAL WORK  IP CONSULT TO CASE MANAGEMENT  IP CONSULT TO HOSPITALIST  IP CONSULT TO REHAB PSYCHOLOGY  PHARMACY TO DOSE AND MONITOR WARFARIN  IP CONSULT TO NEUROLOGY  Therapy Orders:   PT and OT Orders Placed this Encounter   Procedures    Occupational Therapy    Physical Therapy       ADVANCED DIRECTIVES     Code Status: Do Not Resuscitate       ASSESSMENT AND PLAN     R55 Syncope and collapse  K59.00 Constipation, unspecified  Fecal impaction.  R33.9 Urinary retention  G20 Parkinson's disease  R54 Age-related physical debility  I67.9 Cerebrovascular disease, unspecified  E78.5 Hyperlipidemia, unspecified        Plan    Same plan.    Supportive care.    INR 2.3  Continue aspirin and Lipitor.    Parkinson's disease continue current doses of Sinemet  Hypertension continue losartan.  He also remains on warfarin continue to monitor PT INR.    Severe constipation with fecal impaction remains on Linzess.    Activity as per rehab team.    Plan of care were discussed with the patient.    Is delirium and confusion is improving.    Remains a risk for fall continue to monitor.      Faby Aly MD     Clear bilaterally, pupils equal, round and reactive to light.

## 2024-02-22 ENCOUNTER — OUTPATIENT (OUTPATIENT)
Dept: OUTPATIENT SERVICES | Facility: HOSPITAL | Age: 49
LOS: 1 days | Discharge: HOME | End: 2024-02-22

## 2024-02-22 DIAGNOSIS — C85.88 OTHER SPECIFIED TYPES OF NON-HODGKIN LYMPHOMA, LYMPH NODES OF MULTIPLE SITES: ICD-10-CM

## 2024-02-27 ENCOUNTER — RESULT REVIEW (OUTPATIENT)
Age: 49
End: 2024-02-27

## 2024-02-27 ENCOUNTER — APPOINTMENT (OUTPATIENT)
Dept: HEMATOLOGY ONCOLOGY | Facility: CLINIC | Age: 49
End: 2024-02-27
Payer: MEDICAID

## 2024-02-27 VITALS
WEIGHT: 217.13 LBS | HEART RATE: 106 BPM | SYSTOLIC BLOOD PRESSURE: 144 MMHG | DIASTOLIC BLOOD PRESSURE: 97 MMHG | HEIGHT: 68.98 IN | TEMPERATURE: 99.5 F | BODY MASS INDEX: 32.16 KG/M2 | OXYGEN SATURATION: 97 % | RESPIRATION RATE: 18 BRPM

## 2024-02-27 DIAGNOSIS — I10 ESSENTIAL (PRIMARY) HYPERTENSION: ICD-10-CM

## 2024-02-27 LAB
BASOPHILS # BLD AUTO: 0 K/UL — SIGNIFICANT CHANGE UP (ref 0–0.2)
BASOPHILS NFR BLD AUTO: 0 % — SIGNIFICANT CHANGE UP (ref 0–2)
EOSINOPHIL # BLD AUTO: 0 K/UL — SIGNIFICANT CHANGE UP (ref 0–0.5)
EOSINOPHIL NFR BLD AUTO: 0 % — SIGNIFICANT CHANGE UP (ref 0–6)
HCT VFR BLD CALC: 46.9 % — SIGNIFICANT CHANGE UP (ref 39–50)
HGB BLD-MCNC: 15.5 G/DL — SIGNIFICANT CHANGE UP (ref 13–17)
LYMPHOCYTES # BLD AUTO: 1.91 K/UL — SIGNIFICANT CHANGE UP (ref 1–3.3)
LYMPHOCYTES # BLD AUTO: 40 % — SIGNIFICANT CHANGE UP (ref 13–44)
LYMPHOCYTES # SPEC AUTO: 4 % — HIGH (ref 0–0)
MCHC RBC-ENTMCNC: 28.1 PG — SIGNIFICANT CHANGE UP (ref 27–34)
MCHC RBC-ENTMCNC: 33 G/DL — SIGNIFICANT CHANGE UP (ref 32–36)
MCV RBC AUTO: 85.1 FL — SIGNIFICANT CHANGE UP (ref 80–100)
MONOCYTES # BLD AUTO: 0.48 K/UL — SIGNIFICANT CHANGE UP (ref 0–0.9)
MONOCYTES NFR BLD AUTO: 10 % — SIGNIFICANT CHANGE UP (ref 2–14)
NEUTROPHILS # BLD AUTO: 2.19 K/UL — SIGNIFICANT CHANGE UP (ref 1.8–7.4)
NEUTROPHILS NFR BLD AUTO: 46 % — SIGNIFICANT CHANGE UP (ref 43–77)
NRBC # BLD: 0 /100 WBCS — SIGNIFICANT CHANGE UP (ref 0–0)
NRBC # BLD: SIGNIFICANT CHANGE UP /100 WBCS (ref 0–0)
PLAT MORPH BLD: NORMAL — SIGNIFICANT CHANGE UP
PLATELET # BLD AUTO: 175 K/UL — SIGNIFICANT CHANGE UP (ref 150–400)
RBC # BLD: 5.51 M/UL — SIGNIFICANT CHANGE UP (ref 4.2–5.8)
RBC # FLD: 13.2 % — SIGNIFICANT CHANGE UP (ref 10.3–14.5)
RBC BLD AUTO: SIGNIFICANT CHANGE UP
SMUDGE CELLS # BLD: PRESENT — SIGNIFICANT CHANGE UP
WBC # BLD: 4.77 K/UL — SIGNIFICANT CHANGE UP (ref 3.8–10.5)
WBC # FLD AUTO: 4.77 K/UL — SIGNIFICANT CHANGE UP (ref 3.8–10.5)

## 2024-02-27 PROCEDURE — 99214 OFFICE O/P EST MOD 30 MIN: CPT

## 2024-02-27 RX ORDER — GLUCOSAMINE HCL 500 MG
75 MCG TABLET ORAL
Qty: 90 | Refills: 3 | Status: DISCONTINUED | COMMUNITY
Start: 2019-11-18 | End: 2024-02-27

## 2024-02-28 ENCOUNTER — LABORATORY RESULT (OUTPATIENT)
Age: 49
End: 2024-02-28

## 2024-02-28 ENCOUNTER — APPOINTMENT (OUTPATIENT)
Dept: INFECTIOUS DISEASE | Facility: CLINIC | Age: 49
End: 2024-02-28
Payer: MEDICAID

## 2024-02-28 ENCOUNTER — OUTPATIENT (OUTPATIENT)
Dept: OUTPATIENT SERVICES | Facility: HOSPITAL | Age: 49
LOS: 1 days | End: 2024-02-28
Payer: MEDICAID

## 2024-02-28 VITALS
SYSTOLIC BLOOD PRESSURE: 137 MMHG | DIASTOLIC BLOOD PRESSURE: 88 MMHG | BODY MASS INDEX: 32.44 KG/M2 | OXYGEN SATURATION: 96 % | HEART RATE: 109 BPM | TEMPERATURE: 97.8 F | HEIGHT: 68.99 IN | WEIGHT: 219 LBS

## 2024-02-28 DIAGNOSIS — Z92.89 PERSONAL HISTORY OF OTHER MEDICAL TREATMENT: ICD-10-CM

## 2024-02-28 DIAGNOSIS — B20 HUMAN IMMUNODEFICIENCY VIRUS [HIV] DISEASE: ICD-10-CM

## 2024-02-28 DIAGNOSIS — I10 ESSENTIAL (PRIMARY) HYPERTENSION: ICD-10-CM

## 2024-02-28 DIAGNOSIS — Z23 ENCOUNTER FOR IMMUNIZATION: ICD-10-CM

## 2024-02-28 DIAGNOSIS — Z00.00 ENCOUNTER FOR GENERAL ADULT MEDICAL EXAMINATION W/OUT ABNORMAL FINDINGS: ICD-10-CM

## 2024-02-28 DIAGNOSIS — C85.10 UNSPECIFIED B-CELL LYMPHOMA, UNSPECIFIED SITE: ICD-10-CM

## 2024-02-28 DIAGNOSIS — Z01.00 ENCOUNTER FOR EXAMINATION OF EYES AND VISION W/OUT ABNORMAL FINDINGS: ICD-10-CM

## 2024-02-28 DIAGNOSIS — Z00.00 ENCOUNTER FOR GENERAL ADULT MEDICAL EXAMINATION WITHOUT ABNORMAL FINDINGS: ICD-10-CM

## 2024-02-28 LAB
4/8 RATIO: 0.51 RATIO — LOW (ref 0.9–3.6)
ABS CD8: 1153 CELLS/UL — HIGH (ref 142–740)
ALBUMIN SERPL ELPH-MCNC: 4.3 G/DL
ALP BLD-CCNC: 114 U/L
ALT SERPL-CCNC: 65 U/L
ANION GAP SERPL CALC-SCNC: 13 MMOL/L
AST SERPL-CCNC: 39 U/L
B2 MICROGLOB SERPL-MCNC: 3.3 MG/L
BILIRUB SERPL-MCNC: 0.2 MG/DL
BUN SERPL-MCNC: 8 MG/DL
CALCIUM SERPL-MCNC: 9.7 MG/DL
CD3 BLASTS SPEC-ACNC: 1755 CELLS/UL — SIGNIFICANT CHANGE UP (ref 672–1870)
CD3 BLASTS SPEC-ACNC: 79 % — SIGNIFICANT CHANGE UP (ref 59–83)
CD4 %: 27 % — LOW (ref 30–62)
CD8 %: 52 % — HIGH (ref 12–36)
CHLORIDE SERPL-SCNC: 100 MMOL/L
CHOLEST SERPL-MCNC: 137 MG/DL — SIGNIFICANT CHANGE UP
CO2 SERPL-SCNC: 25 MMOL/L
CREAT SERPL-MCNC: 1.19 MG/DL
EGFR: 75 ML/MIN/1.73M2
GLUCOSE SERPL-MCNC: 85 MG/DL
HBV DNA # SERPL NAA+PROBE: SIGNIFICANT CHANGE UP
HBV DNA SERPL NAA+PROBE-LOG#: SIGNIFICANT CHANGE UP LOGIU/ML
HCV RNA SPEC NAA+PROBE-LOG IU: SIGNIFICANT CHANGE UP
HCV RNA SPEC NAA+PROBE-LOG IU: SIGNIFICANT CHANGE UP LOGIU/ML
HDLC SERPL-MCNC: 33 MG/DL — LOW
HIV1 RNA # SERPL NAA+PROBE: ABNORMAL
HIV1 RNA # SERPL NAA+PROBE: NORMAL
LDH SERPL-CCNC: 313 U/L
LIPID PNL WITH DIRECT LDL SERPL: 88 MG/DL — SIGNIFICANT CHANGE UP
MANUAL DIF COMMENT BLD-IMP: SIGNIFICANT CHANGE UP
NON HDL CHOLESTEROL: 105 MG/DL — SIGNIFICANT CHANGE UP
POTASSIUM SERPL-SCNC: 3.9 MMOL/L
PROT SERPL-MCNC: 7.6 G/DL
SODIUM SERPL-SCNC: 139 MMOL/L
T-CELL CD4 SUBSET PNL BLD: 591 CELLS/UL — SIGNIFICANT CHANGE UP (ref 489–1457)
TRIGL SERPL-MCNC: 88 MG/DL — SIGNIFICANT CHANGE UP
VIRAL LOAD INTERP: NORMAL
VIRAL LOAD LOG: 6.69

## 2024-02-28 PROCEDURE — 87536 HIV-1 QUANT&REVRSE TRNSCRPJ: CPT

## 2024-02-28 PROCEDURE — 86360 T CELL ABSOLUTE COUNT/RATIO: CPT

## 2024-02-28 PROCEDURE — 99215 OFFICE O/P EST HI 40 MIN: CPT

## 2024-02-28 PROCEDURE — G0463: CPT | Mod: 25

## 2024-02-28 PROCEDURE — 87522 HEPATITIS C REVRS TRNSCRPJ: CPT

## 2024-02-28 PROCEDURE — G0008: CPT

## 2024-02-28 PROCEDURE — 80061 LIPID PANEL: CPT

## 2024-02-28 PROCEDURE — 87517 HEPATITIS B DNA QUANT: CPT

## 2024-02-28 PROCEDURE — 86359 T CELLS TOTAL COUNT: CPT

## 2024-02-28 PROCEDURE — 86480 TB TEST CELL IMMUN MEASURE: CPT

## 2024-02-28 PROCEDURE — 36415 COLL VENOUS BLD VENIPUNCTURE: CPT

## 2024-02-28 PROCEDURE — 86780 TREPONEMA PALLIDUM: CPT

## 2024-02-28 PROCEDURE — 90688 IIV4 VACCINE SPLT 0.5 ML IM: CPT

## 2024-02-28 RX ORDER — BICTEGRAVIR SODIUM, EMTRICITABINE, AND TENOFOVIR ALAFENAMIDE FUMARATE 50; 200; 25 MG/1; MG/1; MG/1
50-200-25 TABLET ORAL
Qty: 90 | Refills: 3 | Status: ACTIVE | COMMUNITY
Start: 2021-10-05 | End: 1900-01-01

## 2024-02-28 NOTE — HISTORY OF PRESENT ILLNESS
[FreeTextEntry1] : 48M with HIV/AID, high grade B cell lymphoma reportedly in remission, presents for follow up.  Has not seen ID since 10/2022 Accompanied by sister, helping with translation Reports feeling well, ran out of Biktarvy about 1 month ago Denies any fever, chills, cough, dyspnea, abdominal pain, n/v, diarrhea or dysuria Denies any recent sick contact No recent hospitalization  never COVID vaccinated and still declines, but reports past COVID infection. Reports not working and living in a room in someone's house  Cousin translating at patient's request- he declines monkey pox vaccine, no current partners and reports only female past partners  HTN- significant on multiple out patient visits: we tried to refer to Internal medicine on multiple occasions and he still has not decided to accept. Discussed risks of continued HTN but trend improving over the past two visits. However patient has gained weight and diet /exercise discussed  Refusing to receive COVID vaccine- counseled about importance   Sexual History: The patient is not sexually active.   Travel: [place].   Occupation: not working/disability.   Lives with alone.   Who is aware of HIV status: sister accompanying him

## 2024-02-28 NOTE — PHYSICAL EXAM
[General Appearance - Alert] : alert [Sclera] : the sclera and conjunctiva were normal [General Appearance - In No Acute Distress] : in no acute distress [PERRL With Normal Accommodation] : pupils were equal in size, round, reactive to light [Extraocular Movements] : extraocular movements were intact [Outer Ear] : the ears and nose were normal in appearance [Oropharynx] : the oropharynx was normal with no thrush [Neck Appearance] : the appearance of the neck was normal [Neck Cervical Mass (___cm)] : no neck mass was observed [Jugular Venous Distention Increased] : there was no jugular-venous distention [Thyroid Diffuse Enlargement] : the thyroid was not enlarged [Auscultation Breath Sounds / Voice Sounds] : lungs were clear to auscultation bilaterally [Heart Rate And Rhythm] : heart rate was normal and rhythm regular [Heart Sounds] : normal S1 and S2 [Heart Sounds Gallop] : no gallops [Murmurs] : no murmurs [Heart Sounds Pericardial Friction Rub] : no pericardial rub [Bowel Sounds] : normal bowel sounds [Abdomen Tenderness] : non-tender [Abdomen Soft] : soft [Abdomen Mass (___ Cm)] : no abdominal mass palpated [Costovertebral Angle Tenderness] : no CVA tenderness [Full Pulse] : the pedal pulses are present [Edema] : there was no peripheral edema [No Palpable Adenopathy] : no palpable adenopathy [FreeTextEntry1] : small palpable left axilllary nodes [Musculoskeletal - Swelling] : no joint swelling [Nail Clubbing] : no clubbing  or cyanosis of the fingernails [Motor Tone] : muscle strength and tone were normal [] : no rash [Skin Color & Pigmentation] : normal skin color and pigmentation [Deep Tendon Reflexes (DTR)] : deep tendon reflexes were 2+ and symmetric [Sensation] : the sensory exam was normal to light touch and pinprick [Oriented To Time, Place, And Person] : oriented to person, place, and time [No Focal Deficits] : no focal deficits [Affect] : the affect was normal

## 2024-02-28 NOTE — ASSESSMENT
[FreeTextEntry1] : 48M with HIV/AID, high grade B cell lymphoma reportedly in remission, presents for follow up.  #HIV -routine labs 10/2022 --- HIV VL < 30 --- CD4 700 PLAN --- continue Biktarvy, refills sent --- obtain HIV VL, Tcell Subset, CMP, CBC  #STI Screening --- obtain GC/Chlamydia Urine, Syphilis Screen  #HCM --- obtain Quantgold, Hepatitis B PCR, Hepatitis C PCR --- obtain A1c, Lipid panel  #Encounter for vaccination --- PSV 2018, PPSV 2019 PLAN --- administer Flu vaccine today --- due for Menquadfi series, deferred next visit  --- Recommend COVID vaccination series  RTC in 1 months

## 2024-02-29 LAB
HIV-1 VIRAL LOAD RESULT: ABNORMAL
HIV1 RNA # SERPL NAA+PROBE: SIGNIFICANT CHANGE UP
HIV1 RNA SER-IMP: SIGNIFICANT CHANGE UP
HIV1 RNA SERPL NAA+PROBE-ACNC: ABNORMAL
HIV1 RNA SERPL NAA+PROBE-LOG#: 6.62 — SIGNIFICANT CHANGE UP
T PALLIDUM AB TITR SER: NEGATIVE — SIGNIFICANT CHANGE UP

## 2024-03-01 ENCOUNTER — NON-APPOINTMENT (OUTPATIENT)
Age: 49
End: 2024-03-01

## 2024-03-02 LAB
GAMMA INTERFERON BACKGROUND BLD IA-ACNC: 0.14 IU/ML — SIGNIFICANT CHANGE UP
M TB IFN-G BLD-IMP: NEGATIVE — SIGNIFICANT CHANGE UP
M TB IFN-G CD4+ BCKGRND COR BLD-ACNC: 0.06 IU/ML — SIGNIFICANT CHANGE UP
M TB IFN-G CD4+CD8+ BCKGRND COR BLD-ACNC: 0.08 IU/ML — SIGNIFICANT CHANGE UP
QUANT TB PLUS MITOGEN MINUS NIL: >10 IU/ML — SIGNIFICANT CHANGE UP

## 2024-03-03 NOTE — REASON FOR VISIT
[Follow-Up Visit] : a follow-up visit for [Family Member] : family member [Lymphoma] : lymphoma [FreeTextEntry2] : DLBCL

## 2024-03-03 NOTE — ASSESSMENT
[Palliative Care Plan] : not applicable at this time [Curative] : Goals of care discussed with patient: Curative [FreeTextEntry1] : High grade CD10+ B cell lymphoma, in HIV+ patient with (minimal) viral load on HAART.  Completed chemotherapy in July 2018. Almost 2 years out.  Persistent but not rapidly growing left axillary adenopathy; axilla adenopathy not palpable will follow but not repeat US at this point  Continue HAART and f/u with his ID doctor.  ID f/u scheduled.  CMP, LDH, Beta-2 MG. HIV viral load and t cell sub set schedule  axilla sonogram stressed again to take his B/P medication    RV 4mos

## 2024-03-03 NOTE — HISTORY OF PRESENT ILLNESS
[Disease:__________________________] : Disease: [unfilled] [Treatment Protocol] : Treatment Protocol [Therapy: ___] : Therapy: [unfilled] [Cycle: ___] : Cycle: [unfilled] [Day: ___] : Day: [unfilled] [de-identified] : I pending work-up; [de-identified] : Mr. Cherry presented in early 2018 - he was treated for a high grade, myc rearranged DLBCL, HIV-positive .  [facility]  He had not been fully compliant with his antiretroviral therapy until December of 2017 when he restarted his medication.  His CD4 count was actually normal.\par  \par  CT of chest showed left axillary lymphadenopathy with a node measuring up to 5 cm.  Minimal bibasilar and right middle lobe atelectasis was seen with a tiny left pleural effusion.  Mild cardiomegaly was noted.  CT of abdomen and pelvis showed no hepatosplenomegaly or mass and no significant adenopathy.  Gastric wall thickening was seen \par  of unclear etiology.\par  Left axillary lymph node biopsy showed a high-grade CD10 positive B cell lymphoma expressing CD20 and BCL 6.  65% of nuclei showed a myc rearrangement.  BCL-2 and BCL 6 were not rearranged.  Ki-67 was about 90%.. LDH was 618, but I think it was tested using a higher range.  CBC, creat were normal.  Total protein and globulins were increased.    [de-identified] : to be reviewed here [de-identified] : myc rearranged, bcl-2, bcl-6 germline [FreeTextEntry1] : Rituxan with DA EPOCH /Neulasta x6 6/12/18 l/p x 6 [de-identified] :  S/p completion of treatment in 6/2018, with 6 cycles of DA R-EPOCH and IT MTX prophylaxis. No constitutional symptoms. Appetite is good., denies fever , chill or body ach c/o  gas and intermittent pain to left upper abd no n/v . pt losted to follow up states busy working has not taken his Bitkarvy for at least 2 weeks   PET/CT on 7/20/2018 showed FDG (-) bilateral inguinal nodes and slight increase in prominence of a subcm left axillary node, initially thought to possibly represent viable  NHL.  This was f/u with a CT in 11/2018 which showed no change. A repeat CT in 8/28/2019 showed the left axillary node to be unchanged and there was a stable to slight increase in inguinal which was previously not FDG (+).  Most recent left axillary U/S - node sl larger, described as abnormal, but there has been no change convincing enough to suggest recurrent high grade NHL.  Pt unaware of dz in axilla. Having left upper abd pain describes it gas like pain no other associated symptoms. Persistently high diastolic BP -  started antihypertensive therapy self stopped medication advised and stressed consequence of self stopping COVID - Reports a prolonged illness with COVID in March and April but feeling better at this point. 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  No fever or resp sxs at present.  Nasal swab was first (+) on 3/30/20.  No constitutional or neurologic c/o  HIV - T-cell subsets in 10/2019 showed that the CD4 count was 759. Viral load at that time was low level positive. He is on triumeq. Per last ID note, the Mepron was stopped but pt/sister  say pt still taking it. . He is to follow up with his ID physician in May 2020.  .

## 2024-03-04 ENCOUNTER — NON-APPOINTMENT (OUTPATIENT)
Age: 49
End: 2024-03-04

## 2024-05-02 NOTE — CONSULT NOTE ADULT - ATTENDING COMMENTS
Detail Level: Detailed 42 yo M with PMHx of HIV, with high-grade B-cell, CD10+ lymphoma with CNS involvement currently cycle 5, day 13 who is admitted for low grade fever, found to be positive for metapneumovirus/rhinovirus, along with mucositis.  He is not neutropenic, is tolerating diet.    Continue prophylactic antibiotics, follow up cultures.  If negative, d/c planning for tomorrow.

## 2024-06-12 NOTE — DISCHARGE NOTE ADULT - CONDITION (STATED IN TERMS THAT PERMIT A SPECIFIC MEASURABLE COMPARISON WITH CONDITION ON ADMISSION):
2024  EMPLOYEE INFORMATION: EMPLOYER INFORMATION:   NAME: Sid NUNEZ   : 1965 [phone]    DATE OF INJURY/EVENT: 4/10/2024           [facility]   Treating Provider: Christiana Villeda PA-C  Time In:  3:42 PM Time Out:  4:04 PM      DIAGNOSIS:   1. Trigger ring finger of right hand      STATUS: This injury is determined to be WORK RELATED.    RETURN TO WORK: Employee may return to work without restrictions.Employee is discharged from care. Return Date: 2024            RESTRICTIONS:  No Restrictions    TREATMENT PLAN:   Medications for this injury/condition: None   Referral/Consult: None  Diagnostic Testing: None       Instructions: None     NEXT RETURN VISIT: None  Thank you for the privilege of providing medical care for this injury/condition.  If there are any questions, please call the occupational health clinic at Dept: [phone].      Electronically signed on 2024 at 4:04 PM by:   Christiana Villeda PA-C   [facility]      
stable

## 2024-06-26 NOTE — H&P ADULT - GENERAL
Detail Level: Zone Patient Specific Counseling (Will Not Stick From Patient To Patient): Patient states these are from cat scratches. Re-check at [place] follow up to ensure appropriate healing. Detail Level: Simple Detail Level: Detailed details…

## 2024-07-12 NOTE — PATIENT PROFILE ADULT - INFORMATION PROVIDED TO:
[facility]  INPATIENT PHYSICAL THERAPY  DAILY NOTE  [facility] 4A - 4A-18/018-A      Time In: 728  Time Out: 08  Timed Code Treatment Minutes: 39 Minutes  Minutes: 39          Date: 2024  Patient Name: Rahel Lopez,  Gender:  female        MRN: [id number]  : 3/4/1932  (89 y.o.)     Referring Practitioner: Soniya Alcala PA-C  Diagnosis: Dysarthria  Additional Pertinent Hx: Per HPI, \"Patient presents to the ED with a one day history of falls and problems with her speech.  The patient reports she fell twice in the house today.  The first time she was able to pull herself back up and didn't report the incident to anyone.  A little later in the day she was talking to her sister on the phone and after they hung up her sister contacted another family member to check on the patient due to confusion and slurred speech.  When the family member arrived the patient had fallen and could not get off the floor.  Patient does endorse some left sided weakness earlier in the day but that has resolved.  While in the ED the slurred speech also resolves.  Patient will be admitted for MRI of her brain and further optimization of her medical problems.\"  MRI on  results showed \"Small acute infarcts in the posterior limb of the internal capsule on the  right and the right thalamus. These are immediately adjacent to one another and  extend over a length of 1.1 cm.\"     Prior Level of Function:  Lives With: Alone  Type of Home: House  Home Layout: One level  Home Access: Stairs to enter with rails  Entrance Stairs - Number of Steps: 2  Entrance Stairs - Rails: Left  Home Equipment: Rollator, Walker - Rolling, Cane   Bathroom Shower/Tub: Tub/Shower unit, Shower chair with back  Bathroom Toilet: Standard  Bathroom Equipment: Grab bars in shower, Grab bars around toilet    ADL Assistance: Independent  Homemaking Assistance: Independent  Homemaking Responsibilities: Yes  Ambulation Assistance:  assist to recover and noted unsteady when turning around      Balance:  Standing w/ one to no UE at support to complete toileting task pt needed min to CGA noted trunk sway and unsteady when both UEs were released and she needed physical assist to complete colothing management   -Pt stood w/ riki UE at support completed pre-gait activity with wt shifting and controlled stepping/tapping to target with CGA worked on left hip and knee stability during closed chain activity w/ decreased accuracy to target at both LE during open chain  .     Exercise:  Patient was guided in 1 set(s) 10 reps of exercise to both lower extremities.  Ankle pumps, Heelslides, Short arc quads, Hip abduction/adduction, Lower trunk rotations, Seated marches, Seated hamstring curls, Long arc quads, and noted decreased control at left LE and it did fatigue easy - stretched riki heelcords 3x30 sec  .  Exercises were completed for increased independence with functional mobility.    Functional Outcome Measures:  Completed.    5 Times Sit to Stand   Current Score: 68 seconds (Date: 7/12/2024)    Interpretation of Score: The 5 Times Sit to Stand Test (FTSST) measures functional lower limb muscle strength and may be useful in quantifying functional change of transitional movements. Greater than 16.0 seconds indicates increased risk of falls. Cut-off scores of 16 seconds discriminates fallers from non-fallers. < 60 years old: 11 seconds = normal; 70-80 years old: 13 seconds = normal; 80-90 years old: 15 seconds = normal., [facility] (6 CLICK) BASIC MOBILITY  AM-PAC Inpatient Mobility Raw Score : 17  AM-PAC Inpatient T-Scale Score : 42.13  Mobility Inpatient CMS 0-100% Score: 50.57  Mobility Inpatient CMS G-Code Modifier : CK        Modified [place] Scale:  +3 - Moderate disability; requiring some help, but able to walk without physical assistance (SBA/CGA).   Patient could not live alone but could walk from one room to another without physical help from another  person.  Education provided regarding stroke rehabilitation management.    ASSESSMENT:  Assessment:  Pt cont to demonstrate generalized weakness maria victoria in left LE and needing cues and hands on assist w/ mobility, transfers and gait. Pt struggled with sit to stands from various hts and completed a 5x sit to  68 sec putting her at a fall risk. Pt would benefit from cont skilled therapy to work on strength, balance, endurance and for increased independence with functional mobility prior to return home.   Activity Tolerance:  Patient tolerance of  treatment: well    Equipment Recommendations:Other: monitor for needs  Discharge Recommendations: Inpatient Rehabilitation and pt would be able to tolerate 3 hours of therapy a day   Plan: Current Treatment Recommendations: Strengthening, Balance training, Functional mobility training, Endurance training, Transfer training, Gait training, Stair training, Neuromuscular re-education, Home exercise program, Patient/Caregiver education & training, Therapeutic activities, Safety education & training  General Plan:  (6x C)    Education  Education:  Learners: Patient  Patient Education: Plan of Care    Goals:  Patient Goals : go home  Short Term Goals  Time Frame for Short Term Goals: at discharge  Short Term Goal 1: Pt to be Mod I for supine <> sit to get in/out of bed  Short Term Goal 2: Pt to be Mod I for sit <> stand to get up to ambulate  Short Term Goal 3: Pt to ambulate 40 ft with RW with  Supervision for household distances  Short Term Goal 4: Pt to negotiate 2 steps with 1 rail and cane with Supervision for home access  Long Term Goals  Time Frame for Long Term Goals : not set due to short ELOS    Following session, patient left in safe position with all fall risk precautions in place.     patient

## 2024-07-15 ENCOUNTER — NON-APPOINTMENT (OUTPATIENT)
Age: 49
End: 2024-07-15

## 2024-10-31 NOTE — DISCHARGE NOTE ADULT - FUNCTIONAL SCREEN CURRENT LEVEL: AMBULATION, MLM
October 31, 2024     Pravin Castaneda Jr., MD  [address]   [address]  [facility] [address]    Patient: Murphy Willams   YOB: 1953   Date of Visit: 10/31/2024       Dear Dr. Castaneda:    Thank you for referring Murphy Willams to me for evaluation. Below are my notes for this consultation.    If you have questions, please do not hesitate to call me. I look forward to following your patient along with you.         Sincerely,        Filiberto Olsen MD        CC: Murphy Willams  THUY Reyes MD Steven Stevens, MD Sudip Nanda, MD  10/31/2024  8:44 PM  Sign when Signing Visit  Progress Note - Electrophysiology-Cardiology (EP)   Murphy Willams 71 y.o. male MRN: [id number]  Unit/Bed#:  Encounter: [id number]           1. Long QT syndrome type 2        2. Chronic combined systolic and diastolic congestive heart failure (HCC)        3. Paroxysmal A-fib (HCC)  POCT ECG    dabigatran etexilate (PRADAXA) 150 mg capsu    POCT ECG      4. Essential hypertension        5. Pulmonary hypertension (HCC)        6. Alcoholic cirrhosis of liver without ascites (HCC)        7. Autoimmune hemolytic anemia (HCC)        8. CLL (chronic lymphocytic leukemia) (HCC)        9. Pacemaker        10. Narrow complex tachycardia (HCC)              Summary of my recommendations   EF improved to 55%  VF and post ICD - Device optimization if can have narrower QRS, QTc and RBBB morphology - min QRS is 140 ms, LBBB  Continue with dabigatran for PAF  Continue nadolol has Long QT 2  Avoid other QT prolonging meds     Patient has CLL- Intermittent flareups  New antineoplastic agents being used  There is a small but definite chance of developing thrombocytopenia down the line  Prefer to have evaluation for possible Watchman-Dr. Charles/Dr. Saavedra          Clinical conditions  Cardiac arrest necessitating external resuscitation and shock  Long QT type II  Bradycardia at baseline  Intrinsic ventricular rate 50/min,  progress to 2-1 heart block, progressed to complete heart block during the procedure  Post BiV ICD     Cardiomyopathy with LVEF 30% currently - EF 60% in June 2022     QT prolonging medication  Paroxysmal A-fib  Alcohol use  Obesity  Alzheimer's dementia  History of CLL  Hypertension  UTI            Assessment & Plan    Cardiac arrest necessitating external resuscitation and shock  Long QT type II  Bradycardia at baseline  Intrinsic ventricular rate 50/min, progress to 2-1 heart block, progressed to complete heart block during the procedure  Post BiV ICD    Patient currently on nadolol although this has limited effect and long QT type II  Patient should be evaluated for QT genotype as we think he is type II based on his son being positive for type II  Patient does have BiV ICD in place  Avoid all QT prolonging conditions-hypokalemia, medications, bradycardia          Cardiomyopathy with LVEF 30% post arrest  - EF 60% in June 2022 - currently improved to 45% oct 2023, 55% in 2024  Patient is on optimal therapy for heart failure  Medications can be titrated after that as needed by primary cardiologist           QT prolonging medication  Avoid all QT prolonging medication          Paroxysmal A-fib  Long-term anticoagulation  Currently we are going with rate control and anticoagulation strategy  Rate control nadolol  Anticoagulation-dabigatran    Patient having flareup of CLL, new antineoplastic medications being used  Down the line he may develop lower platelets which make use of oral anticoagulants risky  Prefer to have the patient evaluated for Watchman          Alcohol use  Advised to avoid alcohol  Prior history of significant alcohol use      Obesity  BMI 30  Diet is the most important way to lose weight      Alzheimer's dementia  At baseline      History of CLL  Follow-up with primary  Recent flareup      Hypertension  110/60  Currently on losartan, nadolol      UTI  Self catheterizes      Mixed  hyperlipidemia  On atorvastatin  No myositis or myalgia            History of Present Illness  HPI: Murphy Willams is a 71 y.o. year old male following up post VF arrest and BiV ICD placement     Slowly improving in activity and EF is up to 55%  As far as cardiac symptoms he is doing well    He has some flareup of CLL  He is being started on antineoplastic agents    The patient has significant medical illnesses which include  Cardiac arrest necessitating external resuscitation and shock  Long QT type II  Bradycardia at baseline  Intrinsic ventricular rate 50/min, progress to 2-1 heart block, progressed to complete heart block during the procedure  Post BiV ICD  Cardiomyopathy with LVEF 30% currently - EF 60% in June 2022  QT prolonging medication  Paroxysmal A-fib  Alcohol use  Obesity  Alzheimer's dementia  History of CLL  Hypertension  UTI    Patient does have a long history of multiple diseases  Has undergone prior diverticular surgery, TURP, self catheterizes for urinary retention    He has slowly started moving around  Not complaining of angina, worsening orthopnea or PND or leg swelling  No further complaints of presyncope or syncope  Activities are limited and has exertional intolerance      Patient does have a history of snoring, morning fatigue and daytime sleepiness      Historical Information  Past Medical History:   Diagnosis Date   • Anxiety    • BPH (benign prostatic hypertrophy)    • Cancer (HCC)     CLL   • CLL (chronic lymphocytic leukemia) (HCC)     2009   • Colon polyp    • Concussion     Resolved: 08/22/16   • COVID-19 12/29/2023   • Depression    • Diverticulitis 01/13/2020 2014 CT done 11/19 12/19 CT done    • E coli bacteremia 06/27/2021    2/2 blood cultures with Ecoli  Source appears to be the urine    • Epididymitis 05/19/2021 5/2021   • Gastrointestinal hemorrhage     Last assessed: 08/27/13   • GERD (gastroesophageal reflux disease)    • H/O vitamin D deficiency 07/07/2021   •  Hearing loss of aging    • Hiatal hernia    • History of right hip replacement 04/19/2021   • History of transfusion    • Hyperlipidemia    • Hypertension    • Hyponatremia 03/06/2023   • Iron deficiency anemia    • 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  Per Urology do not treat at this time.   • Urinary tract infection associated with catheterization of urinary tract  ([facility]) 02/05/2021    Pseudomonal UTI asymptomatic.  Per Urology do not treat at this time.  He did have urosepsis from E coli 7/21   • UTI (urinary tract infection)     in past   • Ventricular fibrillation ([facility]) 03/06/2023    Noted after unresponsive episode     • Wears glasses      Past Surgical History:   Procedure Laterality Date   • ADENOIDECTOMY     • BLADDER SURGERY     • CARDIAC CATHETERIZATION Left 03/07/2023    Procedure: Cardiac Left Heart Cath;  Surgeon: Rich Ramirez MD;  Location: [facility];  Service: Cardiology   • CARDIAC CATHETERIZATION N/A 03/07/2023    Procedure: Cardiac temporary pacemaker;  Surgeon: Rich Ramirez MD;  Location: [facility];  Service: Cardiology   • CARDIAC ELECTROPHYSIOLOGY PROCEDURE N/A 03/08/2023    Procedure: Cardiac icd implant;  Surgeon: Filiberto Olsen MD;  Location:  CARDIAC CATH LAB;  Service: Cardiology   • COLONOSCOPY     • CYSTOSCOPY  10/09/2014    Diagnostic   • CYSTOSCOPY  06/29/2020   • FL CYSTOGRAM  08/15/2022   • FRACTURE SURGERY      left lower arm   • FRACTURE SURGERY    "   left femur   • HERNIA REPAIR     • IR BIOPSY BONE MARROW  2024   • JOINT REPLACEMENT Right     hip   • OTHER SURGICAL HISTORY  2011    Spinal anesthesia epidural   • ND ARTHRP ACETBLR/PROX FEM PROSTC AGRFT/ALGRFT Right 2017    Procedure: ARTHROPLASTY HIP TOTAL ANTERIOR;  Surgeon: Roly Liu MD;  Location: [facility];  Service: Orthopedics   • TONSILLECTOMY AND ADENOIDECTOMY     • TRANSURETHRAL RESECTION OF PROSTATE      x 2   • WRIST SURGERY       Social History     Substance and Sexual Activity   Alcohol Use Yes   • Alcohol/week: 2.0 - 4.0 standard drinks of alcohol   • Types: 2 - 4 Cans of beer per week     Social History     Substance and Sexual Activity   Drug Use Not Currently   • Types: Marijuana    Comment: \"medical marijuana\"     Social History     Tobacco Use   Smoking Status Former   • Current packs/day: 0.00   • Types: Cigarettes   • Quit date: 1980   • Years since quittin.1   • Passive exposure: Past   Smokeless Tobacco Never     Social History     Socioeconomic History   • Marital status: /Civil Union     Spouse name: Not on file   • Number of children: Not on file   • Years of education: Not on file   • Highest education level: Not on file   Occupational History   • Not on file   Tobacco Use   • Smoking status: Former     Current packs/day: 0.00     Types: Cigarettes     Quit date: 1980     Years since quittin.1     Passive exposure: Past   • Smokeless tobacco: Never   Vaping Use   • Vaping status: Never Used   Substance and Sexual Activity   • Alcohol use: Yes     Alcohol/week: 2.0 - 4.0 standard drinks of alcohol     Types: 2 - 4 Cans of beer per week   • Drug use: Not Currently     Types: Marijuana     Comment: \"medical marijuana\"   • Sexual activity: Not Currently   Other Topics Concern   • Not on file   Social History Narrative   • Not on file     Social Determinants of Health     Financial Resource Strain: Low Risk  (10/23/2023)    Overall Financial " "Resource Strain (CARDIA)    • Difficulty of Paying Living Expenses: Not hard at all   Food Insecurity: No Food Insecurity (9/5/2024)    Nursing - Inadequate Food Risk Classification    • Worried About Running Out of Food in the Last Year: Never true    • Ran Out of Food in the Last Year: Never true    • Ran Out of Food in the Last Year: Not on file   Transportation Needs: No Transportation Needs (9/5/2024)    PRAPARE - Transportation    • Lack of Transportation (Medical): No    • Lack of Transportation (Non-Medical): No   Physical Activity: Not on file   Stress: Not on file   Social Connections: Not on file   Intimate Partner Violence: Not on file   Housing Stability: Low Risk  (9/5/2024)    Housing Stability Vital Sign    • Unable to Pay for Housing in the Last Year: No    • Number of Times Moved in the Last Year: 0    • Homeless in the Last Year: No     .  Family History:  Family History   Problem Relation Age of Onset   • No Known Problems Mother    • Heart attack Father    • Diabetes Brother    • Prostate cancer Brother          Meds/Allergies     No current facility-administered medications for this visit.       Not in a hospital admission.      Allergies   Allergen Reactions   • Ciprofloxacin Other (See Comments)     LONG QT syndrome   • Codeine Other (See Comments)     agitated   • Sulfamethoxazole-Trimethoprim GI Intolerance, Abdominal Pain and Other (See Comments)     upset stomach   • Augmentin [Amoxicillin-Pot Clavulanate] Rash     Livedo reticularis   • Macrobid [Nitrofurantoin] Dizziness, Headache and Fatigue     Per Patient           Objective  Vitals:   Visit Vitals  /60   Pulse 70   Ht 5' 8\" (1.727 m)   Wt 87.6 kg (193 lb 3.2 oz)   BMI 29.38 kg/m²   Smoking Status Former   BSA 2.01 m²        Invasive Devices       None                     ROS  Review of Systems   All other systems reviewed and are negative.  As described in my history of present illness        PHYSICAL EXAM  Physical " Exam  Constitutional:       General: He is not in acute distress.     Appearance: Normal appearance. He is obese. He is not ill-appearing.   HENT:      Head: Normocephalic and atraumatic.      Right Ear: External ear normal.      Left Ear: External ear normal.      Nose: Nose normal.      Mouth/Throat:      Comments: Posterior pharynx is crowded  Eyes:      General: No scleral icterus.     Extraocular Movements: Extraocular movements intact.      Conjunctiva/sclera: Conjunctivae normal.      Pupils: Pupils are equal, round, and reactive to light.   Neck:      Comments: Short thick neck  Cardiovascular:      Rate and Rhythm: Normal rate and regular rhythm.      Heart sounds: Normal heart sounds. No murmur heard.     Comments: paced  Pulmonary:      Effort: No respiratory distress.      Breath sounds: Examination of the right-middle field reveals decreased breath sounds. Examination of the left-middle field reveals decreased breath sounds. Examination of the right-lower field reveals decreased breath sounds. Examination of the left-lower field reveals decreased breath sounds. Decreased breath sounds present.   Abdominal:      Palpations: Abdomen is soft.      Comments: Central obesity present   Musculoskeletal:         General: No swelling or deformity.   Skin:     Coloration: Skin is not jaundiced.      Findings: No bruising or lesion.   Neurological:      Mental Status: He is alert and oriented to person, place, and time. Mental status is at baseline.   Psychiatric:         Mood and Affect: Mood normal.         Behavior: Behavior normal.         Thought Content: Thought content normal.         Judgment: Judgment normal.         LAB RESULTS:    CBC:  WBC   Date Value Ref Range Status   10/28/2024 10.06 4.31 - 10.16 Thousand/uL Final   07/20/2015 22.68 (H) 4.31 - 10.16 Thousand/uL Final     Hemoglobin   Date Value Ref Range Status   10/28/2024 10.5 (L) 12.0 - 17.0 g/dL Final   07/20/2015 10.7 (L) 12.0 - 17.0 g/dL  Final     Hematocrit   Date Value Ref Range Status   10/28/2024 30.9 (L) 36.5 - 49.3 % Final   07/20/2015 33.0 (L) 36.5 - 49.3 % Final     MCV   Date Value Ref Range Status   10/28/2024 108 (H) 82 - 98 fL Final   07/20/2015 80 (L) 82 - 98 fL Final     Platelets   Date Value Ref Range Status   10/28/2024 327 149 - 390 Thousands/uL Final   07/20/2015 206 149 - 390 Thousand/uL Final     RBC   Date Value Ref Range Status   10/28/2024 2.85 (L) 3.88 - 5.62 Million/uL Final   07/20/2015 4.14 3.88 - 5.62 Million/uL Final     MCH   Date Value Ref Range Status   10/28/2024 36.8 (H) 26.8 - 34.3 pg Final   07/20/2015 25.8 (L) 26.8 - 34.3 pg Final     MCHC   Date Value Ref Range Status   10/28/2024 34.0 31.4 - 37.4 g/dL Final   07/20/2015 32.4 31.4 - 37.4 g/dL Final     RDW   Date Value Ref Range Status   10/28/2024 19.7 (H) 11.6 - 15.1 % Final   07/20/2015 15.9 (H) 11.6 - 15.1 % Final     MPV   Date Value Ref Range Status   10/28/2024 8.9 8.9 - 12.7 fL Final   07/20/2015 9.9 8.9 - 12.7 fL Final     nRBC   Date Value Ref Range Status   10/28/2024 0 /100 WBCs Final       CMP:  Sodium   Date Value Ref Range Status   07/20/2015 135 (L) 136 - 145 mmol/L Final     Potassium   Date Value Ref Range Status   10/21/2024 4.2 3.5 - 5.3 mmol/L Final   07/20/2015 4.3 3.5 - 5.3 mmol/L Final     Chloride   Date Value Ref Range Status   10/21/2024 98 96 - 108 mmol/L Final   07/20/2015 99 (L) 100 - 108 mmol/L Final     CO2   Date Value Ref Range Status   10/21/2024 32 21 - 32 mmol/L Final     CO2, i-STAT   Date Value Ref Range Status   01/10/2021 25 21 - 32 mmol/L Final     Anion Gap   Date Value Ref Range Status   07/20/2015 9 4 - 13 mmol/L Final     BUN   Date Value Ref Range Status   10/21/2024 11 5 - 25 mg/dL Final   07/20/2015 11 5 - 25 mg/dL Final     Creatinine   Date Value Ref Range Status   10/21/2024 0.72 0.60 - 1.30 mg/dL Final     Comment:     Standardized to IDMS reference method   07/20/2015 0.86 0.60 - 1.30 mg/dL Final      Comment:     Standardized to IDMS reference method     Glucose   Date Value Ref Range Status   07/20/2015 119 65 - 140 mg/dL Final     Comment:     If patient is fasting, the ADA then defines impaired fasting glucose as  >100 mg/dl and diabetes as  >or equal to 126 mg/dl.       Glucose, i-STAT   Date Value Ref Range Status   01/10/2021 130 65 - 140 mg/dl Final     Calcium   Date Value Ref Range Status   10/21/2024 9.6 8.4 - 10.2 mg/dL Final   07/20/2015 8.9 8.3 - 10.1 mg/dL Final     AST   Date Value Ref Range Status   10/21/2024 20 13 - 39 U/L Final   07/20/2015 29 0 - 45 U/L Final     ALT   Date Value Ref Range Status   10/21/2024 24 7 - 52 U/L Final     Comment:     Specimen collection should occur prior to Sulfasalazine administration due to the potential for falsely depressed results.    07/20/2015 48 16 - 63 U/L Final     Alkaline Phosphatase   Date Value Ref Range Status   10/21/2024 90 34 - 104 U/L Final   07/20/2015 78 46 - 116 U/L Final     Total Protein   Date Value Ref Range Status   07/20/2015 8.0 6.4 - 8.2 g/dL Final     Total Bilirubin   Date Value Ref Range Status   07/20/2015 0.47 0.20 - 1.00 mg/dL Final     eGFR   Date Value Ref Range Status   10/21/2024 93 ml/min/1.73sq m Final        Magnesium:   Magnesium   Date Value Ref Range Status   08/20/2024 2.3 1.9 - 2.7 mg/dL Final        A1C:  Hemoglobin A1C   Date Value Ref Range Status   10/14/2024 <4.2 Normal 4.0-5.6%; PreDiabetic 5.7-6.4%; Diabetic >=6.5%; Glycemic control for adults with diabetes <7.0% % Corrected     Comment:     This is a corrected result. Previous result was 6.7 % on 10/15/2024 at 1358 EDT  This is an appended report.  These results have been appended to a previously preliminary verified report.        TSH:  TSH 3RD GENERATON   Date Value Ref Range Status   08/20/2024 1.023 0.450 - 4.500 uIU/mL Final     Comment:     Adult TSH (3rd generation) reference range follows the recommended guidelines of the American Thyroid  Association, January, 2020.        PT/INR:  Protime   Date Value Ref Range Status   03/06/2023 15.6 (H) 11.6 - 14.5 seconds Final     INR   Date Value Ref Range Status   03/06/2023 1.25 (H) 0.84 - 1.19 Final       Lipid Panel:  Cholesterol   Date Value Ref Range Status   05/31/2024 105 See Comment mg/dL Final     Comment:     Cholesterol:         Pediatric <18 Years        Desirable          <170 mg/dL      Borderline High    170-199 mg/dL      High               >=200 mg/dL        Adult >=18 Years            Desirable        <200 mg/dL      Borderline High  200-239 mg/dL      High             >239 mg/dL       Triglycerides   Date Value Ref Range Status   05/31/2024 111 See Comment mg/dL Final     Comment:     Triglyceride:     0-9Y            <75mg/dL     10Y-17Y         <90 mg/dL       >=18Y     Normal          <150 mg/dL     Borderline High 150-199 mg/dL     High            200-499 mg/dL        Very High       >499 mg/dL    Specimen collection should occur prior to Metamizole administration due to the potential for falsely depressed results.   01/20/2015 79 mg/dL Final     Comment:     TRIGLYCERIDE:       Normal                 <150 mg/dl       Borderline High       150-199 mg/dl       High                  200-499 mg/dl       Very High             >499 mg/dl  _______________________________________       HDL   Date Value Ref Range Status   01/20/2015 59 mg/dL Final     Comment:     HDL:       High       >59 mg/dl       Low        <41 mg/dl  ______________________________       HDL, Direct   Date Value Ref Range Status   05/31/2024 29 (L) >=40 mg/dL Final     Non-HDL-Chol (CHOL-HDL)   Date Value Ref Range Status   03/07/2023 67 mg/dl Final       Troponin:  Troponin I   Date Value Ref Range Status   04/09/2021 <0.02 <=0.04 ng/mL Final     Comment:     [employer] Chemistry analyzer 99% cutoff is > 0.04 ng/mL in network labs     o cTnI 99% cutoff is useful only when applied to patients in the clinical setting of  myocardial ischemia   o cTnI 99% cutoff should be interpreted in the context of clinical history, ECG findings and possibly cardiac imaging to establish correct diagnosis.   o cTnI 99% cutoff may be suggestive but clearly not indicative of a coronary event without the clinical setting of myocardial ischemia.           Imaging:    [id number]        SOILA:  No results found for this or any previous visit.      Echocardiogram:  Results for orders placed during the hospital encounter of 24    Echo complete w/ contrast if indicated    Interpretation Summary  Technically fair quality transthoracic echocardiogram study.    Mild to moderate concentric left ventricular potential, normal left ventricular systolic function with regional wall motion variability.  Ejection fraction is estimated as around 55%.  Mild dilated right ventricle cavity, normal right ventricular systolic function.  Severe left atrial and moderate right atrial cavity enlargement.  Aortic valve leaflet thickening and sclerosis, mild aortic valve regurgitation.  Mitral valve leaflet sclerosis, mild mitral valve regurgitation.  Severe tricuspid valve regurgitation and mild pulmonic valve regurgitation.  Severe pulmonary hypertension.  Estimated RVSP/PASP is greater than 74 mmHg.  No pericardial effusion.    Compared to report of previous echocardiogram from 2023 severe tricuspid valve regurgitation and pulmonary hypertension are new.  Left ventricular ejection fraction appears to be slightly better.    Results for orders placed during the hospital encounter of 23    Echo follow up/limited w/ contrast if indicated    Interpretation Summary  •  Left Ventricle: Left ventricular cavity size is normal. Wall thickness is normal. The left ventricular ejection fraction is 45%. Systolic function is mildly reduced. There is mild global hypokinesis with akinesis of the basal inferior myocardial wall segment.  •  Left Atrium: The atrium is mildly  "dilated.  •  Right Atrium: The atrium is mildly dilated.  •  Aortic Valve: There is mild regurgitation.  •  Mitral Valve: There is mild annular calcification.  •  Tricuspid Valve: There is mild regurgitation.      Stress Test:   No results found for this or any previous visit.      Cardiac Catheterization:  No results found for this or any previous visit.      HOLTER MONITOR: 24 HOUR/48 HOUR MONITORS  No results found for this or any previous visit.      AMB Extended Holter Monitor: Zio XT/AT or BioTel  No results found for this or any previous visit.      Cardiac CT:    CT Cardiac w Pulmonary Vein Mapping  09/04/2024    FINDINGS:     LEFT ATRIAL APPENDAGE: Left atrial appendage is normal in size and morphology. There is layering of contrast in the left atrial appendage on early phase imaging which corresponds to the appearance on recent PE study. Axial 3-minute delayed images on the   current study through the left atrial appendage confirm absence of thrombus.     PULMONARY VEIN ANATOMY: Typical with two right and two left pulmonary veins.     CARDIAC STRUCTURES: Extensive coronary artery calcification noted. Pacer leads noted in the right heart.     GREAT VESSELS: Visualized thoracic aorta and central pulmonary arterial tree are within normal limits for the patient's age.     EXTRACARDIAC FINDINGS: Consolidative density in the right middle lobe consistent with reidentified pneumonia, partly imaged.     IMPRESSION:     No thrombus in the left atrial appendage.          DEVICE CHECK:     Results for orders placed or performed in visit on 09/19/24   Cardiac EP device report    Narrative    MDT BI-V ICD (DDIR) ACTIVE SYSTEM IS MRI CONDITIONAL  CARELINK TRANSMISSION: BATTERY STATUS \"6 YRS.\" AP 98% CRT PACING (BIV) 100% (LV) 0%. ALL AVAILABLE LEAD PARAMETERS WITHIN NORMAL LIMITS. NO SIGNIFICANT HIGH RATE EPISODES. VENT SENSING MARKERS NOTED. OPTI-VOL FLUID THRESHOLD CROSSED. HF/RN MADE AWARE. NORMAL DEVICE FUNCTION. " NC               Code Status: [unfilled]  Advance Directive and Living Will: Yes    Power of :    POLST:      Counseling / Coordination of Care  Detailed discussion done with regards to following QT, evaluation for Watchman      Filiberto Olsen MD     (0) independent

## 2024-11-04 NOTE — H&P ADULT - PROBLEM/PLAN-3
Body Location Override (Optional - Billing Will Still Be Based On Selected Body Map Location If Applicable): right lateral neck Detail Level: Detailed Depth Of Biopsy: dermis Was A Bandage Applied: Yes Size Of Lesion In Cm: 0 Biopsy Type: H and E Biopsy Method: Personna blade Anesthesia Type: 1% lidocaine with 1:200,000 epinephrine and a 1:10 solution of 8.4% sodium bicarbonate Anesthesia Volume In Cc: 3 Hemostasis: Aluminum Chloride and Thermocautery Wound Care: Petrolatum Dressing: bandage Destruction After The Procedure: No Type Of Destruction Used: Curettage Curettage Text: The wound bed was treated with curettage after the biopsy was performed. Cryotherapy Text: The wound bed was treated with cryotherapy after the biopsy was performed. Electrodesiccation Text: The wound bed was treated with electrodesiccation after the biopsy was performed. Electrodesiccation And Curettage Text: The wound bed was treated with electrodesiccation and curettage after the biopsy was performed. Silver Nitrate Text: The wound bed was treated with silver nitrate after the biopsy was performed. Lab: [id number] Lab Facility: [id number] Consent: Written consent was obtained and risks were reviewed including but not limited to scarring, infection, bleeding, scabbing, incomplete removal, nerve damage and allergy to anesthesia. Post-Care Instructions: I reviewed with the patient in detail post-care instructions. Patient is to keep the biopsy site dry overnight, and then apply bacitracin twice daily until healed. Patient may apply hydrogen peroxide soaks to remove any crusting. Notification Instructions: Patient will be notified of biopsy results. However, patient instructed to call the office if not contacted within 2 weeks. Billing Type: Third-Party Bill Information: Selecting Yes will display possible errors in your note based on the variables you have selected. This validation is only offered as a suggestion for you. PLEASE NOTE THAT THE VALIDATION TEXT WILL BE REMOVED WHEN YOU FINALIZE YOUR NOTE. IF YOU WANT TO FAX A PRELIMINARY NOTE YOU WILL NEED TO TOGGLE THIS TO 'NO' IF YOU DO NOT WANT IT IN YOUR FAXED NOTE. Body Location Override (Optional - Billing Will Still Be Based On Selected Body Map Location If Applicable): right lateral shoulder DISPLAY PLAN FREE TEXT

## 2024-12-06 NOTE — ED PROVIDER NOTE - CHIEF COMPLAINT
History of Present Illness  Chief Complaint   Patient presents with    Right Hand - Pain     Fell on ice 12/4/24  Recent hand surgery w/ Dr. Gonzalez 11/8/24     S/p side: right carpal tunnel release on 11/8/24  She states that about a week ago she woke up and noticed increased bruising and swelling of the back of the hand. Not sure if she slept on it wrong or hit something in the night. She then had a fall on ice and put her hands down to catch herself.  She states that pain from both have improved.  She was wanting to make sure nothing was going on.  Also mild pain palm of hand either side of her incision.     Review of Systems   GENERAL: Negative for malaise, significant weight loss, fever  MUSCULOSKELETAL: see HPI  NEURO:  Negative     Examination  side: right wrist  Healthy incision without erythema, warmth, or drainage   Sensation intact median, ulnar and radial nerve distribution   Ecchymosis dorsal side of hand, mild edema and tenderness in area.  No snuffbox tenderness.  ROM normal of hand and fingers.  Good cap refill    X-rays completed today and reviewed by myself and shows no acute fracture or dislocation. Mild degenerative changes at thumb IP joint.      Assessment:  Patient status post side: right carpal tunnel release, fall on ice     Plan  Much of the symptoms she is having are normal post op pains and healing.  Not sure what caused in the increased posterior hand swelling, but it is improving.  Patient to continue to use right hand to tolerance.  Weight bearing as tolerated.  Wound is healing nicely. Discussed incision care and scar massage.  Discussed wrist splint as needed.  RICE for acute posterior hand pain.  Encouraged ROM of digits, formal OT if any difficulties.  Follow-up: carly Jimenez PA-C  12/06/24           The patient is a 42y Male complaining of congestion.

## 2025-04-16 NOTE — H&P ADULT - SKIN/BREAST
Patient was given time and location to arrive for procedure as documented in Procedure Pass. All medications to be held DOS unless advised otherwise. Patient informed to shower DOS, avoid putting any products on skin, leave valuables at home, and that she will need a  to take her home after surgery and someone to stay with her at home until the next morning. NPO status provided as documented in Procedure Pass. Patient verbalized understanding of the above and all questions were answered.     negative

## 2025-05-02 NOTE — PATIENT PROFILE ADULT. - PAIN SCALE PREFERRED, PROFILE
Initiate Treatment: triamcinolone acetonide 0.5 % topical ointment : Apply twice daily to aa on body prn for itching only\\n\\ntacrolimus 0.03 % topical ointment : Apply twice daily to aa on body prn for flair ups only\\n\\nketoconazole 2 % topical cream : Apply twice daily to aa on body prn for up to one to two weeks only Detail Level: Zone Render In Strict Bullet Format?: No none

## 2025-09-16 ENCOUNTER — NON-APPOINTMENT (OUTPATIENT)
Age: 50
End: 2025-09-16